# Patient Record
Sex: FEMALE | Race: WHITE | NOT HISPANIC OR LATINO | Employment: OTHER | URBAN - METROPOLITAN AREA
[De-identification: names, ages, dates, MRNs, and addresses within clinical notes are randomized per-mention and may not be internally consistent; named-entity substitution may affect disease eponyms.]

---

## 2017-07-07 ENCOUNTER — TELEPHONE (OUTPATIENT)
Dept: FAMILY MEDICINE | Facility: CLINIC | Age: 67
End: 2017-07-07

## 2017-07-07 NOTE — TELEPHONE ENCOUNTER
----- Message from Jodi Christianson sent at 7/7/2017  7:30 AM CDT -----  Contact: radha   Wants to be seen sooner, has many problems that need to be addressed   Past shingles may be causing neuropathy,   nausea, not able to eat,   Call back

## 2017-07-10 ENCOUNTER — LAB VISIT (OUTPATIENT)
Dept: LAB | Facility: HOSPITAL | Age: 67
End: 2017-07-10
Attending: FAMILY MEDICINE
Payer: MEDICARE

## 2017-07-10 ENCOUNTER — OFFICE VISIT (OUTPATIENT)
Dept: FAMILY MEDICINE | Facility: CLINIC | Age: 67
End: 2017-07-10
Payer: MEDICARE

## 2017-07-10 ENCOUNTER — DOCUMENTATION ONLY (OUTPATIENT)
Dept: FAMILY MEDICINE | Facility: CLINIC | Age: 67
End: 2017-07-10

## 2017-07-10 VITALS
OXYGEN SATURATION: 97 % | BODY MASS INDEX: 43.5 KG/M2 | TEMPERATURE: 98 F | HEIGHT: 60 IN | HEART RATE: 88 BPM | SYSTOLIC BLOOD PRESSURE: 162 MMHG | DIASTOLIC BLOOD PRESSURE: 97 MMHG | WEIGHT: 221.56 LBS

## 2017-07-10 DIAGNOSIS — J06.9 UPPER RESPIRATORY TRACT INFECTION, UNSPECIFIED TYPE: ICD-10-CM

## 2017-07-10 DIAGNOSIS — K21.9 GASTROESOPHAGEAL REFLUX DISEASE WITHOUT ESOPHAGITIS: ICD-10-CM

## 2017-07-10 DIAGNOSIS — Z29.9 PREVENTIVE MEASURE: Primary | ICD-10-CM

## 2017-07-10 DIAGNOSIS — Z29.9 PREVENTIVE MEASURE: ICD-10-CM

## 2017-07-10 DIAGNOSIS — L30.9 DERMATITIS: ICD-10-CM

## 2017-07-10 DIAGNOSIS — R94.31 ABNORMAL ECG: ICD-10-CM

## 2017-07-10 DIAGNOSIS — M32.9 SYSTEMIC LUPUS ERYTHEMATOSUS, UNSPECIFIED SLE TYPE, UNSPECIFIED ORGAN INVOLVEMENT STATUS: ICD-10-CM

## 2017-07-10 DIAGNOSIS — E66.9 OBESITY (BMI 35.0-39.9 WITHOUT COMORBIDITY): ICD-10-CM

## 2017-07-10 DIAGNOSIS — E78.5 HYPERLIPIDEMIA, UNSPECIFIED HYPERLIPIDEMIA TYPE: ICD-10-CM

## 2017-07-10 DIAGNOSIS — R11.0 NAUSEA: ICD-10-CM

## 2017-07-10 DIAGNOSIS — E03.9 HYPOTHYROIDISM, UNSPECIFIED TYPE: ICD-10-CM

## 2017-07-10 DIAGNOSIS — R13.10 DYSPHAGIA, UNSPECIFIED TYPE: ICD-10-CM

## 2017-07-10 DIAGNOSIS — R73.03 PREDIABETES: ICD-10-CM

## 2017-07-10 DIAGNOSIS — R94.31 ABNORMAL EKG: ICD-10-CM

## 2017-07-10 LAB
25(OH)D3+25(OH)D2 SERPL-MCNC: 16 NG/ML
ALBUMIN SERPL BCP-MCNC: 3.9 G/DL
ALP SERPL-CCNC: 81 U/L
ALT SERPL W/O P-5'-P-CCNC: 13 U/L
ANION GAP SERPL CALC-SCNC: 9 MMOL/L
AST SERPL-CCNC: 11 U/L
BILIRUB SERPL-MCNC: 0.5 MG/DL
BUN SERPL-MCNC: 13 MG/DL
CALCIUM SERPL-MCNC: 10.2 MG/DL
CHLORIDE SERPL-SCNC: 102 MMOL/L
CO2 SERPL-SCNC: 28 MMOL/L
CREAT SERPL-MCNC: 0.8 MG/DL
EST. GFR  (AFRICAN AMERICAN): >60 ML/MIN/1.73 M^2
EST. GFR  (NON AFRICAN AMERICAN): >60 ML/MIN/1.73 M^2
GLUCOSE SERPL-MCNC: 113 MG/DL
HCV AB SERPL QL IA: NEGATIVE
HIV 1+2 AB+HIV1 P24 AG SERPL QL IA: NEGATIVE
POTASSIUM SERPL-SCNC: 4.4 MMOL/L
PROT SERPL-MCNC: 8.2 G/DL
SODIUM SERPL-SCNC: 139 MMOL/L
T4 FREE SERPL-MCNC: 1.24 NG/DL
TSH SERPL DL<=0.005 MIU/L-ACNC: 11.51 UIU/ML

## 2017-07-10 PROCEDURE — 86592 SYPHILIS TEST NON-TREP QUAL: CPT

## 2017-07-10 PROCEDURE — 99999 PR PBB SHADOW E&M-EST. PATIENT-LVL V: CPT | Mod: PBBFAC,,, | Performed by: FAMILY MEDICINE

## 2017-07-10 PROCEDURE — 99204 OFFICE O/P NEW MOD 45 MIN: CPT | Mod: 25,S$GLB,, | Performed by: FAMILY MEDICINE

## 2017-07-10 PROCEDURE — 82306 VITAMIN D 25 HYDROXY: CPT

## 2017-07-10 PROCEDURE — 93000 ELECTROCARDIOGRAM COMPLETE: CPT | Mod: S$GLB,,, | Performed by: INTERNAL MEDICINE

## 2017-07-10 PROCEDURE — 84439 ASSAY OF FREE THYROXINE: CPT

## 2017-07-10 PROCEDURE — 96372 THER/PROPH/DIAG INJ SC/IM: CPT | Mod: S$GLB,,, | Performed by: FAMILY MEDICINE

## 2017-07-10 PROCEDURE — 80053 COMPREHEN METABOLIC PANEL: CPT

## 2017-07-10 PROCEDURE — 84443 ASSAY THYROID STIM HORMONE: CPT

## 2017-07-10 PROCEDURE — 99499 UNLISTED E&M SERVICE: CPT | Mod: S$GLB,,, | Performed by: FAMILY MEDICINE

## 2017-07-10 PROCEDURE — 36415 COLL VENOUS BLD VENIPUNCTURE: CPT | Mod: PO

## 2017-07-10 PROCEDURE — 86703 HIV-1/HIV-2 1 RESULT ANTBDY: CPT

## 2017-07-10 PROCEDURE — 86803 HEPATITIS C AB TEST: CPT

## 2017-07-10 RX ORDER — ONDANSETRON 4 MG/1
4 TABLET, FILM COATED ORAL EVERY 8 HOURS PRN
COMMUNITY
End: 2017-07-10 | Stop reason: SDUPTHER

## 2017-07-10 RX ORDER — ALBUTEROL SULFATE 90 UG/1
2 AEROSOL, METERED RESPIRATORY (INHALATION) EVERY 6 HOURS PRN
COMMUNITY

## 2017-07-10 RX ORDER — BENZONATATE 100 MG/1
100 CAPSULE ORAL 3 TIMES DAILY PRN
COMMUNITY
End: 2017-09-07

## 2017-07-10 RX ORDER — LEVOTHYROXINE SODIUM 112 UG/1
112 TABLET ORAL DAILY
COMMUNITY
End: 2017-10-03

## 2017-07-10 RX ORDER — OMEPRAZOLE 20 MG/1
20 CAPSULE, DELAYED RELEASE ORAL DAILY
Qty: 90 CAPSULE | Refills: 3 | Status: SHIPPED | OUTPATIENT
Start: 2017-07-10 | End: 2018-12-14 | Stop reason: SDUPTHER

## 2017-07-10 RX ORDER — GABAPENTIN 300 MG/1
300 CAPSULE ORAL 3 TIMES DAILY
COMMUNITY
End: 2017-09-07 | Stop reason: ALTCHOICE

## 2017-07-10 RX ORDER — BENZONATATE 200 MG/1
200 CAPSULE ORAL 3 TIMES DAILY PRN
COMMUNITY
End: 2017-08-03

## 2017-07-10 RX ORDER — ONDANSETRON 4 MG/1
4 TABLET, FILM COATED ORAL EVERY 8 HOURS PRN
Qty: 30 TABLET | Refills: 1 | Status: SHIPPED | OUTPATIENT
Start: 2017-07-10 | End: 2018-01-30 | Stop reason: SDUPTHER

## 2017-07-10 RX ORDER — TRIAMCINOLONE ACETONIDE 1 MG/G
CREAM TOPICAL 2 TIMES DAILY
COMMUNITY

## 2017-07-10 RX ORDER — IBUPROFEN 600 MG/1
600 TABLET ORAL EVERY 6 HOURS PRN
COMMUNITY
End: 2020-03-12

## 2017-07-10 RX ORDER — HYDROXYZINE HYDROCHLORIDE 25 MG/1
25 TABLET, FILM COATED ORAL 3 TIMES DAILY PRN
COMMUNITY
End: 2018-10-09 | Stop reason: ALTCHOICE

## 2017-07-10 RX ORDER — DEXAMETHASONE SODIUM PHOSPHATE 4 MG/ML
4 INJECTION, SOLUTION INTRA-ARTICULAR; INTRALESIONAL; INTRAMUSCULAR; INTRAVENOUS; SOFT TISSUE
Status: COMPLETED | OUTPATIENT
Start: 2017-07-10 | End: 2017-07-10

## 2017-07-10 RX ADMIN — DEXAMETHASONE SODIUM PHOSPHATE 4 MG: 4 INJECTION, SOLUTION INTRA-ARTICULAR; INTRALESIONAL; INTRAMUSCULAR; INTRAVENOUS; SOFT TISSUE at 08:07

## 2017-07-10 NOTE — NURSING
2 Patient identifiers used (name & ). Administered 60 mg Dexamethasone IM. Patient tolerated well. No bleeding at insertion site noted. Pain scale 0/10. Allergies reviewed. Aseptic technique maintained.

## 2017-07-10 NOTE — PROGRESS NOTES
[facility]  Progress Note    Subjective:       Patient ID: Aida Gupta is a 67 y.o. female.    Chief Complaint: Establish Care    HPI67 y.o.female with current medical history of hypothyroidism, GERD, obesity, hyperlipidemia, dysphagia, and prediabetes is here today to establish care.  Patient is currently complaining of congestion and wheezing.  Patient has significant for the past 1 week.  Patient has been taken over-the-counter medications which have not been effective in treating her symptoms.  Patient also has complaints of trouble swallowing.  Patient feels as if food gets stuck in her throat.  Patient also has burning sensation that starts at her stomach and comes up to her throat.  Patient has been taking Zantac which has provided minimal symptom relief.  Patient has not been on PPI in the past.  Patient also complaining of rash near [place].  Patient had shingles and ever since patient has had residual pain.  No further complaints at today's visit.  Review of Systems   Constitutional: Negative for chills, fatigue and fever.   HENT: Positive for congestion. Negative for ear pain, postnasal drip, sinus pressure, sneezing and sore throat.    Eyes: Negative for pain.   Respiratory: Positive for wheezing. Negative for cough and shortness of breath.    Cardiovascular: Negative for chest pain, palpitations and leg swelling.   Gastrointestinal: Negative for abdominal distention, abdominal pain, constipation, diarrhea, nausea and vomiting.   Genitourinary: Negative for difficulty urinating.   Musculoskeletal: Negative for back pain and myalgias.   Skin: Positive for rash.   Neurological: Negative for dizziness, seizures, syncope, weakness and numbness.   Psychiatric/Behavioral: Negative for sleep disturbance. The patient is not nervous/anxious.        Objective:      Vitals:    07/10/17 0707   BP: (!) 162/97   BP Location: Right arm   Patient Position: Sitting   BP Method: Automatic   Pulse: 88    Temp: 97.8 °F (36.6 °C)   TempSrc: Oral   SpO2: 97%   Weight: 100.5 kg (221 lb 9 oz)   Height: 5' (1.524 m)     Body mass index is 43.27 kg/m².  Physical Exam   Constitutional: She is oriented to person, place, and time. She appears well-developed and well-nourished.   HENT:   Head: Normocephalic and atraumatic.   Eyes: Conjunctivae and EOM are normal. Pupils are equal, round, and reactive to light.   Neck: Normal range of motion. Neck supple. No JVD present.   Cardiovascular: Normal rate, regular rhythm, normal heart sounds and intact distal pulses.  Exam reveals no gallop and no friction rub.    No murmur heard.  Pulmonary/Chest: Effort normal. No respiratory distress. She has no wheezes.   Abdominal: Soft. Bowel sounds are normal. There is no tenderness. There is no rebound and no guarding.   Musculoskeletal: Normal range of motion.   Neurological: She is alert and oriented to person, place, and time. No cranial nerve deficit.   Skin: Skin is warm and dry. Rash noted.   erythematous rash [place] near anus    Psychiatric: She has a normal mood and affect. Her behavior is normal. Judgment and thought content normal.   Nursing note and vitals reviewed.      Assessment:       1. Preventive measure    2. Hypothyroidism, unspecified type    3. Gastroesophageal reflux disease without esophagitis    4. Obesity (BMI 35.0-39.9 without comorbidity)    5. Hyperlipidemia, unspecified hyperlipidemia type    6. Prediabetes    7. Dermatitis    8. Dysphagia, unspecified type    9. Upper respiratory tract infection, unspecified type    10. Nausea    11. Abnormal EKG    12. Systemic lupus erythematosus, unspecified SLE type, unspecified organ involvement status        Plan:       Preventive measure  -     Hepatitis C antibody; Future; Expected date: 07/10/2017  -     HIV-1 and HIV-2 antibodies; Future; Expected date: 07/10/2017  -     RPR; Future; Expected date: 07/10/2017    Hypothyroidism, unspecified type  -     TSH; Future;  Expected date: 07/10/2017  -     T4, free; Future; Expected date: 07/10/2017    Gastroesophageal reflux disease without esophagitis  -     Comprehensive metabolic panel; Future; Expected date: 07/10/2017  -     omeprazole (PRILOSEC) 20 MG capsule; Take 1 capsule (20 mg total) by mouth once daily.  Dispense: 90 capsule; Refill: 3    Obesity (BMI 35.0-39.9 without comorbidity)  -     Cancel: Hemoglobin A1c; Future; Expected date: 07/10/2017  -     Cancel: Lipid panel; Future; Expected date: 07/10/2017  -     Vitamin D; Future; Expected date: 07/10/2017    Hyperlipidemia, unspecified hyperlipidemia type        - Follow up in two weeks with blood pressure log         - reassess in risk calculator for possible medication    Prediabetes        - Patient educated on diet and exercise     Dermatitis  -     Ambulatory referral to Dermatology    Dysphagia, unspecified type  -     Ambulatory referral to Gastroenterology    Upper respiratory tract infection, unspecified type  -     dexamethasone injection 4 mg; Inject 1 mL (4 mg total) into the muscle one time.    Nausea  -     ondansetron (ZOFRAN) 4 MG tablet; Take 1 tablet (4 mg total) by mouth every 8 (eight) hours as needed for Nausea.  Dispense: 30 tablet; Refill: 1    Abnormal EKG  -     Ambulatory referral to Cardiology    Systemic lupus erythematosus, unspecified SLE type, unspecified organ involvement status  -     Ambulatory referral to Rheumatology    Elevated Blood pressure        - Follow up in two weeks with blood pressure log    Patient readiness: acceptance and barriers:none    During the course of the visit the patient was educated and counseled about the following:     Obesity:   General weight loss/lifestyle modification strategies discussed (elicit support from others; identify saboteurs; non-food rewards, etc).    Goals: Obesity: Reduce calorie intake and BMI    Did patient meet goals/outcomes: Yes    The following self management tools provided: excercise  log    Patient Instructions (the written plan) was given to the patient/family.     Time spent with patient: 30 minutes        Return in about 3 weeks (around 7/31/2017).  Beni Gutierrez MD  [facility]  7/10/2017 7:40 AM

## 2017-07-10 NOTE — PATIENT INSTRUCTIONS
Weight Management: Getting Started  Healthy bodies come in all shapes and sizes. Not all bodies are made to be thin. For some people, a healthy weight is higher than the average weight listed on weight charts. Your healthcare provider can help you decide on a healthy weight for you.    Reasons to lose weight  Losing weight can help with some health problems, such as high blood pressure, heart disease, diabetes, sleep apnea, and arthritis. You may also feel more energy.  Set your long-term goal  Your goal doesn't even have to be a specific weight. You may decide on a fitness goal (such as being able to walk 10 miles a week), or a health goal (such as lowering your blood pressure). Choose a goal that is measurable and reasonable, so you know when you've reached it. A goal of reaching a BMI of less than 25 is not always reasonable (or possible).   Make an action plan  Habits dont change overnight. Setting your goals too high can leave you feeling discouraged if you cant reach them. Be realistic. Choose one or two small changes you can make now. Set an action plan for how you are going to make these changes. When you can stick to this plan, keep making a few more small changes. Taking small steps will help you stay on the path to success.  Track your progress  Write down your goals. Then, keep a daily record of your progress. Write down what you eat and how active you are. This record lets you look back on how much youve done. It may also help when youre feeling frustrated. Reward yourself for success. Even if you dont reach every goal, give yourself credit for what you do get done.  Get support  Encouragement from others can help make losing weight easier. Ask your family members and friends for support. They may even want to join you. Also look to your healthcare provider, registered dietitian, and  for help. Your local hospital can give you more information about nutrition, exercise, and  weight loss.  Date Last Reviewed: 1/31/2016 © 2000-2016 [employer]. [address] [place] [address], [place] [address]. All rights reserved. This information is not intended as a substitute for professional medical care. Always follow your healthcare professional's instructions.        Walking for Fitness  Fitness walking has something for everyone, even people who are already fit. Walking is one of the safest ways to condition your body aerobically. It can boost energy, help you lose weight, and reduce stress.    Physical benefits  · Walking strengthens your heart and lungs, and tones your muscles.  · When walking, your feet land with less impact than in other sports. This reduces chances of muscle, bone, and joint injury.  · Regular walking improves your cholesterol levels and lowers your risk of heart disease. And it helps you control your blood sugar if you have diabetes.  · Walking is a weight-bearing activity, which helps maintain bone density. This can help prevent osteoporosis.  Personal rewards  · Taking walks can help you relax and manage stress. And fitness walking may make you feel better about yourself.  · Walking can help you sleep better at night and make you less likely to be depressed.  · Regular walking may help maintain your memory as you get older.  · Walking is a great way to spend extra time with friends and family members. Be sure to invite your dog along!  Q&A about fitness walking  Q: Will walking keep me fit?  A: Yes. Regular walking at the right pace gives you all the benefits of other aerobic activities, such as jogging and swimming.  Q: Will walking help me lose weight and keep it off?  A: Yes. Per mile, walking can burn as many calories as jogging. Your health care provider can help work walking into your weight-loss plan.  Q: Is walking safe for my health?  A: Yes. Walking is safe if you have high blood pressure, diabetes, heart disease, or other conditions. Talk to your health  care provider before you start.  Date Last Reviewed: 5/9/2015  © 3820-1236 [employer]. [address] [place] [address], [place] [address]. All rights reserved. This information is not intended as a substitute for professional medical care. Always follow your healthcare professional's instructions.        Controlling High Blood Pressure  High blood pressure (hypertension) is often called the silent killer. This is because many people who have it dont know it. High blood pressure is defined as 140/90 mm Hg or higher. Know your blood pressure and remember to check it regularly. Doing so can save your life. Here are some things you can do to help control your blood pressure.    Choose heart-healthy foods  · Select low-salt, low-fat foods. Limit sodium intake to 2,400 mg per day or the amount suggested by your healthcare provider.  · Limit canned, dried, cured, packaged, and fast foods. These can contain a lot of salt.  · Eat 8 to 10 servings of fruits and vegetables every day.  · Choose lean meats, fish, or chicken.  · Eat whole-grain pasta, brown rice, and beans.  · Eat 2 to 3 servings of low-fat or fat-free dairy products.  · Ask your doctor about the DASH eating plan. This plan helps reduce blood pressure.  · When you go to a restaurant, ask that your meal be prepared with no added salt.  Maintain a healthy weight  · Ask your healthcare provider how many calories to eat a day. Then stick to that number.  · Ask your healthcare provider what weight range is healthiest for you. If you are overweight, a weight loss of only 3% to 5% of your body weight can help lower blood pressure. Generally, a good weight loss goal is to lose 10% of your body weight in a year.  · Limit snacks and sweets.  · Get regular exercise.  Get up and get active  · Choose activities you enjoy. Find ones you can do with friends or family. This includes bicycling, dancing, walking, and jogging.  · Park farther away from building  entrances.  · Use stairs instead of the elevator.  · When you can, walk or bike instead of driving.  · [place] leaves, garden, or do household repairs.  · Be active at a moderate to vigorous level of physical activity for at least 40 minutes for a minimum of 3 to 4 days a week.   Manage stress  · Make time to relax and enjoy life. Find time to laugh.  · Communicate your concerns with your loved ones and your healthcare provider.  · Visit with family and friends, and keep up with hobbies.  Limit alcohol and quit smoking  · Men should have no more than 2 drinks per day.  · Women should have no more than 1 drink per day.  · Talk with your healthcare provider about quitting smoking. Smoking significantly increases your risk for heart disease and stroke. Ask your healthcare provider about community smoking cessation programs and other options.  Medicines  If lifestyle changes arent enough, your healthcare provider may prescribe high blood pressure medicine. Take all medicines as prescribed. If you have any questions about your medicines, ask your healthcare provider before stopping or changing them.   Date Last Reviewed: 4/27/2016 © 2000-2016 [employer]. [address] [place] [address], [place] [address]. All rights reserved. This information is not intended as a substitute for professional medical care. Always follow your healthcare professional's instructions.

## 2017-07-10 NOTE — PROGRESS NOTES
Pre-Visit Chart Review  For Appointment Scheduled on 7-    Health Maintenance Due   Topic Date Due    Hepatitis C Screening  1950    Lipid Panel  1950    TETANUS VACCINE  06/10/1970    Mammogram  06/10/1992    DEXA SCAN  06/10/1992    Colonoscopy  06/10/2002    Zoster Vaccine  06/10/2012    Pneumococcal (65+) (1 of 2 - PCV13) 06/10/2017

## 2017-07-11 LAB — RPR SER QL: NORMAL

## 2017-07-14 DIAGNOSIS — R94.31 ABNORMAL ECG: Primary | ICD-10-CM

## 2017-07-20 ENCOUNTER — TELEPHONE (OUTPATIENT)
Dept: FAMILY MEDICINE | Facility: CLINIC | Age: 67
End: 2017-07-20

## 2017-07-25 ENCOUNTER — DOCUMENTATION ONLY (OUTPATIENT)
Dept: FAMILY MEDICINE | Facility: CLINIC | Age: 67
End: 2017-07-25

## 2017-07-25 NOTE — PROGRESS NOTES
Pre-Visit Chart Review  For Appointment Scheduled on 8/3/17.    Health Maintenance Due   Topic Date Due    Lipid Panel  1950    TETANUS VACCINE  06/10/1970    Mammogram  06/10/1992    DEXA SCAN  06/10/1992    Colonoscopy  06/10/2002    Zoster Vaccine  06/10/2012    Pneumococcal (65+) (1 of 2 - PCV13) 06/10/2017

## 2017-07-28 ENCOUNTER — OFFICE VISIT (OUTPATIENT)
Dept: CARDIOLOGY | Facility: CLINIC | Age: 67
End: 2017-07-28
Payer: MEDICARE

## 2017-07-28 VITALS
BODY MASS INDEX: 43.93 KG/M2 | OXYGEN SATURATION: 96 % | HEART RATE: 82 BPM | WEIGHT: 223.75 LBS | SYSTOLIC BLOOD PRESSURE: 159 MMHG | DIASTOLIC BLOOD PRESSURE: 14 MMHG | HEIGHT: 60 IN

## 2017-07-28 DIAGNOSIS — R06.00 DYSPNEA, UNSPECIFIED TYPE: ICD-10-CM

## 2017-07-28 DIAGNOSIS — E66.9 OBESITY (BMI 35.0-39.9 WITHOUT COMORBIDITY): ICD-10-CM

## 2017-07-28 DIAGNOSIS — R94.31 ABNORMAL ECG: Primary | ICD-10-CM

## 2017-07-28 DIAGNOSIS — R94.31 ABNORMAL ECG: ICD-10-CM

## 2017-07-28 PROCEDURE — 99499 UNLISTED E&M SERVICE: CPT | Mod: S$GLB,,, | Performed by: INTERNAL MEDICINE

## 2017-07-28 PROCEDURE — 99204 OFFICE O/P NEW MOD 45 MIN: CPT | Mod: S$GLB,,, | Performed by: INTERNAL MEDICINE

## 2017-07-28 PROCEDURE — 99999 PR PBB SHADOW E&M-EST. PATIENT-LVL V: CPT | Mod: PBBFAC,,, | Performed by: INTERNAL MEDICINE

## 2017-07-28 NOTE — PROGRESS NOTES
[facility]    CC: Abnormal EKG  Chief Complaint   Patient presents with    Consult     Dr. Gutierrez referral; Abnormal EKG       Patient ID: Aida Gupta is a 67 y.o. female .  HPI  She was referred with an abnormal EKG.  EKG shows incomplete right bundle-branch block.  SHe has some chest pain which is related to food.  It is mostly when she tries to swallow her food.  She also is suffering from and from shingles.  In addition she has some lab work and pending rheumatology consultation for possible lupus.  She does complain of occasional shortness of breath..    No past medical history on file.  No past surgical history on file.  Social History     Social History    Marital status:      Spouse name: N/A    Number of children: N/A    Years of education: N/A     Occupational History    Not on file.     Social History Main Topics    Smoking status: Never Smoker    Smokeless tobacco: Never Used    Alcohol use Not on file    Drug use: Unknown    Sexual activity: Not on file     Other Topics Concern    Not on file     Social History Narrative    No narrative on file     No family history on file.    Review of patient's allergies indicates:  No Known Allergies    Medication List with Changes/Refills   Current Medications    ALBUTEROL (VENTOLIN HFA) 90 MCG/ACTUATION INHALER    Inhale 2 puffs into the lungs every 6 (six) hours as needed for Wheezing. Rescue    BENZONATATE (TESSALON) 100 MG CAPSULE    Take 100 mg by mouth 3 (three) times daily as needed for Cough.    BENZONATATE (TESSALON) 200 MG CAPSULE    Take 200 mg by mouth 3 (three) times daily as needed for Cough.    FLUTICASONE PROPIONATE, MICRO (FLUTICASONE PROP, MICRO, BULK, MISC)    by Misc.(Non-Drug; Combo Route) route.    GABAPENTIN (NEURONTIN) 300 MG CAPSULE    Take 300 mg by mouth 3 (three) times daily.    HYDROXYZINE HCL (ATARAX) 25 MG TABLET    Take 25 mg by mouth 3 (three) times daily as needed for Itching.    IBUPROFEN  (ADVIL,MOTRIN) 600 MG TABLET    Take 600 mg by mouth every 6 (six) hours as needed for Pain.    LEVOTHYROXINE (SYNTHROID) 112 MCG TABLET    Take 112 mcg by mouth once daily.    LIDOCAINE 4 % GEL    Apply topically.    OMEPRAZOLE (PRILOSEC) 20 MG CAPSULE    Take 1 capsule (20 mg total) by mouth once daily.    ONDANSETRON (ZOFRAN) 4 MG TABLET    Take 1 tablet (4 mg total) by mouth every 8 (eight) hours as needed for Nausea.    TRIAMCINOLONE ACETONIDE 0.1% (KENALOG) 0.1 % CREAM    Apply topically 2 (two) times daily.       Review of Systems  Constitution: Denies chills, fever, and sweats.  HENT: Denies headaches or blurry vision.  Cardiovascular: Denies chest pain or irregular heart beat.  Respiratory:  shortness of breath.  Gastrointestinal: Denies abdominal pain, nausea, or vomiting.  Musculoskeletal: Denies muscle cramps.  Neurological: Denies dizziness or focal weakness.  Psychiatric/Behavioral: Normal mental status.  Hematologic/Lymphatic: Denies bleeding problem or easy bruising/bleeding.  Skin: Denies rash or suspicious lesions    Physical Examination  BP (!) 159/14 (BP Location: Right arm, Patient Position: Sitting)   Pulse 82   Ht 5' (1.524 m)   Wt 101.5 kg (223 lb 12.3 oz)   SpO2 96%   BMI 43.70 kg/m²     Constitutional: No acute distress, conversant  HEENT: Sclera anicteric, Pupils equal, round and reactive to light, extraocular motions intact, Oropharynx clear  Neck: No JVD, no carotid bruits  Cardiovascular: regular rate and rhythm, no murmur, rubs or gallops, normal S1/S2  Pulmonary: Clear to auscultation bilaterally  Abdominal: Abdomen soft, nontender, nondistended, positive bowel sounds  Extremities: No lower extremity edema,   Pulses:  Carotid pulses are 2+ on the right side, and 2+ on the left side.  Radial pulses are 2+ on the right side, and 2+ on the left side.   Femoral pulses are 2+ on the right side, and 2+ on the left side.      Skin: No ecchymosis, erythema, or ulcers  Psych: Alert and  oriented x 3, appropriate affect  Neuro: CNII-XII intact, no focal deficits    Labs:  Most Recent Data  CBC: No results found for: WBC, HGB, HCT, PLT, MCV, RDW  BMP:   Lab Results   Component Value Date     07/10/2017    K 4.4 07/10/2017     07/10/2017    CO2 28 07/10/2017    BUN 13 07/10/2017    CREATININE 0.8 07/10/2017     (H) 07/10/2017    CALCIUM 10.2 07/10/2017     LFTS;   Lab Results   Component Value Date    PROT 8.2 07/10/2017    ALBUMIN 3.9 07/10/2017    BILITOT 0.5 07/10/2017    AST 11 07/10/2017    ALKPHOS 81 07/10/2017    ALT 13 07/10/2017     COAGS: No results found for: INR, PROTIME, PTT  FLP: No results found for: CHOL, HDL, LDLCALC, TRIG, CHOLHDL  CARDIAC: No results found for: TROPONINI, CKMB, BNP    Imaging:    EKG: Normal sinus rhythm and complete right bundle-branch block.      I have personally reviewed these images and echo data    Assessment/Plan:  Aida was seen today for consult.    Diagnoses and all orders for this visit:    Abnormal ECG  -     EKG 12-lead    Dyspnea, unspecified type    Obesity (BMI 35.0-39.9 without comorbidity)       Incomplete right bundle branch block is A benign finding in absence of other symptoms.  We would do an baseline echocardiography for evaluation of cardiovascular hemodynamics.  We'll see her back with the results of the above.  Recommendations regarding healthy lifestyle, weight loss and exercise given to patient.      Total duration of face to face visit time 45 minutes.  Total time spent counseling greater than fifty percent of total visit time.  Counseling included discussion regarding imaging findings, diagnosis, possibilities, treatment options, risks and benefits.  The patient had many questions regarding the options and long-term effect which were all answered to my best ability.      Abdiel Avalos MD,MRCP,RPVI,FACC,FSCAI.  Interventional Cardiology   Phone [phone]

## 2017-07-28 NOTE — LETTER
July 28, 2017      Beni Gutierrez MD  [address]  [place] [address]           [place] [facility] Cardiology  [address]  [place] [address]  Phone: [phone]          Patient: Aida Gupta   MR Number: [id number]   YOB: 1950   Date of Visit: 7/28/2017       Dear Dr. Beni Gutierrez:    Thank you for referring Aida Gupta to me for evaluation. Attached you will find relevant portions of my assessment and plan of care.    If you have questions, please do not hesitate to call me. I look forward to following Aida Gupta along with you.    Sincerely,    Abdiel Avalos MD    Enclosure  CC:  No Recipients    If you would like to receive this communication electronically, please contact [email or web address] or [phone] to request more information on [employer] access.    For providers and/or their staff who would like to refer a patient to [facility], please contact us through our one-stop-shop provider referral line, [facility], at [phone].    If you feel you have received this communication in error or would no longer like to receive these types of communications, please e-mail [email or web address]

## 2017-08-03 ENCOUNTER — HOSPITAL ENCOUNTER (OUTPATIENT)
Dept: RADIOLOGY | Facility: CLINIC | Age: 67
Discharge: HOME | End: 2017-08-03
Attending: FAMILY MEDICINE
Payer: MEDICARE

## 2017-08-03 ENCOUNTER — OFFICE VISIT (OUTPATIENT)
Dept: FAMILY MEDICINE | Facility: CLINIC | Age: 67
End: 2017-08-03
Payer: MEDICARE

## 2017-08-03 VITALS
DIASTOLIC BLOOD PRESSURE: 98 MMHG | SYSTOLIC BLOOD PRESSURE: 156 MMHG | HEIGHT: 60 IN | BODY MASS INDEX: 43.76 KG/M2 | WEIGHT: 222.88 LBS

## 2017-08-03 DIAGNOSIS — E03.9 HYPOTHYROIDISM, UNSPECIFIED TYPE: ICD-10-CM

## 2017-08-03 DIAGNOSIS — B37.9 YEAST INFECTION: ICD-10-CM

## 2017-08-03 DIAGNOSIS — R05.9 COUGH: ICD-10-CM

## 2017-08-03 DIAGNOSIS — J32.9 SINUSITIS, UNSPECIFIED CHRONICITY, UNSPECIFIED LOCATION: Primary | ICD-10-CM

## 2017-08-03 DIAGNOSIS — E55.9 VITAMIN D DEFICIENCY: ICD-10-CM

## 2017-08-03 DIAGNOSIS — R73.9 HYPERGLYCEMIA: ICD-10-CM

## 2017-08-03 PROCEDURE — 96372 THER/PROPH/DIAG INJ SC/IM: CPT | Mod: S$GLB,,, | Performed by: FAMILY MEDICINE

## 2017-08-03 PROCEDURE — 99499 UNLISTED E&M SERVICE: CPT | Mod: S$GLB,,, | Performed by: FAMILY MEDICINE

## 2017-08-03 PROCEDURE — 99214 OFFICE O/P EST MOD 30 MIN: CPT | Mod: 25,S$GLB,, | Performed by: FAMILY MEDICINE

## 2017-08-03 PROCEDURE — 99999 PR PBB SHADOW E&M-EST. PATIENT-LVL III: CPT | Mod: PBBFAC,,, | Performed by: FAMILY MEDICINE

## 2017-08-03 PROCEDURE — 71020 XR CHEST PA AND LATERAL: CPT | Mod: 26,,, | Performed by: RADIOLOGY

## 2017-08-03 PROCEDURE — 3008F BODY MASS INDEX DOCD: CPT | Mod: S$GLB,,, | Performed by: FAMILY MEDICINE

## 2017-08-03 PROCEDURE — 71020 XR CHEST PA AND LATERAL: CPT | Mod: TC,PO

## 2017-08-03 RX ORDER — AMOXICILLIN AND CLAVULANATE POTASSIUM 875; 125 MG/1; MG/1
1 TABLET, FILM COATED ORAL 2 TIMES DAILY
Qty: 20 TABLET | Refills: 0 | Status: SHIPPED | OUTPATIENT
Start: 2017-08-03 | End: 2017-09-07

## 2017-08-03 RX ORDER — CODEINE PHOSPHATE AND GUAIFENESIN 10; 100 MG/5ML; MG/5ML
5 SOLUTION ORAL 3 TIMES DAILY PRN
Qty: 118 ML | Refills: 0 | Status: SHIPPED | OUTPATIENT
Start: 2017-08-03 | End: 2017-08-13

## 2017-08-03 RX ORDER — ERGOCALCIFEROL 1.25 MG/1
50000 CAPSULE ORAL
Qty: 12 CAPSULE | Refills: 0 | Status: SHIPPED | OUTPATIENT
Start: 2017-08-03 | End: 2018-01-30

## 2017-08-03 RX ORDER — NYSTATIN 100000 U/G
CREAM TOPICAL 2 TIMES DAILY
Qty: 30 G | Refills: 1 | Status: SHIPPED | OUTPATIENT
Start: 2017-08-03 | End: 2019-06-20

## 2017-08-03 RX ORDER — DEXAMETHASONE SODIUM PHOSPHATE 4 MG/ML
4 INJECTION, SOLUTION INTRA-ARTICULAR; INTRALESIONAL; INTRAMUSCULAR; INTRAVENOUS; SOFT TISSUE
Status: COMPLETED | OUTPATIENT
Start: 2017-08-03 | End: 2017-08-03

## 2017-08-03 RX ADMIN — DEXAMETHASONE SODIUM PHOSPHATE 4 MG: 4 INJECTION, SOLUTION INTRA-ARTICULAR; INTRALESIONAL; INTRAMUSCULAR; INTRAVENOUS; SOFT TISSUE at 11:08

## 2017-08-03 NOTE — PROGRESS NOTES
Injection given; RUO; patient tolerates well; Bandaid applied; Patient asked to wait 15minutes in lobby to monitor any reaction. Pain level: 0/10

## 2017-08-04 NOTE — PROGRESS NOTES
[facility]  Progress Note    Subjective:       Patient ID: Aida Gupta is a 67 y.o. female.    Chief Complaint: sinus congestion     HPI67 y.o.female is here today for abnormal lab follow-up.  Patient was found to have elevated TSH and low vitamin D.  Patient was not compliant with Synthroid.  Patient has started to her Synthroid once again.  Patient has not been on vitamin D supplementation in the past.  Patient is also complaining of sinus congestion, sinus drainage, and sinus pressure.  Patient has had symptoms ongoing for the past 1 week.  Symptoms have been progressing and severity.  Patient has been using over-the-counter medication which have not been effective in controlling her symptoms.  Review of Systems   Constitutional: Negative for chills, fatigue and fever.   HENT: Negative for congestion, ear pain, postnasal drip, sinus pressure, sneezing and sore throat.    Eyes: Negative for pain.   Respiratory: Negative for cough, shortness of breath and wheezing.    Cardiovascular: Negative for chest pain, palpitations and leg swelling.   Gastrointestinal: Negative for abdominal distention, abdominal pain, constipation, diarrhea, nausea and vomiting.   Genitourinary: Negative for difficulty urinating.   Musculoskeletal: Negative for back pain and myalgias.   Skin: Negative for rash.   Neurological: Negative for dizziness, seizures, syncope, weakness and numbness.   Psychiatric/Behavioral: Negative for sleep disturbance. The patient is not nervous/anxious.        Objective:      Vitals:    08/03/17 1055 08/03/17 1101   BP: (!) 160/94  Comment: AUTOMATED (!) 156/98  Comment: MANUAL   BP Location: Left arm Right arm   Patient Position: Sitting Sitting   Weight:  101.1 kg (222 lb 14.2 oz)   Height:  5' (1.524 m)     Body mass index is 43.53 kg/m².  Physical Exam   Constitutional: She is oriented to person, place, and time. She appears well-developed and well-nourished.   HENT:   Head: Normocephalic  and atraumatic.   Eyes: Conjunctivae and EOM are normal. Pupils are equal, round, and reactive to light.   Neck: Normal range of motion. Neck supple. No JVD present.   Cardiovascular: Normal rate, regular rhythm, normal heart sounds and intact distal pulses.  Exam reveals no gallop and no friction rub.    No murmur heard.  Pulmonary/Chest: Effort normal. No respiratory distress. She has no wheezes.   Abdominal: Soft. Bowel sounds are normal. There is no tenderness. There is no rebound and no guarding.   Musculoskeletal: Normal range of motion.   Neurological: She is alert and oriented to person, place, and time. No cranial nerve deficit.   Skin: Skin is warm and dry.   Psychiatric: She has a normal mood and affect. Her behavior is normal. Judgment and thought content normal.   Nursing note and vitals reviewed.      Assessment:       1. Sinusitis, unspecified chronicity, unspecified location    2. Hyperglycemia    3. Vitamin D deficiency    4. Hypothyroidism, unspecified type    5. Cough    6. Yeast infection        Plan:       Sinusitis, unspecified chronicity, unspecified location  -     amoxicillin-clavulanate 875-125mg (AUGMENTIN) 875-125 mg per tablet; Take 1 tablet by mouth 2 (two) times daily.  Dispense: 20 tablet; Refill: 0  -     dexamethasone injection 4 mg; Inject 1 mL (4 mg total) into the muscle one time.    Hyperglycemia  -     Hemoglobin A1c; Future; Expected date: 08/03/2017    Vitamin D deficiency  -     ergocalciferol (ERGOCALCIFEROL) 50,000 unit Cap; Take 1 capsule (50,000 Units total) by mouth every 7 days.  Dispense: 12 capsule; Refill: 0    Hypothyroidism, unspecified type  -     T4, free; Future; Expected date: 08/03/2017  -     TSH; Future; Expected date: 08/03/2017    Cough  -     guaifenesin-codeine 100-10 mg/5 ml (TUSSI-ORGANIDIN NR)  mg/5 mL syrup; Take 5 mLs by mouth 3 (three) times daily as needed for Cough.  Dispense: 118 mL; Refill: 0  -     X-Ray Chest PA And Lateral; Future;  Expected date: 08/03/2017    Yeast infection  -     nystatin (MYCOSTATIN) cream; Apply topically 2 (two) times daily.  Dispense: 30 g; Refill: 1      Return in about 4 weeks (around 8/31/2017).  Beni Gutierrez MD  [facility]  8/4/2017 1:22 PM

## 2017-08-07 ENCOUNTER — OFFICE VISIT (OUTPATIENT)
Dept: RHEUMATOLOGY | Facility: CLINIC | Age: 67
End: 2017-08-07
Payer: MEDICARE

## 2017-08-07 VITALS
DIASTOLIC BLOOD PRESSURE: 84 MMHG | HEIGHT: 61 IN | SYSTOLIC BLOOD PRESSURE: 138 MMHG | WEIGHT: 224 LBS | BODY MASS INDEX: 42.29 KG/M2

## 2017-08-07 DIAGNOSIS — M15.9 OSTEOARTHRITIS, GENERALIZED: ICD-10-CM

## 2017-08-07 DIAGNOSIS — R76.8 POSITIVE ANA (ANTINUCLEAR ANTIBODY): Primary | ICD-10-CM

## 2017-08-07 PROCEDURE — 3008F BODY MASS INDEX DOCD: CPT | Mod: ,,, | Performed by: INTERNAL MEDICINE

## 2017-08-07 PROCEDURE — 99204 OFFICE O/P NEW MOD 45 MIN: CPT | Mod: ,,, | Performed by: INTERNAL MEDICINE

## 2017-08-07 NOTE — PROGRESS NOTES
[facility] RHEUMATOLOGY        NEW PATIENT      Subjective:       Patient ID:   NAME: Aida Gupta : 1950     67 y.o. female    Referring Doc: No ref. provider found  Other Physicians:    Chief Complaint:  new pt. (abnormal blood results)      History of Present Illness:     New patient referred for positive JAVY,pos ds DNA .Tests were done in March due to fatigue for the last year.  Interrupted sleep due to generalized pain in legs.Loud snoring,has never had sleep study  No rashes,no photosensitivity  Dry eyes for yrs.No chronic  dryness of mouth,  No mucosal ulcerations.Hair thinning for 1 yr.  No Raynaud's.            ROS:   GEN: no fevers+ night sweats no  significant weight changes  +  fatigue  HEENT: +,from neck to occiput HA's, changes in vision , no mouth ulcers,  no scalp tenderness, jaw claudication  CV: no CP, SOB, PND,+ MCKEON ,no  orthopnea,no palpitations  PULM:no SOB, occ cough from allergiesNo hemoptysis, sputum or pleuritic pain  GI: no abdominal pain,+ nausea after eating vomiting,+ constipation, diarrhea, melanotic stools, BRBPR, or hematemesis, no dysphagia  : no hematuria, dysuria  NEURO:no paresthesias,+ headaches, visual disturbances, muscle weakness  SKIN:  no rashes , erythema, bruising, or swelling, no Raynauds, no photosensitivity  MUSCULOSKELETAL:no joint swelling, no prolonged AM stiffness, occ back pain   PSYCH:    Insomnia,   depression,  anxiety  RENAL: no hx proteinuria,hematuria  HEMATOLOGY; no hematologic disorders,thromboembolic events or miscarriages  Medications:    Current Outpatient Prescriptions:     albuterol (VENTOLIN HFA) 90 mcg/actuation inhaler, Inhale 2 puffs into the lungs every 6 (six) hours as needed for Wheezing. Rescue, Disp: , Rfl:     amoxicillin-clavulanate 875-125mg (AUGMENTIN) 875-125 mg per tablet, Take 1 tablet by mouth 2 (two) times daily., Disp: 20 tablet, Rfl: 0    benzonatate (TESSALON) 100 MG capsule, Take 100 mg by mouth 3 (three)  "times daily as needed for Cough., Disp: , Rfl:     ergocalciferol (ERGOCALCIFEROL) 50,000 unit Cap, Take 1 capsule (50,000 Units total) by mouth every 7 days., Disp: 12 capsule, Rfl: 0    FLUTICASONE PROPIONATE, MICRO (FLUTICASONE PROP, MICRO, BULK, MISC), by Misc.(Non-Drug; Combo Route) route., Disp: , Rfl:     gabapentin (NEURONTIN) 300 MG capsule, Take 300 mg by mouth 3 (three) times daily., Disp: , Rfl:     guaifenesin-codeine 100-10 mg/5 ml (TUSSI-ORGANIDIN NR)  mg/5 mL syrup, Take 5 mLs by mouth 3 (three) times daily as needed for Cough., Disp: 118 mL, Rfl: 0    hydrOXYzine HCl (ATARAX) 25 MG tablet, Take 25 mg by mouth 3 (three) times daily as needed for Itching., Disp: , Rfl:     ibuprofen (ADVIL,MOTRIN) 600 MG tablet, Take 600 mg by mouth every 6 (six) hours as needed for Pain., Disp: , Rfl:     levothyroxine (SYNTHROID) 112 MCG tablet, Take 112 mcg by mouth once daily., Disp: , Rfl:     nystatin (MYCOSTATIN) cream, Apply topically 2 (two) times daily., Disp: 30 g, Rfl: 1    omeprazole (PRILOSEC) 20 MG capsule, Take 1 capsule (20 mg total) by mouth once daily., Disp: 90 capsule, Rfl: 3    ondansetron (ZOFRAN) 4 MG tablet, Take 1 tablet (4 mg total) by mouth every 8 (eight) hours as needed for Nausea., Disp: 30 tablet, Rfl: 1    triamcinolone acetonide 0.1% (KENALOG) 0.1 % cream, Apply topically 2 (two) times daily., Disp: , Rfl:         FAMILY HISTORY: negative for Connective Tissue Disease except possibly mother with Rheumatoid Arthritis      PAST MEDICAL HISTORY:Hypothyroidism,GERD,Shingles one yr ago    PAST SURGICAL HISTORY:partial colectomy due to diverticulitis;cholecystectomy    SOCIAL HISTORY:Lives with     ALLERGIES:NKDA          Objective:     Vitals:  Blood pressure 138/84, height 5' 1" (1.549 m), weight 101.6 kg (224 lb).    Physical Examination:   GEN: no apparent distress, comfortable; AAOx3  SKIN: no rashes, no lesions, no sclerodactyly or induration, no Raynaud's, no " periungual erythema  HEAD: normal  EYES: no pallor, no icterus, PERRLA  ENT:  no thrush,no mucosal dryness or ulcerations  NECK: no masses, thyroid normal, trachea midline, no LAD/LN's, supple  CV:   S1 and S2 regular, no murmurs, gallop or rubs  CHEST: Normal respiratory effort;  normal breath sounds; no rubs, no wheezes, no crackles.   ABDOM: nontender and nondistended; soft; ; no rebound/guarding,no masses  MUSC/Skeletal: ROM normal; no crepitus; joints without synovitis, no deformities   EXTREM: no clubbing, cyanosis, edema, normal pulses.  NEURO: grossly intact; motorWNL; AAOx3; no tremors  PSYCH: normal mood, affect and behavior  LYMPH: normal cervical, supraclavicular            Labs:   @RESUFAST(WBC,HGB,HCT,MCV,PLT)  )@RESUFAST(NA,K,CL,CO2,GLU,BUN,Creatinine,Calcium,PROT,Albumin,Bilitot,Alkphos,AST,ALT,JAVY,Sed Rate,CRP,RF,CCP)      Radiology/Diagnostic Studies:    I have reviewed all available labs and XRay reports    Assessment/Plan:   67 y.o. female with positive JAVY,ds DNA.No other sxs or signs of CTD at this time  Labs  FU in 3 wks      PLAN:          Discussion:     I have explained all of the above in detail and the patient understands all of the current recommendation(s). I have answered all of their questions to the best of my ability and to their complete satisfaction.      I have reviewed the risks and benefits of the medication in detail with patient, who understands and wishes to proceed. Printed information regarding the disease and/or medication was also provided.        RTC 3 wks        Electronically signed by Cheri Serrato MD

## 2017-08-07 NOTE — PATIENT INSTRUCTIONS
Lupus (Systemic Lupus Erythematosus, SLE)  Lupus is a chronic (long-term) disease. It causes inflammation (swelling) in the body. It mainly affects the joints and muscles. Other parts of the body, such as the skin, kidneys, and heart may also be affected. Lupus is an autoimmune disease. This means that immune cells in the body begin attacking normal body cells. The cause of this is not known.  Common symptoms include:  · A butterfly-shaped rash across the bridge of the nose and cheeks or a disk-shaped rash on the face, neck, or chest  · Sun sensitivity (a short time in the sun may lead to severe sunburn or rash)  · Stiff, painful, or swollen joints (arthritis)  · Fatigue or depression  · Weight gain or weight loss  · Fever  Your healthcare provider may prescribe medicines such as oral steroids or medicines to suppress the immune system. Some people benefit from anti-malarial medicines as well. People with lupus are more likely to have heart disease. So, it is vital to manage other risk factors for heart disease. These include high blood pressure, smoking, and unhealthy cholesterol.   There is no cure for lupus. With good care, though, most people with the condition lead normal, active lives.   Home care  · If you were prescribed a medicine, take it as directed.  · Unless another pain medicine was prescribed, take an over-the-counter pain medicine such as acetaminophen or ibuprofen for pain. Do not take ibuprofen or other NSAID (non-steroidal anti-inflammatory) medicine if you were prescribed prednisone.  · Avoid sun exposure. Cover up with clothing. Wear sunglasses. Use sun screen (at least SPF 15).  · Get enough rest and reduce stress to help your immune system.  · Get some physical activity every day. This will help you feel your best.  · If you have high blood pressure, consider buying an automatic blood pressure machine (available at most pharmacies). Use this to monitor your blood pressure and report to your  doctor.  · Limit alcohol intake. Eat a healthy, balanced diet low in fat and cholesterol.  · If you smoke, quit. Smoking increases the risk of lupus-related complications.  Follow-up care  Follow up with your healthcare provider or as advised by our staff.  For more information contact the Lupus Foundation at [phone]  www.lupus.org  When to seek medical advice  Call your healthcare provider for any of the following:  · Increasing weakness, fainting  · Chest pain or shortness of breath or pain with breathing  · Severe headache with fever  · Seizures  · Leg swelling, redness or tenderness (sign of blood clot)  · Unusual bruising or bleeding anywhere on your body  · Blood in your stool (black or red color)  · Abdominal pain, repeated vomiting  Date Last Reviewed: 5/14/2015  © 8003-4485 [employer]. [address] [place] [address], [place] [address]. All rights reserved. This information is not intended as a substitute for professional medical care. Always follow your healthcare professional's instructions.

## 2017-08-11 ENCOUNTER — INITIAL CONSULT (OUTPATIENT)
Dept: GASTROENTEROLOGY | Facility: CLINIC | Age: 67
End: 2017-08-11
Payer: MEDICARE

## 2017-08-11 VITALS
WEIGHT: 225.31 LBS | BODY MASS INDEX: 44.23 KG/M2 | HEIGHT: 60 IN | HEART RATE: 77 BPM | RESPIRATION RATE: 20 BRPM | DIASTOLIC BLOOD PRESSURE: 92 MMHG | SYSTOLIC BLOOD PRESSURE: 151 MMHG

## 2017-08-11 DIAGNOSIS — R13.10 DYSPHAGIA, UNSPECIFIED TYPE: Primary | ICD-10-CM

## 2017-08-11 DIAGNOSIS — R12 HEARTBURN: ICD-10-CM

## 2017-08-11 DIAGNOSIS — R53.83 FATIGUE, UNSPECIFIED TYPE: ICD-10-CM

## 2017-08-11 DIAGNOSIS — Z87.19 HISTORY OF DIVERTICULITIS: ICD-10-CM

## 2017-08-11 PROCEDURE — 99205 OFFICE O/P NEW HI 60 MIN: CPT | Mod: S$GLB,,, | Performed by: INTERNAL MEDICINE

## 2017-08-11 PROCEDURE — 99999 PR PBB SHADOW E&M-EST. PATIENT-LVL III: CPT | Mod: PBBFAC,,, | Performed by: INTERNAL MEDICINE

## 2017-08-11 PROCEDURE — 99499 UNLISTED E&M SERVICE: CPT | Mod: S$GLB,,, | Performed by: INTERNAL MEDICINE

## 2017-08-11 PROCEDURE — 3008F BODY MASS INDEX DOCD: CPT | Mod: S$GLB,,, | Performed by: INTERNAL MEDICINE

## 2017-08-11 RX ORDER — MIRTAZAPINE 15 MG/1
15 TABLET, FILM COATED ORAL NIGHTLY
Qty: 30 TABLET | Refills: 2 | Status: SHIPPED | OUTPATIENT
Start: 2017-08-11 | End: 2020-03-12

## 2017-08-11 NOTE — PROGRESS NOTES
[facility]     CC: Dysphagia    HPI 67 y.o. female with history of hypothyroidism and fatigue, presents with chronic, intermittent, painless dysphagia to solids (primariliy rice and meats), not associated with weight loss or odynophagia. She describes substernal sensation of food sticking, however has not had nausea, vomiting or regurgitation. Of note she has had severe fatigue and generalized weakness for ~1 year since having a severe shingles outbreak, and is currently undergoing work-up for ? Rheumatologic disease (+ JAVY, + anti-DS DNA). She has not had prior endoscopy or radiographic imaging. She does not frequent heartburn which has improved with daily PPI (this has also somewhat improved her dysphagia). She also has intermittent back pain.     Her  is present and provides additional history, including onset of her symptoms. He also states she had 18 inches of her colon removed several years ago due to diverticulitis with abscess. She has no prior EGD or colonoscopy.     Patient is tearful at times due to concern over fatigue. She notes she has trouble both falling asleep and staying asleep.     Past Medical History:   Diagnosis Date    Shingles     1 year ago      Surgical History:  -Cholecystectomy  -Partial colectomy    Social History   Substance Use Topics    Smoking status: Never Smoker    Smokeless tobacco: Never Used    Alcohol use No   -Denies illicit drug use  -Lives with     Family History:  -No family history of esophageal, gastric or colon cancer    Review of Systems  General ROS: negative for - chills, fever or weight loss  Psychological ROS: negative for - hallucination or suicidal ideation; + depression  Ophthalmic ROS: negative for - blurry vision, photophobia or eye pain  ENT ROS: negative for - epistaxis, sore throat or rhinorrhea  Respiratory ROS: no cough, shortness of breath, or wheezing  Cardiovascular ROS: no chest pain or dyspnea on  exertion  Gastrointestinal ROS: + dysphagia to solids, no blood in stool, no changes in bowel habits  Genito-Urinary ROS: no dysuria, trouble voiding, or hematuria  Musculoskeletal ROS: positivee for - arthralgia, myalgia, weakness  Neurological ROS: no syncope or seizures; no ataxia  Dermatological ROS: negative for pruritis, rash and jaundice    Physical Examination  BP (!) 151/92   Pulse 77   Resp 20   Ht 5' (1.524 m)   Wt 102.2 kg (225 lb 5 oz)   BMI 44.00 kg/m²   General appearance: alert, cooperative, tearful at times  HENT: Normocephalic, atraumatic, neck symmetrical, no nasal discharge   Eyes: conjunctivae/corneas clear, PERRL, EOM's intact, sclera anicteric  Lungs: clear to auscultation bilaterally, no dullness to percussion bilaterally, symmetric expansion, breathing unlabored  Heart: regular rate and rhythm without rub; no displacement of the PMI   Abdomen: obese, soft, nontender, nondistended  Extremities: extremities symmetric; no clubbing, cyanosis, or edema  Integument: Skin color, texture, turgor normal; no rashes; hair distrubution normal, no jaundice  Neurologic: Alert and oriented X 3, no focal sensory or motor neurologic deficits  Psychiatric: no pressured speech; sad affect; no evidence of impaired cognition    Labs:  July 2017- LFTs- normal, HCV Ab- NR, HIV- NR    Imaging:  CXR was independently visualized and reviewed by me and showed clear lungs, no abnormalities.    Assessment:   67 y.o. female with 1 year of severe fatigue and generalized weakness after shingles undergoing rheumatologic work-up, presents with chronic dysphagia to solids and heartburn.     Plan:  1.Dysphagia:  -EGD (+/- biopsies pending endoscopic appearance)  -Barium esophagram    2.Heartburn  -Continue PPI    3.Screening  -Needs colonoscopy, however hesitant as has gluteal area of prior shingles that have healed. She has appointment with dermatology in next several weeks, and she would like to discuss with them prior  to scheduling procedure. Will re-discuss at next appointment.    4.Insomnia  -Trial of Emil Warren MD  [facility]  [address], [address]  [facility] [place] [address]  Office: [phone]  Fax: [phone]

## 2017-08-11 NOTE — LETTER
August 11, 2017      Beni Gutierrez MD  [address]  [place] [address]           [place] [facility]  [address]  [place] [address]  Phone: [phone]          Patient: Aida Gupta   MR Number: [id number]   YOB: 1950   Date of Visit: 8/11/2017       Dear Dr. Beni Gutierrez:    Thank you for referring Aida Gupta to me for evaluation. Attached you will find relevant portions of my assessment and plan of care.    If you have questions, please do not hesitate to call me. I look forward to following Aida Gupta along with you.    Sincerely,    Peyton Warren MD    Enclosure  CC:  No Recipients    If you would like to receive this communication electronically, please contact [email or web address] or [phone] to request more information on [employer] Link access.    For providers and/or their staff who would like to refer a patient to [facility], please contact us through our one-stop-shop provider referral line, [facility], at [phone].    If you feel you have received this communication in error or would no longer like to receive these types of communications, please e-mail [email or web address]

## 2017-08-14 ENCOUNTER — CLINICAL SUPPORT (OUTPATIENT)
Dept: CARDIOLOGY | Facility: CLINIC | Age: 67
End: 2017-08-14
Payer: MEDICARE

## 2017-08-14 DIAGNOSIS — R94.31 ABNORMAL ECG: ICD-10-CM

## 2017-08-14 PROCEDURE — 93306 TTE W/DOPPLER COMPLETE: CPT | Mod: S$GLB,,, | Performed by: INTERNAL MEDICINE

## 2017-08-15 LAB
ALBUMIN SERPL-MCNC: 3.9 G/DL (ref 3.6–5.1)
ALBUMIN/GLOB SERPL: 1.4 (CALC) (ref 1–2.5)
ALP SERPL-CCNC: 74 U/L (ref 33–130)
ALT SERPL-CCNC: 16 U/L (ref 6–29)
ANA SER QL IF: NEGATIVE
AST SERPL-CCNC: 14 U/L (ref 10–35)
B2 GLYCOPROT1 IGA SER-ACNC: <9 SAU
B2 GLYCOPROT1 IGG SER-ACNC: <9 SGU
B2 GLYCOPROT1 IGM SER-ACNC: <9 SMU
BASOPHILS # BLD AUTO: 41 CELLS/UL (ref 0–200)
BASOPHILS NFR BLD AUTO: 0.4 %
BILIRUB SERPL-MCNC: 0.4 MG/DL (ref 0.2–1.2)
BUN SERPL-MCNC: 14 MG/DL (ref 7–25)
BUN/CREAT SERPL: NORMAL (CALC) (ref 6–22)
C3 SERPL-MCNC: 164 MG/DL (ref 90–180)
C4 SERPL-MCNC: 32 MG/DL (ref 16–47)
CALCIUM SERPL-MCNC: 9.1 MG/DL (ref 8.6–10.4)
CARDIOLIPIN IGA SER IA-ACNC: <11 APL
CARDIOLIPIN IGG SER IA-ACNC: <14 GPL
CARDIOLIPIN IGM SER IA-ACNC: <12 MPL
CHLORIDE SERPL-SCNC: 104 MMOL/L (ref 98–110)
CO2 SERPL-SCNC: 26 MMOL/L (ref 20–31)
CREAT SERPL-MCNC: 0.56 MG/DL (ref 0.5–0.99)
CRP SERPL-MCNC: 1.75 MG/DL
EOSINOPHIL # BLD AUTO: 268 CELLS/UL (ref 15–500)
EOSINOPHIL NFR BLD AUTO: 2.6 %
ERYTHROCYTE [DISTWIDTH] IN BLOOD BY AUTOMATED COUNT: 13.5 % (ref 11–15)
GFR SERPL CREATININE-BSD FRML MDRD: 98 ML/MIN/1.73M2
GLOBULIN SER CALC-MCNC: 2.8 G/DL (CALC) (ref 1.9–3.7)
GLUCOSE SERPL-MCNC: 99 MG/DL (ref 65–99)
HBV SURFACE AG SERPL QL IA: NORMAL
HCT VFR BLD AUTO: 41 % (ref 35–45)
HCV AB S/CO SERPL IA: 0.03
HCV AB SERPL QL IA: NORMAL
HGB BLD-MCNC: 13.1 G/DL (ref 11.7–15.5)
INTERPRETATION: NORMAL
LYMPHOCYTES # BLD AUTO: 1761 CELLS/UL (ref 850–3900)
LYMPHOCYTES NFR BLD AUTO: 17.1 %
MCH RBC QN AUTO: 28.5 PG (ref 27–33)
MCHC RBC AUTO-ENTMCNC: 32 G/DL (ref 32–36)
MCV RBC AUTO: 89.1 FL (ref 80–100)
MONOCYTES # BLD AUTO: 567 CELLS/UL (ref 200–950)
MONOCYTES NFR BLD AUTO: 5.5 %
NEUTROPHILS # BLD AUTO: 7663 CELLS/UL (ref 1500–7800)
NEUTROPHILS NFR BLD AUTO: 74.4 %
PLATELET # BLD AUTO: 315 THOUSAND/UL (ref 140–400)
PMV BLD REES-ECKER: 8.9 FL (ref 7.5–12.5)
POTASSIUM SERPL-SCNC: 4.3 MMOL/L (ref 3.5–5.3)
PROT SERPL-MCNC: 6.7 G/DL (ref 6.1–8.1)
PS IGA SER-ACNC: <20 U/ML
PS IGG SER-ACNC: <10 U/ML
PS IGM SER-ACNC: <25 U/ML
RBC # BLD AUTO: 4.6 MILLION/UL (ref 3.8–5.1)
SODIUM SERPL-SCNC: 140 MMOL/L (ref 135–146)
T4 FREE SERPL-MCNC: 1.3 NG/DL (ref 0.8–1.8)
THYROGLOB AB SERPL-ACNC: <1 IU/ML
THYROPEROXIDASE AB SERPL-ACNC: 17 IU/ML
TSH SERPL-ACNC: 5.07 MIU/L (ref 0.4–4.5)
URATE SERPL-MCNC: 5.2 MG/DL (ref 2.5–7)
WBC # BLD AUTO: 10.3 THOUSAND/UL (ref 3.8–10.8)

## 2017-08-16 LAB
DIASTOLIC DYSFUNCTION: YES
ESTIMATED PA SYSTOLIC PRESSURE: 29.59
MITRAL VALVE REGURGITATION: ABNORMAL
RETIRED EF AND QEF - SEE NOTES: 55 (ref 55–65)
TRICUSPID VALVE REGURGITATION: ABNORMAL

## 2017-08-21 ENCOUNTER — HOSPITAL ENCOUNTER (OUTPATIENT)
Dept: RADIOLOGY | Facility: HOSPITAL | Age: 67
Discharge: HOME | End: 2017-08-21
Attending: INTERNAL MEDICINE
Payer: MEDICARE

## 2017-08-21 DIAGNOSIS — R13.10 DYSPHAGIA, UNSPECIFIED TYPE: ICD-10-CM

## 2017-08-21 PROCEDURE — 74220 X-RAY XM ESOPHAGUS 1CNTRST: CPT | Mod: 26,,, | Performed by: RADIOLOGY

## 2017-08-21 PROCEDURE — 74220 X-RAY XM ESOPHAGUS 1CNTRST: CPT | Mod: TC

## 2017-08-29 ENCOUNTER — DOCUMENTATION ONLY (OUTPATIENT)
Dept: FAMILY MEDICINE | Facility: CLINIC | Age: 67
End: 2017-08-29

## 2017-08-29 NOTE — PROGRESS NOTES
Pre-Visit Chart Review  For Appointment Scheduled on 9/1/17.    Health Maintenance Due   Topic Date Due    Lipid Panel  1950    TETANUS VACCINE  06/10/1970    Mammogram  06/10/1992    DEXA SCAN  06/10/1992    Colonoscopy  06/10/2002    Zoster Vaccine  06/10/2012    Pneumococcal (65+) (1 of 2 - PCV13) 06/10/2017    Influenza Vaccine  08/01/2017

## 2017-08-30 ENCOUNTER — TELEPHONE (OUTPATIENT)
Dept: FAMILY MEDICINE | Facility: CLINIC | Age: 67
End: 2017-08-30

## 2017-08-30 DIAGNOSIS — Z78.0 ASYMPTOMATIC MENOPAUSAL STATE: ICD-10-CM

## 2017-08-30 DIAGNOSIS — Z12.11 SCREENING FOR COLON CANCER: Primary | ICD-10-CM

## 2017-08-30 NOTE — TELEPHONE ENCOUNTER
Left message for patient to call back regarding appointment time. Will need patient to come in for an earlier time.

## 2017-09-06 ENCOUNTER — HOSPITAL ENCOUNTER (OUTPATIENT)
Facility: HOSPITAL | Age: 67
Discharge: HOME | End: 2017-09-06
Attending: INTERNAL MEDICINE | Admitting: INTERNAL MEDICINE
Payer: MEDICARE

## 2017-09-06 ENCOUNTER — ANESTHESIA EVENT (OUTPATIENT)
Dept: ENDOSCOPY | Facility: HOSPITAL | Age: 67
End: 2017-09-06
Payer: MEDICARE

## 2017-09-06 ENCOUNTER — ANESTHESIA (OUTPATIENT)
Dept: ENDOSCOPY | Facility: HOSPITAL | Age: 67
End: 2017-09-06
Payer: MEDICARE

## 2017-09-06 ENCOUNTER — SURGERY (OUTPATIENT)
Age: 67
End: 2017-09-06

## 2017-09-06 VITALS — RESPIRATION RATE: 38 BRPM

## 2017-09-06 DIAGNOSIS — R13.10 DYSPHAGIA: ICD-10-CM

## 2017-09-06 DIAGNOSIS — K22.2 LOWER ESOPHAGEAL RING: Primary | ICD-10-CM

## 2017-09-06 PROBLEM — K31.7 GASTRIC POLYP: Status: ACTIVE | Noted: 2017-09-06

## 2017-09-06 PROBLEM — K29.70 GASTRITIS DETERMINED BY ENDOSCOPY: Status: ACTIVE | Noted: 2017-09-06

## 2017-09-06 PROCEDURE — 25000003 PHARM REV CODE 250: Performed by: INTERNAL MEDICINE

## 2017-09-06 PROCEDURE — 43248 EGD GUIDE WIRE INSERTION: CPT | Performed by: INTERNAL MEDICINE

## 2017-09-06 PROCEDURE — 43248 EGD GUIDE WIRE INSERTION: CPT | Mod: ,,, | Performed by: INTERNAL MEDICINE

## 2017-09-06 PROCEDURE — D9220A PRA ANESTHESIA: Mod: CRNA,,, | Performed by: NURSE ANESTHETIST, CERTIFIED REGISTERED

## 2017-09-06 PROCEDURE — 88305 TISSUE EXAM BY PATHOLOGIST: CPT | Mod: 26,,,

## 2017-09-06 PROCEDURE — 63600175 PHARM REV CODE 636 W HCPCS: Performed by: NURSE ANESTHETIST, CERTIFIED REGISTERED

## 2017-09-06 PROCEDURE — 43239 EGD BIOPSY SINGLE/MULTIPLE: CPT | Performed by: INTERNAL MEDICINE

## 2017-09-06 PROCEDURE — 27201012 HC FORCEPS, HOT/COLD, DISP: Performed by: INTERNAL MEDICINE

## 2017-09-06 PROCEDURE — 37000009 HC ANESTHESIA EA ADD 15 MINS: Performed by: INTERNAL MEDICINE

## 2017-09-06 PROCEDURE — D9220A PRA ANESTHESIA: Mod: ANES,,, | Performed by: ANESTHESIOLOGY

## 2017-09-06 PROCEDURE — 37000008 HC ANESTHESIA 1ST 15 MINUTES: Performed by: INTERNAL MEDICINE

## 2017-09-06 PROCEDURE — 88305 TISSUE EXAM BY PATHOLOGIST: CPT

## 2017-09-06 PROCEDURE — 43239 EGD BIOPSY SINGLE/MULTIPLE: CPT | Mod: 51,,, | Performed by: INTERNAL MEDICINE

## 2017-09-06 PROCEDURE — 88342 IMHCHEM/IMCYTCHM 1ST ANTB: CPT | Mod: 26,,,

## 2017-09-06 RX ORDER — PROPOFOL 10 MG/ML
VIAL (ML) INTRAVENOUS
Status: DISCONTINUED | OUTPATIENT
Start: 2017-09-06 | End: 2017-09-06

## 2017-09-06 RX ORDER — LIDOCAINE HYDROCHLORIDE 20 MG/ML
INJECTION, SOLUTION EPIDURAL; INFILTRATION; INTRACAUDAL; PERINEURAL
Status: DISCONTINUED
Start: 2017-09-06 | End: 2017-09-06 | Stop reason: HOSPADM

## 2017-09-06 RX ORDER — SODIUM CHLORIDE 9 MG/ML
INJECTION, SOLUTION INTRAVENOUS CONTINUOUS
Status: DISCONTINUED | OUTPATIENT
Start: 2017-09-06 | End: 2017-09-06

## 2017-09-06 RX ORDER — LIDOCAINE HCL/PF 100 MG/5ML
SYRINGE (ML) INTRAVENOUS
Status: DISCONTINUED | OUTPATIENT
Start: 2017-09-06 | End: 2017-09-06

## 2017-09-06 RX ORDER — PROPOFOL 10 MG/ML
INJECTION, EMULSION INTRAVENOUS
Status: DISCONTINUED
Start: 2017-09-06 | End: 2017-09-06 | Stop reason: HOSPADM

## 2017-09-06 RX ADMIN — LIDOCAINE HYDROCHLORIDE 75 MG: 20 INJECTION, SOLUTION INTRAVENOUS at 08:09

## 2017-09-06 RX ADMIN — PROPOFOL 50 MG: 10 INJECTION, EMULSION INTRAVENOUS at 08:09

## 2017-09-06 RX ADMIN — PROPOFOL 100 MG: 10 INJECTION, EMULSION INTRAVENOUS at 08:09

## 2017-09-06 RX ADMIN — SODIUM CHLORIDE: 0.9 INJECTION, SOLUTION INTRAVENOUS at 08:09

## 2017-09-06 NOTE — ANESTHESIA POSTPROCEDURE EVALUATION
Anesthesia Post Evaluation    Patient: Aida Gupta    Procedure(s) Performed: Procedure(s) (LRB):  ESOPHAGOGASTRODUODENOSCOPY (EGD) (N/A)    Final Anesthesia Type: general  Patient location during evaluation: PACU  Patient participation: Yes- Able to Participate  Level of consciousness: awake and alert and oriented  Post-procedure vital signs: reviewed and stable  Pain management: adequate  Airway patency: patent  PONV status at discharge: No PONV  Anesthetic complications: no      Cardiovascular status: blood pressure returned to baseline  Respiratory status: unassisted, spontaneous ventilation and room air  Hydration status: euvolemic  Follow-up not needed.        Visit Vitals  BP (!) 146/85   Pulse 62   Temp 36.7 °C (98 °F) (Temporal)   Resp 18   Ht 5' (1.524 m)   Wt 95.3 kg (210 lb)   SpO2 96%   BMI 41.01 kg/m²       Pain/Sayra Score: Pain Assessment Performed: Yes (9/6/2017  9:00 AM)  Presence of Pain: denies (9/6/2017  9:45 AM)  Sayra Score: 10 (9/6/2017  9:45 AM)

## 2017-09-06 NOTE — TRANSFER OF CARE
Anesthesia Transfer of Care Note    Patient: Aida Gupta    Procedure(s) Performed: Procedure(s) (LRB):  ESOPHAGOGASTRODUODENOSCOPY (EGD) (N/A)    Patient location: PACU    Anesthesia Type: general    Transport from OR: Transported from OR on room air with adequate spontaneous ventilation    Post pain: adequate analgesia    Post assessment: no apparent anesthetic complications and tolerated procedure well    Post vital signs: stable    Level of consciousness: awake, alert and oriented    Nausea/Vomiting: no nausea/vomiting    Complications: none    Transfer of care protocol was followed      Last vitals:   Visit Vitals  BP (!) 177/104   Pulse 68   Temp 36.6 °C (97.9 °F) (Oral)   Resp 19   Ht 5' (1.524 m)   Wt 95.3 kg (210 lb)   SpO2 98%   BMI 41.01 kg/m²

## 2017-09-06 NOTE — H&P
The patient has been examined and the H&P has been reviewed:  I concur with the findings and no changes have occurred since H&P was written.    Patient cleared for Anesthesia: MAC    Anesthesia/Surgery risks, benefits and alternative options discussed and understood by patient/family.    There are no hospital problems to display for this patient.

## 2017-09-06 NOTE — DISCHARGE INSTRUCTIONS
Gastritis (Adult)    Gastritis is inflammation and irritation of the stomach lining. It can be present for a short time (acute) or be long lasting (chronic). Gastritis is often caused by infection with bacteria called H pylori. More than a third of people in the [place] have this bacteria in their bodies. In many cases, H pylori causes no problems or symptoms. In some people, though, the infection irritates the stomach lining and causes gastritis. Other causes of stomach irritation include drinking alcohol or taking pain-relieving medicines called NSAIDs (such as aspirin or ibuprofen).   Symptoms of gastritis can include:  · Abdominal pain or bloating  · Loss of appetite  · Nausea or vomiting  · Vomiting blood or having black stools  · Feeling more tired than usual  An inflamed and irritated stomach lining is more likely to develop a sore called an ulcer. To help prevent this, gastritis should be treated.  Home care  If needed, medicines may be prescribed. If you have H pylori infection, treating it will likely relieve your symptoms. Other changes can help reduce stomach irritation and help it heal.  · If you have been prescribed medicines for H pylori infection, take them as directed. Take all of the medicine until it is finished or your healthcare provider tells you to stop, even if you feel better.  · Your healthcare provider may recommend avoiding NSAIDs. If you take daily aspirin for your heart or other medical reasons, do not stop without talking to your healthcare provider first.  · Avoid drinking alcohol.  · Stop smoking. Smoking can irritate the stomach and delay healing. As much as possible, stay away from second hand smoke.  Follow-up care  Follow up with your healthcare provider, or as advised by our staff. Testing may be needed to check for inflammation or an ulcer.  When to seek medical advice  Call your healthcare provider for any of the following:  · Stomach pain that gets worse or moves to the lower  right abdomen (appendix area)  · Chest pain that appears or gets worse, or spreads to the back, neck, shoulder, or arm  · Frequent vomiting (cant keep down liquids)  · Blood in the stool or vomit (red or black in color)  · Feeling weak or dizzy  · Fever of 100.4ºF (38ºC) or higher, or as directed by your healthcare provider  Date Last Reviewed: 6/22/2015 © 2000-2016 [employer]. [address] [place] [address], [place] [address]. All rights reserved. This information is not intended as a substitute for professional medical care. Always follow your healthcare professional's instructions.        Anesthesia: Monitored Anesthesia Care (MAC)  Youre due to have surgery. During surgery, youll be given medication called anesthesia. This will keep you comfortable and pain-free. Your surgeon will use monitored anesthesia care (MAC). This sheet tells you more about this type of anesthesia.    What is monitored anesthesia care?  MAC keeps you very drowsy during surgery. You may be awake, but you will likely not remember much. And you wont feel pain. With MAC, medications are given through an IV line into a vein in your arm or hand. A local anesthetic will usually be injected into the skin and muscle around the surgical site to numb it. The anesthesia provider monitors you during the procedure. He or she checks your heart rate and rhythm, blood pressure, and blood oxygen level.  Anesthesia tools and medications that may be near you during your procedure  You will likely have:  · A pulse oximeter on the end of your finger. This measures your blood oxygen level.  · Electrocardiography leads (electrodes) on your chest. These record your heart rate and rhythm.  · Medications given through an IV. These relax you and prevent pain. You may be awake or sleep lightly. If you have local anesthetic, it is injected directly into your skin.  · A facemask to give you oxygen, if needed.  Risks and Possible Complications  MAC has some  risks. These include:  · Breathing problems  · Nausea and vomiting  · Allergic reaction to the anesthetic    Anesthesia safety  · Follow all instructions you are given for how long not to eat or drink before your procedure.  · Be sure your doctor knows what medications you take, especially any anti-inflammatory medication or blood thinners. This includes aspirin and any other over-the-counter medications, herbs, and supplements.  · Have an adult family member or friend drive you home after the procedure.  · For the first 24 hours after your surgery:  ¨ Do not drive or use heavy equipment.  ¨ Do not make important decisions or sign documents.  ¨ Avoid alcohol.  ¨ Have someone stay with you, if possible. They can watch for problems and help keep you safe.  Date Last Reviewed: 10/16/2014  © 1594-1546 [employer] [address] [place] [address], [place] [address]. All rights reserved. This information is not intended as a substitute for professional medical care. Always follow your healthcare professional's instructions.

## 2017-09-07 ENCOUNTER — OFFICE VISIT (OUTPATIENT)
Dept: DERMATOLOGY | Facility: CLINIC | Age: 67
End: 2017-09-07
Payer: MEDICARE

## 2017-09-07 VITALS
OXYGEN SATURATION: 96 % | WEIGHT: 210 LBS | RESPIRATION RATE: 18 BRPM | HEART RATE: 62 BPM | TEMPERATURE: 98 F | SYSTOLIC BLOOD PRESSURE: 146 MMHG | DIASTOLIC BLOOD PRESSURE: 85 MMHG | HEIGHT: 60 IN | BODY MASS INDEX: 41.23 KG/M2

## 2017-09-07 VITALS — HEIGHT: 60 IN | BODY MASS INDEX: 41.23 KG/M2 | WEIGHT: 210 LBS

## 2017-09-07 DIAGNOSIS — B02.29 POST HERPETIC NEURALGIA: Primary | ICD-10-CM

## 2017-09-07 PROCEDURE — 99201 PR OFFICE/OUTPT VISIT,NEW,LEVL I: CPT | Mod: S$GLB,,, | Performed by: DERMATOLOGY

## 2017-09-07 PROCEDURE — 99499 UNLISTED E&M SERVICE: CPT | Mod: S$GLB,,, | Performed by: DERMATOLOGY

## 2017-09-07 PROCEDURE — 3008F BODY MASS INDEX DOCD: CPT | Mod: S$GLB,,, | Performed by: DERMATOLOGY

## 2017-09-07 PROCEDURE — 99999 PR PBB SHADOW E&M-EST. PATIENT-LVL II: CPT | Mod: PBBFAC,,, | Performed by: DERMATOLOGY

## 2017-09-07 RX ORDER — AMITRIPTYLINE HYDROCHLORIDE 25 MG/1
TABLET, FILM COATED ORAL
Qty: 30 TABLET | Refills: 0 | Status: SHIPPED | OUTPATIENT
Start: 2017-09-07 | End: 2018-08-16 | Stop reason: SDUPTHER

## 2017-09-07 NOTE — LETTER
September 7, 2017      Beni Gutierrez MD  [address]  [place] [address]           [place] [facility]  [address]  [place] [address]  Phone: [phone]          Patient: Aida Gupta   MR Number: [id number]   YOB: 1950   Date of Visit: 9/7/2017       Dear Dr. Beni Gutierrez:    Thank you for referring Aida Gupta to me for evaluation. Attached you will find relevant portions of my assessment and plan of care.    If you have questions, please do not hesitate to call me. I look forward to following Aida Gupta along with you.    Sincerely,    Adelina Wu MD    Enclosure  CC:  No Recipients    If you would like to receive this communication electronically, please contact [email or web address] or [phone] to request more information on [facility] Link access.    For providers and/or their staff who would like to refer a patient to [facility], please contact us through our one-stop-shop provider referral line, [facility], at [phone].    If you feel you have received this communication in error or would no longer like to receive these types of communications, please e-mail [email or web address]

## 2017-09-07 NOTE — PROGRESS NOTES
"  Subjective:       Patient ID:  Aida Gupta is a 67 y.o. female who presents for   Chief Complaint   Patient presents with    Spot     R buttock      Initial visit  H/o shingles on R buttock 1 year ago, lasted months, had to wear catheter   "went away, never fully, now buttock stays raw", neuropathy to area and leg  Has neurontin rx, rarely taking "makes me groggy", even one QHS tablet lingers next am  Very painful    Current Outpatient Prescriptions:     albuterol (VENTOLIN HFA) 90 mcg/actuation inhaler, Inhale 2 puffs into the lungs every 6 (six) hours as needed for Wheezing. Rescue, Disp: , Rfl:     ergocalciferol (ERGOCALCIFEROL) 50,000 unit Cap, Take 1 capsule (50,000 Units total) by mouth every 7 days., Disp: 12 capsule, Rfl: 0    FLUTICASONE PROPIONATE, MICRO (FLUTICASONE PROP, MICRO, BULK, MISC), by Misc.(Non-Drug; Combo Route) route., Disp: , Rfl:     gabapentin (NEURONTIN) 300 MG capsule, Take 300 mg by mouth 3 (three) times daily., Disp: , Rfl:     hydrOXYzine HCl (ATARAX) 25 MG tablet, Take 25 mg by mouth 3 (three) times daily as needed for Itching., Disp: , Rfl:     ibuprofen (ADVIL,MOTRIN) 600 MG tablet, Take 600 mg by mouth every 6 (six) hours as needed for Pain., Disp: , Rfl:     levothyroxine (SYNTHROID) 112 MCG tablet, Take 112 mcg by mouth once daily., Disp: , Rfl:     mirtazapine (REMERON) 15 MG tablet, Take 1 tablet (15 mg total) by mouth every evening., Disp: 30 tablet, Rfl: 2    nystatin (MYCOSTATIN) cream, Apply topically 2 (two) times daily., Disp: 30 g, Rfl: 1    omeprazole (PRILOSEC) 20 MG capsule, Take 1 capsule (20 mg total) by mouth once daily., Disp: 90 capsule, Rfl: 3    ondansetron (ZOFRAN) 4 MG tablet, Take 1 tablet (4 mg total) by mouth every 8 (eight) hours as needed for Nausea., Disp: 30 tablet, Rfl: 1    triamcinolone acetonide 0.1% (KENALOG) 0.1 % cream, Apply topically 2 (two) times daily., Disp: , Rfl:   No current facility-administered medications for " this visit.         Spot  - Initial  Affected locations: right buttock  Duration: 1 year  Signs / symptoms: pain  Severity: moderate  Timing: constant  Aggravated by: pressure and friction  Treatments tried: triamcinolone cream, nystatin cream.  Improvement on treatment: mild        Review of Systems   Constitutional: Negative for fever, chills and night sweats.   Skin: Negative for daily sunscreen use, activity-related sunscreen use and wears hat.   Hematologic/Lymphatic: Does not bruise/bleed easily.        Objective:    Physical Exam   Constitutional: She appears well-developed and well-nourished. No distress.   Neurological: She is alert and oriented to person, place, and time. She is not disoriented.   Psychiatric: She has a normal mood and affect.   Skin:   Areas Examined (abnormalities noted in diagram):   Genitals / Buttocks / Groin Inspection Performed  RLE Inspected  LLE Inspection Performed              Diagram Legend     Erythematous scaling macule/papule c/w actinic keratosis       Vascular papule c/w angioma      Pigmented verrucoid papule/plaque c/w seborrheic keratosis      Yellow umbilicated papule c/w sebaceous hyperplasia      Irregularly shaped tan macule c/w lentigo     1-2 mm smooth white papules consistent with Milia      Movable subcutaneous cyst with punctum c/w epidermal inclusion cyst      Subcutaneous movable cyst c/w pilar cyst      Firm pink to brown papule c/w dermatofibroma      Pedunculated fleshy papule(s) c/w skin tag(s)      Evenly pigmented macule c/w junctional nevus     Mildly variegated pigmented, slightly irregular-bordered macule c/w mildly atypical nevus      Flesh colored to evenly pigmented papule c/w intradermal nevus       Pink pearly papule/plaque c/w basal cell carcinoma      Erythematous hyperkeratotic cursted plaque c/w SCC      Surgical scar with no sign of skin cancer recurrence      Open and closed comedones      Inflammatory papules and pustules      Verrucoid  papule consistent consistent with wart     Erythematous eczematous patches and plaques     Dystrophic onycholytic nail with subungual debris c/w onychomycosis     Umbilicated papule    Erythematous-base heme-crusted tan verrucoid plaque consistent with inflamed seborrheic keratosis     Erythematous Silvery Scaling Plaque c/w Psoriasis     See annotation      Assessment / Plan:        Post herpetic neuralgia, R buttock, perirectal  Normal skin  Gabapentin too sedating, not taking  Trial alternate    Amitriptyline 12.5mg QHS, x 2 weeks, may increase to one tab QHS   STOP gabapentin (too sedating)  Do not take with hydroxyzine or remeron (ws using PRN as sleeping aid)               Return in about 2 months (around 11/7/2017).

## 2017-09-13 ENCOUNTER — TELEPHONE (OUTPATIENT)
Dept: GASTROENTEROLOGY | Facility: CLINIC | Age: 67
End: 2017-09-13

## 2017-09-13 NOTE — TELEPHONE ENCOUNTER
----- Message from Peyton Warren MD sent at 9/12/2017  9:00 AM CDT -----  Please let patient know her biopsies are benign. She can follow up in clinic to discuss dysphagia (? Improved with dilation).

## 2017-09-22 ENCOUNTER — OFFICE VISIT (OUTPATIENT)
Dept: GASTROENTEROLOGY | Facility: CLINIC | Age: 67
End: 2017-09-22
Payer: MEDICARE

## 2017-09-22 VITALS
HEART RATE: 61 BPM | SYSTOLIC BLOOD PRESSURE: 164 MMHG | BODY MASS INDEX: 43.81 KG/M2 | RESPIRATION RATE: 20 BRPM | HEIGHT: 60 IN | DIASTOLIC BLOOD PRESSURE: 96 MMHG | WEIGHT: 223.13 LBS

## 2017-09-22 DIAGNOSIS — R13.10 DYSPHAGIA, UNSPECIFIED TYPE: Primary | ICD-10-CM

## 2017-09-22 DIAGNOSIS — K22.2 LOWER ESOPHAGEAL RING: ICD-10-CM

## 2017-09-22 PROCEDURE — 99213 OFFICE O/P EST LOW 20 MIN: CPT | Mod: S$GLB,,, | Performed by: INTERNAL MEDICINE

## 2017-09-22 PROCEDURE — 99999 PR PBB SHADOW E&M-EST. PATIENT-LVL III: CPT | Mod: PBBFAC,,, | Performed by: INTERNAL MEDICINE

## 2017-09-22 PROCEDURE — 3008F BODY MASS INDEX DOCD: CPT | Mod: S$GLB,,, | Performed by: INTERNAL MEDICINE

## 2017-09-22 PROCEDURE — 99499 UNLISTED E&M SERVICE: CPT | Mod: S$GLB,,, | Performed by: INTERNAL MEDICINE

## 2017-09-26 NOTE — PROGRESS NOTES
[facility] Note    CC: Dysphagia    HPI 67 y.o. female with history of hypothyroidism and fatigue, presents with chronic, intermittent, painless dysphagia to solids (primariliy rice and meats), not associated with weight loss or odynophagia. Since last visit she underwent EGD which showed mild gastritis (negative for H.pylori) and lower esophageal ring that was dilated. Since dilation her swallowing has improved. She continues to have severe fatigue and has appointment with rheumatologist in the next several months.     Past Medical History:   Diagnosis Date    Asthma     Shingles     1 year ago     Thyroid disease          Review of Systems  General ROS: negative for - chills, fever or weight loss  Cardiovascular ROS: no chest pain or dyspnea on exertion  Gastrointestinal ROS: improved dysphagia, no diarrhea, no vomiting    Physical Examination  BP (!) 164/96   Pulse 61   Resp 20   Ht 5' (1.524 m)   Wt 101.2 kg (223 lb 1.7 oz)   BMI 43.57 kg/m²   General appearance: alert, cooperative, no distress  HENT: Normocephalic, atraumatic, neck symmetrical, no nasal discharge, sclera anicteric   Lungs: clear to auscultation bilaterally, symmetric chest wall expansion bilaterally  Heart: regular rate and rhythm without rub; no displacement of the PMI   Abdomen: obese, soft, nontender, nondistended, BS normoactive, no organomegaly appreciated  Extremities: extremities symmetric; no clubbing, cyanosis, or edema        Labs:  .-No recent    Imaging:  Barium esophagram was independently visualized and reviewed by me and showed mild esophageal dysmotility, hiatal hernia    Assessment:   67 y.o. female presents with dysphagia that improved after dilation, as well as chronic fatigue and post-herpetic neuralgia, undergoing Rheumatologic work-up.     Plan:  -Resume Elavil  -Chew food thoroughly  -Keep follow up with rheumatology   -Discuss colonoscopy again at next visit (patient again refused today)    Peyton  Debbie Warren MD  [facility]  [address], [address]  [place] [address]  Office: [phone]  Fax: [phone]

## 2017-09-27 ENCOUNTER — LAB VISIT (OUTPATIENT)
Dept: LAB | Facility: HOSPITAL | Age: 67
End: 2017-09-27
Attending: FAMILY MEDICINE
Payer: MEDICARE

## 2017-09-27 DIAGNOSIS — E03.9 HYPOTHYROIDISM, UNSPECIFIED TYPE: ICD-10-CM

## 2017-09-27 DIAGNOSIS — R73.9 HYPERGLYCEMIA: ICD-10-CM

## 2017-09-27 LAB
ESTIMATED AVG GLUCOSE: 114 MG/DL
HBA1C MFR BLD HPLC: 5.6 %
T4 FREE SERPL-MCNC: 1.46 NG/DL
TSH SERPL DL<=0.005 MIU/L-ACNC: 4.95 UIU/ML

## 2017-09-27 PROCEDURE — 83036 HEMOGLOBIN GLYCOSYLATED A1C: CPT

## 2017-09-27 PROCEDURE — 84443 ASSAY THYROID STIM HORMONE: CPT

## 2017-09-27 PROCEDURE — 36415 COLL VENOUS BLD VENIPUNCTURE: CPT | Mod: PO

## 2017-09-27 PROCEDURE — 84439 ASSAY OF FREE THYROXINE: CPT

## 2017-09-28 ENCOUNTER — TELEPHONE (OUTPATIENT)
Dept: FAMILY MEDICINE | Facility: CLINIC | Age: 67
End: 2017-09-28

## 2017-09-28 DIAGNOSIS — Z12.39 SCREENING FOR BREAST CANCER: Primary | ICD-10-CM

## 2017-09-28 NOTE — TELEPHONE ENCOUNTER
Call placed to patient regarding lab results dated 9-27-17. No answer received. Left message to call office.

## 2017-09-28 NOTE — TELEPHONE ENCOUNTER
----- Message from Beni Gutierrez MD sent at 9/28/2017  8:57 AM CDT -----  Please call informed patient of have elevated TSH.  Please make appointment to discuss appropriate thyroid medication.

## 2017-10-02 ENCOUNTER — DOCUMENTATION ONLY (OUTPATIENT)
Dept: FAMILY MEDICINE | Facility: CLINIC | Age: 67
End: 2017-10-02

## 2017-10-02 ENCOUNTER — OFFICE VISIT (OUTPATIENT)
Dept: RHEUMATOLOGY | Facility: CLINIC | Age: 67
End: 2017-10-02
Payer: MEDICARE

## 2017-10-02 VITALS
SYSTOLIC BLOOD PRESSURE: 178 MMHG | BODY MASS INDEX: 44.05 KG/M2 | HEIGHT: 60 IN | DIASTOLIC BLOOD PRESSURE: 100 MMHG | WEIGHT: 224.38 LBS

## 2017-10-02 DIAGNOSIS — M15.9 OSTEOARTHRITIS, GENERALIZED: Primary | ICD-10-CM

## 2017-10-02 PROCEDURE — 99213 OFFICE O/P EST LOW 20 MIN: CPT | Mod: ,,, | Performed by: INTERNAL MEDICINE

## 2017-10-02 PROCEDURE — 3008F BODY MASS INDEX DOCD: CPT | Mod: ,,, | Performed by: INTERNAL MEDICINE

## 2017-10-02 NOTE — PROGRESS NOTES
[facility] RHEUMATOLOGY            PROGRESS NOTE      Subjective:       Patient ID:   NAME: Aida Gupta : 1950     67 y.o. female    Referring Doc: No ref. provider found  Other Physicians:    Chief Complaint:  Positive JAVY and Pain (8/10 lower back)      History of Present Illness:     Patient returns today for a regularly scheduled follow-up visit for  Initial visit for pos JAVY  No headaches,+sinus congestion  No rashes               ROS:   GEN:  No  fever, night sweats . weight is stable   +fatigue  SKIN: no rashes, no bruising, no ulcerations, no Raynaud's  HEENT: no HA's, No visual changes, no mucosal ulcers, no sicca symptoms,  CV:   no CP, SOB, PND, MCKEON, no orthopnea, no palpitations  PULM: normal with no SOB,+ cough from post nasal drip, hemoptysis, sputum or pleuritic pain  GI:  no abdominal pain, nausea, vomiting, constipation, diarrhea, melanotic stools, BRBPR, hematemesis, no dysphagia  :   no dysuria  NEURO: no paresthesias, headaches, visual disturbances, muscle weakness  MUSCULOSKELETAL:no joint swelling, prolonged AM stiffness, + back pain, no muscle pain  Allergies:  Review of patient's allergies indicates:  No Known Allergies    Medications:    Current Outpatient Prescriptions:     albuterol (VENTOLIN HFA) 90 mcg/actuation inhaler, Inhale 2 puffs into the lungs every 6 (six) hours as needed for Wheezing. Rescue, Disp: , Rfl:     amitriptyline (ELAVIL) 25 MG tablet, Take 1/2 tablet at bedtime x 2 weeks, may increase to one tablet at bedtime if tolerated, Disp: 30 tablet, Rfl: 0    ergocalciferol (ERGOCALCIFEROL) 50,000 unit Cap, Take 1 capsule (50,000 Units total) by mouth every 7 days., Disp: 12 capsule, Rfl: 0    FLUTICASONE PROPIONATE, MICRO (FLUTICASONE PROP, MICRO, BULK, MISC), by Misc.(Non-Drug; Combo Route) route., Disp: , Rfl:     hydrOXYzine HCl (ATARAX) 25 MG tablet, Take 25 mg by mouth 3 (three) times daily as needed for Itching., Disp: , Rfl:     ibuprofen  (ADVIL,MOTRIN) 600 MG tablet, Take 600 mg by mouth every 6 (six) hours as needed for Pain., Disp: , Rfl:     levothyroxine (SYNTHROID) 112 MCG tablet, Take 112 mcg by mouth once daily., Disp: , Rfl:     mirtazapine (REMERON) 15 MG tablet, Take 1 tablet (15 mg total) by mouth every evening., Disp: 30 tablet, Rfl: 2    nystatin (MYCOSTATIN) cream, Apply topically 2 (two) times daily., Disp: 30 g, Rfl: 1    omeprazole (PRILOSEC) 20 MG capsule, Take 1 capsule (20 mg total) by mouth once daily., Disp: 90 capsule, Rfl: 3    ondansetron (ZOFRAN) 4 MG tablet, Take 1 tablet (4 mg total) by mouth every 8 (eight) hours as needed for Nausea., Disp: 30 tablet, Rfl: 1    triamcinolone acetonide 0.1% (KENALOG) 0.1 % cream, Apply topically 2 (two) times daily., Disp: , Rfl:     PMHx/PSHx Updates:        Objective:     Vitals:  Blood pressure (!) 178/100, height 5' (1.524 m), weight 101.8 kg (224 lb 6.4 oz).    Physical Examination:   GEN: no apparent distress, comfortable; AAOx3  SKIN: no rashes,no ulceration, no Raynaud's, no petechiae, no SQ nodules,  HEAD: normal  EYES: no pallor, no icterus,   NECK: no masses, thyroid normal, trachea midline, no LAD/LN's, supple  CV: RRR with no murmur; l S1 and S2 reg. ,no gallop no rubs,   CHEST: Normal respiratory effort; CTAB; normal breath sounds; no wheeze or crackles  MUSC/Skeletal: ROM normal; no crepitus; joints without synovitis,  no deformities  No joint swelling or tenderness of PIP, MCP, wrist, elbow, shoulder, or knee joints  EXTREM: no clubbing, cyanosis, no edema,normal  pulses   NEURO: grossly intact; motor WNL; AAOx3; ,   PSYCH: normal mood, affect and behavior  LYMPH: normal cervical, supraclavicular          Labs:   Lab Results   Component Value Date    WBC 10.3 08/11/2017    HGB 13.1 08/11/2017    HCT 41.0 08/11/2017    MCV 89.1 08/11/2017     08/11/2017     CMP  @LASTLAB(NA,K,CL,CO2,GLU,BUN,Creatinine,Calcium,PROT,Albumin,Bilitot,Alkphos,AST,ALT,CRP,ESR,RF,CCP,JAVY,SSA,CPK,uric acid) )@  I have reviewed all available lab results and radiology reports.    Radiology/Diagnostic Studies:        Assessment/Plan:   (1) 67 y.o. female with diagnosis of Positive JAVY,pos ds DNA antibody  Repeated JAVY reported as negative but positive anti thyroid peroxidase antibody,  Hx pos JAVY most likely due to autoimmune thyroid diseased not SLE  Repeat ds DNA antibody,( should be negative)    RTC prn or if ds DNA remains positive    Pt will contact PCP for HTN        Discussion:     I have explained all of the above in detail and the patient understands all of the current recommendation(s). I have answered all questions to the best of my ability and to their complete satisfaction.       The patient is to continue with the current management plan         RTC in  prn       Electronically signed by Cheri Serrato MD

## 2017-10-02 NOTE — TELEPHONE ENCOUNTER
Talked with patient; she has 10/3/17  appointment and can discuss lab results then. She verifies time and thanks for call

## 2017-10-02 NOTE — PROGRESS NOTES
Pre-Visit Chart Review  For Appointment Scheduled on 10/3/17.    Health Maintenance Due   Topic Date Due    Lipid Panel  1950    TETANUS VACCINE  06/10/1970    Mammogram  06/10/1992    DEXA SCAN  06/10/1992    Zoster Vaccine  06/10/2012    Pneumococcal (65+) (1 of 2 - PCV13) 06/10/2017    Influenza Vaccine  08/01/2017

## 2017-10-03 ENCOUNTER — OFFICE VISIT (OUTPATIENT)
Dept: FAMILY MEDICINE | Facility: CLINIC | Age: 67
End: 2017-10-03
Payer: MEDICARE

## 2017-10-03 VITALS
DIASTOLIC BLOOD PRESSURE: 88 MMHG | HEART RATE: 76 BPM | BODY MASS INDEX: 43.81 KG/M2 | RESPIRATION RATE: 20 BRPM | HEIGHT: 60 IN | TEMPERATURE: 98 F | WEIGHT: 223.13 LBS | SYSTOLIC BLOOD PRESSURE: 152 MMHG

## 2017-10-03 DIAGNOSIS — E03.9 HYPOTHYROIDISM, UNSPECIFIED TYPE: Primary | ICD-10-CM

## 2017-10-03 DIAGNOSIS — M54.9 CHRONIC BACK PAIN, UNSPECIFIED BACK LOCATION, UNSPECIFIED BACK PAIN LATERALITY: ICD-10-CM

## 2017-10-03 DIAGNOSIS — E78.5 HYPERLIPIDEMIA, UNSPECIFIED HYPERLIPIDEMIA TYPE: ICD-10-CM

## 2017-10-03 DIAGNOSIS — B02.29 POST HERPETIC NEURALGIA: ICD-10-CM

## 2017-10-03 DIAGNOSIS — Z11.4 ENCOUNTER FOR SCREENING FOR HIV: ICD-10-CM

## 2017-10-03 DIAGNOSIS — G89.29 CHRONIC BACK PAIN, UNSPECIFIED BACK LOCATION, UNSPECIFIED BACK PAIN LATERALITY: ICD-10-CM

## 2017-10-03 PROCEDURE — 99214 OFFICE O/P EST MOD 30 MIN: CPT | Mod: S$GLB,,, | Performed by: FAMILY MEDICINE

## 2017-10-03 PROCEDURE — 99499 UNLISTED E&M SERVICE: CPT | Mod: S$GLB,,, | Performed by: FAMILY MEDICINE

## 2017-10-03 PROCEDURE — 99999 PR PBB SHADOW E&M-EST. PATIENT-LVL IV: CPT | Mod: PBBFAC,,, | Performed by: FAMILY MEDICINE

## 2017-10-03 RX ORDER — LEVOTHYROXINE SODIUM 125 UG/1
125 TABLET ORAL DAILY
Qty: 90 TABLET | Refills: 3 | Status: SHIPPED | OUTPATIENT
Start: 2017-10-03 | End: 2018-12-14 | Stop reason: SDUPTHER

## 2017-10-03 NOTE — PROGRESS NOTES
[facility]  Progress Note    Subjective:       Patient ID: Aida Gupta is a 67 y.o. female.    Chief Complaint: hypothyroidism follow up     HPI67 y.o.female is here today to follow-up for abnormal labs.  Patient had elevated TSH.  Patient has been taking 112 µg of Synthroid daily.  Patient also continues to complain of postherpetic neuralgia.  Patient had outbreak of shingles near her buttox.  Since that time patient has sharp excruciating pain in the area.  Patient is unable to sit flat on buttox without pain.  Will refer to pain management for further evaluation.  Review of Systems   Constitutional: Negative for activity change and unexpected weight change.   HENT: Positive for hearing loss and rhinorrhea. Negative for trouble swallowing.    Eyes: Positive for discharge. Negative for visual disturbance.   Cardiovascular: Negative for palpitations.   Gastrointestinal: Negative for blood in stool, constipation, diarrhea and vomiting.   Endocrine: Negative for polydipsia and polyuria.   Genitourinary: Positive for difficulty urinating. Negative for hematuria and menstrual problem.   Musculoskeletal: Positive for arthralgias. Negative for joint swelling and neck pain.   Neurological: Positive for weakness and headaches.   Psychiatric/Behavioral: Positive for confusion and dysphoric mood.       Objective:      Vitals:    10/03/17 1157 10/03/17 1201   BP: (!) 164/87 (!) 152/88   BP Location: Right arm Left arm   Patient Position: Sitting Sitting   BP Method: Large (Automatic) Large (Manual)   Pulse: 76    Resp: 20    Temp: 97.7 °F (36.5 °C)    TempSrc: Oral    Weight: 101.2 kg (223 lb 1.7 oz)    Height: 5' (1.524 m)      Body mass index is 43.57 kg/m².  Physical Exam   Constitutional: She is oriented to person, place, and time. She appears well-developed and well-nourished.   Eyes: Conjunctivae are normal.   Cardiovascular: Normal rate, regular rhythm, normal heart sounds and intact distal pulses.   Exam reveals no gallop and no friction rub.    No murmur heard.  Pulmonary/Chest: Effort normal and breath sounds normal. No respiratory distress. She has no wheezes. She has no rales. She exhibits no tenderness.   Abdominal: Soft.   Musculoskeletal: Normal range of motion.   Neurological: She is alert and oriented to person, place, and time.   Skin: She is not diaphoretic.   Psychiatric: She has a normal mood and affect. Her behavior is normal. Judgment and thought content normal.   Vitals reviewed.      Assessment:       1. Hypothyroidism, unspecified type    2. Hyperlipidemia, unspecified hyperlipidemia type    3. Encounter for screening for HIV    4. Chronic back pain, unspecified back location, unspecified back pain laterality    5. Post herpetic neuralgia        Plan:       Hypothyroidism, unspecified type  -     TSH; Future; Expected date: 10/03/2017  -     T4, free; Future; Expected date: 10/03/2017  -     levothyroxine (SYNTHROID) 125 MCG tablet; Take 1 tablet (125 mcg total) by mouth once daily.  Dispense: 90 tablet; Refill: 3    Hyperlipidemia, unspecified hyperlipidemia type  -     Lipid panel; Future; Expected date: 10/03/2017    Encounter for screening for HIV  -     Mammo Digital Screening Bilat with CAD; Future; Expected date: 10/03/2017    Chronic back pain, unspecified back location, unspecified back pain laterality  -     Ambulatory referral to Pain Clinic    Post herpetic neuralgia  -     Ambulatory referral to Pain Clinic      Return in about 4 weeks (around 10/31/2017).  Beni Gutierrez MD  [facility]  10/3/2017 1:25 PM

## 2017-10-04 ENCOUNTER — TELEPHONE (OUTPATIENT)
Dept: FAMILY MEDICINE | Facility: CLINIC | Age: 67
End: 2017-10-04

## 2017-10-04 LAB — DSDNA AB SER-ACNC: 27 IU/ML

## 2017-10-04 NOTE — TELEPHONE ENCOUNTER
----- Message from Darion Cleaning sent at 10/4/2017 12:04 PM CDT -----  Contact: pt  Pt is calling about her medications that was called into pharmacy for her(she only got her Thyroid medication)   Call Back#[phone]  Thanks

## 2017-10-05 NOTE — TELEPHONE ENCOUNTER
Patient thought medication was to be sent to pharmacy for neuropathy. [facility] suggests Cymbalta. Please advise

## 2017-10-05 NOTE — TELEPHONE ENCOUNTER
----- Message from Medhat Erazo sent at 10/4/2017  3:34 PM CDT -----  Contact: self   Placed call to pod, patient miss call from your office please call back at [phone] (dlxp)

## 2017-10-06 DIAGNOSIS — G62.9 NEUROPATHY: Primary | ICD-10-CM

## 2017-10-06 RX ORDER — DULOXETIN HYDROCHLORIDE 20 MG/1
20 CAPSULE, DELAYED RELEASE ORAL DAILY
Qty: 30 CAPSULE | Refills: 1 | Status: SHIPPED | OUTPATIENT
Start: 2017-10-06 | End: 2017-11-13

## 2017-10-06 NOTE — TELEPHONE ENCOUNTER
I have sent Cymbalta over to her pharmacy.  Please ensure she has follow-up within 6 weeks. (Routing comment) PER

## 2017-10-18 DIAGNOSIS — Z12.39 SCREENING FOR BREAST CANCER: Primary | ICD-10-CM

## 2017-10-19 ENCOUNTER — LAB VISIT (OUTPATIENT)
Dept: LAB | Facility: HOSPITAL | Age: 67
End: 2017-10-19
Attending: FAMILY MEDICINE
Payer: MEDICARE

## 2017-10-19 ENCOUNTER — OFFICE VISIT (OUTPATIENT)
Dept: PAIN MEDICINE | Facility: CLINIC | Age: 67
End: 2017-10-19
Payer: MEDICARE

## 2017-10-19 VITALS
HEIGHT: 60 IN | SYSTOLIC BLOOD PRESSURE: 151 MMHG | WEIGHT: 223 LBS | DIASTOLIC BLOOD PRESSURE: 106 MMHG | HEART RATE: 82 BPM | BODY MASS INDEX: 43.78 KG/M2

## 2017-10-19 DIAGNOSIS — M54.16 LUMBAR RADICULOPATHY: Primary | ICD-10-CM

## 2017-10-19 DIAGNOSIS — E78.5 HYPERLIPIDEMIA, UNSPECIFIED HYPERLIPIDEMIA TYPE: ICD-10-CM

## 2017-10-19 DIAGNOSIS — M54.16 LUMBAR RADICULITIS: ICD-10-CM

## 2017-10-19 DIAGNOSIS — B02.29 POST HERPETIC NEURALGIA: Primary | ICD-10-CM

## 2017-10-19 DIAGNOSIS — E03.9 HYPOTHYROIDISM, UNSPECIFIED TYPE: ICD-10-CM

## 2017-10-19 LAB
CHOLEST SERPL-MCNC: 215 MG/DL
CHOLEST/HDLC SERPL: 5.5 {RATIO}
HDLC SERPL-MCNC: 39 MG/DL
HDLC SERPL: 18.1 %
LDLC SERPL CALC-MCNC: 126.8 MG/DL
NONHDLC SERPL-MCNC: 176 MG/DL
T4 FREE SERPL-MCNC: 1.3 NG/DL
TRIGL SERPL-MCNC: 246 MG/DL
TSH SERPL DL<=0.005 MIU/L-ACNC: 2.79 UIU/ML

## 2017-10-19 PROCEDURE — 84439 ASSAY OF FREE THYROXINE: CPT

## 2017-10-19 PROCEDURE — 84443 ASSAY THYROID STIM HORMONE: CPT

## 2017-10-19 PROCEDURE — 99204 OFFICE O/P NEW MOD 45 MIN: CPT | Mod: S$GLB,,, | Performed by: ANESTHESIOLOGY

## 2017-10-19 PROCEDURE — 99499 UNLISTED E&M SERVICE: CPT | Mod: S$GLB,,, | Performed by: ANESTHESIOLOGY

## 2017-10-19 PROCEDURE — 36415 COLL VENOUS BLD VENIPUNCTURE: CPT | Mod: PO

## 2017-10-19 PROCEDURE — 80061 LIPID PANEL: CPT

## 2017-10-19 PROCEDURE — 99999 PR PBB SHADOW E&M-EST. PATIENT-LVL III: CPT | Mod: PBBFAC,,, | Performed by: ANESTHESIOLOGY

## 2017-10-19 RX ORDER — GABAPENTIN 300 MG/1
300 CAPSULE ORAL 3 TIMES DAILY
Qty: 90 CAPSULE | Refills: 1 | Status: SHIPPED | OUTPATIENT
Start: 2017-10-19 | End: 2018-01-30 | Stop reason: SDUPTHER

## 2017-10-19 NOTE — PROGRESS NOTES
This note was completed with dictation software and grammatical errors may exist.    Referring Physician: Beni Gutierrez MD    PCP: Beni Gutierrez MD      CC: Buttock and right leg pain    HPI:   Aida Gupta is a 67 y.o. female referred to us for buttock and right leg pain.  Pain has been present for over a year.  She had about of shingles that occurred over her tailbone and buttock area.  Pain radiated down her right leg.  Pain is a constant aching, burning, sharp pain in her buttock.  Pain worsens with sitting, touching, walking.  Pain improves with rest.  She is currently on Elavil, Cymbalta, gabapentin with mild benefits so far.  She denies weakness.  No bowel bladder changes.  She rates her pain 8/10 today.    ROS:  CONSTITUTIONAL: No fevers, chills, night sweats, wt. loss, appetite changes  SKIN: no rashes or itching  ENT: No headaches, head trauma, vision changes, or eye pain  LYMPH NODES: None noticed   CV: No chest pain, palpitations.   RESP: No shortness of breath, dyspnea on exertion, cough, wheezing, or hemoptysis  GI: No nausea, emesis, diarrhea, constipation, melena, hematochezia, pain.    : No dysuria, hematuria, urgency, or frequency   HEME: No easy bruising, bleeding problems  PSYCHIATRIC: No depression, anxiety, psychosis, hallucinations.  NEURO: No seizures, memory loss, dizziness or difficulty sleeping  MSK: + History of present illness      Past Medical History:   Diagnosis Date    Asthma     Shingles     1 year ago     Thyroid disease      Past Surgical History:   Procedure Laterality Date    Bilateral tubal ligation      cholectomy      COLON SURGERY      Diverticulitis     Family History   Problem Relation Age of Onset    Eczema Neg Hx     Lupus Neg Hx     Psoriasis Neg Hx     Melanoma Neg Hx      Social History     Social History    Marital status:      Spouse name: N/A    Number of children: N/A    Years of education: N/A     Social History Main Topics     Smoking status: Never Smoker    Smokeless tobacco: Never Used    Alcohol use No    Drug use: No    Sexual activity: Not Asked     Other Topics Concern    None     Social History Narrative    None         Medications/Allergies: See med card    Vitals:    10/19/17 0914   BP: (!) 151/106   Pulse: 82   Weight: 101.2 kg (223 lb)   Height: 5' (1.524 m)   PainSc:   8   PainLoc: Back         Physical exam:    GENERAL: A and O x3, the patient appears well groomed and is in no acute distress.  Skin: No rashes or obvious lesions  HEENT: normocephalic, atraumatic  CARDIOVASCULAR:  Palpable peripheral pulses  LUNGS: easy work of breathing  ABDOMEN: soft, nontender   UPPER EXTREMITIES: Normal alignment, normal range of motion, no atrophy, no skin changes,  hair growth and nail growth normal and equal bilaterally. No swelling, no tenderness.    LOWER EXTREMITIES:  Normal alignment, normal range of motion, no atrophy, no skin changes,  hair growth and nail growth normal and equal bilaterally. No swelling, no tenderness.    LUMBAR SPINE  Lumbar spine: ROM is full with flexion extension and oblique extension with mild increased pain.    Moises's test causes no increased pain on either side.    Supine straight leg raise is positive on right at 45 degrees.    Internal and external rotation of the hip causes no increased pain on either side.  Myofascial exam: No tenderness to palpation across lumbar paraspinous muscles.      MENTAL STATUS: normal orientation, speech, language, and fund of knowledge for social situation.  Emotional state appropriate.    CRANIAL NERVES:  II:  PERRL bilaterally,   III,IV,VI: EOMI.    V:  Facial sensation equal bilaterally  VII:  Facial motor function normal.  VIII:  Hearing equal to finger rub bilaterally  IX/X: Gag normal, palate symmetric  XI:  Shoulder shrug equal, head turn equal  XII:  Tongue midline without fasciculations      MOTOR: Tone and bulk: normal bilateral upper and lower Strength:  normal   Delt Bi Tri WE WF     R 5 5 5 5 5 5   L 5 5 5 5 5 5     IP ADD ABD Quad TA Gas HAM  R 5 5 5 5 5 5 5  L 5 5 5 5 5 5 5    SENSATION: Light touch and pinprick intact bilaterally  REFLEXES: normal, symmetric, nonbrisk.  Toes down, no clonus. No hoffmans.  GAIT: normal rise, base, steps, and arm swing.        Imaging:  None    Assessment:  Patient referred for buttock and right leg pain  1. Post herpetic neuralgia    2. Lumbar radiculitis          Plan:  - I have stressed the importance of physical activity and exercise to improve overall health  - Patient with postherpetic neuralgia of her sacral nerve.  Discuss disease progression of postherpetic neuralgia.  Continue anti-neuropathic medications.  Discuss performing a caudal SEUN to help with her radicular pain.  Patient wishes to proceed with procedure.  - Follow up after procedure      Thank you for referring this interesting patient, and I look forward to continuing to collaborate in her care.

## 2017-10-20 ENCOUNTER — DOCUMENTATION ONLY (OUTPATIENT)
Dept: FAMILY MEDICINE | Facility: CLINIC | Age: 67
End: 2017-10-20

## 2017-10-20 NOTE — PROGRESS NOTES
Pre-Visit Chart Review  For Appointment Scheduled on 10/23/17.    Health Maintenance Due   Topic Date Due    TETANUS VACCINE  06/10/1970    Mammogram  06/10/1992    DEXA SCAN  06/10/1992    Zoster Vaccine  06/10/2012    Pneumococcal (65+) (1 of 2 - PCV13) 06/10/2017    Influenza Vaccine  08/01/2017

## 2017-10-24 ENCOUNTER — TELEPHONE (OUTPATIENT)
Dept: FAMILY MEDICINE | Facility: CLINIC | Age: 67
End: 2017-10-24

## 2017-10-24 NOTE — TELEPHONE ENCOUNTER
Spoke with patient ;  Rescheduled appointment on Monday 10/30/17; patient states she is in too much discomfort to come in Wednesday and then have an outpatient procedure done on 10/26/17.

## 2017-10-24 NOTE — TELEPHONE ENCOUNTER
----- Message from Amalia Dalton sent at 10/24/2017  1:30 PM CDT -----  Contact: Patient  Patient is trying to schedule appointment with Dr. Gutierrez instead of coming tomorrow with Dr. Trevizo; states that she is coming to [place] the following day for an outpatient procedure and does not want to travel two days due to pain.  Call Back#[phone] or   Thanks

## 2017-10-26 ENCOUNTER — SURGERY (OUTPATIENT)
Age: 67
End: 2017-10-26

## 2017-10-26 ENCOUNTER — HOSPITAL ENCOUNTER (OUTPATIENT)
Facility: AMBULARY SURGERY CENTER | Age: 67
Discharge: HOME | End: 2017-10-26
Attending: ANESTHESIOLOGY | Admitting: ANESTHESIOLOGY
Payer: MEDICARE

## 2017-10-26 DIAGNOSIS — M51.36 DDD (DEGENERATIVE DISC DISEASE), LUMBAR: Primary | ICD-10-CM

## 2017-10-26 PROBLEM — M51.369 DDD (DEGENERATIVE DISC DISEASE), LUMBAR: Status: ACTIVE | Noted: 2017-10-26

## 2017-10-26 PROCEDURE — 62323 NJX INTERLAMINAR LMBR/SAC: CPT | Mod: ,,, | Performed by: ANESTHESIOLOGY

## 2017-10-26 PROCEDURE — 62323 NJX INTERLAMINAR LMBR/SAC: CPT | Performed by: ANESTHESIOLOGY

## 2017-10-26 PROCEDURE — 99152 MOD SED SAME PHYS/QHP 5/>YRS: CPT | Mod: ,,, | Performed by: ANESTHESIOLOGY

## 2017-10-26 RX ORDER — MIDAZOLAM HYDROCHLORIDE 2 MG/2ML
INJECTION, SOLUTION INTRAMUSCULAR; INTRAVENOUS
Status: DISCONTINUED | OUTPATIENT
Start: 2017-10-26 | End: 2017-10-26 | Stop reason: HOSPADM

## 2017-10-26 RX ORDER — FENTANYL CITRATE 50 UG/ML
INJECTION, SOLUTION INTRAMUSCULAR; INTRAVENOUS
Status: DISCONTINUED | OUTPATIENT
Start: 2017-10-26 | End: 2017-10-26 | Stop reason: HOSPADM

## 2017-10-26 RX ORDER — DEXAMETHASONE SODIUM PHOSPHATE 10 MG/ML
INJECTION INTRAMUSCULAR; INTRAVENOUS
Status: DISCONTINUED | OUTPATIENT
Start: 2017-10-26 | End: 2017-10-26 | Stop reason: HOSPADM

## 2017-10-26 RX ORDER — SODIUM CHLORIDE, SODIUM LACTATE, POTASSIUM CHLORIDE, CALCIUM CHLORIDE 600; 310; 30; 20 MG/100ML; MG/100ML; MG/100ML; MG/100ML
INJECTION, SOLUTION INTRAVENOUS ONCE AS NEEDED
Status: COMPLETED | OUTPATIENT
Start: 2017-10-26 | End: 2017-10-26

## 2017-10-26 RX ORDER — LIDOCAINE HYDROCHLORIDE 10 MG/ML
INJECTION, SOLUTION EPIDURAL; INFILTRATION; INTRACAUDAL; PERINEURAL
Status: DISCONTINUED
Start: 2017-10-26 | End: 2017-10-26 | Stop reason: HOSPADM

## 2017-10-26 RX ORDER — FENTANYL CITRATE 50 UG/ML
INJECTION, SOLUTION INTRAMUSCULAR; INTRAVENOUS
Status: DISCONTINUED
Start: 2017-10-26 | End: 2017-10-26 | Stop reason: HOSPADM

## 2017-10-26 RX ORDER — MIDAZOLAM HYDROCHLORIDE 1 MG/ML
INJECTION INTRAMUSCULAR; INTRAVENOUS
Status: DISCONTINUED
Start: 2017-10-26 | End: 2017-10-26 | Stop reason: HOSPADM

## 2017-10-26 RX ORDER — LIDOCAINE HYDROCHLORIDE 10 MG/ML
INJECTION, SOLUTION EPIDURAL; INFILTRATION; INTRACAUDAL; PERINEURAL
Status: DISCONTINUED | OUTPATIENT
Start: 2017-10-26 | End: 2017-10-26 | Stop reason: HOSPADM

## 2017-10-26 RX ORDER — DEXAMETHASONE SODIUM PHOSPHATE 10 MG/ML
INJECTION INTRAMUSCULAR; INTRAVENOUS
Status: DISCONTINUED
Start: 2017-10-26 | End: 2017-10-26 | Stop reason: HOSPADM

## 2017-10-26 RX ORDER — SODIUM CHLORIDE 9 MG/ML
INJECTION, SOLUTION INTRAMUSCULAR; INTRAVENOUS; SUBCUTANEOUS
Status: DISCONTINUED | OUTPATIENT
Start: 2017-10-26 | End: 2017-10-26 | Stop reason: HOSPADM

## 2017-10-26 RX ORDER — ALPRAZOLAM 0.25 MG/1
1 TABLET ORAL ONCE AS NEEDED
Status: DISCONTINUED | OUTPATIENT
Start: 2017-10-26 | End: 2017-10-26 | Stop reason: HOSPADM

## 2017-10-26 RX ADMIN — MIDAZOLAM HYDROCHLORIDE 2 MG: 2 INJECTION, SOLUTION INTRAMUSCULAR; INTRAVENOUS at 02:10

## 2017-10-26 RX ADMIN — LIDOCAINE HYDROCHLORIDE 5 ML: 10 INJECTION, SOLUTION EPIDURAL; INFILTRATION; INTRACAUDAL; PERINEURAL at 02:10

## 2017-10-26 RX ADMIN — SODIUM CHLORIDE 4 ML: 9 INJECTION, SOLUTION INTRAMUSCULAR; INTRAVENOUS; SUBCUTANEOUS at 02:10

## 2017-10-26 RX ADMIN — DEXAMETHASONE SODIUM PHOSPHATE 10 MG: 10 INJECTION INTRAMUSCULAR; INTRAVENOUS at 02:10

## 2017-10-26 RX ADMIN — SODIUM CHLORIDE, SODIUM LACTATE, POTASSIUM CHLORIDE, CALCIUM CHLORIDE: 600; 310; 30; 20 INJECTION, SOLUTION INTRAVENOUS at 01:10

## 2017-10-26 RX ADMIN — FENTANYL CITRATE 50 MCG: 50 INJECTION, SOLUTION INTRAMUSCULAR; INTRAVENOUS at 02:10

## 2017-10-26 NOTE — OP NOTE
PROCEDURE DATE: 10/26/2017    PROCEDURE:  Caudal epidural steroid injection under fluoroscopy.    Diagnosis: Lumbar disc displacement without myelopathy  Post Op diagnosis: Same    PHYSICIAN: Lex Walton M.D.    MEDICATIONS INJECTED:  10 mg of dexamethasone and 4 ml of sterile, preservative-free NaCl.    LOCAL ANESTHETIC GIVEN:  Lidocaine 1%, 2 ml total    SEDATION MEDICATIONS: RN IV sedation    ESTIMATED BLOOD LOSS:  None    COMPLICATIONS:  NOne    TECHNIQUE:   After the patient was placed in prone position, the patient was prepped and draped in the usual sterile fashion using ChloraPrep and sterile towels.  Appropriate anatomic landmarks were determined by identifying the sacral hiatus in the lateral fluoroscopic view.  Local anesthetic was given via a 25g 1.5 inch needle by raising a wheal and infiltrating down to the periosteum.  A 3.5 inch 20 gauge touhy needle was introduced thru the sacral hiatus.  1ml of contrast was injected to confirm placement in the appropriate area and that there was no vascular uptake.  The medication was then injected slowly.  The patient tolerated the procedure well.    The patient was monitored after the procedure.  Patient was given post procedure and discharge instructions to follow at home.  The patient was discharged in a stable condition

## 2017-10-26 NOTE — DISCHARGE SUMMARY
[facility]  Discharge Note  Short Stay    Admit Date: 10/26/2017    Discharge Date and Time: 10/26/2017    Attending Physician: Lex Walton MD     Discharge Provider: Lex Walton    Diagnoses:  Active Hospital Problems    Diagnosis  POA    *DDD (degenerative disc disease), lumbar [M51.36]  Yes      Resolved Hospital Problems    Diagnosis Date Resolved POA   No resolved problems to display.       Hospital Course: Caudal SEUN  Discharged Condition: Good    Final Diagnoses:   Active Hospital Problems    Diagnosis  POA    *DDD (degenerative disc disease), lumbar [M51.36]  Yes      Resolved Hospital Problems    Diagnosis Date Resolved POA   No resolved problems to display.       Disposition: Home or Self Care    Follow up/Patient Instructions:    Medications:  Reconciled Home Medications:   Current Discharge Medication List      CONTINUE these medications which have NOT CHANGED    Details   levothyroxine (SYNTHROID) 125 MCG tablet Take 1 tablet (125 mcg total) by mouth once daily.  Qty: 90 tablet, Refills: 3    Associated Diagnoses: Hypothyroidism, unspecified type      albuterol (VENTOLIN HFA) 90 mcg/actuation inhaler Inhale 2 puffs into the lungs every 6 (six) hours as needed for Wheezing. Rescue      amitriptyline (ELAVIL) 25 MG tablet Take 1/2 tablet at bedtime x 2 weeks, may increase to one tablet at bedtime if tolerated  Qty: 30 tablet, Refills: 0    Associated Diagnoses: Post herpetic neuralgia      duloxetine (CYMBALTA) 20 MG capsule Take 1 capsule (20 mg total) by mouth once daily.  Qty: 30 capsule, Refills: 1    Associated Diagnoses: Neuropathy      ergocalciferol (ERGOCALCIFEROL) 50,000 unit Cap Take 1 capsule (50,000 Units total) by mouth every 7 days.  Qty: 12 capsule, Refills: 0    Associated Diagnoses: Vitamin D deficiency      FLUTICASONE PROPIONATE, MICRO (FLUTICASONE PROP, MICRO, BULK, MISC) by Misc.(Non-Drug; Combo Route) route.      gabapentin (NEURONTIN) 300 MG capsule Take 1 capsule (300  mg total) by mouth 3 (three) times daily.  Qty: 90 capsule, Refills: 1      hydrOXYzine HCl (ATARAX) 25 MG tablet Take 25 mg by mouth 3 (three) times daily as needed for Itching.      ibuprofen (ADVIL,MOTRIN) 600 MG tablet Take 600 mg by mouth every 6 (six) hours as needed for Pain.      mirtazapine (REMERON) 15 MG tablet Take 1 tablet (15 mg total) by mouth every evening.  Qty: 30 tablet, Refills: 2      nystatin (MYCOSTATIN) cream Apply topically 2 (two) times daily.  Qty: 30 g, Refills: 1    Associated Diagnoses: Yeast infection      omeprazole (PRILOSEC) 20 MG capsule Take 1 capsule (20 mg total) by mouth once daily.  Qty: 90 capsule, Refills: 3    Associated Diagnoses: Gastroesophageal reflux disease without esophagitis      ondansetron (ZOFRAN) 4 MG tablet Take 1 tablet (4 mg total) by mouth every 8 (eight) hours as needed for Nausea.  Qty: 30 tablet, Refills: 1    Associated Diagnoses: Nausea      triamcinolone acetonide 0.1% (KENALOG) 0.1 % cream Apply topically 2 (two) times daily.             Discharge Procedure Orders  Diet general     Activity as tolerated     Call MD for:  temperature >100.4     Call MD for:  persistent nausea and vomiting or diarrhea     Call MD for:  severe uncontrolled pain     Call MD for:  redness, tenderness, or signs of infection (pain, swelling, redness, odor or green/yellow discharge around incision site)     Call MD for:  difficulty breathing or increased cough     Call MD for:  severe persistent headache          Follow up with MD in 2-3 weeks    Discharge Procedure Orders (must include Diet, Follow-up, Activity):    Discharge Procedure Orders (must include Diet, Follow-up, Activity)  Diet general     Activity as tolerated     Call MD for:  temperature >100.4     Call MD for:  persistent nausea and vomiting or diarrhea     Call MD for:  severe uncontrolled pain     Call MD for:  redness, tenderness, or signs of infection (pain, swelling, redness, odor or green/yellow  discharge around incision site)     Call MD for:  difficulty breathing or increased cough     Call MD for:  severe persistent headache

## 2017-10-26 NOTE — DISCHARGE INSTRUCTIONS
Anesthesia information    Anesthesia Safety      You have been given medicine  to sedate you during your procedure today. This may have included both a pain medicine and sleeping medicine. Most of the effects have worn off; however, you may continue to have some drowsiness for the next  24 hours. Anesthesia and pain medicines can cause nausea, sleepiness, dizziness and  constipation.    HOME CARE:  1) For the next EIGHT HOURS, you should be watched by a responsible adult to look for any worsening of your condition.  2) DO NOT DRINK any ALCOHOL for the next 24 HOURS.  3) DO NOT DRIVE or operate dangerous machinery during the next 24 HOURS.  FOLLOW UP with your doctor or this facility if you are not alert and back to your usual level of activity within 24 hrs.  GET PROMPT MEDICAL ATTENTION if any of the following occur:  -- Increased drowsiness  -- Increased weakness or dizziness  -- Repeated vomiting  -- If you cannot be awakened    Pain injection instructions:     Steroids take about a week to relieve pain.  Initially you may get pain relief from the local anesthetic but this may wear off before the steroid works.    No driving for 24 hrs   Activity as tolerated- gradually increase activities.  Dont lift over 10 lbs for 24 hrs   No heat at injection sites x 2 days  Use ice for mild swelling and for comfort.  May shower today. No baths for two days.      Resume Aspirin, Plavix, or Coumadin the day after the procedure unless otherwise instructed.   If diabetic,monitor your glucose carefully as steroids can increase glucose level    Seek immediate medical help for:   Severe increase in your usual pain or appearance of new pain.  Prolonged or increasing weakness or numbness in the legs or arms.    - Numbing medicine was injected that affects nerves that carry information from       muscles to brain and vice versa.  This numbness can last 4-6 hrs so be very careful walking.    Fever above 101 ,Drainage,redness,active  bleeding, or increased swelling at the injection site.  Headache, shortness of breath, chest pain, or breathing problems.

## 2017-10-27 ENCOUNTER — DOCUMENTATION ONLY (OUTPATIENT)
Dept: FAMILY MEDICINE | Facility: CLINIC | Age: 67
End: 2017-10-27

## 2017-10-27 VITALS
TEMPERATURE: 98 F | DIASTOLIC BLOOD PRESSURE: 86 MMHG | HEIGHT: 60 IN | BODY MASS INDEX: 43.78 KG/M2 | HEART RATE: 72 BPM | RESPIRATION RATE: 18 BRPM | OXYGEN SATURATION: 95 % | WEIGHT: 223 LBS | SYSTOLIC BLOOD PRESSURE: 164 MMHG

## 2017-10-27 NOTE — PROGRESS NOTES
Pre-Visit Chart Review  For Appointment Scheduled on 10/30/2017    Health Maintenance Due   Topic Date Due    TETANUS VACCINE  06/10/1970    Mammogram  06/10/1992    DEXA SCAN  06/10/1992    Zoster Vaccine  06/10/2012    Pneumococcal (65+) (1 of 2 - PCV13) 06/10/2017    Influenza Vaccine  08/01/2017

## 2017-10-30 ENCOUNTER — OFFICE VISIT (OUTPATIENT)
Dept: FAMILY MEDICINE | Facility: CLINIC | Age: 67
End: 2017-10-30
Payer: MEDICARE

## 2017-10-30 VITALS
BODY MASS INDEX: 43.81 KG/M2 | HEIGHT: 60 IN | WEIGHT: 223.13 LBS | DIASTOLIC BLOOD PRESSURE: 98 MMHG | TEMPERATURE: 97 F | SYSTOLIC BLOOD PRESSURE: 144 MMHG | HEART RATE: 86 BPM

## 2017-10-30 DIAGNOSIS — I10 HYPERTENSION, UNSPECIFIED TYPE: ICD-10-CM

## 2017-10-30 DIAGNOSIS — E78.5 HYPERLIPIDEMIA, UNSPECIFIED HYPERLIPIDEMIA TYPE: Primary | ICD-10-CM

## 2017-10-30 DIAGNOSIS — Z23 IMMUNIZATION DUE: ICD-10-CM

## 2017-10-30 PROCEDURE — 99999 PR PBB SHADOW E&M-EST. PATIENT-LVL IV: CPT | Mod: PBBFAC,,, | Performed by: PHYSICIAN ASSISTANT

## 2017-10-30 PROCEDURE — 90662 IIV NO PRSV INCREASED AG IM: CPT | Mod: S$GLB,,, | Performed by: PHYSICIAN ASSISTANT

## 2017-10-30 PROCEDURE — 99499 UNLISTED E&M SERVICE: CPT | Mod: S$GLB,,, | Performed by: PHYSICIAN ASSISTANT

## 2017-10-30 PROCEDURE — 99213 OFFICE O/P EST LOW 20 MIN: CPT | Mod: 25,S$GLB,, | Performed by: PHYSICIAN ASSISTANT

## 2017-10-30 PROCEDURE — 90662 IIV NO PRSV INCREASED AG IM: CPT | Mod: S$GLB,,, | Performed by: FAMILY MEDICINE

## 2017-10-30 PROCEDURE — 90670 PCV13 VACCINE IM: CPT | Mod: S$GLB,,, | Performed by: FAMILY MEDICINE

## 2017-10-30 PROCEDURE — 90471 IMMUNIZATION ADMIN: CPT | Mod: S$GLB,,, | Performed by: PHYSICIAN ASSISTANT

## 2017-10-30 PROCEDURE — G0009 ADMIN PNEUMOCOCCAL VACCINE: HCPCS | Mod: S$GLB,,, | Performed by: FAMILY MEDICINE

## 2017-10-30 RX ORDER — LISINOPRIL 10 MG/1
10 TABLET ORAL EVERY MORNING
Qty: 90 TABLET | Refills: 3 | Status: SHIPPED | OUTPATIENT
Start: 2017-10-30 | End: 2017-11-13

## 2017-10-30 RX ORDER — ATORVASTATIN CALCIUM 10 MG/1
10 TABLET, FILM COATED ORAL NIGHTLY
Qty: 90 TABLET | Refills: 3 | Status: SHIPPED | OUTPATIENT
Start: 2017-10-30 | End: 2019-05-06

## 2017-10-30 NOTE — PROGRESS NOTES
Subjective:       Patient ID: Aida Gupta is a 67 y.o. female.    Chief Complaint: Results (labs 10/19)    Patient presents for follow up of recent lab indicating hyperlipidemia meeting criteria for statin therapy.  She states that she took a cholesterol medication years ago but stopped due to throat congestion.  She cannot recall the name of the medication.  She also states that she took blood pressure medication in the past but she stopped when her blood pressure became controlled.  She cannot recall the name of the medication.        Review of Systems   Constitutional: Negative for activity change and unexpected weight change.   HENT: Positive for hearing loss. Negative for rhinorrhea and trouble swallowing.    Eyes: Positive for discharge. Negative for visual disturbance.   Respiratory: Negative for chest tightness and wheezing.    Cardiovascular: Negative for chest pain and palpitations.   Gastrointestinal: Negative for blood in stool, constipation, diarrhea and vomiting.   Endocrine: Negative for polydipsia and polyuria.   Genitourinary: Negative for difficulty urinating, dysuria, hematuria and menstrual problem.   Musculoskeletal: Positive for arthralgias and neck pain. Negative for joint swelling.   Neurological: Positive for weakness and headaches.       Objective:      Physical Exam   Constitutional: She appears well-developed and well-nourished. She is cooperative. No distress.   Obese body habitus      Eyes: Conjunctivae and EOM are normal. Pupils are equal, round, and reactive to light. No scleral icterus.   Cardiovascular: Normal rate, regular rhythm and normal heart sounds.    Pulmonary/Chest: Effort normal and breath sounds normal.   Abdominal: Soft. Bowel sounds are normal.   Musculoskeletal:        Right lower leg: She exhibits no edema.        Left lower leg: She exhibits no edema.   Neurological: She is alert.   Skin: Skin is warm and dry.   Psychiatric: She has a normal mood and affect.  Her speech is normal. Judgment normal. Cognition and memory are normal.   Vitals reviewed.      Assessment:       1. Hyperlipidemia, unspecified hyperlipidemia type    2. Hypertension, unspecified type    3. BMI 40.0-44.9, adult        Plan:     Aida was seen today for results.    Diagnoses and all orders for this visit:    Hyperlipidemia, unspecified hyperlipidemia type  atorvastatin (LIPITOR) 10 MG tablet; Take 1 tablet (10 mg total) by mouth nightly. Cholesterol  Hypertension, unspecified type     lisinopril 10 MG tablet; Take 1 tablet (10 mg total) by mouth every morning. Blood pressure      BMI 40.0-44.9, adult  Patient readiness: acceptance and barriers:environmental    During the course of the visit the patient was educated and counseled about the following:     Hypertension:   Medication: begin lisinopril.  Obesity:   Diet interventions: qualitative changes (increase low-fat,  high-fiber foods).    Goals: Hypertension: Reduce Blood Pressure and Obesity: Reduce calorie intake and BMI    Did patient meet goals/outcomes: No    The following self management tools provided: declined    Patient Instructions (the written plan) was given to the patient/family.     Time spent with patient: 30 minutes      prevnar 13 and influenza today     Other orders  -       -

## 2017-11-10 ENCOUNTER — DOCUMENTATION ONLY (OUTPATIENT)
Dept: FAMILY MEDICINE | Facility: CLINIC | Age: 67
End: 2017-11-10

## 2017-11-10 NOTE — PROGRESS NOTES
Pre-Visit Chart Review  For Appointment Scheduled on 11/13/2017    Health Maintenance Due   Topic Date Due    TETANUS VACCINE  06/10/1970    Mammogram  06/10/1992    DEXA SCAN  06/10/1992    Zoster Vaccine  06/10/2012

## 2017-11-13 ENCOUNTER — OFFICE VISIT (OUTPATIENT)
Dept: DERMATOLOGY | Facility: CLINIC | Age: 67
End: 2017-11-13
Payer: MEDICARE

## 2017-11-13 ENCOUNTER — OFFICE VISIT (OUTPATIENT)
Dept: FAMILY MEDICINE | Facility: CLINIC | Age: 67
End: 2017-11-13
Payer: MEDICARE

## 2017-11-13 VITALS
DIASTOLIC BLOOD PRESSURE: 96 MMHG | BODY MASS INDEX: 44.19 KG/M2 | HEIGHT: 60 IN | BODY MASS INDEX: 43.78 KG/M2 | TEMPERATURE: 98 F | SYSTOLIC BLOOD PRESSURE: 144 MMHG | WEIGHT: 223 LBS | WEIGHT: 225.06 LBS | HEIGHT: 60 IN | HEART RATE: 71 BPM

## 2017-11-13 DIAGNOSIS — I10 HYPERTENSION, UNSPECIFIED TYPE: Primary | ICD-10-CM

## 2017-11-13 DIAGNOSIS — B02.29 POST HERPETIC NEURALGIA: Primary | ICD-10-CM

## 2017-11-13 DIAGNOSIS — B02.29 PHN (POSTHERPETIC NEURALGIA): ICD-10-CM

## 2017-11-13 DIAGNOSIS — F32.1 MODERATE SINGLE CURRENT EPISODE OF MAJOR DEPRESSIVE DISORDER: ICD-10-CM

## 2017-11-13 PROCEDURE — 99499 UNLISTED E&M SERVICE: CPT | Mod: S$GLB,,, | Performed by: PHYSICIAN ASSISTANT

## 2017-11-13 PROCEDURE — 99213 OFFICE O/P EST LOW 20 MIN: CPT | Mod: S$GLB,,, | Performed by: PHYSICIAN ASSISTANT

## 2017-11-13 PROCEDURE — 99499 UNLISTED E&M SERVICE: CPT | Mod: S$GLB,,, | Performed by: DERMATOLOGY

## 2017-11-13 PROCEDURE — 99999 PR PBB SHADOW E&M-EST. PATIENT-LVL IV: CPT | Mod: PBBFAC,,, | Performed by: PHYSICIAN ASSISTANT

## 2017-11-13 PROCEDURE — 99999 PR PBB SHADOW E&M-EST. PATIENT-LVL II: CPT | Mod: PBBFAC,,, | Performed by: DERMATOLOGY

## 2017-11-13 PROCEDURE — 99213 OFFICE O/P EST LOW 20 MIN: CPT | Mod: S$GLB,,, | Performed by: DERMATOLOGY

## 2017-11-13 RX ORDER — DULOXETIN HYDROCHLORIDE 60 MG/1
60 CAPSULE, DELAYED RELEASE ORAL DAILY
Qty: 30 CAPSULE | Refills: 11 | Status: SHIPPED | OUTPATIENT
Start: 2017-11-13 | End: 2019-06-20

## 2017-11-13 RX ORDER — LISINOPRIL AND HYDROCHLOROTHIAZIDE 12.5; 2 MG/1; MG/1
1 TABLET ORAL DAILY
Qty: 30 TABLET | Refills: 11 | Status: SHIPPED | OUTPATIENT
Start: 2017-11-13 | End: 2019-05-06

## 2017-11-13 RX ORDER — DOXEPIN HYDROCHLORIDE 50 MG/G
CREAM TOPICAL
Qty: 45 G | Refills: 2 | Status: SHIPPED | OUTPATIENT
Start: 2017-11-13 | End: 2018-12-13 | Stop reason: SDUPTHER

## 2017-11-13 NOTE — PROGRESS NOTES
Subjective:       Patient ID: Aida Gupta is a 67 y.o. female.    Chief Complaint: Follow-up (lov 10/30)    Patient with uncontrolled hypertension, hyperlipidemia, uncontrolled pain due to PHN, and depression presents for 2 week follow up of HTN.  She started lisinopril 10 mg.  She is checking BP at home and readings are 140-150/.  She continues to have pain secondary to PHN.  She is in such pain that she cannot preform ADLs.  She is having increased depression with crying spells and lack of motivation.        Review of Systems   Constitutional: Negative for activity change and unexpected weight change.   HENT: Negative for hearing loss, rhinorrhea and trouble swallowing.    Eyes: Negative for discharge and visual disturbance.   Respiratory: Negative for chest tightness and wheezing.    Cardiovascular: Negative for chest pain and palpitations.   Gastrointestinal: Negative for blood in stool, constipation, diarrhea and vomiting.   Endocrine: Negative for polydipsia and polyuria.   Genitourinary: Negative for difficulty urinating, dysuria, hematuria and menstrual problem.   Musculoskeletal: Positive for arthralgias. Negative for joint swelling and neck pain.   Neurological: Positive for weakness, numbness and headaches.   Psychiatric/Behavioral: Positive for confusion and dysphoric mood. Negative for self-injury, sleep disturbance and suicidal ideas. The patient is not nervous/anxious.        Objective:      Physical Exam   Constitutional: She is cooperative. No distress.   Obese body habitus     Cardiovascular: Normal rate, regular rhythm and normal heart sounds.    Pulmonary/Chest: Effort normal and breath sounds normal.   Neurological: She is alert.   Psychiatric: Her speech is normal and behavior is normal. Judgment and thought content normal. Her mood appears not anxious. She is not actively hallucinating. Cognition and memory are normal. She exhibits a depressed mood (tearful ). She is attentive.    Vitals reviewed.      Assessment:       1. Hypertension, unspecified type    2. BMI 40.0-44.9, adult    3. PHN (postherpetic neuralgia)    4. Moderate single current episode of major depressive disorder        Plan:       Aida was seen today for follow-up.    Diagnoses and all orders for this visit:    Hypertension, unspecified type     lisinopril-hydrochlorothiazide (PRINZIDE,ZESTORETIC) 20-12.5 mg per tablet; Take 1 tablet by mouth once daily.  BMI 40.0-44.9, adult    PHN (postherpetic neuralgia)  - needs follow up Dr Walton     Ambulatory referral to [facility] Clinic    Moderate single current episode of major depressive disorder  -    -  Increase    DULoxetine (CYMBALTA) 60 MG capsule; Take 1 capsule (60 mg total) by mouth once daily. Depression and nerve pain    Return for 4 weeks me or pcp htn follow up .

## 2017-11-13 NOTE — LETTER
November 13, 2017      Beni Gutierrez MD  [address]  [place] [address]           [facility]  [address]  [place] [address]  Phone: [phone]          Patient: Aida Gupta   MR Number: [id number]   YOB: 1950   Date of Visit: 11/13/2017       Dear Dr. Beni Gutierrez:    Thank you for referring Aida Gupta to me for evaluation. Attached you will find relevant portions of my assessment and plan of care.    If you have questions, please do not hesitate to call me. I look forward to following Aida Gupta along with you.    Sincerely,    Adelina Wu MD    Enclosure  CC:  No Recipients    If you would like to receive this communication electronically, please contact [email or web address] or [phone] to request more information on [facility] [employer] access.    For providers and/or their staff who would like to refer a patient to [facility], please contact us through our one-stop-shop provider referral line, [facility], at [phone].    If you feel you have received this communication in error or would no longer like to receive these types of communications, please e-mail [email or web address]

## 2017-11-13 NOTE — PROGRESS NOTES
"  Subjective:       Patient ID:  Aida Gupta is a 67 y.o. female who presents for   Chief Complaint   Patient presents with    Spot     Follow up      Last seen 9/2017 with PHN  Discontinued gabapentin (too sedating), rx amitriptyline  Pain management Dr Walton discontinued amitryptiline and restarted gabapentin; takes 300mg as needed, not scheduled "I dont want to take it"    H/o shingles on R buttock 2016, lasted months, had to wear catheter   "went away, never fully, now buttock stays raw", neuropathy to area and leg  Has neurontin rx, rarely taking "makes me groggy", even one QHS tablet lingers next am  Very painful      Current Outpatient Prescriptions:     albuterol (VENTOLIN HFA) 90 mcg/actuation inhaler, Inhale 2 puffs into the lungs every 6 (six) hours as needed for Wheezing. Rescue, Disp: , Rfl:     amitriptyline (ELAVIL) 25 MG tablet, Take 1/2 tablet at bedtime x 2 weeks, may increase to one tablet at bedtime if tolerated, Disp: 30 tablet, Rfl: 0    atorvastatin (LIPITOR) 10 MG tablet, Take 1 tablet (10 mg total) by mouth nightly. Cholesterol, Disp: 90 tablet, Rfl: 3    duloxetine (CYMBALTA) 20 MG capsule, Take 1 capsule (20 mg total) by mouth once daily., Disp: 30 capsule, Rfl: 1    ergocalciferol (ERGOCALCIFEROL) 50,000 unit Cap, Take 1 capsule (50,000 Units total) by mouth every 7 days., Disp: 12 capsule, Rfl: 0    FLUTICASONE PROPIONATE, MICRO (FLUTICASONE PROP, MICRO, BULK, MISC), by Misc.(Non-Drug; Combo Route) route., Disp: , Rfl:     gabapentin (NEURONTIN) 300 MG capsule, Take 1 capsule (300 mg total) by mouth 3 (three) times daily., Disp: 90 capsule, Rfl: 1    hydrOXYzine HCl (ATARAX) 25 MG tablet, Take 25 mg by mouth 3 (three) times daily as needed for Itching., Disp: , Rfl:     ibuprofen (ADVIL,MOTRIN) 600 MG tablet, Take 600 mg by mouth every 6 (six) hours as needed for Pain., Disp: , Rfl:     levothyroxine (SYNTHROID) 125 MCG tablet, Take 1 tablet (125 mcg total) by mouth once " daily., Disp: 90 tablet, Rfl: 3    lisinopril 10 MG tablet, Take 1 tablet (10 mg total) by mouth every morning. Blood pressure, Disp: 90 tablet, Rfl: 3    mirtazapine (REMERON) 15 MG tablet, Take 1 tablet (15 mg total) by mouth every evening., Disp: 30 tablet, Rfl: 2    nystatin (MYCOSTATIN) cream, Apply topically 2 (two) times daily., Disp: 30 g, Rfl: 1    omeprazole (PRILOSEC) 20 MG capsule, Take 1 capsule (20 mg total) by mouth once daily., Disp: 90 capsule, Rfl: 3    ondansetron (ZOFRAN) 4 MG tablet, Take 1 tablet (4 mg total) by mouth every 8 (eight) hours as needed for Nausea., Disp: 30 tablet, Rfl: 1    triamcinolone acetonide 0.1% (KENALOG) 0.1 % cream, Apply topically 2 (two) times daily., Disp: , Rfl:         Spot  - Follow-up  Diagnosis: Post herpetic neuralgia.  Symptom course: unchanged  Currently using: gabapentin, kenalog cream.  Affected locations: right buttock  Signs / symptoms: pain  Severity: moderate        Review of Systems   Constitutional: Negative for fever, chills, weight loss, weight gain, fatigue, night sweats and malaise.   Skin: Negative for daily sunscreen use and activity-related sunscreen use.   Hematologic/Lymphatic: Does not bruise/bleed easily.        Objective:    Physical Exam   Constitutional: She appears well-developed and well-nourished. No distress.   Neurological: She is alert and oriented to person, place, and time. She is not disoriented.   Psychiatric: She has a normal mood and affect.   Skin:   Areas Examined (abnormalities noted in diagram):   Genitals / Buttocks / Groin Inspection Performed              Diagram Legend     Erythematous scaling macule/papule c/w actinic keratosis       Vascular papule c/w angioma      Pigmented verrucoid papule/plaque c/w seborrheic keratosis      Yellow umbilicated papule c/w sebaceous hyperplasia      Irregularly shaped tan macule c/w lentigo     1-2 mm smooth white papules consistent with Milia      Movable subcutaneous cyst  with punctum c/w epidermal inclusion cyst      Subcutaneous movable cyst c/w pilar cyst      Firm pink to brown papule c/w dermatofibroma      Pedunculated fleshy papule(s) c/w skin tag(s)      Evenly pigmented macule c/w junctional nevus     Mildly variegated pigmented, slightly irregular-bordered macule c/w mildly atypical nevus      Flesh colored to evenly pigmented papule c/w intradermal nevus       Pink pearly papule/plaque c/w basal cell carcinoma      Erythematous hyperkeratotic cursted plaque c/w SCC      Surgical scar with no sign of skin cancer recurrence      Open and closed comedones      Inflammatory papules and pustules      Verrucoid papule consistent consistent with wart     Erythematous eczematous patches and plaques     Dystrophic onycholytic nail with subungual debris c/w onychomycosis     Umbilicated papule    Erythematous-base heme-crusted tan verrucoid plaque consistent with inflamed seborrheic keratosis     Erythematous Silvery Scaling Plaque c/w Psoriasis     See annotation      Assessment / Plan:        Post herpetic neuralgia  -     doxepin (ZONALON) 5 % cream; AAA R buttock BID PRN pain  Dispense: 45 g; Refill: 2    skin normal; no suspicion of HSV with recurrent flares  Severe PHN  Under care pain mangement Dr Walton, defer management to him  Advised to take gabapentin scheduled at least BID  Not taking amitriptyline  Trial topical doxepin         Return if symptoms worsen or fail to improve.

## 2017-12-18 ENCOUNTER — DOCUMENTATION ONLY (OUTPATIENT)
Dept: FAMILY MEDICINE | Facility: CLINIC | Age: 67
End: 2017-12-18

## 2017-12-18 NOTE — PROGRESS NOTES
Pre-Visit Chart Review  For Appointment Scheduled on 12/19/17.    Health Maintenance Due   Topic Date Due    TETANUS VACCINE  06/10/1970    Mammogram  06/10/1992    DEXA SCAN  06/10/1992    Zoster Vaccine  06/10/2012

## 2017-12-20 ENCOUNTER — OFFICE VISIT (OUTPATIENT)
Dept: PAIN MEDICINE | Facility: CLINIC | Age: 67
End: 2017-12-20
Payer: MEDICARE

## 2017-12-20 VITALS
WEIGHT: 225 LBS | BODY MASS INDEX: 44.17 KG/M2 | DIASTOLIC BLOOD PRESSURE: 100 MMHG | HEART RATE: 77 BPM | HEIGHT: 60 IN | SYSTOLIC BLOOD PRESSURE: 137 MMHG

## 2017-12-20 DIAGNOSIS — M54.16 LUMBAR RADICULITIS: ICD-10-CM

## 2017-12-20 DIAGNOSIS — B02.29 POST HERPETIC NEURALGIA: Primary | ICD-10-CM

## 2017-12-20 PROCEDURE — 99499 UNLISTED E&M SERVICE: CPT | Mod: S$GLB,,, | Performed by: ANESTHESIOLOGY

## 2017-12-20 PROCEDURE — 99214 OFFICE O/P EST MOD 30 MIN: CPT | Mod: S$GLB,,, | Performed by: ANESTHESIOLOGY

## 2017-12-20 PROCEDURE — 99999 PR PBB SHADOW E&M-EST. PATIENT-LVL IV: CPT | Mod: PBBFAC,,, | Performed by: ANESTHESIOLOGY

## 2017-12-20 NOTE — PROGRESS NOTES
This note was completed with dictation software and grammatical errors may exist.    Referring Physician: Esther Trevizo PA    PCP: Beni Gutierrez MD      CC: Buttock and right leg pain    Interval history: Patient returns to our clinic.  She has continued buttock and right leg pain due to post herpetic neuralgia.  She is s/p caudal SEUN on 10/26/17 and states having moderate benefit for over 1.5 months. She was able to ambulate more with less pain.  Pain has gradually returned.  She does not desire oral medications. She is taking gabapentin 300mg BID with mild benefits.    Prior HPI:   Aida Gupta is a 67 y.o. female referred to us for buttock and right leg pain.  Pain has been present for over a year.  She had about of shingles that occurred over her tailbone and buttock area.  Pain radiated down her right leg.  Pain is a constant aching, burning, sharp pain in her buttock.  Pain worsens with sitting, touching, walking.  Pain improves with rest.  She is currently on Elavil, Cymbalta, gabapentin with mild benefits so far.  She denies weakness.  No bowel bladder changes.  She rates her pain 8/10 today.    ROS:  CONSTITUTIONAL: No fevers, chills, night sweats, wt. loss, appetite changes  SKIN: no rashes or itching  ENT: No headaches, head trauma, vision changes, or eye pain  LYMPH NODES: None noticed   CV: No chest pain, palpitations.   RESP: No shortness of breath, dyspnea on exertion, cough, wheezing, or hemoptysis  GI: No nausea, emesis, diarrhea, constipation, melena, hematochezia, pain.    : No dysuria, hematuria, urgency, or frequency   HEME: No easy bruising, bleeding problems  PSYCHIATRIC: No depression, anxiety, psychosis, hallucinations.  NEURO: No seizures, memory loss, dizziness or difficulty sleeping  MSK: + History of present illness      Past Medical History:   Diagnosis Date    Asthma     chronic bronchitis    Shingles     1 year ago     Thyroid disease      Past Surgical History:    Procedure Laterality Date    Bilateral tubal ligation      cholectomy      COLON SURGERY      Diverticulitis     Family History   Problem Relation Age of Onset    Eczema Neg Hx     Lupus Neg Hx     Psoriasis Neg Hx     Melanoma Neg Hx      Social History     Social History    Marital status:      Spouse name: N/A    Number of children: N/A    Years of education: N/A     Social History Main Topics    Smoking status: Never Smoker    Smokeless tobacco: Never Used    Alcohol use No    Drug use: No    Sexual activity: Not Asked     Other Topics Concern    None     Social History Narrative    None         Medications/Allergies: See med card    Vitals:    12/20/17 1118   BP: (!) 137/100   Pulse: 77   Weight: 102.1 kg (225 lb)   Height: 5' (1.524 m)   PainSc:   4   PainLoc: Back         Physical exam:    GENERAL: A and O x3, the patient appears well groomed and is in no acute distress.  Skin: No rashes or obvious lesions  HEENT: normocephalic, atraumatic  CARDIOVASCULAR:  Palpable peripheral pulses  LUNGS: easy work of breathing  ABDOMEN: soft, nontender   UPPER EXTREMITIES: Normal alignment, normal range of motion, no atrophy, no skin changes,  hair growth and nail growth normal and equal bilaterally. No swelling, no tenderness.    LOWER EXTREMITIES:  Normal alignment, normal range of motion, no atrophy, no skin changes,  hair growth and nail growth normal and equal bilaterally. No swelling, no tenderness.    LUMBAR SPINE  Lumbar spine: ROM is full with flexion extension and oblique extension with mild increased pain.    Moises's test causes no increased pain on either side.    Supine straight leg raise is positive on right at 45 degrees.    Internal and external rotation of the hip causes no increased pain on either side.  Myofascial exam: No tenderness to palpation across lumbar paraspinous muscles.      MENTAL STATUS: normal orientation, speech, language, and fund of knowledge for social  situation.  Emotional state appropriate.    CRANIAL NERVES:  II:  PERRL bilaterally,   III,IV,VI: EOMI.    V:  Facial sensation equal bilaterally  VII:  Facial motor function normal.  VIII:  Hearing equal to finger rub bilaterally  IX/X: Gag normal, palate symmetric  XI:  Shoulder shrug equal, head turn equal  XII:  Tongue midline without fasciculations      MOTOR: Tone and bulk: normal bilateral upper and lower Strength: normal   Delt Bi Tri WE WF     R 5 5 5 5 5 5   L 5 5 5 5 5 5     IP ADD ABD Quad TA Gas HAM  R 5 5 5 5 5 5 5  L 5 5 5 5 5 5 5    SENSATION: Light touch and pinprick intact bilaterally  REFLEXES: normal, symmetric, nonbrisk.  Toes down, no clonus. No hoffmans.  GAIT: normal rise, base, steps, and arm swing.        Imaging:  None    Assessment:  Patient referred for buttock and right leg pain  1. Post herpetic neuralgia    2. Lumbar radiculitis          Plan:  - I have stressed the importance of physical activity and exercise to improve overall health  - Patient with postherpetic neuralgia of her sacral nerve.  Discuss disease progression of postherpetic neuralgia.  Continue anti-neuropathic medications.  Discuss performing repeat caudal SEUN to help with her radicular pain.    - Follow up after procedure

## 2017-12-26 ENCOUNTER — DOCUMENTATION ONLY (OUTPATIENT)
Dept: FAMILY MEDICINE | Facility: CLINIC | Age: 67
End: 2017-12-26

## 2017-12-26 NOTE — PROGRESS NOTES
Pre-Visit Chart Review  For Appointment Scheduled on 12/26/17.    Health Maintenance Due   Topic Date Due    TETANUS VACCINE  06/10/1970    Mammogram  06/10/1992    DEXA SCAN  06/10/1992    Zoster Vaccine  06/10/2012

## 2017-12-28 DIAGNOSIS — M54.16 LUMBAR RADICULOPATHY: Primary | ICD-10-CM

## 2018-01-03 ENCOUNTER — TELEPHONE (OUTPATIENT)
Dept: PAIN MEDICINE | Facility: CLINIC | Age: 68
End: 2018-01-03

## 2018-01-03 NOTE — TELEPHONE ENCOUNTER
----- Message from Angela Bee sent at 1/3/2018  9:57 AM CST -----  Contact: patient  Patient calling to reschedule her procedure tomorrow on 1/4/18, due to a chest cold. She will call back to reschedule. Please advise.  Call back   Thanks!

## 2018-01-12 ENCOUNTER — SURGERY (OUTPATIENT)
Age: 68
End: 2018-01-12

## 2018-01-12 ENCOUNTER — HOSPITAL ENCOUNTER (OUTPATIENT)
Facility: AMBULARY SURGERY CENTER | Age: 68
Discharge: HOME | End: 2018-01-12
Attending: ANESTHESIOLOGY | Admitting: ANESTHESIOLOGY
Payer: MEDICARE

## 2018-01-12 DIAGNOSIS — M54.16 LUMBAR RADICULITIS: ICD-10-CM

## 2018-01-12 DIAGNOSIS — M51.36 DDD (DEGENERATIVE DISC DISEASE), LUMBAR: Primary | ICD-10-CM

## 2018-01-12 PROCEDURE — 62323 NJX INTERLAMINAR LMBR/SAC: CPT | Mod: ,,, | Performed by: ANESTHESIOLOGY

## 2018-01-12 PROCEDURE — 62323 NJX INTERLAMINAR LMBR/SAC: CPT | Performed by: ANESTHESIOLOGY

## 2018-01-12 PROCEDURE — 99152 MOD SED SAME PHYS/QHP 5/>YRS: CPT | Mod: ,,, | Performed by: ANESTHESIOLOGY

## 2018-01-12 RX ORDER — SODIUM CHLORIDE 9 MG/ML
INJECTION, SOLUTION INTRAMUSCULAR; INTRAVENOUS; SUBCUTANEOUS
Status: DISCONTINUED | OUTPATIENT
Start: 2018-01-12 | End: 2018-01-12 | Stop reason: HOSPADM

## 2018-01-12 RX ORDER — LIDOCAINE HYDROCHLORIDE 10 MG/ML
INJECTION, SOLUTION EPIDURAL; INFILTRATION; INTRACAUDAL; PERINEURAL
Status: DISCONTINUED | OUTPATIENT
Start: 2018-01-12 | End: 2018-01-12 | Stop reason: HOSPADM

## 2018-01-12 RX ORDER — DEXAMETHASONE SODIUM PHOSPHATE 10 MG/ML
INJECTION INTRAMUSCULAR; INTRAVENOUS
Status: DISCONTINUED | OUTPATIENT
Start: 2018-01-12 | End: 2018-01-12 | Stop reason: HOSPADM

## 2018-01-12 RX ORDER — DEXAMETHASONE SODIUM PHOSPHATE 10 MG/ML
INJECTION INTRAMUSCULAR; INTRAVENOUS
Status: DISCONTINUED
Start: 2018-01-12 | End: 2018-01-12 | Stop reason: HOSPADM

## 2018-01-12 RX ORDER — SODIUM CHLORIDE, SODIUM LACTATE, POTASSIUM CHLORIDE, CALCIUM CHLORIDE 600; 310; 30; 20 MG/100ML; MG/100ML; MG/100ML; MG/100ML
INJECTION, SOLUTION INTRAVENOUS ONCE AS NEEDED
Status: DISCONTINUED | OUTPATIENT
Start: 2018-01-12 | End: 2018-01-12 | Stop reason: HOSPADM

## 2018-01-12 RX ORDER — FENTANYL CITRATE 50 UG/ML
INJECTION, SOLUTION INTRAMUSCULAR; INTRAVENOUS
Status: DISCONTINUED | OUTPATIENT
Start: 2018-01-12 | End: 2018-01-12 | Stop reason: HOSPADM

## 2018-01-12 RX ORDER — MIDAZOLAM HYDROCHLORIDE 1 MG/ML
INJECTION INTRAMUSCULAR; INTRAVENOUS
Status: DISCONTINUED
Start: 2018-01-12 | End: 2018-01-12 | Stop reason: HOSPADM

## 2018-01-12 RX ORDER — LIDOCAINE HYDROCHLORIDE 10 MG/ML
INJECTION, SOLUTION EPIDURAL; INFILTRATION; INTRACAUDAL; PERINEURAL
Status: DISCONTINUED
Start: 2018-01-12 | End: 2018-01-12 | Stop reason: HOSPADM

## 2018-01-12 RX ORDER — MIDAZOLAM HYDROCHLORIDE 2 MG/2ML
INJECTION, SOLUTION INTRAMUSCULAR; INTRAVENOUS
Status: DISCONTINUED | OUTPATIENT
Start: 2018-01-12 | End: 2018-01-12 | Stop reason: HOSPADM

## 2018-01-12 RX ORDER — FENTANYL CITRATE 50 UG/ML
INJECTION, SOLUTION INTRAMUSCULAR; INTRAVENOUS
Status: DISCONTINUED
Start: 2018-01-12 | End: 2018-01-12 | Stop reason: HOSPADM

## 2018-01-12 RX ADMIN — DEXAMETHASONE SODIUM PHOSPHATE 10 MG: 10 INJECTION INTRAMUSCULAR; INTRAVENOUS at 12:01

## 2018-01-12 RX ADMIN — MIDAZOLAM HYDROCHLORIDE 2 MG: 2 INJECTION, SOLUTION INTRAMUSCULAR; INTRAVENOUS at 12:01

## 2018-01-12 RX ADMIN — FENTANYL CITRATE 25 MCG: 50 INJECTION, SOLUTION INTRAMUSCULAR; INTRAVENOUS at 12:01

## 2018-01-12 RX ADMIN — LIDOCAINE HYDROCHLORIDE 5 ML: 10 INJECTION, SOLUTION EPIDURAL; INFILTRATION; INTRACAUDAL; PERINEURAL at 12:01

## 2018-01-12 RX ADMIN — FENTANYL CITRATE 50 MCG: 50 INJECTION, SOLUTION INTRAMUSCULAR; INTRAVENOUS at 12:01

## 2018-01-12 RX ADMIN — SODIUM CHLORIDE 4 ML: 9 INJECTION, SOLUTION INTRAMUSCULAR; INTRAVENOUS; SUBCUTANEOUS at 12:01

## 2018-01-12 NOTE — DISCHARGE SUMMARY
[facility]  Discharge Note  Short Stay    Admit Date: 1/12/2018    Discharge Date and Time: 1/12/2018    Attending Physician: Lex Walton MD     Discharge Provider: Lex Walton    Diagnoses:  Active Hospital Problems    Diagnosis  POA    *Lumbar radiculitis [M54.16]  Yes      Resolved Hospital Problems    Diagnosis Date Resolved POA   No resolved problems to display.       Hospital Course: Caudal SEUN  Discharged Condition: Good    Final Diagnoses:   Active Hospital Problems    Diagnosis  POA    *Lumbar radiculitis [M54.16]  Yes      Resolved Hospital Problems    Diagnosis Date Resolved POA   No resolved problems to display.       Disposition: Home or Self Care    Follow up/Patient Instructions:    Medications:  Reconciled Home Medications:   Current Discharge Medication List      CONTINUE these medications which have NOT CHANGED    Details   ibuprofen (ADVIL,MOTRIN) 600 MG tablet Take 600 mg by mouth every 6 (six) hours as needed for Pain.      albuterol (VENTOLIN HFA) 90 mcg/actuation inhaler Inhale 2 puffs into the lungs every 6 (six) hours as needed for Wheezing. Rescue      amitriptyline (ELAVIL) 25 MG tablet Take 1/2 tablet at bedtime x 2 weeks, may increase to one tablet at bedtime if tolerated  Qty: 30 tablet, Refills: 0    Associated Diagnoses: Post herpetic neuralgia      atorvastatin (LIPITOR) 10 MG tablet Take 1 tablet (10 mg total) by mouth nightly. Cholesterol  Qty: 90 tablet, Refills: 3      doxepin (ZONALON) 5 % cream AAA R buttock BID PRN pain  Qty: 45 g, Refills: 2    Associated Diagnoses: Post herpetic neuralgia      DULoxetine (CYMBALTA) 60 MG capsule Take 1 capsule (60 mg total) by mouth once daily. Depression and nerve pain  Qty: 30 capsule, Refills: 11      ergocalciferol (ERGOCALCIFEROL) 50,000 unit Cap Take 1 capsule (50,000 Units total) by mouth every 7 days.  Qty: 12 capsule, Refills: 0    Associated Diagnoses: Vitamin D deficiency      FLUTICASONE PROPIONATE, MICRO (FLUTICASONE  PROP, MICRO, BULK, MISC) by Misc.(Non-Drug; Combo Route) route.      gabapentin (NEURONTIN) 300 MG capsule Take 1 capsule (300 mg total) by mouth 3 (three) times daily.  Qty: 90 capsule, Refills: 1      hydrOXYzine HCl (ATARAX) 25 MG tablet Take 25 mg by mouth 3 (three) times daily as needed for Itching.      levothyroxine (SYNTHROID) 125 MCG tablet Take 1 tablet (125 mcg total) by mouth once daily.  Qty: 90 tablet, Refills: 3    Associated Diagnoses: Hypothyroidism, unspecified type      lisinopril-hydrochlorothiazide (PRINZIDE,ZESTORETIC) 20-12.5 mg per tablet Take 1 tablet by mouth once daily.  Qty: 30 tablet, Refills: 11      mirtazapine (REMERON) 15 MG tablet Take 1 tablet (15 mg total) by mouth every evening.  Qty: 30 tablet, Refills: 2      nystatin (MYCOSTATIN) cream Apply topically 2 (two) times daily.  Qty: 30 g, Refills: 1    Associated Diagnoses: Yeast infection      omeprazole (PRILOSEC) 20 MG capsule Take 1 capsule (20 mg total) by mouth once daily.  Qty: 90 capsule, Refills: 3    Associated Diagnoses: Gastroesophageal reflux disease without esophagitis      ondansetron (ZOFRAN) 4 MG tablet Take 1 tablet (4 mg total) by mouth every 8 (eight) hours as needed for Nausea.  Qty: 30 tablet, Refills: 1    Associated Diagnoses: Nausea      triamcinolone acetonide 0.1% (KENALOG) 0.1 % cream Apply topically 2 (two) times daily.             Discharge Procedure Orders  Call MD for:  temperature >100.4     Call MD for:  persistent nausea and vomiting or diarrhea     Call MD for:  severe uncontrolled pain     Call MD for:  redness, tenderness, or signs of infection (pain, swelling, redness, odor or green/yellow discharge around incision site)     Call MD for:  difficulty breathing or increased cough     Call MD for:  severe persistent headache          Follow up with MD in 2-3 weeks    Discharge Procedure Orders (must include Diet, Follow-up, Activity):    Discharge Procedure Orders (must include Diet, Follow-up,  Activity)  Call MD for:  temperature >100.4     Call MD for:  persistent nausea and vomiting or diarrhea     Call MD for:  severe uncontrolled pain     Call MD for:  redness, tenderness, or signs of infection (pain, swelling, redness, odor or green/yellow discharge around incision site)     Call MD for:  difficulty breathing or increased cough     Call MD for:  severe persistent headache

## 2018-01-12 NOTE — PLAN OF CARE
Pt states ready to go home , stable, chiqui po fluids, ambulatory, denies pain, Leg raises performed in bed without diff and instructed on fall risk. PT and spouse verbalized understanding

## 2018-01-12 NOTE — OP NOTE
PROCEDURE DATE: 1/12/2018    PROCEDURE:  Caudal epidural steroid injection under fluoroscopy.    Diagnosis: Lumbar disc displacement without myelopathy  Post Op diagnosis: Same    PHYSICIAN: Lex Walton M.D.    MEDICATIONS INJECTED:  10 mg of dexamethasone and 4 ml of sterile, preservative-free NaCl.    LOCAL ANESTHETIC GIVEN:  Lidocaine 1%, 2 ml total    SEDATION MEDICATIONS: RN IV sedation    ESTIMATED BLOOD LOSS:  None    COMPLICATIONS:  None    TECHNIQUE:   After the patient was placed in prone position, the patient was prepped and draped in the usual sterile fashion using ChloraPrep and sterile towels.  Appropriate anatomic landmarks were determined by identifying the sacral hiatus in the lateral fluoroscopic view.  Local anesthetic was given via a 25g 1.5 inch needle by raising a wheal and infiltrating down to the periosteum.  A 3.5 inch 20 gauge touhy needle was introduced thru the sacral hiatus.  2ml of contrast was injected to confirm placement in the appropriate area and that there was no vascular uptake.  The medication was then injected slowly.  The patient tolerated the procedure well.    The patient was monitored after the procedure.  Patient was given post procedure and discharge instructions to follow at home.  The patient was discharged in a stable condition

## 2018-01-15 VITALS
RESPIRATION RATE: 18 BRPM | TEMPERATURE: 98 F | WEIGHT: 225 LBS | DIASTOLIC BLOOD PRESSURE: 70 MMHG | HEIGHT: 60 IN | SYSTOLIC BLOOD PRESSURE: 117 MMHG | OXYGEN SATURATION: 96 % | BODY MASS INDEX: 44.17 KG/M2 | HEART RATE: 73 BPM

## 2018-01-16 ENCOUNTER — OFFICE VISIT (OUTPATIENT)
Dept: RHEUMATOLOGY | Facility: CLINIC | Age: 68
End: 2018-01-16
Payer: MEDICARE

## 2018-01-16 VITALS
BODY MASS INDEX: 43.78 KG/M2 | WEIGHT: 223 LBS | SYSTOLIC BLOOD PRESSURE: 140 MMHG | HEIGHT: 60 IN | DIASTOLIC BLOOD PRESSURE: 98 MMHG

## 2018-01-16 DIAGNOSIS — R76.8 POSITIVE ANA (ANTINUCLEAR ANTIBODY): Primary | ICD-10-CM

## 2018-01-16 PROCEDURE — 99213 OFFICE O/P EST LOW 20 MIN: CPT | Mod: ,,, | Performed by: INTERNAL MEDICINE

## 2018-01-16 NOTE — PROGRESS NOTES
[facility] RHEUMATOLOGY            PROGRESS NOTE      Subjective:       Patient ID:   NAME: Aida Gupta : 1950     67 y.o. female    Referring Doc: No ref. provider found  Other Physicians:    Chief Complaint:  No chief complaint on file.      History of Present Illness:     Patient returns today for a regularly scheduled follow-up visit for positive JAVY,pos ds DNA antibody      The patient is recovering From a respiratory infection symptoms. No fevers just a nonproductive cough and rhinorrhea.  No rashes. No chest pains or shortness of breath. No mucosal ulcerations. No Raynaud's. No joint swelling.            ROS:   GEN:  No  fever, night sweats . weight is stable   some fatigue  SKIN: no rashes, no bruising, no ulcerations, no Raynaud's  HEENT: no HA's, No visual changes, no mucosal ulcers, no sicca symptoms,  CV:   no CP, SOB, PND, MCKEON, no orthopnea, no palpitations  PULM: normal with no SOB, + nonproductive cough, Nohemoptysis, sputum or pleuritic pain  GI:  no abdominal pain, nausea, vomiting, constipation, diarrhea, melanotic stools, BRBPR, hematemesis, no dysphagia  :   no dysuria  NEURO: no paresthesias, headaches, visual disturbances, muscle weakness  MUSCULOSKELETAL:no joint swelling, prolonged AM stiffness, no back pain, occ muscle pain  Allergies:  Review of patient's allergies indicates:  No Known Allergies    Medications:    Current Outpatient Prescriptions:     albuterol (VENTOLIN HFA) 90 mcg/actuation inhaler, Inhale 2 puffs into the lungs every 6 (six) hours as needed for Wheezing. Rescue, Disp: , Rfl:     amitriptyline (ELAVIL) 25 MG tablet, Take 1/2 tablet at bedtime x 2 weeks, may increase to one tablet at bedtime if tolerated, Disp: 30 tablet, Rfl: 0    atorvastatin (LIPITOR) 10 MG tablet, Take 1 tablet (10 mg total) by mouth nightly. Cholesterol, Disp: 90 tablet, Rfl: 3    doxepin (ZONALON) 5 % cream, AAA R buttock BID PRN pain, Disp: 45 g, Rfl: 2    DULoxetine  (CYMBALTA) 60 MG capsule, Take 1 capsule (60 mg total) by mouth once daily. Depression and nerve pain, Disp: 30 capsule, Rfl: 11    ergocalciferol (ERGOCALCIFEROL) 50,000 unit Cap, Take 1 capsule (50,000 Units total) by mouth every 7 days., Disp: 12 capsule, Rfl: 0    FLUTICASONE PROPIONATE, MICRO (FLUTICASONE PROP, MICRO, BULK, MISC), by Misc.(Non-Drug; Combo Route) route., Disp: , Rfl:     gabapentin (NEURONTIN) 300 MG capsule, Take 1 capsule (300 mg total) by mouth 3 (three) times daily., Disp: 90 capsule, Rfl: 1    hydrOXYzine HCl (ATARAX) 25 MG tablet, Take 25 mg by mouth 3 (three) times daily as needed for Itching., Disp: , Rfl:     ibuprofen (ADVIL,MOTRIN) 600 MG tablet, Take 600 mg by mouth every 6 (six) hours as needed for Pain., Disp: , Rfl:     levothyroxine (SYNTHROID) 125 MCG tablet, Take 1 tablet (125 mcg total) by mouth once daily., Disp: 90 tablet, Rfl: 3    lisinopril-hydrochlorothiazide (PRINZIDE,ZESTORETIC) 20-12.5 mg per tablet, Take 1 tablet by mouth once daily., Disp: 30 tablet, Rfl: 11    mirtazapine (REMERON) 15 MG tablet, Take 1 tablet (15 mg total) by mouth every evening., Disp: 30 tablet, Rfl: 2    nystatin (MYCOSTATIN) cream, Apply topically 2 (two) times daily., Disp: 30 g, Rfl: 1    omeprazole (PRILOSEC) 20 MG capsule, Take 1 capsule (20 mg total) by mouth once daily., Disp: 90 capsule, Rfl: 3    ondansetron (ZOFRAN) 4 MG tablet, Take 1 tablet (4 mg total) by mouth every 8 (eight) hours as needed for Nausea., Disp: 30 tablet, Rfl: 1    triamcinolone acetonide 0.1% (KENALOG) 0.1 % cream, Apply topically 2 (two) times daily., Disp: , Rfl:     PMHx/PSHx Updates:      Last Eye Exam      Objective:     Vitals:  Blood pressure (!) 140/98, height 5' (1.524 m), weight 101.2 kg (223 lb).    Physical Examination:   GEN: no apparent distress, comfortable; AAOx3  SKIN: no rashes,no ulceration, no Raynaud's, no petechiae, no SQ nodules,  HEAD: normal  EYES: no pallor, no icterus,  NECK:  no masses, thyroid normal, trachea midline, no LAD/LN's, supple  CV: RRR with no murmur; l S1 and S2 reg. ,no gallop no rubs,   CHEST: Normal respiratory effort; CTAB; normal breath sounds; no wheeze or crackles  MUSC/Skeletal: ROM normal; no crepitus; joints without synovitis,  no deformities  No joint swelling or tenderness of PIP, MCP, wrist, elbow, shoulder, or knee joints  EXTREM: no clubbing, cyanosis, no edema,normal  pulses   NEURO: grossly intact; motor WNL; AAOx3  PSYCH: normal mood, affect and behavior  LYMPH: normal cervical, supraclavicular          Labs:   Lab Results   Component Value Date    WBC 10.3 08/11/2017    HGB 13.1 08/11/2017    HCT 41.0 08/11/2017    MCV 89.1 08/11/2017     08/11/2017    CMP  @LASTLAB(NA,K,CL,CO2,GLU,BUN,Creatinine,Calcium,PROT,Albumin,Bilitot,Alkphos,AST,ALT,CRP,ESR,RF,CCP,JAVY,SSA,CPK,uric acid) )@  I have reviewed all available lab results and radiology reports.    Radiology/Diagnostic Studies:        Assessment/Plan:   (1) 67 y.o. female with diagnosis of positive JAVY,pos ds DNA.  She has a positive JAVY and positive antithyroid antibodies. The fact that the double-stranded DNA antibody is  persistently positive suggests that despite autoimmune thyroid disease,she  might still have systemic lupus. We will monitor her for any manifestation of Systemic Lupus .      Plan: CBC CMP urinalysis and complement  levels          Discussion:     I have explained all of the above in detail and the patient understands all of the current recommendation(s). I have answered all questions to the best of my ability and to their complete satisfaction.       The patient is to continue with the current management plan         RTC in   4 months or before if needed      Electronically signed by Cheri Serrato MD

## 2018-01-29 ENCOUNTER — DOCUMENTATION ONLY (OUTPATIENT)
Dept: FAMILY MEDICINE | Facility: CLINIC | Age: 68
End: 2018-01-29

## 2018-01-29 NOTE — PROGRESS NOTES
Pre-Visit Chart Review  For Appointment Scheduled on 1/30/18.    Health Maintenance Due   Topic Date Due    TETANUS VACCINE  06/10/1970    Mammogram  06/10/1992    DEXA SCAN  06/10/1992    Zoster Vaccine  06/10/2012

## 2018-01-30 ENCOUNTER — OFFICE VISIT (OUTPATIENT)
Dept: FAMILY MEDICINE | Facility: CLINIC | Age: 68
End: 2018-01-30
Payer: MEDICARE

## 2018-01-30 ENCOUNTER — TELEPHONE (OUTPATIENT)
Dept: FAMILY MEDICINE | Facility: CLINIC | Age: 68
End: 2018-01-30

## 2018-01-30 VITALS
WEIGHT: 224 LBS | HEART RATE: 70 BPM | TEMPERATURE: 98 F | RESPIRATION RATE: 20 BRPM | HEIGHT: 60 IN | SYSTOLIC BLOOD PRESSURE: 144 MMHG | BODY MASS INDEX: 43.98 KG/M2 | DIASTOLIC BLOOD PRESSURE: 76 MMHG

## 2018-01-30 DIAGNOSIS — R11.0 NAUSEA: ICD-10-CM

## 2018-01-30 DIAGNOSIS — E03.9 HYPOTHYROIDISM, UNSPECIFIED TYPE: ICD-10-CM

## 2018-01-30 DIAGNOSIS — E78.5 HYPERLIPIDEMIA, UNSPECIFIED HYPERLIPIDEMIA TYPE: Primary | ICD-10-CM

## 2018-01-30 DIAGNOSIS — E55.9 VITAMIN D DEFICIENCY: ICD-10-CM

## 2018-01-30 DIAGNOSIS — Z12.39 BREAST CANCER SCREENING: ICD-10-CM

## 2018-01-30 DIAGNOSIS — J32.9 SINUSITIS, UNSPECIFIED CHRONICITY, UNSPECIFIED LOCATION: ICD-10-CM

## 2018-01-30 DIAGNOSIS — E66.9 OBESITY (BMI 35.0-39.9 WITHOUT COMORBIDITY): ICD-10-CM

## 2018-01-30 DIAGNOSIS — M51.36 DDD (DEGENERATIVE DISC DISEASE), LUMBAR: ICD-10-CM

## 2018-01-30 DIAGNOSIS — I10 HYPERTENSION, UNSPECIFIED TYPE: ICD-10-CM

## 2018-01-30 DIAGNOSIS — R73.03 PREDIABETES: ICD-10-CM

## 2018-01-30 DIAGNOSIS — M54.16 LUMBAR RADICULITIS: ICD-10-CM

## 2018-01-30 DIAGNOSIS — R05.9 COUGH: ICD-10-CM

## 2018-01-30 DIAGNOSIS — K21.9 GASTROESOPHAGEAL REFLUX DISEASE WITHOUT ESOPHAGITIS: ICD-10-CM

## 2018-01-30 PROCEDURE — 99999 PR PBB SHADOW E&M-EST. PATIENT-LVL IV: CPT | Mod: PBBFAC,,, | Performed by: FAMILY MEDICINE

## 2018-01-30 PROCEDURE — 96372 THER/PROPH/DIAG INJ SC/IM: CPT | Mod: S$GLB,,, | Performed by: FAMILY MEDICINE

## 2018-01-30 PROCEDURE — 99214 OFFICE O/P EST MOD 30 MIN: CPT | Mod: 25,S$GLB,, | Performed by: FAMILY MEDICINE

## 2018-01-30 RX ORDER — GABAPENTIN 300 MG/1
300 CAPSULE ORAL 3 TIMES DAILY
Qty: 90 CAPSULE | Refills: 1 | Status: SHIPPED | OUTPATIENT
Start: 2018-01-30 | End: 2018-08-31

## 2018-01-30 RX ORDER — CODEINE PHOSPHATE AND GUAIFENESIN 10; 100 MG/5ML; MG/5ML
5 SOLUTION ORAL 3 TIMES DAILY PRN
Qty: 118 ML | Refills: 0 | Status: SHIPPED | OUTPATIENT
Start: 2018-01-30 | End: 2018-02-09

## 2018-01-30 RX ORDER — GUAIFENESIN 100 MG/5ML
200 SOLUTION ORAL 3 TIMES DAILY PRN
COMMUNITY
End: 2018-10-09 | Stop reason: ALTCHOICE

## 2018-01-30 RX ORDER — DEXAMETHASONE SODIUM PHOSPHATE 4 MG/ML
4 INJECTION, SOLUTION INTRA-ARTICULAR; INTRALESIONAL; INTRAMUSCULAR; INTRAVENOUS; SOFT TISSUE
Status: COMPLETED | OUTPATIENT
Start: 2018-01-30 | End: 2018-01-30

## 2018-01-30 RX ORDER — AMOXICILLIN AND CLAVULANATE POTASSIUM 875; 125 MG/1; MG/1
1 TABLET, FILM COATED ORAL 2 TIMES DAILY
Qty: 20 TABLET | Refills: 0 | Status: SHIPPED | OUTPATIENT
Start: 2018-01-30 | End: 2018-09-04 | Stop reason: SDUPTHER

## 2018-01-30 RX ORDER — ONDANSETRON 4 MG/1
4 TABLET, FILM COATED ORAL EVERY 8 HOURS PRN
Qty: 30 TABLET | Refills: 6 | Status: SHIPPED | OUTPATIENT
Start: 2018-01-30 | End: 2019-06-20 | Stop reason: SDUPTHER

## 2018-01-30 RX ADMIN — DEXAMETHASONE SODIUM PHOSPHATE 4 MG: 4 INJECTION, SOLUTION INTRA-ARTICULAR; INTRALESIONAL; INTRAMUSCULAR; INTRAVENOUS; SOFT TISSUE at 11:01

## 2018-01-30 NOTE — PROGRESS NOTES
[facility]  Progress Note    Subjective:       Patient ID: Aida Gupta is a 67 y.o. female.    Chief Complaint: Productive Cough     HPI67 y.o.female with productive cough (clear sputum) present for the past 3-4 weeks. Patient reports additional fevers, chills, MCKEON, rhinorrhea and congestion. She had tried taking Robitussin and Theraflu which have not helped. She states that her grandchildren have been sick lately. She denies any hemoptysis, weight loss, chest pain, palpitations, dyspnea at rest or dizziness. She recently had a caudal epidural steroid injection for post-herpetic neuralgia over the R-buttock. She states this did not help with her pain. However, her gabapentin does resolve the pain. She has a positive dsDNA Antibody and is scheduled to follow up with Rheumatology for this.     Review of Systems   Constitutional: Positive for chills and fever.   HENT: Positive for nosebleeds, postnasal drip and rhinorrhea. Negative for ear pain and sore throat.    Respiratory: Positive for cough, shortness of breath and wheezing.    Cardiovascular: Negative for chest pain.   Musculoskeletal: Positive for myalgias.   Skin: Negative for rash.   Allergic/Immunologic: Positive for environmental allergies.   Neurological: Positive for headaches.       Objective:      Vitals:    01/30/18 1021 01/30/18 1028   BP: (!) 143/75 (!) 144/76   BP Location: Right arm Left arm   Patient Position: Sitting Sitting   BP Method: Medium (Automatic) Medium (Manual)   Pulse: 70    Resp: 20    Temp: 97.9 °F (36.6 °C)    TempSrc: Oral    Weight: 101.6 kg (223 lb 15.8 oz)    Height: 5' (1.524 m)      Body mass index is 43.74 kg/m².  Physical Exam   Constitutional: She is oriented to person, place, and time. She appears well-developed and well-nourished. No distress.   HENT:   Head: Normocephalic and atraumatic.   Mouth/Throat: No oropharyngeal exudate.   Eyes: Conjunctivae are normal. Right eye exhibits no discharge. Left eye  exhibits no discharge. No scleral icterus.   Neck: Normal range of motion. No thyromegaly present.   Cardiovascular: Normal rate, regular rhythm, normal heart sounds and intact distal pulses.  Exam reveals no gallop and no friction rub.    No murmur heard.  Pulmonary/Chest: Effort normal and breath sounds normal. No respiratory distress. She has no wheezes. She has no rales. She exhibits no tenderness.   Abdominal: Soft. Bowel sounds are normal. She exhibits no distension. There is no tenderness.   Musculoskeletal: Normal range of motion.   Lymphadenopathy:     She has no cervical adenopathy.   Neurological: She is alert and oriented to person, place, and time.   Skin: Skin is warm and dry.   Psychiatric: She has a normal mood and affect.       Assessment:       1. Hyperlipidemia, unspecified hyperlipidemia type    2. Hypothyroidism, unspecified type    3. Obesity (BMI 35.0-39.9 without comorbidity)    4. Prediabetes    5. Gastroesophageal reflux disease without esophagitis    6. Vitamin D deficiency    7. Sinusitis, unspecified chronicity, unspecified location    8. Cough    9. Nausea    10. Lumbar radiculitis    11. DDD (degenerative disc disease), lumbar    12. Breast cancer screening    13. Hypertension, unspecified type        Plan:       Hyperlipidemia, unspecified hyperlipidemia type  -     Lipid panel; Future; Expected date: 01/30/2018   Continue Atorvastatin 10 MG daily     Hypothyroidism, unspecified type  -     T4, free; Future; Expected date: 01/30/2018  -     TSH; Future; Expected date: 01/30/2018   Continue Levothyroxine 125 MCG daily     Obesity (BMI 35.0-39.9 without comorbidity)   Encouraged Low-Fat Diet and Regular Exercise     Prediabetes  -     Hemoglobin A1c; Future; Expected date: 01/30/2018  -     Comprehensive metabolic panel; Future; Expected date: 01/30/2018   Encouraged Low-Fat Diet and Regular Exercise     Gastroesophageal reflux disease without esophagitis  -     CBC auto differential;  Future; Expected date: 01/30/2018   Continue Omeprazole 20 MG daily     Vitamin D deficiency  -     Vitamin D; Future; Expected date: 01/30/2018    Sinusitis, unspecified chronicity, unspecified location  -     dexamethasone injection 4 mg; Inject 1 mL (4 mg total) into the muscle one time.  -     amoxicillin-clavulanate 875-125mg (AUGMENTIN) 875-125 mg per tablet; Take 1 tablet by mouth 2 (two) times daily.  Dispense: 20 tablet; Refill: 0    Cough  -     X-Ray Chest PA And Lateral; Future; Expected date: 01/30/2018  -     guaifenesin-codeine 100-10 mg/5 ml (TUSSI-ORGANIDIN NR)  mg/5 mL syrup; Take 5 mLs by mouth 3 (three) times daily as needed for Cough.  Dispense: 118 mL; Refill: 0    Nausea  -     ondansetron (ZOFRAN) 4 MG tablet; Take 1 tablet (4 mg total) by mouth every 8 (eight) hours as needed for Nausea.  Dispense: 30 tablet; Refill: 6    Lumbar radiculitis  -     gabapentin (NEURONTIN) 300 MG capsule; Take 1 capsule (300 mg total) by mouth 3 (three) times daily.  Dispense: 90 capsule; Refill: 1  -     DXA Bone Density Spine And Hip; Future; Expected date: 01/30/2018    DDD (degenerative disc disease), lumbar  -     gabapentin (NEURONTIN) 300 MG capsule; Take 1 capsule (300 mg total) by mouth 3 (three) times daily.  Dispense: 90 capsule; Refill: 1  -     DXA Bone Density Spine And Hip; Future; Expected date: 01/30/2018    Breast cancer screening  -     Mammo Digital Screening Bilat with CAD; Future; Expected date: 01/30/2018    HTN        - Follow up in two weeks with blood pressure log      Follow-up in about 2 weeks (around 2/13/2018).  Beni Gutierrez MD  [facility]  1/30/2018 10:43 AM

## 2018-01-30 NOTE — PROGRESS NOTES
Patient, Aida Gupta (MRN #[id number]), presented with a recorded BMI of 43.74 kg/m^2 consistent with the definition of morbid obesity (ICD-10 E66.01). The patient's morbid obesity was monitored, evaluated, addressed and/or treated. This addendum to the medical record is made on 01/30/2018.

## 2018-02-02 ENCOUNTER — TELEPHONE (OUTPATIENT)
Dept: PAIN MEDICINE | Facility: CLINIC | Age: 68
End: 2018-02-02

## 2018-08-16 DIAGNOSIS — B02.29 POST HERPETIC NEURALGIA: ICD-10-CM

## 2018-08-16 RX ORDER — AMITRIPTYLINE HYDROCHLORIDE 25 MG/1
TABLET, FILM COATED ORAL
Qty: 30 TABLET | Refills: 2 | Status: SHIPPED | OUTPATIENT
Start: 2018-08-16 | End: 2019-03-11 | Stop reason: SDUPTHER

## 2018-08-16 NOTE — TELEPHONE ENCOUNTER
----- Message from Halley Shelly sent at 8/16/2018  1:35 PM CDT -----  Contact: Aida  Type:  RX Refill Request    Who Called:  patient  Refill or New Rx:  refill  RX Name and Strength:  amitriptyline (ELAVIL) 25 MG tablet  How is the patient currently taking it? (ex. 1XDay):  One tablet at bedtime  Is this a 30 day or 90 day RX:  30  Preferred Pharmacy with phone number:    [address] [place] - [address] [place] [address]  Phone: [phone] Fax: [phone]  Local or Mail Order:  Local  Ordering Provider:  Dr Adelina Wu  Best Call Back Number: [phone]  Additional Information:  Out of medication. Thanks!

## 2018-08-31 ENCOUNTER — LAB VISIT (OUTPATIENT)
Dept: LAB | Facility: HOSPITAL | Age: 68
End: 2018-08-31
Attending: PHYSICIAN ASSISTANT
Payer: MEDICARE

## 2018-08-31 ENCOUNTER — OFFICE VISIT (OUTPATIENT)
Dept: FAMILY MEDICINE | Facility: CLINIC | Age: 68
End: 2018-08-31
Payer: MEDICARE

## 2018-08-31 VITALS
HEIGHT: 60 IN | TEMPERATURE: 98 F | DIASTOLIC BLOOD PRESSURE: 84 MMHG | WEIGHT: 226.88 LBS | SYSTOLIC BLOOD PRESSURE: 132 MMHG | BODY MASS INDEX: 44.54 KG/M2 | HEART RATE: 86 BPM

## 2018-08-31 DIAGNOSIS — R53.81 MALAISE AND FATIGUE: ICD-10-CM

## 2018-08-31 DIAGNOSIS — R73.03 PREDIABETES: ICD-10-CM

## 2018-08-31 DIAGNOSIS — N39.0 URINARY TRACT INFECTION WITHOUT HEMATURIA, SITE UNSPECIFIED: Primary | ICD-10-CM

## 2018-08-31 DIAGNOSIS — E03.9 HYPOTHYROIDISM, UNSPECIFIED TYPE: ICD-10-CM

## 2018-08-31 DIAGNOSIS — R53.83 MALAISE AND FATIGUE: ICD-10-CM

## 2018-08-31 DIAGNOSIS — E78.5 HYPERLIPIDEMIA, UNSPECIFIED HYPERLIPIDEMIA TYPE: ICD-10-CM

## 2018-08-31 DIAGNOSIS — E66.01 CLASS 3 SEVERE OBESITY DUE TO EXCESS CALORIES WITH SERIOUS COMORBIDITY AND BODY MASS INDEX (BMI) OF 40.0 TO 44.9 IN ADULT: ICD-10-CM

## 2018-08-31 DIAGNOSIS — I10 ESSENTIAL HYPERTENSION: ICD-10-CM

## 2018-08-31 DIAGNOSIS — M54.16 LUMBAR RADICULITIS: ICD-10-CM

## 2018-08-31 DIAGNOSIS — B02.29 POST HERPETIC NEURALGIA: ICD-10-CM

## 2018-08-31 LAB
ALBUMIN SERPL BCP-MCNC: 3.9 G/DL
ALP SERPL-CCNC: 83 U/L
ALT SERPL W/O P-5'-P-CCNC: 16 U/L
ANION GAP SERPL CALC-SCNC: 9 MMOL/L
AST SERPL-CCNC: 14 U/L
BASOPHILS # BLD AUTO: 0.06 K/UL
BASOPHILS NFR BLD: 0.7 %
BILIRUB SERPL-MCNC: 0.7 MG/DL
BILIRUB SERPL-MCNC: NEGATIVE MG/DL
BLOOD URINE, POC: ABNORMAL
BUN SERPL-MCNC: 12 MG/DL
CALCIUM SERPL-MCNC: 10.2 MG/DL
CHLORIDE SERPL-SCNC: 100 MMOL/L
CHOLEST SERPL-MCNC: 220 MG/DL
CHOLEST/HDLC SERPL: 5.2 {RATIO}
CO2 SERPL-SCNC: 26 MMOL/L
COLOR, POC UA: YELLOW
CREAT SERPL-MCNC: 0.8 MG/DL
DIFFERENTIAL METHOD: NORMAL
EOSINOPHIL # BLD AUTO: 0.2 K/UL
EOSINOPHIL NFR BLD: 2.6 %
ERYTHROCYTE [DISTWIDTH] IN BLOOD BY AUTOMATED COUNT: 13.2 %
EST. GFR  (AFRICAN AMERICAN): >60 ML/MIN/1.73 M^2
EST. GFR  (NON AFRICAN AMERICAN): >60 ML/MIN/1.73 M^2
ESTIMATED AVG GLUCOSE: 117 MG/DL
GLUCOSE SERPL-MCNC: 119 MG/DL
GLUCOSE UR QL STRIP: NORMAL
HBA1C MFR BLD HPLC: 5.7 %
HCT VFR BLD AUTO: 44.2 %
HDLC SERPL-MCNC: 42 MG/DL
HDLC SERPL: 19.1 %
HGB BLD-MCNC: 14.6 G/DL
IMM GRANULOCYTES # BLD AUTO: 0.04 K/UL
IMM GRANULOCYTES NFR BLD AUTO: 0.5 %
KETONES UR QL STRIP: NEGATIVE
LDLC SERPL CALC-MCNC: 116.4 MG/DL
LEUKOCYTE ESTERASE URINE, POC: POSITIVE
LYMPHOCYTES # BLD AUTO: 1.6 K/UL
LYMPHOCYTES NFR BLD: 20 %
MCH RBC QN AUTO: 29.7 PG
MCHC RBC AUTO-ENTMCNC: 33 G/DL
MCV RBC AUTO: 90 FL
MONOCYTES # BLD AUTO: 0.6 K/UL
MONOCYTES NFR BLD: 7.1 %
NEUTROPHILS # BLD AUTO: 5.6 K/UL
NEUTROPHILS NFR BLD: 69.1 %
NITRITE, POC UA: NEGATIVE
NONHDLC SERPL-MCNC: 178 MG/DL
NRBC BLD-RTO: 0 /100 WBC
PH, POC UA: 5
PLATELET # BLD AUTO: 297 K/UL
PMV BLD AUTO: 9.2 FL
POTASSIUM SERPL-SCNC: 4.5 MMOL/L
PROT SERPL-MCNC: 7.6 G/DL
PROTEIN, POC: ABNORMAL
RBC # BLD AUTO: 4.91 M/UL
SODIUM SERPL-SCNC: 135 MMOL/L
SPECIFIC GRAVITY, POC UA: 1.02
T4 FREE SERPL-MCNC: 1.2 NG/DL
TRIGL SERPL-MCNC: 308 MG/DL
TSH SERPL DL<=0.005 MIU/L-ACNC: 11.53 UIU/ML
UROBILINOGEN, POC UA: NORMAL
WBC # BLD AUTO: 8.08 K/UL

## 2018-08-31 PROCEDURE — 3075F SYST BP GE 130 - 139MM HG: CPT | Mod: CPTII,S$GLB,, | Performed by: PHYSICIAN ASSISTANT

## 2018-08-31 PROCEDURE — 84443 ASSAY THYROID STIM HORMONE: CPT

## 2018-08-31 PROCEDURE — 36415 COLL VENOUS BLD VENIPUNCTURE: CPT | Mod: PO

## 2018-08-31 PROCEDURE — 85025 COMPLETE CBC W/AUTO DIFF WBC: CPT

## 2018-08-31 PROCEDURE — 99214 OFFICE O/P EST MOD 30 MIN: CPT | Mod: 25,S$GLB,, | Performed by: PHYSICIAN ASSISTANT

## 2018-08-31 PROCEDURE — 99999 PR PBB SHADOW E&M-EST. PATIENT-LVL V: CPT | Mod: PBBFAC,,, | Performed by: PHYSICIAN ASSISTANT

## 2018-08-31 PROCEDURE — 83036 HEMOGLOBIN GLYCOSYLATED A1C: CPT

## 2018-08-31 PROCEDURE — 99499 UNLISTED E&M SERVICE: CPT | Mod: S$GLB,,, | Performed by: PHYSICIAN ASSISTANT

## 2018-08-31 PROCEDURE — 84439 ASSAY OF FREE THYROXINE: CPT

## 2018-08-31 PROCEDURE — 80053 COMPREHEN METABOLIC PANEL: CPT

## 2018-08-31 PROCEDURE — 3079F DIAST BP 80-89 MM HG: CPT | Mod: CPTII,S$GLB,, | Performed by: PHYSICIAN ASSISTANT

## 2018-08-31 PROCEDURE — 80061 LIPID PANEL: CPT

## 2018-08-31 PROCEDURE — 81001 URINALYSIS AUTO W/SCOPE: CPT | Mod: S$GLB,,, | Performed by: PHYSICIAN ASSISTANT

## 2018-08-31 RX ORDER — DOXYCYCLINE 100 MG/1
100 CAPSULE ORAL EVERY 12 HOURS
Qty: 20 CAPSULE | Refills: 0 | Status: SHIPPED | OUTPATIENT
Start: 2018-08-31 | End: 2018-09-04

## 2018-08-31 RX ORDER — PREGABALIN 50 MG/1
50 CAPSULE ORAL 3 TIMES DAILY
Qty: 90 CAPSULE | Refills: 6 | Status: SHIPPED | OUTPATIENT
Start: 2018-08-31 | End: 2018-10-09 | Stop reason: ALTCHOICE

## 2018-08-31 NOTE — PROGRESS NOTES
Subjective:       Patient ID: Aida Gupta is a 68 y.o. female.    Chief Complaint: Fatigue and Urinary Tract Infection    Patient with hypertension, hyperlipidemia, hypothyroidism, post herpetic neuralgia and lumbar radiculitis presents for follow-up and evaluation of possible urinary tract infection.  Patient complains of strong odor to the urine.  She is having urinary frequency and hesitancy.  Symptoms began 1-2 weeks ago.  She states that she has had chills sweats nausea and vomiting.  Her appetite is normal.  She denies flank pain.  She denies dysuria and pelvic pain.  Patient states that her pain secondary to post herpetic neuralgia and lumbar radiculitis is not presently controlled.  She was seen by pain management in the past.  She is taking gabapentin twice daily and Elavil at nighttime without relief of her pain. Pain is located in the right lower extremity and right buttock.  Sitting on the right buttock increases the pain. She denies bowel incontinence, bladder incontinence or retention.  Regarding her chronic medical conditions she is due for routine lab.   Patients patient medical/surgical, social and family histories have been reviewed         Review of Systems   Constitutional: Positive for chills and diaphoresis. Negative for activity change, appetite change, fatigue, fever and unexpected weight change.   Eyes: Negative for visual disturbance.   Respiratory: Negative for cough and shortness of breath.    Cardiovascular: Negative for chest pain, palpitations and leg swelling.   Gastrointestinal: Positive for nausea and vomiting. Negative for abdominal distention, abdominal pain, constipation and diarrhea.   Endocrine: Negative for polydipsia and polyuria.   Genitourinary: Positive for frequency and urgency. Negative for difficulty urinating, dysuria, enuresis, flank pain, hematuria and pelvic pain.   Musculoskeletal: Positive for back pain and gait problem. Negative for arthralgias and joint  swelling.   Skin: Negative for rash.   Neurological: Positive for weakness and numbness. Negative for dizziness, light-headedness and headaches.   Psychiatric/Behavioral: Negative for dysphoric mood and sleep disturbance. The patient is not nervous/anxious.        Objective:      Physical Exam   Constitutional: She appears well-developed and well-nourished. She is cooperative. She appears ill. No distress.   Obese body habitus      HENT:   Head: Normocephalic and atraumatic.   Mouth/Throat: Oropharynx is clear and moist and mucous membranes are normal.   Cardiovascular: Normal rate and regular rhythm.   Pulmonary/Chest: Effort normal and breath sounds normal.   Abdominal: Soft. Normal appearance and bowel sounds are normal. She exhibits no distension. There is no tenderness. There is no CVA tenderness.   Limited exam due to body habitus and inability of patient to get up onto the exam table    Musculoskeletal:        Right hip: She exhibits normal range of motion, normal strength and no tenderness.        Left hip: She exhibits normal range of motion, normal strength and no tenderness.        Lumbar back: She exhibits decreased range of motion. She exhibits no tenderness.   Neurological: She is alert.       Assessment:       1. Urinary tract infection without hematuria, site unspecified    2. Malaise and fatigue    3. Post herpetic neuralgia    4. Lumbar radiculitis    5. Hyperlipidemia, unspecified hyperlipidemia type    6. Hypothyroidism, unspecified type    7. Class 3 severe obesity due to excess calories with serious comorbidity and body mass index (BMI) of 40.0 to 44.9 in adult    8. Essential hypertension    9. Prediabetes        Plan:       Aida was seen today for fatigue and urinary tract infection.    Diagnoses and all orders for this visit:    Urinary tract infection without hematuria, site unspecified  -     POCT urinalysis, dipstick or tablet reag  -     Urine culture; Future  -     doxycycline  (VIBRAMYCIN) 100 MG Cap; Take 1 capsule (100 mg total) by mouth every 12 (twelve) hours.    Malaise and fatigue  -     POCT urinalysis, dipstick or tablet reag  -     CBC auto differential; Future    Post herpetic neuralgia  -     pregabalin (LYRICA) 50 MG capsule; Take 1 capsule (50 mg total) by mouth 3 (three) times daily.  -     Ambulatory referral to Pain Clinic    Lumbar radiculitis  -     pregabalin (LYRICA) 50 MG capsule; Take 1 capsule (50 mg total) by mouth 3 (three) times daily.  -     Ambulatory referral to Pain Clinic    Hyperlipidemia, unspecified hyperlipidemia type  -     Comprehensive metabolic panel; Future  -     Lipid panel; Future    Hypothyroidism, unspecified type  -     Cancel: TSH; Future  -     TSH; Future    Class 3 severe obesity due to excess calories with serious comorbidity and body mass index (BMI) of 40.0 to 44.9 in adult    Essential hypertension    Prediabetes  -     Hemoglobin A1c; Future    Patient readiness: acceptance and barriers:readiness and environmental    During the course of the visit the patient was educated and counseled about the following:     Hypertension:   Medication: no change.  Obesity:   General weight loss/lifestyle modification strategies discussed (elicit support from others; identify saboteurs; non-food rewards, etc).    Goals: Hypertension: Reduce Blood Pressure and Obesity: Reduce calorie intake and BMI    Did patient meet goals/outcomes: No    The following self management tools provided: declined    Patient Instructions (the written plan) was given to the patient/family.     Time spent with patient: 30 minutes    Barriers to medications present (no )    Adverse reactions to current medications (yes- malaise gabapentin )    Over the counter medications reviewed (Yes)

## 2018-09-04 DIAGNOSIS — J32.9 SINUSITIS, UNSPECIFIED CHRONICITY, UNSPECIFIED LOCATION: ICD-10-CM

## 2018-09-04 RX ORDER — AMOXICILLIN AND CLAVULANATE POTASSIUM 875; 125 MG/1; MG/1
1 TABLET, FILM COATED ORAL 2 TIMES DAILY
Qty: 20 TABLET | Refills: 0 | Status: SHIPPED | OUTPATIENT
Start: 2018-09-04 | End: 2018-10-09 | Stop reason: ALTCHOICE

## 2018-09-05 DIAGNOSIS — E03.9 HYPOTHYROIDISM, UNSPECIFIED TYPE: Primary | ICD-10-CM

## 2018-09-17 ENCOUNTER — OFFICE VISIT (OUTPATIENT)
Dept: PAIN MEDICINE | Facility: CLINIC | Age: 68
End: 2018-09-17
Payer: MEDICARE

## 2018-09-17 VITALS
BODY MASS INDEX: 42.67 KG/M2 | HEART RATE: 76 BPM | HEIGHT: 61 IN | WEIGHT: 226 LBS | SYSTOLIC BLOOD PRESSURE: 155 MMHG | DIASTOLIC BLOOD PRESSURE: 98 MMHG

## 2018-09-17 DIAGNOSIS — M54.16 LUMBAR RADICULITIS: ICD-10-CM

## 2018-09-17 DIAGNOSIS — M51.36 DDD (DEGENERATIVE DISC DISEASE), LUMBAR: Primary | ICD-10-CM

## 2018-09-17 DIAGNOSIS — B02.29 POST HERPETIC NEURALGIA: ICD-10-CM

## 2018-09-17 PROCEDURE — 99999 PR PBB SHADOW E&M-EST. PATIENT-LVL II: CPT | Mod: PBBFAC,,, | Performed by: ANESTHESIOLOGY

## 2018-09-17 PROCEDURE — 99212 OFFICE O/P EST SF 10 MIN: CPT | Mod: PBBFAC,PN | Performed by: ANESTHESIOLOGY

## 2018-09-17 PROCEDURE — 3077F SYST BP >= 140 MM HG: CPT | Mod: CPTII,,, | Performed by: ANESTHESIOLOGY

## 2018-09-17 PROCEDURE — 1101F PT FALLS ASSESS-DOCD LE1/YR: CPT | Mod: CPTII,,, | Performed by: ANESTHESIOLOGY

## 2018-09-17 PROCEDURE — 99214 OFFICE O/P EST MOD 30 MIN: CPT | Mod: S$PBB,,, | Performed by: ANESTHESIOLOGY

## 2018-09-17 PROCEDURE — 3080F DIAST BP >= 90 MM HG: CPT | Mod: CPTII,,, | Performed by: ANESTHESIOLOGY

## 2018-09-17 RX ORDER — GABAPENTIN 300 MG/1
300 CAPSULE ORAL 3 TIMES DAILY
Qty: 90 CAPSULE | Refills: 1 | Status: SHIPPED | OUTPATIENT
Start: 2018-09-17 | End: 2019-03-11 | Stop reason: SDUPTHER

## 2018-09-17 RX ORDER — GABAPENTIN 300 MG/1
300 CAPSULE ORAL 3 TIMES DAILY
COMMUNITY
End: 2018-09-17

## 2018-09-17 NOTE — PROGRESS NOTES
This note was completed with dictation software and grammatical errors may exist.    Referring Physician: Esther Trevizo PA    PCP: Beni Gutierrez MD (Inactive)      CC: Buttock and right leg pain    Interval history: Patient returns to our clinic.  She has continued buttock and right leg pain due to post herpetic neuralgia.  She is s/p caudal SEUN on 10/26/17 and states having moderate benefit for over 1.5 months. Pain has gradually returned.  She is considering a repeat procedure but is wondering about a much more longer lasting procedrue.  She does not desire oral medications. She is taking gabapentin 300mg BID with mild benefits.    Prior HPI:   Aida Gupta is a 68 y.o. female referred to us for buttock and right leg pain.  Pain has been present for over a year.  She had about of shingles that occurred over her tailbone and buttock area.  Pain radiated down her right leg.  Pain is a constant aching, burning, sharp pain in her buttock.  Pain worsens with sitting, touching, walking.  Pain improves with rest.  She is currently on Elavil, Cymbalta, gabapentin with mild benefits so far.  She denies weakness.  No bowel bladder changes.  She rates her pain 8/10 today.    ROS:  CONSTITUTIONAL: No fevers, chills, night sweats, wt. loss, appetite changes  SKIN: no rashes or itching  ENT: No headaches, head trauma, vision changes, or eye pain  LYMPH NODES: None noticed   CV: No chest pain, palpitations.   RESP: No shortness of breath, dyspnea on exertion, cough, wheezing, or hemoptysis  GI: No nausea, emesis, diarrhea, constipation, melena, hematochezia, pain.    : No dysuria, hematuria, urgency, or frequency   HEME: No easy bruising, bleeding problems  PSYCHIATRIC: No depression, anxiety, psychosis, hallucinations.  NEURO: No seizures, memory loss, dizziness or difficulty sleeping  MSK: + History of present illness      Past Medical History:   Diagnosis Date    Asthma     chronic bronchitis    Shingles   "   1 year ago     Thyroid disease      Past Surgical History:   Procedure Laterality Date    Bilateral tubal ligation      cholectomy      COLON SURGERY      Diverticulitis    ESOPHAGOGASTRODUODENOSCOPY (EGD) N/A 9/6/2017    [facility]    INJECTION-STEROID-EPIDURAL-CAUDAL N/A 1/12/2018    [facility] at [facility]    INJECTION-STEROID-EPIDURAL-CAUDAL N/A 10/26/2017    [facility] at [facility] OR     Family History   Problem Relation Age of Onset    Eczema Neg Hx     Lupus Neg Hx     Psoriasis Neg Hx     Melanoma Neg Hx      Social History     Socioeconomic History    Marital status:      Spouse name: Not on file    Number of children: Not on file    Years of education: Not on file    Highest education level: Not on file   Social Needs    Financial resource strain: Not on file    Food insecurity - worry: Not on file    Food insecurity - inability: Not on file    Transportation needs - medical: Not on file    Transportation needs - non-medical: Not on file   Occupational History    Not on file   Tobacco Use    Smoking status: Never Smoker    Smokeless tobacco: Never Used   Substance and Sexual Activity    Alcohol use: No    Drug use: No    Sexual activity: Not on file   Other Topics Concern    Are you pregnant or think you may be? Not Asked    Breast-feeding Not Asked   Social History Narrative    Not on file         Medications/Allergies: See med card    Vitals:    09/17/18 1052   BP: (!) 155/98   Pulse: 76   Weight: 102.5 kg (226 lb)   Height: 5' 1" (1.549 m)   PainSc:   6   PainLoc: Back         Physical exam:    GENERAL: A and O x3, the patient appears well groomed and is in no acute distress.  Skin: No rashes or obvious lesions  HEENT: normocephalic, atraumatic  CARDIOVASCULAR:  Palpable peripheral pulses  LUNGS: easy work of breathing  ABDOMEN: soft, nontender   UPPER EXTREMITIES: Normal alignment, normal range of " motion, no atrophy, no skin changes,  hair growth and nail growth normal and equal bilaterally. No swelling, no tenderness.    LOWER EXTREMITIES:  Normal alignment, normal range of motion, no atrophy, no skin changes,  hair growth and nail growth normal and equal bilaterally. No swelling, no tenderness.    LUMBAR SPINE  Lumbar spine: ROM is full with flexion extension and oblique extension with mild increased pain.    Moises's test causes no increased pain on either side.    Supine straight leg raise is positive on right at 45 degrees.    Internal and external rotation of the hip causes no increased pain on either side.  Myofascial exam: No tenderness to palpation across lumbar paraspinous muscles.      MENTAL STATUS: normal orientation, speech, language, and fund of knowledge for social situation.  Emotional state appropriate.    CRANIAL NERVES:  II:  PERRL bilaterally,   III,IV,VI: EOMI.    V:  Facial sensation equal bilaterally  VII:  Facial motor function normal.  VIII:  Hearing equal to finger rub bilaterally  IX/X: Gag normal, palate symmetric  XI:  Shoulder shrug equal, head turn equal  XII:  Tongue midline without fasciculations      MOTOR: Tone and bulk: normal bilateral upper and lower Strength: normal   Delt Bi Tri WE WF     R 5 5 5 5 5 5   L 5 5 5 5 5 5     IP ADD ABD Quad TA Gas HAM  R 5 5 5 5 5 5 5  L 5 5 5 5 5 5 5    SENSATION: Light touch and pinprick intact bilaterally  REFLEXES: normal, symmetric, nonbrisk.  Toes down, no clonus. No hoffmans.  GAIT: normal rise, base, steps, and arm swing.        Imaging:  None    Assessment:  Patient referred for buttock and right leg pain  1. DDD (degenerative disc disease), lumbar    2. Lumbar radiculitis    3. Post herpetic neuralgia          Plan:  - I have stressed the importance of physical activity and exercise to improve overall health  - Patient with postherpetic neuralgia of her sacral nerve.  Discuss disease progression of postherpetic neuralgia.   Continue anti-neuropathic medications.  Discuss performing repeat caudal SEUN to help with her radicular pain.  She is consider this option  - Information also given for SCS as she maybe good candidate given her neuropathic pain  - Follow up as needed

## 2018-09-18 DIAGNOSIS — E55.9 VITAMIN D DEFICIENCY: Primary | ICD-10-CM

## 2018-09-18 RX ORDER — ERGOCALCIFEROL 1.25 MG/1
50000 CAPSULE ORAL
COMMUNITY
End: 2018-09-18 | Stop reason: SDUPTHER

## 2018-09-18 NOTE — PROGRESS NOTES
Refill Authorization Note     is requesting a refill authorization.    Brief assessment and rationale for refill: DEFER; No longer taking/ non-adherent/Needs Labs  Amount/Quantity of medication ordered: 90d  Date of last appointment: 8/31/2018     Refills Authorized: Yes  If authorized number of refills: 0        Medication-related problems identified:   Requires labs  Non-adherence - intentional  Medication Therapy Plan: DCed on 01/31/18 by Gena Moulton commented as No longer Taking; Med-minded indicates pt has not gotten the medication in past 12 months;  [facility] at [facility] verified last pu 09/7/17; Labs will be required if approved; unclear documentation; Defer to you   Name and strength of medication: ergocalciferol (VITAMIN D2) 50,000 unit Cap  How patient will take medication: t1c po qw  Medication reconciliation completed: Yes        Comments:     Lab Results   Component Value Date    [id number] 16 (L) 07/10/2017     Lab Results   Component Value Date    CALCIUM 10.2 08/31/2018

## 2018-09-19 RX ORDER — ERGOCALCIFEROL 1.25 MG/1
50000 CAPSULE ORAL
Qty: 12 CAPSULE | Refills: 0 | Status: SHIPPED | OUTPATIENT
Start: 2018-09-19 | End: 2019-03-28 | Stop reason: SDUPTHER

## 2018-10-08 ENCOUNTER — TELEPHONE (OUTPATIENT)
Dept: FAMILY MEDICINE | Facility: CLINIC | Age: 68
End: 2018-10-08

## 2018-10-08 ENCOUNTER — DOCUMENTATION ONLY (OUTPATIENT)
Dept: FAMILY MEDICINE | Facility: CLINIC | Age: 68
End: 2018-10-08

## 2018-10-08 NOTE — PROGRESS NOTES
Pre-Visit Chart Review  For Appointment Scheduled on 10/9/2018    Health Maintenance Due   Topic Date Due    TETANUS VACCINE  06/10/1970    Mammogram  06/10/1992    DEXA SCAN  06/10/1992    Colonoscopy  06/10/2002    Zoster Vaccine  06/10/2012    Influenza Vaccine  08/01/2018

## 2018-10-08 NOTE — TELEPHONE ENCOUNTER
----- Message from Gisselle Zheng sent at 10/8/2018  4:32 PM CDT -----  Contact: patient  Returning missed call. Please call back [phone]

## 2018-10-09 ENCOUNTER — OFFICE VISIT (OUTPATIENT)
Dept: FAMILY MEDICINE | Facility: CLINIC | Age: 68
End: 2018-10-09
Payer: MEDICARE

## 2018-10-09 ENCOUNTER — HOSPITAL ENCOUNTER (OUTPATIENT)
Dept: RADIOLOGY | Facility: CLINIC | Age: 68
Discharge: HOME | End: 2018-10-09
Attending: PHYSICIAN ASSISTANT
Payer: MEDICARE

## 2018-10-09 VITALS
DIASTOLIC BLOOD PRESSURE: 70 MMHG | HEART RATE: 87 BPM | SYSTOLIC BLOOD PRESSURE: 128 MMHG | HEIGHT: 61 IN | WEIGHT: 223.75 LBS | TEMPERATURE: 98 F | OXYGEN SATURATION: 96 % | BODY MASS INDEX: 42.24 KG/M2

## 2018-10-09 DIAGNOSIS — J32.9 SINUSITIS, UNSPECIFIED CHRONICITY, UNSPECIFIED LOCATION: ICD-10-CM

## 2018-10-09 DIAGNOSIS — J40 BRONCHITIS: ICD-10-CM

## 2018-10-09 DIAGNOSIS — R05.9 COUGH: Primary | ICD-10-CM

## 2018-10-09 DIAGNOSIS — R05.9 COUGH: ICD-10-CM

## 2018-10-09 PROCEDURE — 99999 PR PBB SHADOW E&M-EST. PATIENT-LVL V: CPT | Mod: PBBFAC,,, | Performed by: PHYSICIAN ASSISTANT

## 2018-10-09 PROCEDURE — 99215 OFFICE O/P EST HI 40 MIN: CPT | Mod: PBBFAC,25,PO | Performed by: PHYSICIAN ASSISTANT

## 2018-10-09 PROCEDURE — 3078F DIAST BP <80 MM HG: CPT | Mod: CPTII,,, | Performed by: PHYSICIAN ASSISTANT

## 2018-10-09 PROCEDURE — 3074F SYST BP LT 130 MM HG: CPT | Mod: CPTII,,, | Performed by: PHYSICIAN ASSISTANT

## 2018-10-09 PROCEDURE — 96372 THER/PROPH/DIAG INJ SC/IM: CPT | Mod: PBBFAC,PO

## 2018-10-09 PROCEDURE — 71046 X-RAY EXAM CHEST 2 VIEWS: CPT | Mod: TC,FY,PO

## 2018-10-09 PROCEDURE — 99213 OFFICE O/P EST LOW 20 MIN: CPT | Mod: S$PBB,,, | Performed by: PHYSICIAN ASSISTANT

## 2018-10-09 PROCEDURE — 1101F PT FALLS ASSESS-DOCD LE1/YR: CPT | Mod: CPTII,,, | Performed by: PHYSICIAN ASSISTANT

## 2018-10-09 PROCEDURE — 71046 X-RAY EXAM CHEST 2 VIEWS: CPT | Mod: 26,,, | Performed by: RADIOLOGY

## 2018-10-09 RX ORDER — PREDNISONE 10 MG/1
10 TABLET ORAL DAILY
Qty: 10 TABLET | Refills: 0 | Status: SHIPPED | OUTPATIENT
Start: 2018-10-09 | End: 2018-10-19

## 2018-10-09 RX ORDER — AMOXICILLIN AND CLAVULANATE POTASSIUM 875; 125 MG/1; MG/1
1 TABLET, FILM COATED ORAL 2 TIMES DAILY
Qty: 20 TABLET | Refills: 0 | Status: SHIPPED | OUTPATIENT
Start: 2018-10-09 | End: 2018-10-19

## 2018-10-09 RX ORDER — CODEINE PHOSPHATE AND GUAIFENESIN 10; 100 MG/5ML; MG/5ML
5 SOLUTION ORAL 3 TIMES DAILY PRN
Qty: 250 ML | Refills: 0 | Status: SHIPPED | OUTPATIENT
Start: 2018-10-09 | End: 2018-10-19

## 2018-10-09 RX ADMIN — METHYLPREDNISOLONE SODIUM SUCCINATE 125 MG: 125 INJECTION, POWDER, FOR SOLUTION INTRAMUSCULAR; INTRAVENOUS at 07:10

## 2018-10-09 NOTE — PROGRESS NOTES
Subjective:       Patient ID: Aida Gupta is a 68 y.o. female.     Chief Complaint: Cough (congestion)     Patient presents to clinic today for evaluation of cough and congestion. She states that her symptoms have been present for about a month without improvement on OTC medications. She states that her frequent coughing makes it difficult to sleep and causes her chest to hurt. She also reports having frequent bouts of sweating and chills. She has not taken her temperature at home. Additional sx include congestion and shortness of breath.        Review of Systems   Constitutional: Positive for chills, fatigue and fever.   HENT: Positive for congestion and sinus pressure. Negative for ear pain and sore throat.    Respiratory: Positive for cough, chest tightness and shortness of breath.    Cardiovascular: Positive for chest pain.   Gastrointestinal: Negative for abdominal pain, nausea and vomiting.   Musculoskeletal: Negative for myalgias.   Neurological: Positive for headaches. Negative for light-headedness.   All other systems reviewed and are negative.      Objective:   Physical Exam   Constitutional: She is oriented to person, place, and time. She appears well-developed and well-nourished.   HENT:   Head: Normocephalic and atraumatic.   Eyes: Conjunctivae are normal.   Cardiovascular: Normal rate and regular rhythm.   Pulmonary/Chest: She has no wheezes.   Decreased breath sounds due to expiratory and inspiratory effort.   Neurological: She is alert and oriented to person, place, and time.   Psychiatric: She has a normal mood and affect. Her behavior is normal. Judgment and thought content normal.       Assessment:       1. Cough    2. Bronchitis    3. Sinusitis, unspecified chronicity, unspecified location        Plan:   Aida was seen today for cough.     Diagnoses and all orders for this visit:     Cough  -     X-Ray Chest PA And Lateral; Future  -     guaifenesin-codeine 100-10 mg/5 ml  (TUSSI-ORGANIDIN NR)  mg/5 mL syrup; Take 5 mLs by mouth 3 (three) times daily as needed for Cough.     Bronchitis  -     methylPREDNISolone sod suc(PF) injection 125 mg; Inject 125 mg into the muscle one time.  -     predniSONE (DELTASONE) 10 MG tablet; Take 1 tablet (10 mg total) by mouth once daily. for 10 days  -     guaifenesin-codeine 100-10 mg/5 ml (TUSSI-ORGANIDIN NR)  mg/5 mL syrup; Take 5 mLs by mouth 3 (three) times daily as needed for Cough.     Sinusitis, unspecified chronicity, unspecified location  -     amoxicillin-clavulanate 875-125mg (AUGMENTIN) 875-125 mg per tablet; Take 1 tablet by mouth 2 (two) times daily. for 10 days

## 2018-10-09 NOTE — PROGRESS NOTES
2 patient identifiers used ( name &  ).  Administered Solumedrol 125 mg/ 2ml IM RUOQ.  Patient tolerated well, no bleeding at insertion site noted.  Pain scale 0/10.  Aseptic technique maintained. Advised patient to remain in clinic for 15 minutes to monitor for reaction.  No AR noted.

## 2018-10-18 ENCOUNTER — LAB VISIT (OUTPATIENT)
Dept: LAB | Facility: HOSPITAL | Age: 68
End: 2018-10-18
Attending: PHYSICIAN ASSISTANT
Payer: MEDICARE

## 2018-10-18 DIAGNOSIS — E03.9 HYPOTHYROIDISM, UNSPECIFIED TYPE: ICD-10-CM

## 2018-10-18 DIAGNOSIS — E55.9 VITAMIN D DEFICIENCY: ICD-10-CM

## 2018-10-18 LAB
25(OH)D3+25(OH)D2 SERPL-MCNC: 14 NG/ML
T4 FREE SERPL-MCNC: 1.39 NG/DL
TSH SERPL DL<=0.005 MIU/L-ACNC: 4.69 UIU/ML

## 2018-10-18 PROCEDURE — 82306 VITAMIN D 25 HYDROXY: CPT

## 2018-10-18 PROCEDURE — 84443 ASSAY THYROID STIM HORMONE: CPT

## 2018-10-18 PROCEDURE — 36415 COLL VENOUS BLD VENIPUNCTURE: CPT | Mod: PO

## 2018-10-18 PROCEDURE — 84439 ASSAY OF FREE THYROXINE: CPT

## 2018-10-19 RX ORDER — FLUCONAZOLE 150 MG/1
150 TABLET ORAL DAILY
Qty: 1 TABLET | Refills: 0 | Status: SHIPPED | OUTPATIENT
Start: 2018-10-19 | End: 2018-10-20

## 2018-11-30 ENCOUNTER — TELEPHONE (OUTPATIENT)
Dept: PAIN MEDICINE | Facility: CLINIC | Age: 68
End: 2018-11-30

## 2018-11-30 NOTE — TELEPHONE ENCOUNTER
----- Message from Aury Schmitz sent at 11/30/2018  9:28 AM CST -----  Please call pt at  [phone] requesting a Dr Excuse be faxed to  [phone] ... For Jury Duty / cannot sit long ...

## 2018-11-30 NOTE — TELEPHONE ENCOUNTER
----- Message from Chalino Orta sent at 11/30/2018  9:23 AM CST -----  Type: Needs Medical Advice    Who Called:  Patient    Best Call Back Number: [phone]  Additional Information: Patient would like a callback as soon as possible.

## 2018-12-03 ENCOUNTER — TELEPHONE (OUTPATIENT)
Dept: PAIN MEDICINE | Facility: CLINIC | Age: 68
End: 2018-12-03

## 2018-12-03 NOTE — TELEPHONE ENCOUNTER
----- Message from Toni Figueroa sent at 12/3/2018 11:09 AM CST -----  Patient  is on the line calling to confirm if Dr. Walton is going to write her letter for not serving jury duty. [phone] (home)  Fax:[phone]

## 2018-12-13 DIAGNOSIS — B02.29 POST HERPETIC NEURALGIA: ICD-10-CM

## 2018-12-13 RX ORDER — DOXEPIN HYDROCHLORIDE 50 MG/G
CREAM TOPICAL
Qty: 45 G | Refills: 2 | Status: SHIPPED | OUTPATIENT
Start: 2018-12-13 | End: 2019-10-23 | Stop reason: SDUPTHER

## 2018-12-14 DIAGNOSIS — E03.9 HYPOTHYROIDISM, UNSPECIFIED TYPE: ICD-10-CM

## 2018-12-14 DIAGNOSIS — K21.9 GASTROESOPHAGEAL REFLUX DISEASE WITHOUT ESOPHAGITIS: ICD-10-CM

## 2018-12-14 RX ORDER — LEVOTHYROXINE SODIUM 125 UG/1
125 TABLET ORAL DAILY
Qty: 90 TABLET | Refills: 3 | Status: CANCELLED | OUTPATIENT
Start: 2018-12-14 | End: 2019-12-14

## 2018-12-14 RX ORDER — LEVOTHYROXINE SODIUM 125 UG/1
125 TABLET ORAL DAILY
Qty: 90 TABLET | Refills: 0 | Status: SHIPPED | OUTPATIENT
Start: 2018-12-14 | End: 2019-03-11 | Stop reason: SDUPTHER

## 2018-12-14 RX ORDER — OMEPRAZOLE 20 MG/1
20 CAPSULE, DELAYED RELEASE ORAL DAILY
Qty: 90 CAPSULE | Refills: 0 | Status: SHIPPED | OUTPATIENT
Start: 2018-12-14 | End: 2019-06-20 | Stop reason: SDUPTHER

## 2018-12-14 NOTE — PROGRESS NOTES
Refill Authorization Note     is requesting a refill authorization.    Brief assessment and rationale for refill: APPROVE: prr  Amount/Quantity of medication ordered: 90d        Refills Authorized: Yes  If authorized number of refills: 0        Medication-related problems identified: Non-adherence - intentional  Medication Therapy Plan: TSH elevated but improved, T4 WNL; Gerd/Hypothyroidism LCO 1/18 continue current meds; FOV 3/19 to [email or web address] care; approve 3 more on each  Name and strength of medication: omeprazole (PRILOSEC) 20 MG capsule/levothyroxine (SYNTHROID) 125 MCG tablet  How patient will take medication: utd  Medication reconciliation completed: No        Comments:   Lab Results   Component Value Date    WBC 8.08 08/31/2018    HGB 14.6 08/31/2018    RBC 4.91 08/31/2018    HCT 44.2 08/31/2018    MCV 90 08/31/2018     08/31/2018     No results found for: MG  No results found for: [id number]  Lab Results   Component Value Date    TSH 4.692 (H) 10/18/2018    TSH 11.531 (H) 08/31/2018    TSH 2.794 10/19/2017    THYROIDAB 17 (H) 08/11/2017    FREET4 1.39 10/18/2018    FREET4 1.20 08/31/2018    FREET4 1.30 10/19/2017

## 2019-03-11 ENCOUNTER — PATIENT OUTREACH (OUTPATIENT)
Dept: ADMINISTRATIVE | Facility: HOSPITAL | Age: 69
End: 2019-03-11

## 2019-03-11 DIAGNOSIS — E03.9 HYPOTHYROIDISM, UNSPECIFIED TYPE: ICD-10-CM

## 2019-03-11 DIAGNOSIS — B02.29 POST HERPETIC NEURALGIA: ICD-10-CM

## 2019-03-11 RX ORDER — AMITRIPTYLINE HYDROCHLORIDE 25 MG/1
TABLET, FILM COATED ORAL
Qty: 30 TABLET | Refills: 2 | Status: SHIPPED | OUTPATIENT
Start: 2019-03-11 | End: 2019-06-20 | Stop reason: SDUPTHER

## 2019-03-11 RX ORDER — GABAPENTIN 300 MG/1
CAPSULE ORAL
Qty: 90 CAPSULE | Refills: 1 | Status: SHIPPED | OUTPATIENT
Start: 2019-03-11 | End: 2019-03-20

## 2019-03-11 RX ORDER — LEVOTHYROXINE SODIUM 125 UG/1
TABLET ORAL
Qty: 90 TABLET | Refills: 0 | Status: SHIPPED | OUTPATIENT
Start: 2019-03-11 | End: 2019-06-20 | Stop reason: SDUPTHER

## 2019-03-11 NOTE — PROGRESS NOTES
Health Maintenance Due   Topic Date Due    TETANUS VACCINE  06/10/1970    Mammogram  06/10/1992    DEXA SCAN  06/10/1992    Colonoscopy  06/10/2002    Zoster Vaccine  06/10/2012    Influenza Vaccine  08/01/2018    Pneumococcal Vaccine (65+ Low/Medium Risk) (2 of 2 - PPSV23) 10/30/2018

## 2019-03-20 ENCOUNTER — OFFICE VISIT (OUTPATIENT)
Dept: FAMILY MEDICINE | Facility: CLINIC | Age: 69
End: 2019-03-20
Payer: MEDICARE

## 2019-03-20 ENCOUNTER — LAB VISIT (OUTPATIENT)
Dept: LAB | Facility: HOSPITAL | Age: 69
End: 2019-03-20
Attending: FAMILY MEDICINE
Payer: MEDICARE

## 2019-03-20 VITALS
TEMPERATURE: 98 F | HEART RATE: 82 BPM | OXYGEN SATURATION: 97 % | DIASTOLIC BLOOD PRESSURE: 90 MMHG | HEIGHT: 61 IN | SYSTOLIC BLOOD PRESSURE: 150 MMHG | BODY MASS INDEX: 42.08 KG/M2 | WEIGHT: 222.88 LBS | RESPIRATION RATE: 16 BRPM

## 2019-03-20 DIAGNOSIS — I10 HYPERTENSION, UNSPECIFIED TYPE: ICD-10-CM

## 2019-03-20 DIAGNOSIS — B02.29 POST HERPETIC NEURALGIA: ICD-10-CM

## 2019-03-20 DIAGNOSIS — E55.9 VITAMIN D DEFICIENCY: ICD-10-CM

## 2019-03-20 DIAGNOSIS — R73.03 PREDIABETES: ICD-10-CM

## 2019-03-20 DIAGNOSIS — E66.01 MORBID OBESITY WITH BMI OF 40.0-44.9, ADULT: ICD-10-CM

## 2019-03-20 DIAGNOSIS — E06.3 HASHIMOTO'S DISEASE: ICD-10-CM

## 2019-03-20 DIAGNOSIS — E78.5 HYPERLIPIDEMIA, UNSPECIFIED HYPERLIPIDEMIA TYPE: ICD-10-CM

## 2019-03-20 DIAGNOSIS — M54.16 LUMBAR RADICULITIS: ICD-10-CM

## 2019-03-20 DIAGNOSIS — Z12.31 ENCOUNTER FOR SCREENING MAMMOGRAM FOR MALIGNANT NEOPLASM OF BREAST: ICD-10-CM

## 2019-03-20 DIAGNOSIS — R13.10 DYSPHAGIA, UNSPECIFIED TYPE: ICD-10-CM

## 2019-03-20 DIAGNOSIS — N95.9 MENOPAUSAL AND PERIMENOPAUSAL DISORDER: ICD-10-CM

## 2019-03-20 DIAGNOSIS — E78.5 HYPERLIPIDEMIA, UNSPECIFIED HYPERLIPIDEMIA TYPE: Primary | ICD-10-CM

## 2019-03-20 DIAGNOSIS — R76.8 DS DNA ANTIBODY POSITIVE: ICD-10-CM

## 2019-03-20 DIAGNOSIS — E03.9 HYPOTHYROIDISM, UNSPECIFIED TYPE: ICD-10-CM

## 2019-03-20 DIAGNOSIS — K21.9 GASTROESOPHAGEAL REFLUX DISEASE WITHOUT ESOPHAGITIS: ICD-10-CM

## 2019-03-20 DIAGNOSIS — N39.0 URINARY TRACT INFECTION WITHOUT HEMATURIA, SITE UNSPECIFIED: ICD-10-CM

## 2019-03-20 DIAGNOSIS — R76.8 POSITIVE ANA (ANTINUCLEAR ANTIBODY): ICD-10-CM

## 2019-03-20 LAB
25(OH)D3+25(OH)D2 SERPL-MCNC: 15 NG/ML
ALBUMIN SERPL BCP-MCNC: 3.9 G/DL
ALP SERPL-CCNC: 83 U/L
ALT SERPL W/O P-5'-P-CCNC: 18 U/L
ANION GAP SERPL CALC-SCNC: 8 MMOL/L
AST SERPL-CCNC: 15 U/L
BASOPHILS # BLD AUTO: 0.04 K/UL
BASOPHILS NFR BLD: 0.5 %
BILIRUB SERPL-MCNC: 0.5 MG/DL
BUN SERPL-MCNC: 13 MG/DL
CALCIUM SERPL-MCNC: 10.6 MG/DL
CHLORIDE SERPL-SCNC: 102 MMOL/L
CHOLEST SERPL-MCNC: 243 MG/DL
CHOLEST/HDLC SERPL: 5.9 {RATIO}
CO2 SERPL-SCNC: 28 MMOL/L
CREAT SERPL-MCNC: 0.7 MG/DL
DIFFERENTIAL METHOD: ABNORMAL
EOSINOPHIL # BLD AUTO: 0.2 K/UL
EOSINOPHIL NFR BLD: 2.1 %
ERYTHROCYTE [DISTWIDTH] IN BLOOD BY AUTOMATED COUNT: 12.7 %
EST. GFR  (AFRICAN AMERICAN): >60 ML/MIN/1.73 M^2
EST. GFR  (NON AFRICAN AMERICAN): >60 ML/MIN/1.73 M^2
ESTIMATED AVG GLUCOSE: 123 MG/DL
GLUCOSE SERPL-MCNC: 112 MG/DL
HBA1C MFR BLD HPLC: 5.9 %
HCT VFR BLD AUTO: 42.4 %
HDLC SERPL-MCNC: 41 MG/DL
HDLC SERPL: 16.9 %
HGB BLD-MCNC: 13.9 G/DL
IMM GRANULOCYTES # BLD AUTO: 0.04 K/UL
IMM GRANULOCYTES NFR BLD AUTO: 0.5 %
LDLC SERPL CALC-MCNC: 125.6 MG/DL
LYMPHOCYTES # BLD AUTO: 1.8 K/UL
LYMPHOCYTES NFR BLD: 20.8 %
MCH RBC QN AUTO: 29.5 PG
MCHC RBC AUTO-ENTMCNC: 32.8 G/DL
MCV RBC AUTO: 90 FL
MONOCYTES # BLD AUTO: 0.6 K/UL
MONOCYTES NFR BLD: 6.6 %
NEUTROPHILS # BLD AUTO: 6.1 K/UL
NEUTROPHILS NFR BLD: 69.5 %
NONHDLC SERPL-MCNC: 202 MG/DL
NRBC BLD-RTO: 0 /100 WBC
PLATELET # BLD AUTO: 315 K/UL
PMV BLD AUTO: 9.1 FL
POTASSIUM SERPL-SCNC: 4.4 MMOL/L
PROT SERPL-MCNC: 7.7 G/DL
RBC # BLD AUTO: 4.71 M/UL
SODIUM SERPL-SCNC: 138 MMOL/L
T4 FREE SERPL-MCNC: 1.25 NG/DL
TRIGL SERPL-MCNC: 382 MG/DL
TSH SERPL DL<=0.005 MIU/L-ACNC: 1.94 UIU/ML
WBC # BLD AUTO: 8.7 K/UL

## 2019-03-20 PROCEDURE — 96372 PR INJECTION,THERAP/PROPH/DIAG2ST, IM OR SUBCUT: ICD-10-PCS | Mod: S$GLB,,, | Performed by: FAMILY MEDICINE

## 2019-03-20 PROCEDURE — 84443 ASSAY THYROID STIM HORMONE: CPT

## 2019-03-20 PROCEDURE — 1101F PR PT FALLS ASSESS DOC 0-1 FALLS W/OUT INJ PAST YR: ICD-10-PCS | Mod: CPTII,S$GLB,, | Performed by: FAMILY MEDICINE

## 2019-03-20 PROCEDURE — 87088 URINE BACTERIA CULTURE: CPT

## 2019-03-20 PROCEDURE — 99214 PR OFFICE/OUTPT VISIT, EST, LEVL IV, 30-39 MIN: ICD-10-PCS | Mod: 25,S$GLB,, | Performed by: FAMILY MEDICINE

## 2019-03-20 PROCEDURE — 99999 PR PBB SHADOW E&M-EST. PATIENT-LVL V: ICD-10-PCS | Mod: PBBFAC,,, | Performed by: FAMILY MEDICINE

## 2019-03-20 PROCEDURE — 36415 COLL VENOUS BLD VENIPUNCTURE: CPT | Mod: PO

## 2019-03-20 PROCEDURE — 99214 OFFICE O/P EST MOD 30 MIN: CPT | Mod: 25,S$GLB,, | Performed by: FAMILY MEDICINE

## 2019-03-20 PROCEDURE — 85025 COMPLETE CBC W/AUTO DIFF WBC: CPT

## 2019-03-20 PROCEDURE — 3080F DIAST BP >= 90 MM HG: CPT | Mod: CPTII,S$GLB,, | Performed by: FAMILY MEDICINE

## 2019-03-20 PROCEDURE — 80053 COMPREHEN METABOLIC PANEL: CPT

## 2019-03-20 PROCEDURE — 87086 URINE CULTURE/COLONY COUNT: CPT

## 2019-03-20 PROCEDURE — 87186 SC STD MICRODIL/AGAR DIL: CPT

## 2019-03-20 PROCEDURE — 3077F SYST BP >= 140 MM HG: CPT | Mod: CPTII,S$GLB,, | Performed by: FAMILY MEDICINE

## 2019-03-20 PROCEDURE — 3077F PR MOST RECENT SYSTOLIC BLOOD PRESSURE >= 140 MM HG: ICD-10-PCS | Mod: CPTII,S$GLB,, | Performed by: FAMILY MEDICINE

## 2019-03-20 PROCEDURE — 99999 PR PBB SHADOW E&M-EST. PATIENT-LVL V: CPT | Mod: PBBFAC,,, | Performed by: FAMILY MEDICINE

## 2019-03-20 PROCEDURE — 84439 ASSAY OF FREE THYROXINE: CPT

## 2019-03-20 PROCEDURE — 80061 LIPID PANEL: CPT

## 2019-03-20 PROCEDURE — 96372 THER/PROPH/DIAG INJ SC/IM: CPT | Mod: S$GLB,,, | Performed by: FAMILY MEDICINE

## 2019-03-20 PROCEDURE — 82306 VITAMIN D 25 HYDROXY: CPT

## 2019-03-20 PROCEDURE — 99499 RISK ADDL DX/OHS AUDIT: ICD-10-PCS | Mod: S$GLB,,, | Performed by: FAMILY MEDICINE

## 2019-03-20 PROCEDURE — 3080F PR MOST RECENT DIASTOLIC BLOOD PRESSURE >= 90 MM HG: ICD-10-PCS | Mod: CPTII,S$GLB,, | Performed by: FAMILY MEDICINE

## 2019-03-20 PROCEDURE — 1101F PT FALLS ASSESS-DOCD LE1/YR: CPT | Mod: CPTII,S$GLB,, | Performed by: FAMILY MEDICINE

## 2019-03-20 PROCEDURE — 99499 UNLISTED E&M SERVICE: CPT | Mod: S$GLB,,, | Performed by: FAMILY MEDICINE

## 2019-03-20 PROCEDURE — 87077 CULTURE AEROBIC IDENTIFY: CPT

## 2019-03-20 PROCEDURE — 83036 HEMOGLOBIN GLYCOSYLATED A1C: CPT

## 2019-03-20 RX ORDER — KETOROLAC TROMETHAMINE 30 MG/ML
60 INJECTION, SOLUTION INTRAMUSCULAR; INTRAVENOUS
Status: COMPLETED | OUTPATIENT
Start: 2019-03-20 | End: 2019-03-20

## 2019-03-20 RX ORDER — NITROFURANTOIN 25; 75 MG/1; MG/1
100 CAPSULE ORAL 2 TIMES DAILY
Qty: 14 CAPSULE | Refills: 0 | Status: SHIPPED | OUTPATIENT
Start: 2019-03-20 | End: 2019-05-06

## 2019-03-20 RX ORDER — GABAPENTIN 300 MG/1
600 CAPSULE ORAL 3 TIMES DAILY
Qty: 180 CAPSULE | Refills: 1 | Status: SHIPPED | OUTPATIENT
Start: 2019-03-20 | End: 2019-06-20 | Stop reason: SDUPTHER

## 2019-03-20 RX ORDER — MELOXICAM 7.5 MG/1
7.5 TABLET ORAL DAILY
Qty: 90 TABLET | Refills: 3 | Status: SHIPPED | OUTPATIENT
Start: 2019-03-20 | End: 2019-06-20 | Stop reason: SDUPTHER

## 2019-03-20 RX ADMIN — KETOROLAC TROMETHAMINE 60 MG: 30 INJECTION, SOLUTION INTRAMUSCULAR; INTRAVENOUS at 04:03

## 2019-03-20 NOTE — PROGRESS NOTES
Subjective:       Patient ID: Aida Gupta is a 68 y.o. female.    Chief Complaint: Establish Care    Patient Active Problem List   Diagnosis    Hypothyroid    Gastroesophageal reflux disease without esophagitis    Morbid obesity with BMI of 40.0-44.9, adult    Hyperlipidemia    Prediabetes    Dermatitis    Dyspnea    Dysphagia    Lower esophageal ring    Gastritis determined by endoscopy    Gastric polyp    DDD (degenerative disc disease), lumbar    Lumbar radiculitis    Vitamin D deficiency    HTN (hypertension)    Post herpetic neuralgia    Ds DNA antibody positive    Positive JAVY (antinuclear antibody)    Hashimoto's disease   New patient to me.  Standing and leaning over exam table when I walk in. States she can't sit down due to post herpetic neuralgia which started 2 years ago. Pain Dr. Walton has tried nerve blocks caudal 10/17 and According to notes from pain med she has had SEUN for lumbar radiculopathy 1/18    Had uti 1 week ago and treated with abx.  Sx still present    Rheum Dr. Serrato- following for + JAVY with + ds DNA and antithyroid antibodies (TPO AB)- possible SLE but no current manifestations    GI Dr. Warren EGD 9/17-mild schatzki ring-dilated, gastric polyp, gerd    HPI  Review of Systems   Constitutional: Negative for chills, fatigue, fever and unexpected weight change.   Respiratory: Negative for chest tightness and shortness of breath.    Cardiovascular: Negative for chest pain, palpitations and leg swelling.   Gastrointestinal: Negative for abdominal pain, nausea and vomiting.   Genitourinary: Positive for dysuria, frequency and urgency.   Musculoskeletal: Positive for back pain and myalgias. Negative for arthralgias.   Skin: Negative for rash.   Neurological: Negative for dizziness, syncope, light-headedness and headaches.       Objective:      Physical Exam   Constitutional: She is oriented to person, place, and time. She appears well-developed and well-nourished.    Cardiovascular: Normal rate, regular rhythm and normal heart sounds.   Pulmonary/Chest: Effort normal and breath sounds normal.   Musculoskeletal: She exhibits no edema.   Neurological: She is alert and oriented to person, place, and time.   Skin: Skin is warm and dry.   Psychiatric: She has a normal mood and affect.   Nursing note and vitals reviewed.      Assessment:       1. Hyperlipidemia, unspecified hyperlipidemia type    2. Hypertension, unspecified type    3. Hypothyroidism, unspecified type    4. Prediabetes    5. Vitamin D deficiency    6. Gastroesophageal reflux disease without esophagitis    7. Dysphagia, unspecified type    8. Morbid obesity with BMI of 40.0-44.9, adult    9. Lumbar radiculitis    10. Post herpetic neuralgia    11. Ds DNA antibody positive    12. Positive JAVY (antinuclear antibody)    13. Hashimoto's disease    14. Urinary tract infection without hematuria, site unspecified    15. Encounter for screening mammogram for malignant neoplasm of breast    16. Menopausal and perimenopausal disorder        Plan:     1. Hyperlipidemia, unspecified hyperlipidemia type  Stable condition.  Continue current medications.  Will adjust based on lab findings or if condition changes.    - Comprehensive metabolic panel; Future  - Lipid panel; Future    2. Hypertension, unspecified type  Uncontrolled. I counseled the patient on HTN education, management and recommendations.  I recommended weight loss toward a BMI < 25, avoidance of salt and the DASH diet, regular cardio exercise a minimum of 150 minutes per week and medications if indicated.  Printed materials were given. The goal is < 140/90 unless otherwise specified.      3. Hypothyroidism, unspecified type  Stable condition.  Continue current medications.  Will adjust based on lab findings or if condition changes.    - CBC auto differential; Future  - TSH; Future  - T4, free; Future    4. Prediabetes   Your blood sugar is borderline high.  This means  "you are at risk for developing type 2 diabetes mellitus.  To lessen your risk you should exercise regularly, avoid excess carbohydrates and work toward a body mass index of less than 25.      - Hemoglobin A1c; Future    5. Vitamin D deficiency  Screen and treat as indicated:    - Vitamin D; Future    6. Gastroesophageal reflux disease without esophagitis  Counseled patient on prevention of reflux with changes in diet and behavior.  I recommended avoidance of greasy and spicy foods, caffeine and eating within 3 hours of bedtime.  I counseled the patient to avoid eating large meals and instead eating more frequent small meals.  I also recommended weight loss and elevation of the head of the bed by 6 inches.  If symptoms persist after these changes medication may be needed to control GERD.        7. Dysphagia, unspecified type  F/u GI    8. Morbid obesity with BMI of 40.0-44.9, adult  Counseled patient on his ideal body weight, health consequences of being obese and current recommendations including weekly exercise and a heart healthy diet.  Current BMI is:Estimated body mass index is 42.11 kg/m² as calculated from the following:    Height as of this encounter: 5' 1" (1.549 m).    Weight as of this encounter: 101.1 kg (222 lb 14.2 oz)..  Patient is aware that ideal BMI < 25 or Weight in (lb) to have BMI = 25: 132.        9. Lumbar radiculitis  Treat  - meloxicam (MOBIC) 7.5 MG tablet; Take 1 tablet (7.5 mg total) by mouth once daily.  Dispense: 90 tablet; Refill: 3  - gabapentin (NEURONTIN) 300 MG capsule; Take 2 capsules (600 mg total) by mouth 3 (three) times daily.  Dispense: 180 capsule; Refill: 1  - ketorolac injection 60 mg    10. Post herpetic neuralgia  Cont current care    11. Ds DNA antibody positive  Cont rheum care    12. Positive JAVY (antinuclear antibody)  Cont rheum care    13. Hashimoto's disease  Cont monitoring    14. Urinary tract infection without hematuria, site unspecified  Patient was counseled " to increase fluid intake.  Avoid carbonated beverages.  Empty your bladder frequently, before and after intercourse, and wipe front to back when cleaning after using the bathroom.  Cranberry juice intake may help.  Notify MD if you have fever > 100.4, severe abdominal pain, blood in urine or if you see no improvement in 3 days.    - POCT urinalysis, dipstick or tablet reag  - Urine culture  - nitrofurantoin, macrocrystal-monohydrate, (MACROBID) 100 MG capsule; Take 1 capsule (100 mg total) by mouth 2 (two) times daily.  Dispense: 14 capsule; Refill: 0    15. Encounter for screening mammogram for malignant neoplasm of breast  Screen and treat as indicated:    - Mammo Digital Screening Bilateral With CAD; Future    16. Menopausal and perimenopausal disorder  Screen and treat as indicated:    - DXA Bone Density Spine And Hip; Future      Patient Instructions (the written plan) was given to the patient/family: Yes  Time spent with patient: 20 minutes    Patient with be reevaluated in 3 and 6 months or sooner prn    Greater than 50% of this visit was spent counseling as described in above documentation:Yes

## 2019-03-20 NOTE — PATIENT INSTRUCTIONS
Increase gabapentin 300 mg 2 tabs three times per day.  Add meloxicam 7.5mg a day. Avoid anti-inflammatory meds like ibuprofen.  OK to take tylenol ES 500mg 2 every 6 hours as needed.            Established High Blood Pressure    High blood pressure (hypertension) is a chronic disease. Often, healthcare providers dont know what causes it. But it can be caused by certain health conditions and medicines.  If you have high blood pressure, you may not have any symptoms. If you do have symptoms, they may include headache, dizziness, changes in your vision, chest pain, and shortness of breath. But even without symptoms, high blood pressure thats not treated raises your risk for heart attack and stroke. High blood pressure is a serious health risk and shouldnt be ignored.  A blood pressure reading is made up of two numbers: a higher number over a lower number. The top number is the systolic pressure. The bottom number is the diastolic pressure. A normal blood pressure is a systolic pressure of  less than 120 over a diastolic pressure of less than 80. You will see your blood pressure readings written together. For example, a person with a systolic pressure of 188 and a diastolic pressure of 78 will have 118/78 written in the medical record.  High blood pressure is when either the top number is 140 or higher, or the bottom number is 90 or higher. This must be the result when taking your blood pressure a number of times. The blood pressures between normal and high are called prehypertension.  Home care  If you have high blood pressure, you should do what is listed below to lower your blood pressure. If you are taking medicines for high blood pressure, these methods may reduce or end your need for medicines in the future.  · Begin a weight-loss program if you are overweight.  · Cut back on how much salt you get in your diet. Heres how to do this:  ¨ Dont eat foods that have a lot of salt. These include olives, pickles,  smoked meats, and salted potato chips.  ¨ Dont add salt to your food at the table.  ¨ Use only small amounts of salt when cooking.  · Start an exercise program. Talk with your healthcare provider about the type of exercise program that would be best for you. It doesn't have to be hard. Even brisk walking for 20 minutes 3 times a week is a good form of exercise.  · Dont take medicines that stimulate the heart. This includes many over-the-counter cold and sinus decongestant pills and sprays, as well as diet pills. Check the warnings about hypertension on the label. Before buying any over-the-counter medicines or supplements, always ask the pharmacist about the product's potential interaction with your high blood pressure and your high blood pressure medicines.  · Stimulants such as amphetamine or cocaine could be deadly for someone with high blood pressure. Never take these.  · Limit how much caffeine you get in your diet. Switch to caffeine-free products.  · Stop smoking. If you are a long-time smoker, this can be hard. Talk to your healthcare provider about medicines and nicotine replacement options to help you. Also, enroll in a stop-smoking program to make it more likely that you will quit for good.  · Learn how to handle stress. This is an important part of any program to lower blood pressure. Learn about relaxation methods like meditation, yoga, or biofeedback.  · If your provider prescribed medicines, take them exactly as directed. Missing doses may cause your blood pressure get out of control.  · If you miss a dose or doses, check with your healthcare provider or pharmacist about what to do.  · Consider buying an automatic blood pressure machine. Ask your provider for a recommendation. You can get one of these at most pharmacies.     The American Heart Association recommends the following guidelines for home blood pressure monitoring:  · Don't smoke or drink coffee for 30 minutes before taking your blood  pressure.  · Go to the bathroom before the test.  · Relax for 5 minutes before taking the measurement.  · Sit with your back supported (don't sit on a couch or soft chair); keep your feet on the floor uncrossed. Place your arm on a solid flat surface (like a table) with the upper part of the arm at heart level. Place the middle of the cuff directly above the eye of the elbow. Check the monitor's instruction manual for an illustration.  · Take multiple readings. When you measure, take 2 to 3 readings one minute apart and record all of the results.  · Take your blood pressure at the same time every day, or as your healthcare provider recommends.  · Record the date, time, and blood pressure reading.  · Take the record with you to your next medical appointment. If your blood pressure monitor has a built-in memory, simply take the monitor with you to your next appointment.  · Call your provider if you have several high readings. Don't be frightened by a single high blood pressure reading, but if you get several high readings, check in with your healthcare provider.  · Note: When blood pressure reaches a systolic (top number) of 180 or higher OR diastolic (bottom number) of 110 or higher, seek emergency medical treatment.  Follow-up care  You will need to see your healthcare provider regularly. This is to check your blood pressure and to make changes to your medicines. Make a follow-up appointment as directed. Bring the record of your home blood pressure readings to the appointment.  When to seek medical advice  Call your healthcare provider right away if any of these occur:  · Blood pressure reaches a systolic (upper number) of 180 or higher OR a diastolic (bottom number) of 110 or higher  · Chest pain or shortness of breath  · Severe headache  · Throbbing or rushing sound in the ears  · Nosebleed  · Sudden severe pain in your belly (abdomen)  · Extreme drowsiness, confusion, or fainting  · Dizziness or spinning  sensation (vertigo)  · Weakness of an arm or leg or one side of the face  · You have problems speaking or seeing   Date Last Reviewed: 12/1/2016  © 1865-9808 [employer]. [address] [place] [address], [place] [address]. All rights reserved. This information is not intended as a substitute for professional medical care. Always follow your healthcare professional's instructions.

## 2019-03-20 NOTE — PROGRESS NOTES
Patient identified by name and  with verbal feedback. Toradol 60 mg administered IM to right upper outer quadrant gluteus using aseptic technique. Patient tolerated well, no adverse reactions noted/reported.

## 2019-03-23 LAB — BACTERIA UR CULT: NORMAL

## 2019-03-28 ENCOUNTER — TELEPHONE (OUTPATIENT)
Dept: FAMILY MEDICINE | Facility: CLINIC | Age: 69
End: 2019-03-28

## 2019-03-28 RX ORDER — ERGOCALCIFEROL 1.25 MG/1
50000 CAPSULE ORAL
Qty: 12 CAPSULE | Refills: 3 | Status: SHIPPED | OUTPATIENT
Start: 2019-03-28 | End: 2019-06-20 | Stop reason: SDUPTHER

## 2019-03-28 NOTE — TELEPHONE ENCOUNTER
----- Message from Ramona Schmitz LPN sent at 3/27/2019 10:41 AM CDT -----  Contact: self   Please result labs  ----- Message -----  From: Jason Mo  Sent: 3/27/2019  10:25 AM  To: Natalia BLACKMAN Staff    Patient may have missed a call from your office regarding her labs. Please call back at [phone] (avvr)

## 2019-03-28 NOTE — TELEPHONE ENCOUNTER
Notify patient: Your labs are all essentially in the normal range including: kidney function, liver function, blood cell counts and thyroid level.  Your blood sugar control is excellent and at the goal HbA1c, 3 month blood sugar average, we desire.  Keep up the good work!    Your cholesterol is high.  Here are the results of the last cholesterol checks:   Lab Results   Component Value Date    CHOL 243 (H) 03/20/2019    CHOL 220 (H) 08/31/2018    CHOL 215 (H) 10/19/2017     Lab Results   Component Value Date    HDL 41 03/20/2019    HDL 42 08/31/2018    HDL 39 (L) 10/19/2017     Lab Results   Component Value Date    LDLCALC 125.6 03/20/2019    LDLCALC 116.4 08/31/2018    LDLCALC 126.8 10/19/2017     Lab Results   Component Value Date    TRIG 382 (H) 03/20/2019    TRIG 308 (H) 08/31/2018    TRIG 246 (H) 10/19/2017     Lab Results   Component Value Date    CHOLHDL 16.9 (L) 03/20/2019    CHOLHDL 19.1 (L) 08/31/2018    CHOLHDL 18.1 (L) 10/19/2017       Your 10 year risk of having a heart attack or a stroke is:The 10-year ASCVD risk score ([place] WEBSTER Jr., et al., 2013) is: 10.2%    Values used to calculate the score:      Age: 68 years      Sex: Female      Is Non- : No      Diabetic: No      Tobacco smoker: No      Systolic Blood Pressure: 150 mmHg      Is BP treated: No      HDL Cholesterol: 41 mg/dL      Total Cholesterol: 243 mg/dL    I recommend a heart healthy diet rich in fiber, fresh vegetables and fruit and low in saturated fats (fried foods, red meat, etc.).  I also recommend regular exercise including a minimum of 150 minutes of cardio exercise per week and 2-30 minute workouts of strength training like light weights, yoga, pilates, etc.  You should work toward a body mass index of < 25.        Your vitamin D is very low.  Continue your weekly supplement

## 2019-03-29 NOTE — TELEPHONE ENCOUNTER
Called Pt regarding below messages. Patient informed of results and recommendations as noted by Dr. Fisher. Patient verbalized understanding.  Itching from antibiotics and has been taking benadryl and it isnt helping. Please advise

## 2019-04-01 NOTE — TELEPHONE ENCOUNTER
Itching can be a sign of allergic reaction. Is she having hives or persistent sx then stop med and come in. sob, nausea, throat or tongue swelling are emergency and she needs to call 911.

## 2019-04-02 ENCOUNTER — TELEPHONE (OUTPATIENT)
Dept: FAMILY MEDICINE | Facility: CLINIC | Age: 69
End: 2019-04-02

## 2019-04-02 NOTE — TELEPHONE ENCOUNTER
Patient reports itching inside elbow and all over stomach area and chest and flank. Patient denies any bumps, swelling, redness or hives. Patient tried switching detergents. Patient has completed antibiotic macrobid. Patient denies SOB, nausea, or tongue or throat swelling. Please advise.

## 2019-04-02 NOTE — TELEPHONE ENCOUNTER
No voicemail box set up. Unable to leave message on cell phone.    Left message on patient's home number.

## 2019-04-02 NOTE — TELEPHONE ENCOUNTER
Was on lunch break when call came through. Returned call to patient and spoke to her. Documented in previous encounter.

## 2019-04-02 NOTE — TELEPHONE ENCOUNTER
Needs urgent eval-go to urgent care or ED or come in tomorrow if no sob, throat or tongue swelling or nausea. May use benadryl otc prn

## 2019-04-02 NOTE — TELEPHONE ENCOUNTER
----- Message from Angela Bee sent at 4/2/2019 12:36 PM CDT -----  Contact: Patient  Type:  Patient Returning Call    Who Called:  Patient   Who Left Message for Patient:  Tamar  Does the patient know what this is regarding?:  no  Best Call Back Number:    Additional Information:  I sent an IM to Tamar, no response. Please advise.

## 2019-04-02 NOTE — TELEPHONE ENCOUNTER
Called and spoke to patient regarding symptoms. Patient was informed to go to Urgent Care or ED to be evaluated or if symptoms get worse.  Patient was advise per Dr. Fisher, to take benadryl otc prn. Patient states that she has started taking benadryl and symptoms are a little better. Patient was also informed to come into the clinic on tomorrow to be seen by Dr. Fisher or one of our LIZZ's, NP's. Patient verbalize and understand instruction given.

## 2019-04-03 ENCOUNTER — TELEPHONE (OUTPATIENT)
Dept: FAMILY MEDICINE | Facility: CLINIC | Age: 69
End: 2019-04-03

## 2019-04-03 NOTE — TELEPHONE ENCOUNTER
----- Message from Jason Mo sent at 4/3/2019 11:49 AM CDT -----  Contact: self   Patient would like mammogram orders faxed to [facility] [phone]. Please call patient back at [phone] (home)

## 2019-04-03 NOTE — TELEPHONE ENCOUNTER
Spoke to patient to advise her [facility] is part of [facility] and her referral is good to use there, just call to schedule.

## 2019-04-23 ENCOUNTER — TELEPHONE (OUTPATIENT)
Dept: FAMILY MEDICINE | Facility: CLINIC | Age: 69
End: 2019-04-23

## 2019-04-23 DIAGNOSIS — N39.0 URINARY TRACT INFECTION WITHOUT HEMATURIA, SITE UNSPECIFIED: Primary | ICD-10-CM

## 2019-04-23 DIAGNOSIS — R53.81 DEBILITY: ICD-10-CM

## 2019-04-23 NOTE — TELEPHONE ENCOUNTER
Will order [facility] to go out and collect U/A with c/s.  Hold abx until U/A collected. Monitor for worsening symptoms and return to clinic or go to the ER if these occur: fever > 100.4, severe abdominal pain, intractable vomiting, bleeding from the rectum or black tarry stools, dehydration, lethargy or other worsening symptoms.

## 2019-04-23 NOTE — TELEPHONE ENCOUNTER
----- Message from Jelly Phan sent at 4/23/2019 12:06 PM CDT -----  Contact: Patient  Type: Needs Medical Advice    Who Called:  Patient  Symptoms (please be specific):  Bladder infection  How long has patient had these symptoms:  On going  Pharmacy name and phone #:    [facility] - [place] - [address]  [address] [place] [address]  Phone: [phone] Fax: [phone]      Best Call Back Number: [phone]

## 2019-04-23 NOTE — TELEPHONE ENCOUNTER
Dysuria, lower abdominal pressure and burning during urination x 4 days. Patient states she has been taking pyridium from last visit and drinking lots of cranberry juice. Patient also reports pain in lower back. States she cannot come in for visit because she lives in [place]. She states she does not want to go to urgent care close to her because she could not urinate when she went into their clinic and they wouldn't treat her urinary symptoms and they told her she had scabies and she doesn't think she had scabies.

## 2019-04-24 ENCOUNTER — TELEPHONE (OUTPATIENT)
Dept: FAMILY MEDICINE | Facility: CLINIC | Age: 69
End: 2019-04-24

## 2019-04-24 NOTE — TELEPHONE ENCOUNTER
----- Message from Sakshi Blair sent at 4/24/2019  1:38 PM CDT -----  Contact: pt  Pt would like to be called back regarding home health nurse    Pt can be reached at [phone]

## 2019-04-24 NOTE — TELEPHONE ENCOUNTER
Spoke to patient and advised her [facility] will be coming out at some point to collect urine sample. Called [facility] and faxed over the referral. Jennifer stated they will be looking out for it because they are still waiting on authorization from [facility].

## 2019-04-24 NOTE — TELEPHONE ENCOUNTER
Spoke to patient again because  has not yet received authorization to go out there from [facility]. Jennifer at  stated she would call the patient and coordinate with her.

## 2019-04-24 NOTE — TELEPHONE ENCOUNTER
Spoke to patient and let her know home health agency was waiting for authorization from [facility]. JANE Rodriguez stated she would call the patient and let her know an update.

## 2019-04-26 ENCOUNTER — LAB VISIT (OUTPATIENT)
Dept: LAB | Facility: HOSPITAL | Age: 69
End: 2019-04-26
Attending: FAMILY MEDICINE
Payer: MEDICARE

## 2019-04-26 DIAGNOSIS — R53.81 DEBILITY: ICD-10-CM

## 2019-04-26 DIAGNOSIS — N39.0 URINARY TRACT INFECTION WITHOUT HEMATURIA, SITE UNSPECIFIED: ICD-10-CM

## 2019-04-26 LAB
BACTERIA #/AREA URNS AUTO: ABNORMAL /HPF
BILIRUB UR QL STRIP: NEGATIVE
CAOX CRY UR QL COMP ASSIST: ABNORMAL
CLARITY UR REFRACT.AUTO: CLEAR
COLOR UR AUTO: YELLOW
GLUCOSE UR QL STRIP: NEGATIVE
HGB UR QL STRIP: NEGATIVE
KETONES UR QL STRIP: NEGATIVE
LEUKOCYTE ESTERASE UR QL STRIP: ABNORMAL
MICROSCOPIC COMMENT: ABNORMAL
NITRITE UR QL STRIP: NEGATIVE
NON-SQ EPI CELLS #/AREA URNS AUTO: 2 /HPF
PH UR STRIP: 5 [PH] (ref 5–8)
PROT UR QL STRIP: NEGATIVE
SP GR UR STRIP: 1.02 (ref 1–1.03)
SQUAMOUS #/AREA URNS AUTO: 2 /HPF
URN SPEC COLLECT METH UR: ABNORMAL
WBC #/AREA URNS AUTO: 5 /HPF (ref 0–5)

## 2019-04-26 PROCEDURE — 87086 URINE CULTURE/COLONY COUNT: CPT

## 2019-04-26 PROCEDURE — 81001 URINALYSIS AUTO W/SCOPE: CPT

## 2019-04-28 LAB
BACTERIA UR CULT: NORMAL
BACTERIA UR CULT: NORMAL

## 2019-04-29 ENCOUNTER — TELEPHONE (OUTPATIENT)
Dept: FAMILY MEDICINE | Facility: CLINIC | Age: 69
End: 2019-04-29

## 2019-04-29 NOTE — TELEPHONE ENCOUNTER
[facility] faxed and stated that [facility]  cannot assist this patient because of her location. She is located in [place]. Please let me know of another home health location that can assist her in that area and I will let Jennifer know.

## 2019-04-29 NOTE — TELEPHONE ENCOUNTER
----- Message from Alka Butler sent at 4/29/2019  4:54 PM CDT -----  Contact: Aida  Type:  Test Results    Who Called:  patient  Name of Test (Lab/Mammo/Etc):  UA  Date of Test:  4/26/19  Ordering Provider:  Natalia  Where the test was performed:  [facility]  Best Call Back Number:  [phone]  Additional Information:  Please advise--thank you

## 2019-04-29 NOTE — TELEPHONE ENCOUNTER
----- Message from Jesica Erwin sent at 4/29/2019  9:28 AM CDT -----  Contact: Jennifer with [facility]  Jennifer with [facility] calling regarding the referral to [facility]. They are unable to see the patient. Jennifer is asking if there is another agencies they can use. Please call Jennifer at [phone]. Thanks!

## 2019-04-30 ENCOUNTER — TELEPHONE (OUTPATIENT)
Dept: FAMILY MEDICINE | Facility: CLINIC | Age: 69
End: 2019-04-30

## 2019-04-30 NOTE — TELEPHONE ENCOUNTER
Please result UA and culture. Patient would like to know if she needs to be put on antibiotics. Thank you!

## 2019-04-30 NOTE — TELEPHONE ENCOUNTER
Patient notified by phone room  that Natalia has not yet responded with results and recommendations.

## 2019-04-30 NOTE — TELEPHONE ENCOUNTER
----- Message from Meka Clemente sent at 4/30/2019 12:16 PM CDT -----  Contact: Self   Type:  Test Results    Who Called: Self     Name of Test (Lab/Mammo/Etc): URINE     Date of Test:  4/26/19    Ordering Provider: Natalia     Where the test was performed: [facility] lab       Would the patient rather a call back or a response via [facility]? Call  Best Call Back Number: [phone]

## 2019-04-30 NOTE — TELEPHONE ENCOUNTER
----- Message from Angela Bee sent at 4/30/2019  9:57 AM CDT -----  Contact: Patient  Type:  Patient Returning Call    Who Called: Patient  Who Left Message for Patient:  Bekah  Does the patient know what this is regarding?:  Test results  Best Call Back Number:    Additional Information:  Call to pod. No answer. Please advise

## 2019-05-01 ENCOUNTER — TELEPHONE (OUTPATIENT)
Dept: FAMILY MEDICINE | Facility: CLINIC | Age: 69
End: 2019-05-01

## 2019-05-01 ENCOUNTER — PATIENT MESSAGE (OUTPATIENT)
Dept: FAMILY MEDICINE | Facility: CLINIC | Age: 69
End: 2019-05-01

## 2019-05-01 NOTE — TELEPHONE ENCOUNTER
----- Message from Medhat Erazo sent at 5/1/2019 11:10 AM CDT -----  Contact: self  Patient want to speak with a nurse regarding test results please call back at [phone] (home)

## 2019-05-02 ENCOUNTER — TELEPHONE (OUTPATIENT)
Dept: FAMILY MEDICINE | Facility: CLINIC | Age: 69
End: 2019-05-02

## 2019-05-02 NOTE — TELEPHONE ENCOUNTER
Urine culture did not grow any specific bacteria indicating the urine was contaminated but not infected. She does not have a UTI. Needs appt to eval symptoms. Can be urgent care as well as with [facility]

## 2019-05-02 NOTE — TELEPHONE ENCOUNTER
Notified patient and patient demonstrated understanding. Stated she is having less urinary pain now but still lower back pain and thinks it is due to shingles and nerve damage. Scheduled patient to see NP Charly 5/6/19 to discuss next steps.

## 2019-05-02 NOTE — TELEPHONE ENCOUNTER
----- Message from Rekha Gregory sent at 5/2/2019  8:19 AM CDT -----  Type: Needs test results    Who Called:  Patient  Best Call Back Number: [phone]  Additional Information: Patient stated she has been trying for a week to get nurse to explain test results to her/has not heard from anyone/please call patient back to advise. (Patient stated she feels like no one cares)

## 2019-05-05 NOTE — TELEPHONE ENCOUNTER
Urine culture did not grow any bacteria. Needs appt to address concerns. She does not have a UTI and abx are inappropriate

## 2019-05-05 NOTE — TELEPHONE ENCOUNTER
Ask Linda Rueda, our in house [facility] rep, to help us with a list of HH that accept [facility] in her area

## 2019-05-06 ENCOUNTER — OFFICE VISIT (OUTPATIENT)
Dept: FAMILY MEDICINE | Facility: CLINIC | Age: 69
End: 2019-05-06
Payer: MEDICARE

## 2019-05-06 VITALS
BODY MASS INDEX: 41.96 KG/M2 | HEIGHT: 61 IN | DIASTOLIC BLOOD PRESSURE: 88 MMHG | OXYGEN SATURATION: 95 % | HEART RATE: 69 BPM | TEMPERATURE: 98 F | WEIGHT: 222.25 LBS | SYSTOLIC BLOOD PRESSURE: 138 MMHG

## 2019-05-06 DIAGNOSIS — M54.16 LUMBAR RADICULITIS: ICD-10-CM

## 2019-05-06 DIAGNOSIS — G89.29 CHRONIC RIGHT-SIDED LOW BACK PAIN WITH RIGHT-SIDED SCIATICA: Primary | ICD-10-CM

## 2019-05-06 DIAGNOSIS — R35.0 FREQUENCY OF URINATION: ICD-10-CM

## 2019-05-06 DIAGNOSIS — M51.36 DDD (DEGENERATIVE DISC DISEASE), LUMBAR: ICD-10-CM

## 2019-05-06 DIAGNOSIS — M54.41 CHRONIC RIGHT-SIDED LOW BACK PAIN WITH RIGHT-SIDED SCIATICA: Primary | ICD-10-CM

## 2019-05-06 DIAGNOSIS — R33.9 INCOMPLETE BLADDER EMPTYING: ICD-10-CM

## 2019-05-06 PROCEDURE — 99214 PR OFFICE/OUTPT VISIT, EST, LEVL IV, 30-39 MIN: ICD-10-PCS | Mod: 25,S$GLB,, | Performed by: NURSE PRACTITIONER

## 2019-05-06 PROCEDURE — 99499 RISK ADDL DX/OHS AUDIT: ICD-10-PCS | Mod: S$GLB,,, | Performed by: NURSE PRACTITIONER

## 2019-05-06 PROCEDURE — 96372 THER/PROPH/DIAG INJ SC/IM: CPT | Mod: S$GLB,,, | Performed by: NURSE PRACTITIONER

## 2019-05-06 PROCEDURE — 96372 PR INJECTION,THERAP/PROPH/DIAG2ST, IM OR SUBCUT: ICD-10-PCS | Mod: S$GLB,,, | Performed by: NURSE PRACTITIONER

## 2019-05-06 PROCEDURE — 1101F PR PT FALLS ASSESS DOC 0-1 FALLS W/OUT INJ PAST YR: ICD-10-PCS | Mod: CPTII,S$GLB,, | Performed by: NURSE PRACTITIONER

## 2019-05-06 PROCEDURE — 1101F PT FALLS ASSESS-DOCD LE1/YR: CPT | Mod: CPTII,S$GLB,, | Performed by: NURSE PRACTITIONER

## 2019-05-06 PROCEDURE — 99999 PR PBB SHADOW E&M-EST. PATIENT-LVL V: CPT | Mod: PBBFAC,,, | Performed by: NURSE PRACTITIONER

## 2019-05-06 PROCEDURE — 99214 OFFICE O/P EST MOD 30 MIN: CPT | Mod: 25,S$GLB,, | Performed by: NURSE PRACTITIONER

## 2019-05-06 PROCEDURE — 3079F PR MOST RECENT DIASTOLIC BLOOD PRESSURE 80-89 MM HG: ICD-10-PCS | Mod: CPTII,S$GLB,, | Performed by: NURSE PRACTITIONER

## 2019-05-06 PROCEDURE — 3079F DIAST BP 80-89 MM HG: CPT | Mod: CPTII,S$GLB,, | Performed by: NURSE PRACTITIONER

## 2019-05-06 PROCEDURE — 3075F PR MOST RECENT SYSTOLIC BLOOD PRESS GE 130-139MM HG: ICD-10-PCS | Mod: CPTII,S$GLB,, | Performed by: NURSE PRACTITIONER

## 2019-05-06 PROCEDURE — 3075F SYST BP GE 130 - 139MM HG: CPT | Mod: CPTII,S$GLB,, | Performed by: NURSE PRACTITIONER

## 2019-05-06 PROCEDURE — 99999 PR PBB SHADOW E&M-EST. PATIENT-LVL V: ICD-10-PCS | Mod: PBBFAC,,, | Performed by: NURSE PRACTITIONER

## 2019-05-06 PROCEDURE — 99499 UNLISTED E&M SERVICE: CPT | Mod: S$GLB,,, | Performed by: NURSE PRACTITIONER

## 2019-05-06 RX ORDER — DEXAMETHASONE SODIUM PHOSPHATE 4 MG/ML
8 INJECTION, SOLUTION INTRA-ARTICULAR; INTRALESIONAL; INTRAMUSCULAR; INTRAVENOUS; SOFT TISSUE
Status: COMPLETED | OUTPATIENT
Start: 2019-05-06 | End: 2019-05-06

## 2019-05-06 RX ORDER — PERMETHRIN 50 MG/G
CREAM TOPICAL
Refills: 1 | COMMUNITY
Start: 2019-04-05 | End: 2021-01-27

## 2019-05-06 RX ORDER — HYDROXYZINE HYDROCHLORIDE 25 MG/1
TABLET, FILM COATED ORAL
Refills: 0 | COMMUNITY
Start: 2019-04-05 | End: 2019-06-20 | Stop reason: SDUPTHER

## 2019-05-06 RX ADMIN — DEXAMETHASONE SODIUM PHOSPHATE 8 MG: 4 INJECTION, SOLUTION INTRA-ARTICULAR; INTRALESIONAL; INTRAMUSCULAR; INTRAVENOUS; SOFT TISSUE at 10:05

## 2019-05-06 NOTE — PROGRESS NOTES
Patient verified by name and . Patient received 8mg dexamethasone in right ventrogluteal. Patient tolerated injection well. Patient advised to wait in clinic for 15 minutes in case of adverse reactions. Patient demonstrated understanding.

## 2019-05-06 NOTE — PROGRESS NOTES
Subjective:       Patient ID: Aida Gupta is a 68 y.o. female.    Chief Complaint: Back Pain (nerve)    Ms. Gupta presents today with complaints of right sided lower back pain. Recent U/A and urine culture showed no significant growth. Still having urinary pressure and sensation of incomplete bladder emptying. Has seen urology in the past, required catheterization for about a month after shingles. Back pain is worse on the right side, radiates down her right leg. Reports this is a chronic issue. Per chart review, has seen Dr. Walton in the past. Patient reports that nerve block did not provide any relief. Had good relief with a steroid injection in our clinic. Taking mobic nightly. Pain has become so severe that it limits her ADLs. Likes to cook, but prolonged periods of standing cause increased pain. Also very painful at night while lying down.      Patient Active Problem List   Diagnosis    Hypothyroid    Gastroesophageal reflux disease without esophagitis    Morbid obesity with BMI of 40.0-44.9, adult    Hyperlipidemia    Prediabetes    Dermatitis    Dyspnea    Dysphagia    Lower esophageal ring    Gastritis determined by endoscopy    Gastric polyp    DDD (degenerative disc disease), lumbar    Lumbar radiculitis    Vitamin D deficiency    HTN (hypertension)    Post herpetic neuralgia    Ds DNA antibody positive    Positive JAVY (antinuclear antibody)    Hashimoto's disease     Review of Systems   Constitutional: Positive for activity change. Negative for fever.   Genitourinary: Positive for frequency. Negative for difficulty urinating, dysuria, hematuria and urgency.   Musculoskeletal: Positive for arthralgias, back pain and myalgias.   Neurological: Negative for dizziness, weakness and light-headedness.       Objective:      Physical Exam   Constitutional: She is oriented to person, place, and time. She appears well-developed and well-nourished.   Cardiovascular: Normal rate, regular  "rhythm and normal heart sounds.   Pulmonary/Chest: Effort normal and breath sounds normal.   Abdominal: Soft. There is no tenderness.   Musculoskeletal: She exhibits no edema.   Neurological: She is alert and oriented to person, place, and time.   Skin: Skin is warm and dry.   Psychiatric: She has a normal mood and affect.   Nursing note and vitals reviewed.      Assessment:       1. Chronic right-sided low back pain with right-sided sciatica    2. Frequency of urination    3. Incomplete bladder emptying    4. DDD (degenerative disc disease), lumbar    5. Lumbar radiculitis    6. BMI 40.0-44.9, adult        Plan:       Aida was seen today for back pain.    Diagnoses and all orders for this visit:    Chronic right-sided low back pain with right-sided sciatica  -     Ambulatory referral to Physical Medicine Rehab  -     dexamethasone injection 8 mg  Declined referral to pain management  Physical medicine for further evaluation     Frequency of urination  -     URINALYSIS; Future  -     Urine culture; Future  Repeat urine. Unable to void in clinic, will return urine to lab    Incomplete bladder emptying  -     Ambulatory referral to Urology  -     URINALYSIS; Future  -     Urine culture; Future  See above    DDD (degenerative disc disease), lumbar  -     Ambulatory referral to Physical Medicine Rehab    Lumbar radiculitis  -     Ambulatory referral to Physical Medicine Rehab    BMI 40.0-44.9, adult  Counseled patient on his ideal body weight, health consequences of being obese and current recommendations including weekly exercise and a heart healthy diet.  Current BMI is:Estimated body mass index is 41.99 kg/m² as calculated from the following:    Height as of this encounter: 5' 1" (1.549 m).    Weight as of this encounter: 100.8 kg (222 lb 3.6 oz)..  Patient is aware that ideal BMI < 25 or Weight in (lb) to have BMI = 25: 132.        "

## 2019-06-17 DIAGNOSIS — E03.9 HYPOTHYROIDISM, UNSPECIFIED TYPE: ICD-10-CM

## 2019-06-20 ENCOUNTER — OFFICE VISIT (OUTPATIENT)
Dept: FAMILY MEDICINE | Facility: CLINIC | Age: 69
End: 2019-06-20
Payer: MEDICARE

## 2019-06-20 VITALS
BODY MASS INDEX: 42.71 KG/M2 | HEART RATE: 80 BPM | SYSTOLIC BLOOD PRESSURE: 128 MMHG | OXYGEN SATURATION: 97 % | WEIGHT: 226.19 LBS | DIASTOLIC BLOOD PRESSURE: 88 MMHG | HEIGHT: 61 IN

## 2019-06-20 DIAGNOSIS — K21.9 GASTROESOPHAGEAL REFLUX DISEASE WITHOUT ESOPHAGITIS: ICD-10-CM

## 2019-06-20 DIAGNOSIS — R11.0 NAUSEA: ICD-10-CM

## 2019-06-20 DIAGNOSIS — I10 HYPERTENSION, UNSPECIFIED TYPE: Primary | ICD-10-CM

## 2019-06-20 DIAGNOSIS — Z12.39 BREAST CANCER SCREENING: ICD-10-CM

## 2019-06-20 DIAGNOSIS — E78.5 HYPERLIPIDEMIA, UNSPECIFIED HYPERLIPIDEMIA TYPE: ICD-10-CM

## 2019-06-20 DIAGNOSIS — E03.9 HYPOTHYROIDISM, UNSPECIFIED TYPE: ICD-10-CM

## 2019-06-20 DIAGNOSIS — M54.16 LUMBAR RADICULITIS: ICD-10-CM

## 2019-06-20 DIAGNOSIS — B02.29 POST HERPETIC NEURALGIA: ICD-10-CM

## 2019-06-20 DIAGNOSIS — R76.8 POSITIVE ANA (ANTINUCLEAR ANTIBODY): ICD-10-CM

## 2019-06-20 DIAGNOSIS — E66.01 MORBID OBESITY WITH BMI OF 40.0-44.9, ADULT: ICD-10-CM

## 2019-06-20 DIAGNOSIS — E55.9 VITAMIN D DEFICIENCY: ICD-10-CM

## 2019-06-20 DIAGNOSIS — Z78.0 ASYMPTOMATIC POSTMENOPAUSAL STATE: ICD-10-CM

## 2019-06-20 DIAGNOSIS — L29.9 ITCHING: ICD-10-CM

## 2019-06-20 DIAGNOSIS — R73.03 PREDIABETES: ICD-10-CM

## 2019-06-20 PROCEDURE — 3074F PR MOST RECENT SYSTOLIC BLOOD PRESSURE < 130 MM HG: ICD-10-PCS | Mod: CPTII,S$GLB,, | Performed by: NURSE PRACTITIONER

## 2019-06-20 PROCEDURE — 99214 OFFICE O/P EST MOD 30 MIN: CPT | Mod: S$GLB,,, | Performed by: NURSE PRACTITIONER

## 2019-06-20 PROCEDURE — 99999 PR PBB SHADOW E&M-EST. PATIENT-LVL III: CPT | Mod: PBBFAC,,, | Performed by: NURSE PRACTITIONER

## 2019-06-20 PROCEDURE — 3079F PR MOST RECENT DIASTOLIC BLOOD PRESSURE 80-89 MM HG: ICD-10-PCS | Mod: CPTII,S$GLB,, | Performed by: NURSE PRACTITIONER

## 2019-06-20 PROCEDURE — 99214 PR OFFICE/OUTPT VISIT, EST, LEVL IV, 30-39 MIN: ICD-10-PCS | Mod: S$GLB,,, | Performed by: NURSE PRACTITIONER

## 2019-06-20 PROCEDURE — 1101F PR PT FALLS ASSESS DOC 0-1 FALLS W/OUT INJ PAST YR: ICD-10-PCS | Mod: CPTII,S$GLB,, | Performed by: NURSE PRACTITIONER

## 2019-06-20 PROCEDURE — 99999 PR PBB SHADOW E&M-EST. PATIENT-LVL III: ICD-10-PCS | Mod: PBBFAC,,, | Performed by: NURSE PRACTITIONER

## 2019-06-20 PROCEDURE — 1101F PT FALLS ASSESS-DOCD LE1/YR: CPT | Mod: CPTII,S$GLB,, | Performed by: NURSE PRACTITIONER

## 2019-06-20 PROCEDURE — 3074F SYST BP LT 130 MM HG: CPT | Mod: CPTII,S$GLB,, | Performed by: NURSE PRACTITIONER

## 2019-06-20 PROCEDURE — 3079F DIAST BP 80-89 MM HG: CPT | Mod: CPTII,S$GLB,, | Performed by: NURSE PRACTITIONER

## 2019-06-20 RX ORDER — DOXEPIN HYDROCHLORIDE 50 MG/G
CREAM TOPICAL
Qty: 45 G | Refills: 2 | Status: CANCELLED | OUTPATIENT
Start: 2019-06-20

## 2019-06-20 RX ORDER — LEVOTHYROXINE SODIUM 125 UG/1
125 TABLET ORAL DAILY
Qty: 90 TABLET | Refills: 1 | Status: SHIPPED | OUTPATIENT
Start: 2019-06-20 | End: 2019-10-23 | Stop reason: SDUPTHER

## 2019-06-20 RX ORDER — AMITRIPTYLINE HYDROCHLORIDE 25 MG/1
TABLET, FILM COATED ORAL
Qty: 30 TABLET | Refills: 2 | Status: SHIPPED | OUTPATIENT
Start: 2019-06-20 | End: 2019-10-23 | Stop reason: SDUPTHER

## 2019-06-20 RX ORDER — OMEPRAZOLE 20 MG/1
20 CAPSULE, DELAYED RELEASE ORAL DAILY
Qty: 90 CAPSULE | Refills: 1 | Status: SHIPPED | OUTPATIENT
Start: 2019-06-20 | End: 2020-07-22 | Stop reason: SDUPTHER

## 2019-06-20 RX ORDER — HYDROXYZINE HYDROCHLORIDE 25 MG/1
25 TABLET, FILM COATED ORAL 3 TIMES DAILY PRN
Qty: 30 TABLET | Refills: 0 | Status: SHIPPED | OUTPATIENT
Start: 2019-06-20 | End: 2021-05-10 | Stop reason: SDUPTHER

## 2019-06-20 RX ORDER — GABAPENTIN 300 MG/1
600 CAPSULE ORAL 3 TIMES DAILY
Qty: 180 CAPSULE | Refills: 1 | Status: SHIPPED | OUTPATIENT
Start: 2019-06-20 | End: 2019-10-23 | Stop reason: SDUPTHER

## 2019-06-20 RX ORDER — ERGOCALCIFEROL 1.25 MG/1
50000 CAPSULE ORAL
Qty: 12 CAPSULE | Refills: 3 | Status: SHIPPED | OUTPATIENT
Start: 2019-06-20 | End: 2019-10-23 | Stop reason: SDUPTHER

## 2019-06-20 RX ORDER — ONDANSETRON 4 MG/1
4 TABLET, FILM COATED ORAL EVERY 8 HOURS PRN
Qty: 30 TABLET | Refills: 0 | Status: SHIPPED | OUTPATIENT
Start: 2019-06-20 | End: 2019-10-23 | Stop reason: SDUPTHER

## 2019-06-20 RX ORDER — MELOXICAM 7.5 MG/1
7.5 TABLET ORAL DAILY
Qty: 90 TABLET | Refills: 3 | Status: SHIPPED | OUTPATIENT
Start: 2019-06-20 | End: 2019-10-23 | Stop reason: SDUPTHER

## 2019-06-20 NOTE — PROGRESS NOTES
"Subjective:       Patient ID: Aida Gupta is a 69 y.o. female.    Chief Complaint: Follow-up    Ms. Gupta presents today for a chronic conditions F/U and medication refills. She is feeling well. Has intermittent diarrhea since her colon surgery. Has an appointment with urology scheduled for recurrent UTIs. Not having symptoms today. Back pain has improved with mobic at night, denies any pain today. Still struggles with PHN after shingles. Due for mammogram and DEXA. Does not want vaccines today. Vitals stable today. Working to increase her exercise, will walk around the [place].     Back Pain   This is a chronic problem. The current episode started more than 1 month ago. The problem occurs constantly. The problem has been waxing and waning since onset. The pain is present in the sacro-iliac. The quality of the pain is described as aching. The pain radiates to the right thigh. The pain is at a severity of 9/10. The pain is severe. The pain is worse during the night. The symptoms are aggravated by lying down, standing and stress. Stiffness is present all day. Associated symptoms include abdominal pain, bladder incontinence ("dribble if I wait too long to pee"), bowel incontinence ("diarrhea"), headaches ("with bad pain", not present today), leg pain, pelvic pain (from colon sx, at sx site) and weakness (occasional). Pertinent negatives include no chest pain, dysuria, fever, numbness, paresis, paresthesias, perianal numbness, tingling or weight loss. Risk factors include obesity. She has tried heat for the symptoms. The treatment provided moderate relief.      Patient Active Problem List   Diagnosis    Hypothyroid    Gastroesophageal reflux disease without esophagitis    Morbid obesity with BMI of 40.0-44.9, adult    Hyperlipidemia    Prediabetes    Dermatitis    Dyspnea    Dysphagia    Lower esophageal ring    Gastritis determined by endoscopy    Gastric polyp    DDD (degenerative disc " "disease), lumbar    Lumbar radiculitis    Vitamin D deficiency    HTN (hypertension)    Post herpetic neuralgia    Ds DNA antibody positive    Positive JAVY (antinuclear antibody)    Hashimoto's disease     Review of Systems   Constitutional: Negative for activity change, appetite change, fever and weight loss.   Respiratory: Negative for chest tightness, shortness of breath and wheezing.    Cardiovascular: Negative for chest pain and leg swelling.   Gastrointestinal: Positive for abdominal pain, bowel incontinence ("diarrhea") and diarrhea (frequent after colectomy).   Genitourinary: Positive for bladder incontinence ("dribble if I wait too long to pee") and pelvic pain (from colon sx, at sx site). Negative for dysuria and hematuria.   Musculoskeletal: Positive for back pain.   Neurological: Positive for weakness (occasional) and headaches ("with bad pain", not present today). Negative for tingling, numbness and paresthesias.       Objective:      Physical Exam   Constitutional: She is oriented to person, place, and time. She appears well-developed and well-nourished.   Obese body habitus   Cardiovascular: Normal rate, regular rhythm and normal heart sounds.   Pulses:       Dorsalis pedis pulses are 2+ on the right side, and 2+ on the left side.   Pulmonary/Chest: Effort normal and breath sounds normal.   Abdominal: Soft. There is no tenderness.   Musculoskeletal: She exhibits no edema.   Neurological: She is alert and oriented to person, place, and time.   Skin: Skin is warm and dry.   Psychiatric: She has a normal mood and affect.   Nursing note and vitals reviewed.      Assessment:       1. Hypertension, unspecified type    2. Post herpetic neuralgia    3. Hypothyroidism, unspecified type    4. Gastroesophageal reflux disease without esophagitis    5. Nausea    6. Lumbar radiculitis    7. Hyperlipidemia, unspecified hyperlipidemia type    8. Prediabetes    9. Vitamin D deficiency    10. Breast cancer " screening    11. Asymptomatic postmenopausal state    12. Positive JAVY (antinuclear antibody)    13. Itching    14. Morbid obesity with BMI of 40.0-44.9, adult        Plan:       Aida was seen today for follow-up.    Diagnoses and all orders for this visit:    Hypertension, unspecified type  -     CBC auto differential; Future  -     Comprehensive metabolic panel; Future  Controlled on current medications    Post herpetic neuralgia  -     amitriptyline (ELAVIL) 25 MG tablet; TAKE ONE- HALF TABLET BY MOUTH AT BEDTIME FOR 2 WEEKS , MAY INCREASE TO 1 TABLET AT BEDTIME IF TOLERATED  Stable condition.  Continue current medications.  Will adjust based on lab findings or if condition changes.    Hypothyroidism, unspecified type  -     levothyroxine (SYNTHROID) 125 MCG tablet; Take 1 tablet (125 mcg total) by mouth once daily.  -     TSH; Future  -     T4, free; Future  Stable condition.  Continue current medications.  Will adjust based on lab findings or if condition changes.    Gastroesophageal reflux disease without esophagitis  -     omeprazole (PRILOSEC) 20 MG capsule; Take 1 capsule (20 mg total) by mouth once daily.  Stable condition.  Continue current medications.  Will adjust based on lab findings or if condition changes.    Nausea  -     ondansetron (ZOFRAN) 4 MG tablet; Take 1 tablet (4 mg total) by mouth every 8 (eight) hours as needed for Nausea.  Use PRN    Lumbar radiculitis  -     gabapentin (NEURONTIN) 300 MG capsule; Take 2 capsules (600 mg total) by mouth 3 (three) times daily.  -     meloxicam (MOBIC) 7.5 MG tablet; Take 1 tablet (7.5 mg total) by mouth once daily.  Controlled on current drug regimen    Hyperlipidemia, unspecified hyperlipidemia type  -     Lipid panel; Future  I recommend a heart healthy diet rich in fiber, fresh vegetables and fruit and low in saturated fats (fried foods, red meat, etc.).  I also recommend regular exercise including a minimum of 150 minutes of cardio exercise per  "week and 20-30 minute workouts of strength training like light weights, yoga, pilates, etc.  You should work toward a body mass index of < 25.      Prediabetes  -     Hemoglobin A1c; Future  Work on diet    Vitamin D deficiency  -     ergocalciferol (VITAMIN D2) 50,000 unit Cap; Take 1 capsule (50,000 Units total) by mouth every 7 days.  -     Vitamin D; Future  Stable condition.  Continue current medications.  Will adjust based on lab findings or if condition changes.    Breast cancer screening  -     Mammo Digital Screening Bilateral With CAD; Future    Asymptomatic postmenopausal state  DEXA as ordered     Positive JAVY (antinuclear antibody)  Continue under the care of rheumatology     Itching  -     hydrOXYzine HCl (ATARAX) 25 MG tablet; Take 1 tablet (25 mg total) by mouth 3 (three) times daily as needed for Itching.    Morbid obesity with BMI of 40.0-44.9, adult  Counseled patient on his ideal body weight, health consequences of being obese and current recommendations including weekly exercise and a heart healthy diet.  Current BMI is:Estimated body mass index is 42.74 kg/m² as calculated from the following:    Height as of this encounter: 5' 1" (1.549 m).    Weight as of this encounter: 102.6 kg (226 lb 3.1 oz)..  Patient is aware that ideal BMI < 25 or Weight in (lb) to have BMI = 25: 132.    Patient readiness: acceptance and barriers:none    During the course of the visit the patient was educated and counseled about the following:     Obesity:   General weight loss/lifestyle modification strategies discussed (elicit support from others; identify saboteurs; non-food rewards, etc).  Informal exercise measures discussed, e.g. taking stairs instead of elevator.    Goals: Obesity: Reduce calorie intake and BMI    Did patient meet goals/outcomes: No    The following self management tools provided: declined    Patient Instructions (the written plan) was given to the patient/family.     Time spent with patient: 30 " minutes    Barriers to medications present (no )    Adverse reactions to current medications (no)    Over the counter medications reviewed (Yes)    F/U as scheduled with Dr. Fisher or sooner PRN

## 2019-06-23 RX ORDER — LEVOTHYROXINE SODIUM 125 UG/1
TABLET ORAL
Qty: 90 TABLET | Refills: 3 | Status: SHIPPED | OUTPATIENT
Start: 2019-06-23 | End: 2019-12-09 | Stop reason: SDUPTHER

## 2019-10-23 DIAGNOSIS — M54.16 LUMBAR RADICULITIS: ICD-10-CM

## 2019-10-23 DIAGNOSIS — E55.9 VITAMIN D DEFICIENCY: ICD-10-CM

## 2019-10-23 DIAGNOSIS — E03.9 HYPOTHYROIDISM, UNSPECIFIED TYPE: ICD-10-CM

## 2019-10-23 DIAGNOSIS — R11.0 NAUSEA: ICD-10-CM

## 2019-10-23 DIAGNOSIS — B02.29 POST HERPETIC NEURALGIA: ICD-10-CM

## 2019-10-23 NOTE — TELEPHONE ENCOUNTER
LOV: 6/20/19  Next appt: TODAY 10/23/19-NOW SHOW  Labs: 6/27/19  Last refill: 6/20/19        ----- Message from Silvia Caballero sent at 10/23/2019 11:20 AM CDT -----  Contact: patient  Type:  RX Refill Request    Who Called:  patient  Refill or New Rx:  refill  RX Name and Strength:    1. amitriptyline (ELAVIL) 25 MG tablet  2. ondansetron (ZOFRAN) 4 MG tablet  3. meloxicam (MOBIC) 7.5 MG tablet  4. FLUTICASONE PROPIONATE, MICRO (FLUTICASONE PROP, MICRO, BULK, MISC  5. levothyroxine (SYNTHROID) 125 MCG tablet  6. ergocalciferol (VITAMIN D2) 50,000 unit Cap  7. gabapentin (NEURONTIN) 300 MG capsule  8. doxepin (ZONALON) 5 % cream  Is this a 30 day or 90 day RX:   Patient is requesting 90 day supply  Preferred Pharmacy with phone number:    [facility] [place] - [address]  [address] [place] [address]  Phone: [phone] Fax: [phone]      Local or Mail Order:  local  Ordering Provider:  Natalia  Best Call Back Number:  [phone]  Additional Information:  Patient states that she thought her appt with Dr. Fisher was scheduled for 10/24/2019. Patient r/s her appt for 10/28/2019

## 2019-10-24 RX ORDER — LEVOTHYROXINE SODIUM 125 UG/1
125 TABLET ORAL DAILY
Qty: 90 TABLET | Refills: 3 | Status: SHIPPED | OUTPATIENT
Start: 2019-10-24 | End: 2019-11-05

## 2019-10-24 RX ORDER — MELOXICAM 7.5 MG/1
7.5 TABLET ORAL DAILY
Qty: 90 TABLET | Refills: 3 | Status: SHIPPED | OUTPATIENT
Start: 2019-10-24 | End: 2020-03-12 | Stop reason: SDUPTHER

## 2019-10-24 RX ORDER — FLUTICASONE PROPIONAT,MICRONIZ 100 %
POWDER (GRAM) MISCELLANEOUS
Refills: 3 | OUTPATIENT
Start: 2019-10-24

## 2019-10-24 RX ORDER — DOXEPIN HYDROCHLORIDE 50 MG/G
CREAM TOPICAL
Qty: 45 G | Refills: 3 | Status: SHIPPED | OUTPATIENT
Start: 2019-10-24 | End: 2019-11-05

## 2019-10-24 RX ORDER — AMITRIPTYLINE HYDROCHLORIDE 25 MG/1
TABLET, FILM COATED ORAL
Qty: 30 TABLET | Refills: 3 | Status: SHIPPED | OUTPATIENT
Start: 2019-10-24 | End: 2020-03-12 | Stop reason: SDUPTHER

## 2019-10-24 RX ORDER — GABAPENTIN 300 MG/1
600 CAPSULE ORAL 3 TIMES DAILY
Qty: 180 CAPSULE | Refills: 3 | Status: SHIPPED | OUTPATIENT
Start: 2019-10-24 | End: 2019-12-09 | Stop reason: SDUPTHER

## 2019-10-24 RX ORDER — ONDANSETRON 4 MG/1
4 TABLET, FILM COATED ORAL EVERY 8 HOURS PRN
Qty: 30 TABLET | Refills: 3 | Status: SHIPPED | OUTPATIENT
Start: 2019-10-24 | End: 2020-03-12 | Stop reason: SDUPTHER

## 2019-10-24 RX ORDER — ERGOCALCIFEROL 1.25 MG/1
50000 CAPSULE ORAL
Qty: 12 CAPSULE | Refills: 3 | Status: SHIPPED | OUTPATIENT
Start: 2019-10-24 | End: 2019-12-09 | Stop reason: SDUPTHER

## 2019-10-28 ENCOUNTER — TELEPHONE (OUTPATIENT)
Dept: FAMILY MEDICINE | Facility: CLINIC | Age: 69
End: 2019-10-28

## 2019-10-28 NOTE — TELEPHONE ENCOUNTER
Returned call and spoke to patient regarding medication. Patient was informed that Dr. Fisher is out today and will returned tomorrow. Message forwarded to Dr. Fisher

## 2019-10-28 NOTE — TELEPHONE ENCOUNTER
----- Message from Nely Zayas sent at 10/28/2019 11:20 AM CDT -----    Type:  RX Refill Request    Who Called: pt  Refill   RX Name and Strength:  Doxepin  Cream // the  New  Med  Was too  expensive  Preferred Pharmacy with phone number:    [facility] - [place] - [address]  [address] [place] [address]  Phone: [phone] Fax: [phone]  Best Call Back Number:  [phone]  Additional Information:    Calling  For  refill

## 2019-10-28 NOTE — TELEPHONE ENCOUNTER
----- Message from Taryn Moy sent at 10/28/2019  2:00 PM CDT -----  Contact: pt [phone]  Patient called back she is returning  A call back to the office she sates the Doxepin her insurance will no longer cover it will cost her $600.00 patient states she is out of the medication and she is in pain. Please call back to see what they will cover for her they gave her a long list.

## 2019-10-28 NOTE — TELEPHONE ENCOUNTER
Returned call and spoke to patient regarding Rx refill. Patient was informed that Dr. Fisher fill her Rx for Doxepin on 10/24/19

## 2019-10-30 ENCOUNTER — PATIENT OUTREACH (OUTPATIENT)
Dept: ADMINISTRATIVE | Facility: OTHER | Age: 69
End: 2019-10-30

## 2019-11-05 ENCOUNTER — OFFICE VISIT (OUTPATIENT)
Dept: FAMILY MEDICINE | Facility: CLINIC | Age: 69
End: 2019-11-05
Payer: MEDICARE

## 2019-11-05 VITALS
SYSTOLIC BLOOD PRESSURE: 114 MMHG | WEIGHT: 225.31 LBS | OXYGEN SATURATION: 97 % | DIASTOLIC BLOOD PRESSURE: 78 MMHG | HEART RATE: 72 BPM | HEIGHT: 61 IN | BODY MASS INDEX: 42.54 KG/M2 | TEMPERATURE: 99 F

## 2019-11-05 DIAGNOSIS — E55.9 VITAMIN D DEFICIENCY: ICD-10-CM

## 2019-11-05 DIAGNOSIS — M51.36 DDD (DEGENERATIVE DISC DISEASE), LUMBAR: ICD-10-CM

## 2019-11-05 DIAGNOSIS — E78.5 HYPERLIPIDEMIA, UNSPECIFIED HYPERLIPIDEMIA TYPE: ICD-10-CM

## 2019-11-05 DIAGNOSIS — R73.03 PREDIABETES: ICD-10-CM

## 2019-11-05 DIAGNOSIS — E66.01 MORBID OBESITY WITH BMI OF 40.0-44.9, ADULT: ICD-10-CM

## 2019-11-05 DIAGNOSIS — R82.90 FOUL SMELLING URINE: Primary | ICD-10-CM

## 2019-11-05 DIAGNOSIS — E03.9 HYPOTHYROIDISM, UNSPECIFIED TYPE: ICD-10-CM

## 2019-11-05 DIAGNOSIS — I10 HYPERTENSION, UNSPECIFIED TYPE: ICD-10-CM

## 2019-11-05 DIAGNOSIS — B02.29 POST HERPETIC NEURALGIA: ICD-10-CM

## 2019-11-05 DIAGNOSIS — R33.9 INCOMPLETE BLADDER EMPTYING: ICD-10-CM

## 2019-11-05 DIAGNOSIS — Z23 FLU VACCINE NEED: ICD-10-CM

## 2019-11-05 LAB
BILIRUB SERPL-MCNC: ABNORMAL MG/DL
BLOOD URINE, POC: ABNORMAL
COLOR, POC UA: ABNORMAL
GLUCOSE UR QL STRIP: ABNORMAL
KETONES UR QL STRIP: ABNORMAL
LEUKOCYTE ESTERASE URINE, POC: ABNORMAL
NITRITE, POC UA: ABNORMAL
PH, POC UA: 5
PROTEIN, POC: ABNORMAL
SPECIFIC GRAVITY, POC UA: 1020
UROBILINOGEN, POC UA: ABNORMAL

## 2019-11-05 PROCEDURE — 3078F PR MOST RECENT DIASTOLIC BLOOD PRESSURE < 80 MM HG: ICD-10-PCS | Mod: CPTII,S$GLB,, | Performed by: NURSE PRACTITIONER

## 2019-11-05 PROCEDURE — 99499 RISK ADDL DX/OHS AUDIT: ICD-10-PCS | Mod: S$GLB,,, | Performed by: NURSE PRACTITIONER

## 2019-11-05 PROCEDURE — 90662 IIV NO PRSV INCREASED AG IM: CPT | Mod: S$GLB,,, | Performed by: NURSE PRACTITIONER

## 2019-11-05 PROCEDURE — 87086 URINE CULTURE/COLONY COUNT: CPT

## 2019-11-05 PROCEDURE — 3078F DIAST BP <80 MM HG: CPT | Mod: CPTII,S$GLB,, | Performed by: NURSE PRACTITIONER

## 2019-11-05 PROCEDURE — 1101F PT FALLS ASSESS-DOCD LE1/YR: CPT | Mod: CPTII,S$GLB,, | Performed by: NURSE PRACTITIONER

## 2019-11-05 PROCEDURE — G0008 ADMIN INFLUENZA VIRUS VAC: HCPCS | Mod: S$GLB,,, | Performed by: NURSE PRACTITIONER

## 2019-11-05 PROCEDURE — 90662 FLU VACCINE - HIGH DOSE (65+) PRESERVATIVE FREE IM: ICD-10-PCS | Mod: S$GLB,,, | Performed by: NURSE PRACTITIONER

## 2019-11-05 PROCEDURE — G0008 FLU VACCINE - HIGH DOSE (65+) PRESERVATIVE FREE IM: ICD-10-PCS | Mod: S$GLB,,, | Performed by: NURSE PRACTITIONER

## 2019-11-05 PROCEDURE — 3074F SYST BP LT 130 MM HG: CPT | Mod: CPTII,S$GLB,, | Performed by: NURSE PRACTITIONER

## 2019-11-05 PROCEDURE — 3074F PR MOST RECENT SYSTOLIC BLOOD PRESSURE < 130 MM HG: ICD-10-PCS | Mod: CPTII,S$GLB,, | Performed by: NURSE PRACTITIONER

## 2019-11-05 PROCEDURE — 99999 PR PBB SHADOW E&M-EST. PATIENT-LVL IV: CPT | Mod: PBBFAC,,, | Performed by: NURSE PRACTITIONER

## 2019-11-05 PROCEDURE — 1101F PR PT FALLS ASSESS DOC 0-1 FALLS W/OUT INJ PAST YR: ICD-10-PCS | Mod: CPTII,S$GLB,, | Performed by: NURSE PRACTITIONER

## 2019-11-05 PROCEDURE — 99499 UNLISTED E&M SERVICE: CPT | Mod: S$GLB,,, | Performed by: NURSE PRACTITIONER

## 2019-11-05 PROCEDURE — 81002 URINALYSIS NONAUTO W/O SCOPE: CPT | Mod: S$GLB,,, | Performed by: NURSE PRACTITIONER

## 2019-11-05 PROCEDURE — 99999 PR PBB SHADOW E&M-EST. PATIENT-LVL IV: ICD-10-PCS | Mod: PBBFAC,,, | Performed by: NURSE PRACTITIONER

## 2019-11-05 PROCEDURE — 81002 POCT URINE DIPSTICK WITHOUT MICROSCOPE: ICD-10-PCS | Mod: S$GLB,,, | Performed by: NURSE PRACTITIONER

## 2019-11-05 PROCEDURE — 99214 OFFICE O/P EST MOD 30 MIN: CPT | Mod: 25,S$GLB,, | Performed by: NURSE PRACTITIONER

## 2019-11-05 PROCEDURE — 99214 PR OFFICE/OUTPT VISIT, EST, LEVL IV, 30-39 MIN: ICD-10-PCS | Mod: 25,S$GLB,, | Performed by: NURSE PRACTITIONER

## 2019-11-05 RX ORDER — DOXEPIN HYDROCHLORIDE 50 MG/G
CREAM TOPICAL
Qty: 45 G | Refills: 3 | Status: SHIPPED | OUTPATIENT
Start: 2019-11-05 | End: 2019-12-10

## 2019-11-05 NOTE — PROGRESS NOTES
"Subjective:       Patient ID: Aida Gupta is a 69 y.o. female.    Chief Complaint: Urinary Tract Infection    Ms. Gupta presents today for chronic conditions follow-up.  Was seen by Urology yesterday in [place], but left before being seen because of her .  States the appointment "took too long".  She is agreeable see a urologist in [place].  Today she complains of malodorous urine.  Also reports occasional burning with urination but denies urgency, frequency, or hematuria.  Complains of chronic lower back pain secondary and post hepatic neuralgia affecting back and buttocks.  Seen by Pain Management multiple times with several procedures that did not help improve the pain. Does report some improvement with gabapentin.  Last labs show multiple abnormalities.  Reports she is not consistent with taking her medications, including vitamin-D.  Believe she takes Synthroid daily.  Vital stable today.  Due for labs and is agreeable to the flu vaccine.    Patient Active Problem List   Diagnosis    Hypothyroid    Gastroesophageal reflux disease without esophagitis    Morbid obesity with BMI of 40.0-44.9, adult    Hyperlipidemia    Prediabetes    Dermatitis    Dyspnea    Dysphagia    Lower esophageal ring    Gastritis determined by endoscopy    Gastric polyp    DDD (degenerative disc disease), lumbar    Lumbar radiculitis    Vitamin D deficiency    HTN (hypertension)    Post herpetic neuralgia    Ds DNA antibody positive    Positive JAVY (antinuclear antibody)    Hashimoto's disease       Current Outpatient Medications:     albuterol (VENTOLIN HFA) 90 mcg/actuation inhaler, Inhale 2 puffs into the lungs every 6 (six) hours as needed for Wheezing. Rescue, Disp: , Rfl:     amitriptyline (ELAVIL) 25 MG tablet, TAKE ONE- HALF TABLET BY MOUTH AT BEDTIME FOR 2 WEEKS , MAY INCREASE TO 1 TABLET AT BEDTIME IF TOLERATED, Disp: 30 tablet, Rfl: 3    doxepin (ZONALON) 5 % cream, APPLY   TOPICALLY TO " AFFECTED AREA ( RIGHT BUTTOCK) TWICE DAILY AS NEEDED FOR PAIN, Disp: 45 g, Rfl: 3    ergocalciferol (VITAMIN D2) 50,000 unit Cap, Take 1 capsule (50,000 Units total) by mouth every 7 days., Disp: 12 capsule, Rfl: 3    FLUTICASONE PROPIONATE, MICRO (FLUTICASONE PROP, MICRO, BULK, MISC), by Misc.(Non-Drug; Combo Route) route., Disp: , Rfl:     gabapentin (NEURONTIN) 300 MG capsule, Take 2 capsules (600 mg total) by mouth 3 (three) times daily., Disp: 180 capsule, Rfl: 3    hydrOXYzine HCl (ATARAX) 25 MG tablet, Take 1 tablet (25 mg total) by mouth 3 (three) times daily as needed for Itching., Disp: 30 tablet, Rfl: 0    ibuprofen (ADVIL,MOTRIN) 600 MG tablet, Take 600 mg by mouth every 6 (six) hours as needed for Pain., Disp: , Rfl:     levothyroxine (SYNTHROID) 125 MCG tablet, TAKE 1 TABLET BY MOUTH ONCE DAILY, Disp: 90 tablet, Rfl: 3    meloxicam (MOBIC) 7.5 MG tablet, Take 1 tablet (7.5 mg total) by mouth once daily., Disp: 90 tablet, Rfl: 3    omeprazole (PRILOSEC) 20 MG capsule, Take 1 capsule (20 mg total) by mouth once daily., Disp: 90 capsule, Rfl: 1    ondansetron (ZOFRAN) 4 MG tablet, Take 1 tablet (4 mg total) by mouth every 8 (eight) hours as needed for Nausea., Disp: 30 tablet, Rfl: 3    permethrin (ELIMITE) 5 % cream, , Disp: , Rfl: 1    triamcinolone acetonide 0.1% (KENALOG) 0.1 % cream, Apply topically 2 (two) times daily., Disp: , Rfl:     mirtazapine (REMERON) 15 MG tablet, Take 1 tablet (15 mg total) by mouth every evening., Disp: 30 tablet, Rfl: 2    Lab Results   Component Value Date    WBC 8.40 06/27/2019    HGB 14.3 06/27/2019    HCT 42.7 06/27/2019     06/27/2019    CHOL 241 (H) 06/27/2019    TRIG 266 (H) 06/27/2019    HDL 47 06/27/2019    ALT 21 06/27/2019    AST 21 06/27/2019     06/27/2019    K 4.1 06/27/2019     06/27/2019    CREATININE 0.8 06/27/2019    BUN 14 06/27/2019    CO2 27 06/27/2019    TSH 17.44 (H) 06/27/2019    HGBA1C 5.9 (H) 06/27/2019     Review  of Systems   Constitutional: Negative for appetite change and fever.   Genitourinary: Positive for dysuria. Negative for difficulty urinating, frequency, hematuria and urgency.        Malodorous urine    Musculoskeletal: Positive for arthralgias and myalgias.   Neurological: Negative for dizziness, weakness and light-headedness.       Objective:      Physical Exam   Constitutional: She is oriented to person, place, and time. She appears well-developed and well-nourished.   Obese body habitus   Cardiovascular: Normal rate, regular rhythm and normal heart sounds.   Pulses:       Dorsalis pedis pulses are 2+ on the right side, and 2+ on the left side.   Pulmonary/Chest: Effort normal and breath sounds normal.   Abdominal: Soft. There is no tenderness.   Musculoskeletal: She exhibits no edema.   Neurological: She is alert and oriented to person, place, and time.   Skin: Skin is warm and dry.   Psychiatric: She has a normal mood and affect.   Nursing note and vitals reviewed.      Assessment:       1. Foul smelling urine    2. Incomplete bladder emptying    3. Post herpetic neuralgia    4. DDD (degenerative disc disease), lumbar    5. Hypertension, unspecified type    6. Prediabetes    7. Hypothyroidism, unspecified type    8. Hyperlipidemia, unspecified hyperlipidemia type    9. Vitamin D deficiency    10. Flu vaccine need    11. Morbid obesity with BMI of 40.0-44.9, adult        Plan:       Aida was seen today for urinary tract infection.    Diagnoses and all orders for this visit:    Foul smelling urine  -     POCT urine dipstick without microscope  -     Urine culture  -     Urinalysis; Future  Positive leuks, trace blood, negative nitrites  Sent for microscopy and urine culture prior to treatment    Incomplete bladder emptying  -     Ambulatory referral to Urology  -     Urinalysis; Future  Patient prefers to see Urology in [place]      Post herpetic neuralgia  -     doxepin (ZONALON) 5 % cream; APPLY   TOPICALLY  TO AFFECTED AREA ( RIGHT BUTTOCK) TWICE DAILY AS NEEDED FOR PAIN  Fair control on current regimen    DDD (degenerative disc disease), lumbar    Hypertension, unspecified type  -     Comprehensive metabolic panel; Future  -     CBC auto differential; Future  Controlled     Prediabetes  -     Hemoglobin A1c; Future  Monitor, no medications at this time  Check labs    Hypothyroidism, unspecified type  -     TSH; Future  Uncontrolled on last labs, concern for medication compliance  Check labs    Hyperlipidemia, unspecified hyperlipidemia type  -     Lipid panel; Future  Monitor    Vitamin D deficiency  -     Vitamin D; Future  Noncompliant with medications, check labs    Flu vaccine need  -     Influenza - High Dose (65+) (PF) (IM)    Morbid obesity with BMI of 40.0-44.9, adult  Limited on exercise due to chronic pain secondary to lower back pain and post hepatic neuralgia  Work on diet

## 2019-11-07 LAB
BACTERIA UR CULT: NORMAL
BACTERIA UR CULT: NORMAL

## 2019-11-08 ENCOUNTER — TELEPHONE (OUTPATIENT)
Dept: FAMILY MEDICINE | Facility: CLINIC | Age: 69
End: 2019-11-08

## 2019-11-08 ENCOUNTER — CLINICAL SUPPORT (OUTPATIENT)
Dept: FAMILY MEDICINE | Facility: CLINIC | Age: 69
End: 2019-11-08
Payer: MEDICARE

## 2019-11-08 ENCOUNTER — TELEPHONE (OUTPATIENT)
Dept: UROLOGY | Facility: CLINIC | Age: 69
End: 2019-11-08

## 2019-11-08 DIAGNOSIS — R33.9 INCOMPLETE BLADDER EMPTYING: ICD-10-CM

## 2019-11-08 DIAGNOSIS — Z46.6 ENCOUNTER FOR URINARY CATHETER: Primary | ICD-10-CM

## 2019-11-08 DIAGNOSIS — R82.90 FOUL SMELLING URINE: Primary | ICD-10-CM

## 2019-11-08 DIAGNOSIS — R82.90 FOUL SMELLING URINE: ICD-10-CM

## 2019-11-08 LAB
BACTERIA #/AREA URNS AUTO: ABNORMAL /HPF
BILIRUB UR QL STRIP: NEGATIVE
BILIRUB UR QL STRIP: NEGATIVE
CLARITY UR REFRACT.AUTO: ABNORMAL
CLARITY UR REFRACT.AUTO: ABNORMAL
COLOR UR AUTO: YELLOW
COLOR UR AUTO: YELLOW
GLUCOSE UR QL STRIP: NEGATIVE
GLUCOSE UR QL STRIP: NEGATIVE
HGB UR QL STRIP: ABNORMAL
HGB UR QL STRIP: ABNORMAL
HYALINE CASTS UR QL AUTO: 1 /LPF
KETONES UR QL STRIP: NEGATIVE
KETONES UR QL STRIP: NEGATIVE
LEUKOCYTE ESTERASE UR QL STRIP: NEGATIVE
LEUKOCYTE ESTERASE UR QL STRIP: NEGATIVE
MICROSCOPIC COMMENT: ABNORMAL
NITRITE UR QL STRIP: NEGATIVE
NITRITE UR QL STRIP: NEGATIVE
PH UR STRIP: 5 [PH] (ref 5–8)
PH UR STRIP: 5 [PH] (ref 5–8)
PROT UR QL STRIP: NEGATIVE
PROT UR QL STRIP: NEGATIVE
RBC #/AREA URNS AUTO: 1 /HPF (ref 0–4)
SP GR UR STRIP: 1.02 (ref 1–1.03)
SP GR UR STRIP: 1.02 (ref 1–1.03)
URN SPEC COLLECT METH UR: ABNORMAL
URN SPEC COLLECT METH UR: ABNORMAL

## 2019-11-08 PROCEDURE — 87086 URINE CULTURE/COLONY COUNT: CPT

## 2019-11-08 PROCEDURE — 81001 URINALYSIS AUTO W/SCOPE: CPT

## 2019-11-08 NOTE — PROGRESS NOTES
Spoke with patient regarding her results, she stated she will have to get in touch with her  to see if she can come in today. Patient will call me back to let me know.

## 2019-11-08 NOTE — TELEPHONE ENCOUNTER
Spoke with patient regarding her results, she stated she will have to get in touch with her  to see if she can come in today. Patient will call me back to let me know.    Waiting on call back

## 2019-11-08 NOTE — TELEPHONE ENCOUNTER
----- Message from Ewa Hills sent at 11/8/2019  8:41 AM CST -----  Contact: [phone]  Patient is requesting a call back from the nurse stated she can come in today at 2pm for the nurse visit.    Please call the patient upon request at phone number [phone].

## 2019-11-08 NOTE — TELEPHONE ENCOUNTER
Spoke with patient she states message was suppose to be left for 's office. Will call back to leave another message.

## 2019-11-08 NOTE — PROGRESS NOTES
Patient ID with name and . Patient here for an in and out Cath in which a urinalysis and culture will be completed. Procedure was explained to patient privacy was maintained. Patient tolerated procedure well urine straw in color clear with fowl odder. Urine cup labled in front of patient

## 2019-11-08 NOTE — TELEPHONE ENCOUNTER
----- Message from Lakshmi Parks NP sent at 11/8/2019  6:52 AM CST -----  Multiple organisms on urine culture. Needs cath specimen.

## 2019-11-10 LAB — BACTERIA UR CULT: NO GROWTH

## 2019-11-11 ENCOUNTER — TELEPHONE (OUTPATIENT)
Dept: FAMILY MEDICINE | Facility: CLINIC | Age: 69
End: 2019-11-11

## 2019-11-11 NOTE — TELEPHONE ENCOUNTER
----- Message from Lakshmi Parks NP sent at 11/10/2019 11:41 AM CST -----  No growth on UA. F/U with urology as planned.

## 2019-11-12 ENCOUNTER — TELEPHONE (OUTPATIENT)
Dept: FAMILY MEDICINE | Facility: CLINIC | Age: 69
End: 2019-11-12

## 2019-11-14 ENCOUNTER — APPOINTMENT (OUTPATIENT)
Dept: LAB | Facility: HOSPITAL | Age: 69
End: 2019-11-14
Attending: UROLOGY
Payer: MEDICARE

## 2019-11-14 ENCOUNTER — OFFICE VISIT (OUTPATIENT)
Dept: UROLOGY | Facility: CLINIC | Age: 69
End: 2019-11-14
Payer: MEDICARE

## 2019-11-14 VITALS
HEART RATE: 77 BPM | SYSTOLIC BLOOD PRESSURE: 168 MMHG | DIASTOLIC BLOOD PRESSURE: 97 MMHG | BODY MASS INDEX: 41.64 KG/M2 | WEIGHT: 220.56 LBS | HEIGHT: 61 IN

## 2019-11-14 DIAGNOSIS — N81.4 CYSTOCELE WITH PROLAPSE: ICD-10-CM

## 2019-11-14 DIAGNOSIS — N39.0 RECURRENT UTI: ICD-10-CM

## 2019-11-14 DIAGNOSIS — R33.9 INCOMPLETE EMPTYING OF BLADDER: Primary | ICD-10-CM

## 2019-11-14 LAB
BILIRUB UR QL STRIP: NEGATIVE
CLARITY UR: CLEAR
COLOR UR: YELLOW
GLUCOSE UR QL STRIP: NEGATIVE
HGB UR QL STRIP: NEGATIVE
KETONES UR QL STRIP: NEGATIVE
LEUKOCYTE ESTERASE UR QL STRIP: NEGATIVE
NITRITE UR QL STRIP: NEGATIVE
PH UR STRIP: 6 [PH] (ref 5–8)
PROT UR QL STRIP: NEGATIVE
SP GR UR STRIP: >=1.03 (ref 1–1.03)
URN SPEC COLLECT METH UR: ABNORMAL
UROBILINOGEN UR STRIP-ACNC: NEGATIVE EU/DL

## 2019-11-14 PROCEDURE — 1101F PT FALLS ASSESS-DOCD LE1/YR: CPT | Mod: CPTII,S$GLB,, | Performed by: UROLOGY

## 2019-11-14 PROCEDURE — 87088 URINE BACTERIA CULTURE: CPT

## 2019-11-14 PROCEDURE — 99205 PR OFFICE/OUTPT VISIT, NEW, LEVL V, 60-74 MIN: ICD-10-PCS | Mod: 25,S$GLB,, | Performed by: UROLOGY

## 2019-11-14 PROCEDURE — 99999 PR PBB SHADOW E&M-EST. PATIENT-LVL III: ICD-10-PCS | Mod: PBBFAC,,, | Performed by: UROLOGY

## 2019-11-14 PROCEDURE — 3077F SYST BP >= 140 MM HG: CPT | Mod: CPTII,S$GLB,, | Performed by: UROLOGY

## 2019-11-14 PROCEDURE — 99205 OFFICE O/P NEW HI 60 MIN: CPT | Mod: 25,S$GLB,, | Performed by: UROLOGY

## 2019-11-14 PROCEDURE — 81003 URINALYSIS AUTO W/O SCOPE: CPT

## 2019-11-14 PROCEDURE — 87077 CULTURE AEROBIC IDENTIFY: CPT

## 2019-11-14 PROCEDURE — 87186 SC STD MICRODIL/AGAR DIL: CPT

## 2019-11-14 PROCEDURE — 51701 INSERT BLADDER CATHETER: CPT | Mod: S$GLB,,, | Performed by: UROLOGY

## 2019-11-14 PROCEDURE — 87086 URINE CULTURE/COLONY COUNT: CPT

## 2019-11-14 PROCEDURE — 3080F DIAST BP >= 90 MM HG: CPT | Mod: CPTII,S$GLB,, | Performed by: UROLOGY

## 2019-11-14 PROCEDURE — 3077F PR MOST RECENT SYSTOLIC BLOOD PRESSURE >= 140 MM HG: ICD-10-PCS | Mod: CPTII,S$GLB,, | Performed by: UROLOGY

## 2019-11-14 PROCEDURE — 99999 PR PBB SHADOW E&M-EST. PATIENT-LVL III: CPT | Mod: PBBFAC,,, | Performed by: UROLOGY

## 2019-11-14 PROCEDURE — 3080F PR MOST RECENT DIASTOLIC BLOOD PRESSURE >= 90 MM HG: ICD-10-PCS | Mod: CPTII,S$GLB,, | Performed by: UROLOGY

## 2019-11-14 PROCEDURE — 1101F PR PT FALLS ASSESS DOC 0-1 FALLS W/OUT INJ PAST YR: ICD-10-PCS | Mod: CPTII,S$GLB,, | Performed by: UROLOGY

## 2019-11-14 PROCEDURE — 51701 PR INSERTION OF NON-INDWELLING BLADDER CATHETERIZATION FOR RESIDUAL UR: ICD-10-PCS | Mod: S$GLB,,, | Performed by: UROLOGY

## 2019-11-14 RX ORDER — MIRABEGRON 25 MG/1
TABLET, FILM COATED, EXTENDED RELEASE ORAL
COMMUNITY
Start: 2019-11-04 | End: 2019-11-14 | Stop reason: ALTCHOICE

## 2019-11-14 NOTE — LETTER
November 14, 2019      Lakshmi Parks, NP  [address] [facility] [address] [facility]  [place] [address]           [place] [facility]  [address] [place] [address] [address]  [facility] [address]  Phone: [phone]  Fax: [phone]          Patient: Aida Gupta   MR Number: [id number]   YOB: 1950   Date of Visit: 11/14/2019       Dear Lakshmi Parks:    Thank you for referring Aida Gupta to me for evaluation. Attached you will find relevant portions of my assessment and plan of care.    If you have questions, please do not hesitate to call me. I look forward to following Aida Gupta along with you.    Sincerely,    Astrid Castillo MD    Enclosure  CC:  No Recipients    If you would like to receive this communication electronically, please contact [email or web address] or [phone] to request more information on [employer] [facility] access.    For providers and/or their staff who would like to refer a patient to [facility], please contact us through our one-stop-shop provider referral line, [facility], at [phone].    If you feel you have received this communication in error or would no longer like to receive these types of communications, please e-mail [email or web address]

## 2019-11-14 NOTE — PROGRESS NOTES
This patient always needs catheterized urine samples. Her urine samples in the past have shown vaginal contamination from normal occurring vaginal bacteria.       [facility] - [place]  Staff: MD Jonathan    Referring provider and please cc:   Lakshmi Parks, NP  [address] [facility] [place] [address]     PCP: Edel Fisher MD    [facility] Utilization: active    Chief Complaint:   Chief Complaint   Patient presents with    Advice Only     weak urinary stream, incomplete emptying         Subjective:        HPI: Aida Gupta is a 69 y.o. female     Was having rec uti's ass'd with dysuria, frequency, urgency however was also having daily fecal urge incontinence/leakage since colectomy for diverticulitis 5 years ago requring underwear changes and she thinks uti's were associated with this. However recently now has constipation and hasn't had a uti in a few months. Voids about 4x a day, minimal water intake    Apparently also had feelings of incomplete emptying, but now she feels like she is emptying. Having bm's 1-2x a day. Residual today was 160 but voided 2 hours prior.   Only leaks urine if she holds it too long    Also c/o right lower back pain, but has h/o shingles 3 years ago. Non tender on exam today.  Sees   No personal or family hx of stones. No GH.      and told she has prolapse. Not bothersome to her. No hysterectomy.     Urine history  Urinalysis void: could not provide. pvr by scan:0  ua cath: sent for ua and culture, pvr by in and out cath: 160 (voided 2 hours prior and drank bottle of water). No sx of uti.   Urine history:   19 Ng, cath:1+bld, 1 rbc/mod bact  19 Multiple org, void: tr bld/tr prot/2+wbc  19 Multiple org, void:1+leuk  3/20/19 E.coli, void:n/a  18 E.coli, void: 50 bld/+leuk      REVIEW OF SYSTEMS:  General ROS: no fevers, no chills  Psychological ROS: no depression  Endocrine ROS: no heat or cold  Respiratory ROS: no  SOB  Cardiovascular ROS: no CP  Gastrointestinal ROS: no abdominal pain, n constipation, no diarrhea, noBRBPR  Musculoskeletal ROS: no muscle pain  Neurological ROS: no headaches  Dermatological ROS: no rashes  HEENT: noglasses, no sinus   ROS: per HPI     PMHx:  Past Medical History:   Diagnosis Date    Asthma     chronic bronchitis    Shingles     1 year ago     Thyroid disease        PSHx:  Past Surgical History:   Procedure Laterality Date    Bilateral tubal ligation      cholectomy      CHOLECYSTECTOMY      COLON SURGERY      Diverticulitis    TUBAL LIGATION         Stents/Valves/Foreign Bodies/Cardiac Evaluation/Cardiologist: none  GI: , last colonoscopy:    Family History   Problem Relation Age of Onset    Breast cancer Sister     Eczema Neg Hx     Lupus Neg Hx     Psoriasis Neg Hx     Melanoma Neg Hx       malignancies: none.   kidney stones: none    Soc Hx:  Social History     Tobacco Use    Smoking status: Former Smoker     Packs/day: 3.00     Years: 15.00     Pack years: 45.00     Types: Cigarettes    Smokeless tobacco: Never Used   Substance Use Topics    Alcohol use: No    Drug use: No     Only smoked for 2 years.     Lives in: [place]  :  Children:   Patient's occupation: retired nurses aid    Allergies:  Patient has no known allergies.    Anticoagulation/Aspirin: none    Objective:     Vitals:    19 1044   BP: (!) 168/97   Pulse: 77       General:WDWN in NAD  Eyes: PERRLA, normal conjunctiva  Respiratory: no increased work on breathing. No wheezing.   Cardiovascular: No obvious extremity edema. Warm and well perfused.   GI: no palpation of masses. No tenderness. No hepatosplenomegaly to palpation.  Musculoskeletal: normal range of motion of bilateral upper extremities. Normal muscle strength and tone.  Skin: no obvious rashes or lesions. No tightening of skin noted.  Neurologic: CN grossly normal. Normal sensation.   Psychiatric: awake, alert and  oriented x 3. Mood and affect normal. Cooperative.    Pelvic exam 11/14/19     External Genitalia: normal hair distribution, no lesions  Urethral meatus: normal without prolapse, nocaruncle  Urethra: without tenderness or mass  Bladder: without fulness or tenderness  Vagina: normal appearing. No lesions. +stage 2 prolapse.   Anus and perineum: appear normal  In and out cath performed with 160cc residual  Levator ani tenderness: none    LABS REVIEW:  Recent Labs   Lab 08/31/18  1118 03/20/19  1704 06/27/19  0922   WBC 8.08 8.70 8.40   Hemoglobin 14.6 13.9 14.3   Hematocrit 44.2 42.4 42.7   Platelets 297 315 288   ]  Recent Labs   Lab 08/31/18  1118 03/20/19  1704 06/27/19  0922   Sodium 135 L 138 138   Potassium 4.5 4.4 4.1   Chloride 100 102 102   CO2 26 28 27   BUN, Bld 12 13 14   Creatinine 0.8 0.7 0.8   Glucose 119 H 112 H 126 H   Calcium 10.2 10.6 H 9.5   Alkaline Phosphatase 83 83 69   Total Protein 7.6 7.7 7.4   Albumin 3.9 3.9 4.1   Total Bilirubin 0.7 0.5 1.0   AST 14 15 21   ALT 16 18 21   ]    Lab Results   Component Value Date    HGBA1C 5.9 (H) 06/27/2019          Recent Pertinent urologic PATHOLOGY REVIEW:  none      Recent Pertinent Urologic RADIOGRAPHIC REVIEW: (remainder of images reviewed)      none        Assessment:       1. Incomplete emptying of bladder    2. Recurrent UTI    3. Cystocele with prolapse          Plan:     Incomplete emptying of bladder  -     POCT URINE DIPSTICK WITHOUT MICROSCOPE      Constipation will cause feelings of incomplete emptying. However, she is doing fine now.  Has 160cc residual but this is normal since she voided about 2 hours ago and drank 1/2 bottle of water prior to coming. Does have a cystocele and if these feelings persist recommend referral to urogynecology for evaluation and possible treatment of cystocele (could try pessary) to see if this helps.   Return for nurse visit for cath urien for residual immediatley post void. Scan read 0cc but in and out was  150    Denies incontinence and only voids 4x a day-discontinue myrbetriq    To prevent uti's which she thinks is associated with diarrhea, Needs to drink more water. Change underwear asap after diarrhea episode. She should try to talk to her GI to see if she has suggestions to treat diarrhea. She did not talk to her about this at her f/u   Drink more water 2 to 3 bottles of water a day  Urinate more- void every 2 hours will help leak less and less UTI.   If uti persists despite resolution of diarrhea, consider hormone cream and rechecking residual (if high, refer to urogynecology for prolapse treatment)  Can get a renal ultrasound at least to make sure no big stones    Sending cath urine for ua and cx. No sx of uti. Explained to pt no need for tx unless having sx.     Follow up as needed unless renal ultrasound abnormal    Route     This patient always needs catheterized urine samples. Her urine samples in the past have shown vaginal contamination from normal occurring vaginal bacteria.         Astrid Castillo MD

## 2019-11-14 NOTE — PATIENT INSTRUCTIONS
Constipation will cause feelings of incomplete emptying. However, she is doing fine now.  Has 160cc residual but this is normal since she voided about 2 hours ago and drank 1/2 bottle of water prior to coming. Does have a cystocele and if these feelings persist recommend referral to urogynecology for evaluation and possible treatment of cystocele (could try pessary) to see if this helps.   Return for nurse visit for cath urien for residual immediatley post void. Scan read 0cc but in and out was 150    Denies incontinence and only voids 4x a day-discontinue myrbetriq    To prevent uti's which she thinks is associated with diarrhea, Needs to drink more water. Change underwear asap after diarrhea episode. She should try to talk to her GI to see if she has suggestions to treat diarrhea. She did not talk to her about this at her f/u   Drink more water 2 to 3 bottles of water a day  Urinate more- void every 2 hours will help leak less and less UTI.   If uti persists despite resolution of diarrhea, consider hormone cream and rechecking residual (if high, refer to urogynecology for prolapse treatment)  Can get a renal ultrasound at least to make sure no big stones    Sending cath urine for ua and cx. No sx of uti. Explained to pt no need for tx unless having sx.

## 2019-11-15 ENCOUNTER — PATIENT MESSAGE (OUTPATIENT)
Dept: FAMILY MEDICINE | Facility: CLINIC | Age: 69
End: 2019-11-15

## 2019-11-15 NOTE — TELEPHONE ENCOUNTER
Spoke with patient regarding her My chart message, she stated [facility] will not pay for the Doxepin cream even with a PA it will be $600. (she spoke with the pharmacist and PH). Patient is in need of an alternative medication because she is hurting bad , due to the weather.

## 2019-11-17 LAB — BACTERIA UR CULT: ABNORMAL

## 2019-11-19 ENCOUNTER — TELEPHONE (OUTPATIENT)
Dept: FAMILY MEDICINE | Facility: CLINIC | Age: 69
End: 2019-11-19

## 2019-11-19 NOTE — TELEPHONE ENCOUNTER
----- Message from Lotus Hyde sent at 11/19/2019 12:07 PM CST -----  Contact: pt  Pt states that she called the office on 11/15 and told that the doxepin (ZONALON) 5 % cream was $ 600 and then someone ordered it again. She is wanting to speak to someone in the office cause she is in allot of pain....[phone]

## 2019-11-25 ENCOUNTER — PATIENT OUTREACH (OUTPATIENT)
Dept: ADMINISTRATIVE | Facility: HOSPITAL | Age: 69
End: 2019-11-25

## 2019-12-09 ENCOUNTER — CLINICAL SUPPORT (OUTPATIENT)
Dept: UROLOGY | Facility: CLINIC | Age: 69
End: 2019-12-09
Payer: MEDICARE

## 2019-12-09 ENCOUNTER — TELEPHONE (OUTPATIENT)
Dept: UROLOGY | Facility: CLINIC | Age: 69
End: 2019-12-09

## 2019-12-09 ENCOUNTER — HOSPITAL ENCOUNTER (OUTPATIENT)
Dept: RADIOLOGY | Facility: CLINIC | Age: 69
Discharge: HOME | End: 2019-12-09
Attending: UROLOGY
Payer: MEDICARE

## 2019-12-09 ENCOUNTER — TELEPHONE (OUTPATIENT)
Dept: PHARMACY | Facility: CLINIC | Age: 69
End: 2019-12-09

## 2019-12-09 ENCOUNTER — OFFICE VISIT (OUTPATIENT)
Dept: FAMILY MEDICINE | Facility: CLINIC | Age: 69
End: 2019-12-09
Payer: MEDICARE

## 2019-12-09 VITALS
DIASTOLIC BLOOD PRESSURE: 88 MMHG | HEIGHT: 61 IN | BODY MASS INDEX: 42.08 KG/M2 | SYSTOLIC BLOOD PRESSURE: 120 MMHG | OXYGEN SATURATION: 96 % | WEIGHT: 222.88 LBS | RESPIRATION RATE: 14 BRPM | TEMPERATURE: 98 F | HEART RATE: 82 BPM

## 2019-12-09 DIAGNOSIS — F41.8 DEPRESSION WITH ANXIETY: ICD-10-CM

## 2019-12-09 DIAGNOSIS — B02.29 POST HERPETIC NEURALGIA: ICD-10-CM

## 2019-12-09 DIAGNOSIS — E55.9 VITAMIN D DEFICIENCY: ICD-10-CM

## 2019-12-09 DIAGNOSIS — R82.998 CELLS AND CASTS IN URINE: Primary | ICD-10-CM

## 2019-12-09 DIAGNOSIS — R73.03 PREDIABETES: ICD-10-CM

## 2019-12-09 DIAGNOSIS — N39.0 RECURRENT UTI: ICD-10-CM

## 2019-12-09 DIAGNOSIS — Z23 NEED FOR PNEUMOCOCCAL VACCINATION: ICD-10-CM

## 2019-12-09 DIAGNOSIS — E78.5 HYPERLIPIDEMIA, UNSPECIFIED HYPERLIPIDEMIA TYPE: ICD-10-CM

## 2019-12-09 DIAGNOSIS — E03.9 HYPOTHYROIDISM, UNSPECIFIED TYPE: ICD-10-CM

## 2019-12-09 DIAGNOSIS — N81.4 CYSTOCELE WITH PROLAPSE: ICD-10-CM

## 2019-12-09 DIAGNOSIS — I10 HYPERTENSION, UNSPECIFIED TYPE: Primary | ICD-10-CM

## 2019-12-09 DIAGNOSIS — E66.01 MORBID OBESITY WITH BMI OF 40.0-44.9, ADULT: ICD-10-CM

## 2019-12-09 DIAGNOSIS — M54.16 LUMBAR RADICULITIS: ICD-10-CM

## 2019-12-09 PROCEDURE — 76770 US RETROPERITONEAL COMPLETE: ICD-10-PCS | Mod: 26,,, | Performed by: RADIOLOGY

## 2019-12-09 PROCEDURE — 99999 PR PBB SHADOW E&M-EST. PATIENT-LVL III: CPT | Mod: PBBFAC,,, | Performed by: FAMILY MEDICINE

## 2019-12-09 PROCEDURE — 1159F PR MEDICATION LIST DOCUMENTED IN MEDICAL RECORD: ICD-10-PCS | Mod: S$GLB,,, | Performed by: FAMILY MEDICINE

## 2019-12-09 PROCEDURE — 87077 CULTURE AEROBIC IDENTIFY: CPT

## 2019-12-09 PROCEDURE — 87086 URINE CULTURE/COLONY COUNT: CPT

## 2019-12-09 PROCEDURE — 3079F PR MOST RECENT DIASTOLIC BLOOD PRESSURE 80-89 MM HG: ICD-10-PCS | Mod: CPTII,S$GLB,, | Performed by: FAMILY MEDICINE

## 2019-12-09 PROCEDURE — G0009 PNEUMOCOCCAL POLYSACCHARIDE VACCINE 23-VALENT =>2YO SQ IM: ICD-10-PCS | Mod: S$GLB,,, | Performed by: FAMILY MEDICINE

## 2019-12-09 PROCEDURE — 1101F PT FALLS ASSESS-DOCD LE1/YR: CPT | Mod: CPTII,S$GLB,, | Performed by: FAMILY MEDICINE

## 2019-12-09 PROCEDURE — 99499 UNLISTED E&M SERVICE: CPT | Mod: S$GLB,,, | Performed by: UROLOGY

## 2019-12-09 PROCEDURE — 1159F MED LIST DOCD IN RCRD: CPT | Mod: S$GLB,,, | Performed by: FAMILY MEDICINE

## 2019-12-09 PROCEDURE — 90732 PPSV23 VACC 2 YRS+ SUBQ/IM: CPT | Mod: S$GLB,,, | Performed by: FAMILY MEDICINE

## 2019-12-09 PROCEDURE — 99999 PR PBB SHADOW E&M-EST. PATIENT-LVL III: ICD-10-PCS | Mod: PBBFAC,,, | Performed by: FAMILY MEDICINE

## 2019-12-09 PROCEDURE — 3079F DIAST BP 80-89 MM HG: CPT | Mod: CPTII,S$GLB,, | Performed by: FAMILY MEDICINE

## 2019-12-09 PROCEDURE — 87186 SC STD MICRODIL/AGAR DIL: CPT

## 2019-12-09 PROCEDURE — 76770 US EXAM ABDO BACK WALL COMP: CPT | Mod: 26,,, | Performed by: RADIOLOGY

## 2019-12-09 PROCEDURE — 3074F PR MOST RECENT SYSTOLIC BLOOD PRESSURE < 130 MM HG: ICD-10-PCS | Mod: CPTII,S$GLB,, | Performed by: FAMILY MEDICINE

## 2019-12-09 PROCEDURE — 99214 OFFICE O/P EST MOD 30 MIN: CPT | Mod: 25,S$GLB,, | Performed by: FAMILY MEDICINE

## 2019-12-09 PROCEDURE — 90732 PNEUMOCOCCAL POLYSACCHARIDE VACCINE 23-VALENT =>2YO SQ IM: ICD-10-PCS | Mod: S$GLB,,, | Performed by: FAMILY MEDICINE

## 2019-12-09 PROCEDURE — 3074F SYST BP LT 130 MM HG: CPT | Mod: CPTII,S$GLB,, | Performed by: FAMILY MEDICINE

## 2019-12-09 PROCEDURE — 76770 US EXAM ABDO BACK WALL COMP: CPT | Mod: TC,PO

## 2019-12-09 PROCEDURE — 1125F AMNT PAIN NOTED PAIN PRSNT: CPT | Mod: S$GLB,,, | Performed by: FAMILY MEDICINE

## 2019-12-09 PROCEDURE — 1101F PR PT FALLS ASSESS DOC 0-1 FALLS W/OUT INJ PAST YR: ICD-10-PCS | Mod: CPTII,S$GLB,, | Performed by: FAMILY MEDICINE

## 2019-12-09 PROCEDURE — 99214 PR OFFICE/OUTPT VISIT, EST, LEVL IV, 30-39 MIN: ICD-10-PCS | Mod: 25,S$GLB,, | Performed by: FAMILY MEDICINE

## 2019-12-09 PROCEDURE — 99499 NO LOS: ICD-10-PCS | Mod: S$GLB,,, | Performed by: UROLOGY

## 2019-12-09 PROCEDURE — G0009 ADMIN PNEUMOCOCCAL VACCINE: HCPCS | Mod: S$GLB,,, | Performed by: FAMILY MEDICINE

## 2019-12-09 PROCEDURE — 87088 URINE BACTERIA CULTURE: CPT

## 2019-12-09 PROCEDURE — 1125F PR PAIN SEVERITY QUANTIFIED, PAIN PRESENT: ICD-10-PCS | Mod: S$GLB,,, | Performed by: FAMILY MEDICINE

## 2019-12-09 RX ORDER — DOXEPIN HYDROCHLORIDE 50 MG/G
CREAM TOPICAL 3 TIMES DAILY
Qty: 45 G | Refills: 3 | Status: SHIPPED | OUTPATIENT
Start: 2019-12-09 | End: 2019-12-10

## 2019-12-09 RX ORDER — ERGOCALCIFEROL 1.25 MG/1
50000 CAPSULE ORAL
Qty: 12 CAPSULE | Refills: 3 | Status: SHIPPED | OUTPATIENT
Start: 2019-12-09 | End: 2020-03-29 | Stop reason: SDUPTHER

## 2019-12-09 RX ORDER — LEVOTHYROXINE SODIUM 137 UG/1
137 TABLET ORAL DAILY
Qty: 90 TABLET | Refills: 3 | Status: SHIPPED | OUTPATIENT
Start: 2019-12-09 | End: 2020-03-12 | Stop reason: SDUPTHER

## 2019-12-09 RX ORDER — GABAPENTIN 300 MG/1
900 CAPSULE ORAL 3 TIMES DAILY
Qty: 270 CAPSULE | Refills: 5 | Status: SHIPPED | OUTPATIENT
Start: 2019-12-09 | End: 2020-03-12

## 2019-12-09 RX ORDER — DULOXETIN HYDROCHLORIDE 30 MG/1
30 CAPSULE, DELAYED RELEASE ORAL DAILY
Qty: 30 CAPSULE | Refills: 2 | Status: SHIPPED | OUTPATIENT
Start: 2019-12-09 | End: 2020-03-12

## 2019-12-09 NOTE — TELEPHONE ENCOUNTER
The prior authorization for Aida Gupta's Doxepin 5% cream has been submitted on 12/9/19 @ 12.02pm.      It can take up to 72 hours for a decision to be rendered from the insurance.      An unsuccessful attempt to notify the patient was made today and unable to leave a message.      Please let me know if you have any questions.    Thank You!   Barbara Strickland CPhT, B.A  Patient Care Advocate   [facility]  [email or web address]  Phone: [phone] Ext [phone]  Fax: [phone]

## 2019-12-09 NOTE — TELEPHONE ENCOUNTER
Spoke with patient informed her of recommendations. Appointment scheduled for 3/10 at 9:30am with roddy ahuja. Patient verbally voiced understanding.

## 2019-12-09 NOTE — PROGRESS NOTES
Subjective:       Patient ID: Aida Gupta is a 69 y.o. female.    Chief Complaint: Follow-up (1mth f/u)    HPI  Review of Systems   Constitutional: Negative for chills, fatigue, fever and unexpected weight change.   HENT: Positive for congestion, postnasal drip, rhinorrhea, sneezing and sore throat. Negative for ear discharge, ear pain and sinus pressure.    Respiratory: Negative for cough, chest tightness, shortness of breath and wheezing.    Cardiovascular: Negative for chest pain, palpitations and leg swelling.   Gastrointestinal: Negative for abdominal pain.   Musculoskeletal: Positive for arthralgias and back pain.   Neurological: Negative for dizziness, syncope, weakness, light-headedness, numbness and headaches.   Psychiatric/Behavioral: Positive for dysphoric mood. The patient is nervous/anxious.        Patient Active Problem List   Diagnosis    Hypothyroid    Gastroesophageal reflux disease without esophagitis    Morbid obesity with BMI of 40.0-44.9, adult    Hyperlipidemia    Prediabetes    Dermatitis    Dyspnea    Dysphagia    Lower esophageal ring    Gastritis determined by endoscopy    Gastric polyp    DDD (degenerative disc disease), lumbar    Lumbar radiculitis    Vitamin D deficiency    HTN (hypertension)    Post herpetic neuralgia    Ds DNA antibody positive    Positive JAVY (antinuclear antibody)    Hashimoto's disease    Cystocele with prolapse    Incomplete emptying of bladder    Recurrent UTI     Patient is here for a chronic conditions follow up.    Reviewed labs 12/19- vit d def, tsh elevated, TG high    Urology Dr. Castillo recurrent UTIs  Rheum Dr. Serrato- following for + JAVY with + ds DNA and antithyroid antibodies (TPO AB)- possible SLE but no current manifestations     GI Dr. Warren EGD 9/17-mild schatzki ring-dilated, gastric polyp, gerd    Standing and leaning forward uneasily in her chair when I walk in. States she can't sit down due to post herpetic  neuralgia which started 2 years ago. Pain Dr. Walton has tried nerve blocks caudal 10/17 and According to notes from pain med she has had SEUN for lumbar radiculopathy 1/18    Here with her . C/o constant pain from neuraglia unrelieved with gabapentin 300mg 2 tid.  States she is depressed and tearful because she can't do anything.  Has been battling uri sx 5-7 days with sweats.    Objective:      Physical Exam   Constitutional: She appears well-developed and well-nourished.   HENT:   Right Ear: Tympanic membrane and ear canal normal.   Left Ear: Tympanic membrane and ear canal normal.   Nose: Mucosal edema and rhinorrhea present. Right sinus exhibits no maxillary sinus tenderness and no frontal sinus tenderness. Left sinus exhibits no maxillary sinus tenderness and no frontal sinus tenderness.   Mouth/Throat: Posterior oropharyngeal erythema present. No oropharyngeal exudate, posterior oropharyngeal edema or tonsillar abscesses.   Cardiovascular: Normal rate, regular rhythm and normal heart sounds.   Pulmonary/Chest: Effort normal and breath sounds normal.   Skin: Skin is warm and dry.   Psychiatric: She has a normal mood and affect.   Nursing note and vitals reviewed.      Assessment:       1. Hypertension, unspecified type    2. Hyperlipidemia, unspecified hyperlipidemia type    3. Hypothyroidism, unspecified type    4. Morbid obesity with BMI of 40.0-44.9, adult    5. Prediabetes    6. Vitamin D deficiency    7. Need for pneumococcal vaccination    8. Lumbar radiculitis    9. Depression with anxiety    10. Post herpetic neuralgia        Plan:         1. Hypertension, unspecified type  Controlled on current medications.  Continue current medications.      2. Hyperlipidemia, unspecified hyperlipidemia type  Chol at goal. Your triglycerides are borderline high. I recommend a heart healthy diet rich in fiber, fresh vegetables and fruit and low in saturated fats (fried foods, red meat, etc.).  I also recommend  "regular exercise including a minimum of 150 minutes of cardio exercise per week and 2-30 minute workouts of strength training like light weights, yoga, pilates, etc.  You should work toward a body mass index of < 25.        3. Hypothyroidism, unspecified type  Uncontrolled. Increase to  - levothyroxine (SYNTHROID) 137 MCG Tab tablet; Take 1 tablet (137 mcg total) by mouth once daily.  Dispense: 90 tablet; Refill: 3    4. Morbid obesity with BMI of 40.0-44.9, adult  Counseled patient on his ideal body weight, health consequences of being obese and current recommendations including weekly exercise and a heart healthy diet.  Current BMI is:Estimated body mass index is 42.11 kg/m² as calculated from the following:    Height as of this encounter: 5' 1" (1.549 m).    Weight as of this encounter: 101.1 kg (222 lb 14.2 oz)..  Patient is aware that ideal BMI < 25 or Weight in (lb) to have BMI = 25: 132.        5. Prediabetes  Controlled.  Your blood sugar is borderline high.  This means you are at risk for developing type 2 diabetes mellitus.  To lessen your risk you should exercise regularly, avoid excess carbohydrates and work toward a body mass index of less than 25.        6. Vitamin D deficiency  Supplement. Screen and treat as indicated:    - ergocalciferol (VITAMIN D2) 50,000 unit Cap; Take 1 capsule (50,000 Units total) by mouth every 7 days.  Dispense: 12 capsule; Refill: 3    7. Need for pneumococcal vaccination  Immunize today.  Counseled patient on risks, benefits and side effects.  Patient elected to proceed with vaccination.    - (In Office Administered) Pneumococcal Polysaccharide Vaccine (23 Valent) (SQ/IM)    8. Lumbar radiculitis  Uncontrolled. Increase to  - gabapentin (NEURONTIN) 300 MG capsule; Take 3 capsules (900 mg total) by mouth 3 (three) times daily.  Dispense: 270 capsule; Refill: 5    9. Depression with anxiety  Add   - DULoxetine (CYMBALTA) 30 MG capsule; Take 1 capsule (30 mg total) by mouth " once daily.  Dispense: 30 capsule; Refill: 2    10. Post herpetic neuralgia  Try to get doxepin cream which has helped approved by insurance through [facility]        Time spent with patient: 20 minutes    Patient with be reevaluated in 4 weeks or sooner prn    Greater than 50% of this visit was spent counseling as described in above documentation:Yes

## 2019-12-09 NOTE — TELEPHONE ENCOUNTER
Please make 3 month f/u with uro NP to check resisdual (post void preferably)   Since today was 175 and she hadn't voided in about an hour and had no urge to void

## 2019-12-10 ENCOUNTER — TELEPHONE (OUTPATIENT)
Dept: FAMILY MEDICINE | Facility: CLINIC | Age: 69
End: 2019-12-10

## 2019-12-10 DIAGNOSIS — B02.29 POST HERPETIC NEURALGIA: Primary | ICD-10-CM

## 2019-12-10 DIAGNOSIS — N13.30 HYDRONEPHROSIS, UNSPECIFIED HYDRONEPHROSIS TYPE: Primary | ICD-10-CM

## 2019-12-10 RX ORDER — DOXEPIN HYDROCHLORIDE 50 MG/G
CREAM TOPICAL
Qty: 60 G | Refills: 5 | Status: SHIPPED | OUTPATIENT
Start: 2019-12-10 | End: 2019-12-12

## 2019-12-10 NOTE — TELEPHONE ENCOUNTER
----- Message -----  From: Rekha Gregory  Sent: 12/9/2019   3:26 PM CST  To: Natalia BLACKMAN Staff    Type: Calling to concerning an RX    Name of Caller:  Evelyn  Pharmacy Name:  [facility]  Prescription Name:  doxepin (ZONALON) 5 % cream  What do they need to clarify?:  Discuss alternative  Best Call Back Number:  [phone]  Additional Information:

## 2019-12-10 NOTE — TELEPHONE ENCOUNTER
Spoke to Evelyn in Pharmacy services at [facility]. She states that Doxepin cream is not covered for post herpetic neuralgia.  States that lidocaine 5% patch and generic lyrica are coverable with a PA.   Please advise.

## 2019-12-10 NOTE — TELEPHONE ENCOUNTER
Spoke to the pharmacist at [facility]. He states they can make a 4.5% or 5.5% cream.   30 Gm would be approximately $50.

## 2019-12-10 NOTE — TELEPHONE ENCOUNTER
Call [facility] and ask if they make compounded version of doxepin 5 % cream and what the cost would be?

## 2019-12-11 RX ORDER — LIDOCAINE 50 MG/G
OINTMENT TOPICAL
Qty: 284 G | Status: SHIPPED | OUTPATIENT
Start: 2019-12-11 | End: 2019-12-12 | Stop reason: SDUPTHER

## 2019-12-11 NOTE — TELEPHONE ENCOUNTER
Patient states she is unable to afford this medication. Requests that something else be prescribed.

## 2019-12-12 DIAGNOSIS — B02.29 POST HERPETIC NEURALGIA: ICD-10-CM

## 2019-12-12 LAB — BACTERIA UR CULT: ABNORMAL

## 2019-12-12 RX ORDER — LIDOCAINE 50 MG/G
OINTMENT TOPICAL
Qty: 284 G | Status: CANCELLED | OUTPATIENT
Start: 2019-12-12

## 2019-12-12 RX ORDER — LIDOCAINE 50 MG/G
OINTMENT TOPICAL
Qty: 284 G | Status: SHIPPED | OUTPATIENT
Start: 2019-12-12 | End: 2019-12-18 | Stop reason: SDUPTHER

## 2019-12-12 NOTE — TELEPHONE ENCOUNTER
Returned call and spoke to pharmacist regarding patient medication. Per Dr. Fisher medication can be voided. Rx sent to wrong pharmacy.

## 2019-12-12 NOTE — TELEPHONE ENCOUNTER
----- Message from Delilah Alegre sent at 12/12/2019  8:39 AM CST -----  Contact: Shireen  Type:  Pharmacy Calling to Clarify an RX    Name of Caller:  Shireen  Pharmacy Name:  [facility]  Prescription Name:  lidocaine (XYLOCAINE) 5 % Oint ointment  What do they need to clarify?:  Is Rx for compound 5.5 percent, or if not need to change quanity  Best Call Back Number:    [id number] - [place], [place] - [address] [address]  Phone: [phone] Fax: [phone]  Thanks!

## 2019-12-13 ENCOUNTER — HOSPITAL ENCOUNTER (OUTPATIENT)
Dept: RADIOLOGY | Facility: HOSPITAL | Age: 69
Discharge: HOME | End: 2019-12-13
Attending: UROLOGY
Payer: MEDICARE

## 2019-12-13 DIAGNOSIS — N13.30 HYDRONEPHROSIS, UNSPECIFIED HYDRONEPHROSIS TYPE: ICD-10-CM

## 2019-12-13 DIAGNOSIS — R91.1 LUNG NODULE: ICD-10-CM

## 2019-12-13 PROCEDURE — 25500020 PHARM REV CODE 255: Performed by: UROLOGY

## 2019-12-13 PROCEDURE — 74178 CT ABD&PLV WO CNTR FLWD CNTR: CPT | Mod: 26,,, | Performed by: RADIOLOGY

## 2019-12-13 PROCEDURE — 74178 CT ABD&PLV WO CNTR FLWD CNTR: CPT | Mod: TC

## 2019-12-13 PROCEDURE — 74178 CT UROGRAM ABD PELVIS W WO: ICD-10-PCS | Mod: 26,,, | Performed by: RADIOLOGY

## 2019-12-13 RX ADMIN — IOHEXOL 125 ML: 350 INJECTION, SOLUTION INTRAVENOUS at 10:12

## 2019-12-17 ENCOUNTER — TELEPHONE (OUTPATIENT)
Dept: FAMILY MEDICINE | Facility: CLINIC | Age: 69
End: 2019-12-17

## 2019-12-17 DIAGNOSIS — B02.29 POST HERPETIC NEURALGIA: ICD-10-CM

## 2019-12-17 NOTE — TELEPHONE ENCOUNTER
Received fax from [facility] stating that lidocaine ointment requires a PA.   Called [facility] pharmacy services and spoke to júnior. She advised that a PA could be obtained but the qty should be 50 GM for 30 day supply.   Please send a new rx for qty 50 gm  to [facility] so PA process can be started. SIG need to state number of times patient can apply per day.

## 2019-12-18 RX ORDER — LIDOCAINE 50 MG/G
OINTMENT TOPICAL
Qty: 50 G | Status: SHIPPED | OUTPATIENT
Start: 2019-12-18 | End: 2021-06-10

## 2019-12-19 ENCOUNTER — TELEPHONE (OUTPATIENT)
Dept: FAMILY MEDICINE | Facility: CLINIC | Age: 69
End: 2019-12-19

## 2019-12-19 NOTE — TELEPHONE ENCOUNTER
Clarified sig per Dr Fisher-apply topically every 3 hours PRN.  Pharmacy was informed. Spoke to Ira

## 2019-12-19 NOTE — TELEPHONE ENCOUNTER
----- Message from Silvia Caballero sent at 12/19/2019  8:41 AM CST -----  Type:  Pharmacy Calling to Clarify an RX    Name of Caller:  Chraity  Pharmacy Name:    [facility] [place] - [address]  [address] [place] [place] [address]  Phone: [phone] Fax: [phone]  Prescription Name:  lidocaine (XYLOCAINE) 5 % Oint ointment  What do they need to clarify?:  Need frequency

## 2020-03-09 ENCOUNTER — PATIENT OUTREACH (OUTPATIENT)
Dept: ADMINISTRATIVE | Facility: OTHER | Age: 70
End: 2020-03-09

## 2020-03-09 NOTE — PROGRESS NOTES
"[facility] Note  Staff: HENNY SandersonC    PCP: Dr. Edel Fisher    Chief Complaint: Follow-up: Check urine residual by in and out cath    Subjective:        HPI: Aida Gupta is a 69 y.o. female presents today for routine recheck of her lower urinary tract symptoms.  The pt was last seen by Dr. Astrid Castillo on 2019 for weak urinary stream and incomplete bladder emptying.  Today pt states she remains with RIGHT lower back pains and would like medication for this at this time.  She currently denies gross hematuria and dysuria.    Pt states today she still has to sit on her toilet at home for a long time before the urine begins to flow out.  She will intermittently feel that she has to urinate and then "nothing comes out".     HX includes:  Was having rec uti's ass'd with dysuria, frequency, urgency however was also having daily fecal urge incontinence/leakage since colectomy for diverticulitis 5 years ago requring underwear changes and she thinks uti's were associated with this. However recently now has constipation and hasn't had a uti in a few months. Voids about 4x a day, minimal water intake     Apparently also had feelings of incomplete emptying, but now she feels like she is emptying. Having bm's 1-2x a day. Residual today was 160 but voided 2 hours prior.   Only leaks urine if she holds it too long     Also c/o right lower back pain, but has h/o shingles 3 years ago. Non tender on exam today.  Sees   No personal or family hx of stones. No GH.       and told she has prolapse. Not bothersome to her. No hysterectomy.      Urine history  Urinalysis void TODAY: *could not provide during office visit today; She last urinated at 7:30 am today.  Urine history:   19            Ng, cath:1+bld, 1 rbc/mod bact  19            Multiple org, void: tr bld/tr prot/2+wbc  19            Multiple org, void:1+leuk  3/20/19            E.coli, " void:n/a  8/31/18            E.coli, void: 50 bld/+leuk    CT Urogram of Abdomen/Pelvis with and without contrast:  IMPRESSION:  Bilateral parapelvic cyst formation.  No hydronephrosis or suspicious findings.  6 mm right middle lobe nodule.  Small hiatal hernia.  Moderate diverticulosis.  Atherosclerosis.  Cholecystectomy.  Yellow Pulmonary Nodule 2017 Alert:  Yellow Actionable Finding (Radiology: Volume 284: Number 1-July 2017)    [facility] Guidelines do not apply in patients younger than 35 years, immunocompromised patients or patients with cancer. Risk assignment based on ACCP with low risk being less than 5% and high risk combining intermediate and high risk categories.    UNEXPECTED FINDINGS:  6 mm pulmonary nodule (Seeing Dr. Fisher for further evaluation related to this)      REVIEW OF SYSTEMS:  General ROS: no fevers, no chills  Psychological ROS: no depression  Endocrine ROS: no heat or cold  Respiratory ROS: no SOB  Cardiovascular ROS: no CP  Gastrointestinal ROS: no abdominal pain, n constipation, no diarrhea, noBRBPR  Musculoskeletal ROS: no muscle pain  Neurological ROS: no headaches  Dermatological ROS: no rashes  HEENT: noglasses, no sinus   ROS: per HPI    PMHx:  Past Medical History:   Diagnosis Date    Asthma     chronic bronchitis    Hx of colectomy     Shingles     1 year ago     Thyroid disease      PSHx:  Past Surgical History:   Procedure Laterality Date    Bilateral tubal ligation      COLECTOMY      COLON SURGERY      Diverticulitis    TUBAL LIGATION       Allergies:  Contrast media    Medications: reviewed    Objective:     Vitals:    03/10/20 0928   BP: (!) 157/93   Pulse: 68   Resp: 18   Temp: 97.6 °F (36.4 °C)     Physical Exam   Vitals reviewed.  Constitutional: She is oriented to person, place, and time. She appears well-developed and well-nourished.   HENT:   Head: Normocephalic and atraumatic.   Eyes: Conjunctivae and EOM are normal. Pupils are equal, round, and reactive  to light.   Neck: Normal range of motion. Neck supple.   Cardiovascular: Normal rate, regular rhythm, normal heart sounds and intact distal pulses.    Pulmonary/Chest: Effort normal and breath sounds normal.   Abdominal: Soft. Bowel sounds are normal.   Musculoskeletal: Normal range of motion.   Neurological: She is alert and oriented to person, place, and time. She has normal reflexes.   Skin: Skin is warm and dry.     Psychiatric: She has a normal mood and affect. Her behavior is normal. Judgment and thought content normal.      EXAM performed by me in office today:  *Pt lying on her side during exam and catheter-she states she is having pain and unable to lay flat.  External Genitalia: normal hair distribution, no lesions  Urethral meatus: normal without prolapse or caruncle  Urethra: without tenderness or mass  Bladder: without fullness or tenderness  Anus and perineum: appear normal  Pt c/o severe right lower back pains during exam and visit today.  14 fr red rubber in and out cath performed by me with 180 mL of urine residual    LABS REVIEW:  UA today: (Catheter specimen today)  Color:  Yellow  Spec. Grav.  1.030  PH  5.5  Trace blood  Assessment:       1. Hydronephrosis, unspecified hydronephrosis type    2. Incomplete emptying of bladder    3. Recurrent UTI          Plan:   Incomplete bladder emptying, bladder pain:    --It was reiterated to pt during ov today, that her pain is most likely due to when she is not emptying her bladder fully.  But due to her complaints, will go ahead and send urine for culture and UA.    --Would not give her any pain medication but will put her on trial of Flomax 0.4 mg daily to see if this helps improve any of her LUTS and emptying issues.    F/u with Dr. Castillo in 3-4 months if symptoms show no changes or worsening, she may need further workup with UDS and/or Cysto per MD indications.    [facility]: Active    Kalyn Soares, JULIANA

## 2020-03-10 ENCOUNTER — OFFICE VISIT (OUTPATIENT)
Dept: UROLOGY | Facility: CLINIC | Age: 70
End: 2020-03-10
Payer: MEDICARE

## 2020-03-10 ENCOUNTER — APPOINTMENT (OUTPATIENT)
Dept: LAB | Facility: HOSPITAL | Age: 70
End: 2020-03-10
Attending: NURSE PRACTITIONER
Payer: MEDICARE

## 2020-03-10 VITALS
RESPIRATION RATE: 18 BRPM | BODY MASS INDEX: 41.75 KG/M2 | TEMPERATURE: 98 F | HEART RATE: 68 BPM | WEIGHT: 221.13 LBS | SYSTOLIC BLOOD PRESSURE: 157 MMHG | DIASTOLIC BLOOD PRESSURE: 93 MMHG | HEIGHT: 61 IN

## 2020-03-10 DIAGNOSIS — N39.0 RECURRENT UTI: ICD-10-CM

## 2020-03-10 DIAGNOSIS — R33.9 INCOMPLETE EMPTYING OF BLADDER: ICD-10-CM

## 2020-03-10 DIAGNOSIS — N13.30 HYDRONEPHROSIS, UNSPECIFIED HYDRONEPHROSIS TYPE: Primary | ICD-10-CM

## 2020-03-10 LAB
BILIRUB SERPL-MCNC: ABNORMAL MG/DL
BILIRUB UR QL STRIP: NEGATIVE
BLOOD URINE, POC: ABNORMAL
CLARITY UR: CLEAR
COLOR UR: YELLOW
COLOR, POC UA: YELLOW
GLUCOSE UR QL STRIP: ABNORMAL
GLUCOSE UR QL STRIP: NEGATIVE
HGB UR QL STRIP: ABNORMAL
KETONES UR QL STRIP: ABNORMAL
KETONES UR QL STRIP: NEGATIVE
LEUKOCYTE ESTERASE UR QL STRIP: NEGATIVE
LEUKOCYTE ESTERASE URINE, POC: ABNORMAL
NITRITE UR QL STRIP: NEGATIVE
NITRITE, POC UA: ABNORMAL
PH UR STRIP: 6 [PH] (ref 5–8)
PH, POC UA: 5.5
PROT UR QL STRIP: NEGATIVE
PROTEIN, POC: ABNORMAL
SP GR UR STRIP: >=1.03 (ref 1–1.03)
SPECIFIC GRAVITY, POC UA: 1.03
URN SPEC COLLECT METH UR: ABNORMAL
UROBILINOGEN UR STRIP-ACNC: NEGATIVE EU/DL
UROBILINOGEN, POC UA: 0.2

## 2020-03-10 PROCEDURE — 1159F PR MEDICATION LIST DOCUMENTED IN MEDICAL RECORD: ICD-10-PCS | Mod: S$GLB,,, | Performed by: NURSE PRACTITIONER

## 2020-03-10 PROCEDURE — 1101F PR PT FALLS ASSESS DOC 0-1 FALLS W/OUT INJ PAST YR: ICD-10-PCS | Mod: CPTII,S$GLB,, | Performed by: NURSE PRACTITIONER

## 2020-03-10 PROCEDURE — 99214 PR OFFICE/OUTPT VISIT, EST, LEVL IV, 30-39 MIN: ICD-10-PCS | Mod: 25,S$GLB,, | Performed by: NURSE PRACTITIONER

## 2020-03-10 PROCEDURE — 1125F AMNT PAIN NOTED PAIN PRSNT: CPT | Mod: S$GLB,,, | Performed by: NURSE PRACTITIONER

## 2020-03-10 PROCEDURE — 3077F SYST BP >= 140 MM HG: CPT | Mod: CPTII,S$GLB,, | Performed by: NURSE PRACTITIONER

## 2020-03-10 PROCEDURE — 3080F DIAST BP >= 90 MM HG: CPT | Mod: CPTII,S$GLB,, | Performed by: NURSE PRACTITIONER

## 2020-03-10 PROCEDURE — 87077 CULTURE AEROBIC IDENTIFY: CPT

## 2020-03-10 PROCEDURE — 99999 PR PBB SHADOW E&M-EST. PATIENT-LVL V: ICD-10-PCS | Mod: PBBFAC,,, | Performed by: NURSE PRACTITIONER

## 2020-03-10 PROCEDURE — 51701 INSERT,NON-INDWELLING BLADDER CATHETER: ICD-10-PCS | Mod: S$GLB,,, | Performed by: NURSE PRACTITIONER

## 2020-03-10 PROCEDURE — 81002 POCT URINE DIPSTICK WITHOUT MICROSCOPE: ICD-10-PCS | Mod: S$GLB,,, | Performed by: NURSE PRACTITIONER

## 2020-03-10 PROCEDURE — 87088 URINE BACTERIA CULTURE: CPT

## 2020-03-10 PROCEDURE — 3077F PR MOST RECENT SYSTOLIC BLOOD PRESSURE >= 140 MM HG: ICD-10-PCS | Mod: CPTII,S$GLB,, | Performed by: NURSE PRACTITIONER

## 2020-03-10 PROCEDURE — 1101F PT FALLS ASSESS-DOCD LE1/YR: CPT | Mod: CPTII,S$GLB,, | Performed by: NURSE PRACTITIONER

## 2020-03-10 PROCEDURE — 3080F PR MOST RECENT DIASTOLIC BLOOD PRESSURE >= 90 MM HG: ICD-10-PCS | Mod: CPTII,S$GLB,, | Performed by: NURSE PRACTITIONER

## 2020-03-10 PROCEDURE — 87186 SC STD MICRODIL/AGAR DIL: CPT

## 2020-03-10 PROCEDURE — 81003 URINALYSIS AUTO W/O SCOPE: CPT

## 2020-03-10 PROCEDURE — 1125F PR PAIN SEVERITY QUANTIFIED, PAIN PRESENT: ICD-10-PCS | Mod: S$GLB,,, | Performed by: NURSE PRACTITIONER

## 2020-03-10 PROCEDURE — 87086 URINE CULTURE/COLONY COUNT: CPT

## 2020-03-10 PROCEDURE — 99214 OFFICE O/P EST MOD 30 MIN: CPT | Mod: 25,S$GLB,, | Performed by: NURSE PRACTITIONER

## 2020-03-10 PROCEDURE — 99999 PR PBB SHADOW E&M-EST. PATIENT-LVL V: CPT | Mod: PBBFAC,,, | Performed by: NURSE PRACTITIONER

## 2020-03-10 PROCEDURE — 1159F MED LIST DOCD IN RCRD: CPT | Mod: S$GLB,,, | Performed by: NURSE PRACTITIONER

## 2020-03-10 PROCEDURE — 51701 INSERT BLADDER CATHETER: CPT | Mod: S$GLB,,, | Performed by: NURSE PRACTITIONER

## 2020-03-10 PROCEDURE — 81002 URINALYSIS NONAUTO W/O SCOPE: CPT | Mod: S$GLB,,, | Performed by: NURSE PRACTITIONER

## 2020-03-10 RX ORDER — TAMSULOSIN HYDROCHLORIDE 0.4 MG/1
0.4 CAPSULE ORAL DAILY
Qty: 30 CAPSULE | Refills: 5 | Status: SHIPPED | OUTPATIENT
Start: 2020-03-10 | End: 2021-01-27

## 2020-03-12 ENCOUNTER — LAB VISIT (OUTPATIENT)
Dept: LAB | Facility: HOSPITAL | Age: 70
End: 2020-03-12
Attending: FAMILY MEDICINE
Payer: MEDICARE

## 2020-03-12 ENCOUNTER — OFFICE VISIT (OUTPATIENT)
Dept: FAMILY MEDICINE | Facility: CLINIC | Age: 70
End: 2020-03-12
Payer: MEDICARE

## 2020-03-12 VITALS
DIASTOLIC BLOOD PRESSURE: 80 MMHG | SYSTOLIC BLOOD PRESSURE: 120 MMHG | WEIGHT: 221.13 LBS | RESPIRATION RATE: 14 BRPM | HEIGHT: 61 IN | HEART RATE: 75 BPM | OXYGEN SATURATION: 97 % | TEMPERATURE: 98 F | BODY MASS INDEX: 41.75 KG/M2

## 2020-03-12 DIAGNOSIS — E55.9 VITAMIN D DEFICIENCY: ICD-10-CM

## 2020-03-12 DIAGNOSIS — R73.03 PREDIABETES: ICD-10-CM

## 2020-03-12 DIAGNOSIS — E78.5 HYPERLIPIDEMIA, UNSPECIFIED HYPERLIPIDEMIA TYPE: ICD-10-CM

## 2020-03-12 DIAGNOSIS — I10 HYPERTENSION, UNSPECIFIED TYPE: Primary | ICD-10-CM

## 2020-03-12 DIAGNOSIS — E03.9 HYPOTHYROIDISM, UNSPECIFIED TYPE: ICD-10-CM

## 2020-03-12 DIAGNOSIS — B02.29 POST HERPETIC NEURALGIA: ICD-10-CM

## 2020-03-12 DIAGNOSIS — B35.4 TINEA CORPORIS: ICD-10-CM

## 2020-03-12 DIAGNOSIS — M54.16 LUMBAR RADICULITIS: ICD-10-CM

## 2020-03-12 DIAGNOSIS — R91.1 PULMONARY NODULE: ICD-10-CM

## 2020-03-12 DIAGNOSIS — E66.01 MORBID OBESITY WITH BMI OF 40.0-44.9, ADULT: ICD-10-CM

## 2020-03-12 DIAGNOSIS — K21.9 GASTROESOPHAGEAL REFLUX DISEASE WITHOUT ESOPHAGITIS: ICD-10-CM

## 2020-03-12 DIAGNOSIS — R11.0 NAUSEA: ICD-10-CM

## 2020-03-12 LAB
ALBUMIN SERPL BCP-MCNC: 3.9 G/DL (ref 3.5–5.2)
ALP SERPL-CCNC: 72 U/L (ref 55–135)
ALT SERPL W/O P-5'-P-CCNC: 15 U/L (ref 10–44)
ANION GAP SERPL CALC-SCNC: 10 MMOL/L (ref 8–16)
AST SERPL-CCNC: 16 U/L (ref 10–40)
BASOPHILS # BLD AUTO: 0.05 K/UL (ref 0–0.2)
BASOPHILS NFR BLD: 0.7 % (ref 0–1.9)
BILIRUB SERPL-MCNC: 0.5 MG/DL (ref 0.1–1)
BUN SERPL-MCNC: 17 MG/DL (ref 8–23)
CALCIUM SERPL-MCNC: 10.6 MG/DL (ref 8.7–10.5)
CHLORIDE SERPL-SCNC: 103 MMOL/L (ref 95–110)
CHOLEST SERPL-MCNC: 238 MG/DL (ref 120–199)
CHOLEST/HDLC SERPL: 4.8 {RATIO} (ref 2–5)
CO2 SERPL-SCNC: 27 MMOL/L (ref 23–29)
CREAT SERPL-MCNC: 0.7 MG/DL (ref 0.5–1.4)
DIFFERENTIAL METHOD: ABNORMAL
EOSINOPHIL # BLD AUTO: 0.2 K/UL (ref 0–0.5)
EOSINOPHIL NFR BLD: 2.5 % (ref 0–8)
ERYTHROCYTE [DISTWIDTH] IN BLOOD BY AUTOMATED COUNT: 13.1 % (ref 11.5–14.5)
EST. GFR  (AFRICAN AMERICAN): >60 ML/MIN/1.73 M^2
EST. GFR  (NON AFRICAN AMERICAN): >60 ML/MIN/1.73 M^2
GLUCOSE SERPL-MCNC: 104 MG/DL (ref 70–110)
HCT VFR BLD AUTO: 46.4 % (ref 37–48.5)
HDLC SERPL-MCNC: 50 MG/DL (ref 40–75)
HDLC SERPL: 21 % (ref 20–50)
HGB BLD-MCNC: 14.3 G/DL (ref 12–16)
IMM GRANULOCYTES # BLD AUTO: 0.04 K/UL (ref 0–0.04)
IMM GRANULOCYTES NFR BLD AUTO: 0.5 % (ref 0–0.5)
LDLC SERPL CALC-MCNC: 137.8 MG/DL (ref 63–159)
LYMPHOCYTES # BLD AUTO: 1.6 K/UL (ref 1–4.8)
LYMPHOCYTES NFR BLD: 20.9 % (ref 18–48)
MCH RBC QN AUTO: 29.4 PG (ref 27–31)
MCHC RBC AUTO-ENTMCNC: 30.8 G/DL (ref 32–36)
MCV RBC AUTO: 96 FL (ref 82–98)
MONOCYTES # BLD AUTO: 0.5 K/UL (ref 0.3–1)
MONOCYTES NFR BLD: 6.2 % (ref 4–15)
NEUTROPHILS # BLD AUTO: 5.2 K/UL (ref 1.8–7.7)
NEUTROPHILS NFR BLD: 69.2 % (ref 38–73)
NONHDLC SERPL-MCNC: 188 MG/DL
NRBC BLD-RTO: 0 /100 WBC
PLATELET # BLD AUTO: 292 K/UL (ref 150–350)
PMV BLD AUTO: 9.1 FL (ref 9.2–12.9)
POTASSIUM SERPL-SCNC: 4.5 MMOL/L (ref 3.5–5.1)
PROT SERPL-MCNC: 7.6 G/DL (ref 6–8.4)
RBC # BLD AUTO: 4.86 M/UL (ref 4–5.4)
SODIUM SERPL-SCNC: 140 MMOL/L (ref 136–145)
T4 FREE SERPL-MCNC: 1.15 NG/DL (ref 0.71–1.51)
TRIGL SERPL-MCNC: 251 MG/DL (ref 30–150)
TSH SERPL DL<=0.005 MIU/L-ACNC: 6.23 UIU/ML (ref 0.4–4)
WBC # BLD AUTO: 7.57 K/UL (ref 3.9–12.7)

## 2020-03-12 PROCEDURE — 1101F PT FALLS ASSESS-DOCD LE1/YR: CPT | Mod: CPTII,S$GLB,, | Performed by: FAMILY MEDICINE

## 2020-03-12 PROCEDURE — 99499 UNLISTED E&M SERVICE: CPT | Mod: S$GLB,,, | Performed by: FAMILY MEDICINE

## 2020-03-12 PROCEDURE — 85025 COMPLETE CBC W/AUTO DIFF WBC: CPT

## 2020-03-12 PROCEDURE — 1101F PR PT FALLS ASSESS DOC 0-1 FALLS W/OUT INJ PAST YR: ICD-10-PCS | Mod: CPTII,S$GLB,, | Performed by: FAMILY MEDICINE

## 2020-03-12 PROCEDURE — 1159F MED LIST DOCD IN RCRD: CPT | Mod: S$GLB,,, | Performed by: FAMILY MEDICINE

## 2020-03-12 PROCEDURE — 80061 LIPID PANEL: CPT

## 2020-03-12 PROCEDURE — 3074F PR MOST RECENT SYSTOLIC BLOOD PRESSURE < 130 MM HG: ICD-10-PCS | Mod: CPTII,S$GLB,, | Performed by: FAMILY MEDICINE

## 2020-03-12 PROCEDURE — 1159F PR MEDICATION LIST DOCUMENTED IN MEDICAL RECORD: ICD-10-PCS | Mod: S$GLB,,, | Performed by: FAMILY MEDICINE

## 2020-03-12 PROCEDURE — 82306 VITAMIN D 25 HYDROXY: CPT

## 2020-03-12 PROCEDURE — 83036 HEMOGLOBIN GLYCOSYLATED A1C: CPT

## 2020-03-12 PROCEDURE — 3079F PR MOST RECENT DIASTOLIC BLOOD PRESSURE 80-89 MM HG: ICD-10-PCS | Mod: CPTII,S$GLB,, | Performed by: FAMILY MEDICINE

## 2020-03-12 PROCEDURE — 1125F AMNT PAIN NOTED PAIN PRSNT: CPT | Mod: S$GLB,,, | Performed by: FAMILY MEDICINE

## 2020-03-12 PROCEDURE — 99999 PR PBB SHADOW E&M-EST. PATIENT-LVL III: CPT | Mod: PBBFAC,,, | Performed by: FAMILY MEDICINE

## 2020-03-12 PROCEDURE — 99999 PR PBB SHADOW E&M-EST. PATIENT-LVL III: ICD-10-PCS | Mod: PBBFAC,,, | Performed by: FAMILY MEDICINE

## 2020-03-12 PROCEDURE — 36415 COLL VENOUS BLD VENIPUNCTURE: CPT | Mod: PO

## 2020-03-12 PROCEDURE — 84443 ASSAY THYROID STIM HORMONE: CPT

## 2020-03-12 PROCEDURE — 99499 RISK ADDL DX/OHS AUDIT: ICD-10-PCS | Mod: S$GLB,,, | Performed by: FAMILY MEDICINE

## 2020-03-12 PROCEDURE — 80053 COMPREHEN METABOLIC PANEL: CPT

## 2020-03-12 PROCEDURE — 99214 OFFICE O/P EST MOD 30 MIN: CPT | Mod: S$GLB,,, | Performed by: FAMILY MEDICINE

## 2020-03-12 PROCEDURE — 3074F SYST BP LT 130 MM HG: CPT | Mod: CPTII,S$GLB,, | Performed by: FAMILY MEDICINE

## 2020-03-12 PROCEDURE — 99214 PR OFFICE/OUTPT VISIT, EST, LEVL IV, 30-39 MIN: ICD-10-PCS | Mod: S$GLB,,, | Performed by: FAMILY MEDICINE

## 2020-03-12 PROCEDURE — 3079F DIAST BP 80-89 MM HG: CPT | Mod: CPTII,S$GLB,, | Performed by: FAMILY MEDICINE

## 2020-03-12 PROCEDURE — 1125F PR PAIN SEVERITY QUANTIFIED, PAIN PRESENT: ICD-10-PCS | Mod: S$GLB,,, | Performed by: FAMILY MEDICINE

## 2020-03-12 PROCEDURE — 84439 ASSAY OF FREE THYROXINE: CPT

## 2020-03-12 RX ORDER — CLOTRIMAZOLE 1 %
CREAM (GRAM) TOPICAL 2 TIMES DAILY
Qty: 60 G | Refills: 1 | Status: SHIPPED | OUTPATIENT
Start: 2020-03-12 | End: 2021-06-10

## 2020-03-12 RX ORDER — DOXEPIN HYDROCHLORIDE 50 MG/G
CREAM TOPICAL
Qty: 45 G | Refills: 5 | Status: SHIPPED | OUTPATIENT
Start: 2020-03-12 | End: 2023-07-20 | Stop reason: SDUPTHER

## 2020-03-12 RX ORDER — MELOXICAM 7.5 MG/1
7.5 TABLET ORAL DAILY
Qty: 90 TABLET | Refills: 3 | Status: SHIPPED | OUTPATIENT
Start: 2020-03-12 | End: 2021-05-10

## 2020-03-12 RX ORDER — PREGABALIN 50 MG/1
50 CAPSULE ORAL 2 TIMES DAILY
Qty: 60 CAPSULE | Refills: 2 | Status: SHIPPED | OUTPATIENT
Start: 2020-03-12 | End: 2021-01-27

## 2020-03-12 RX ORDER — ONDANSETRON 4 MG/1
4 TABLET, FILM COATED ORAL EVERY 8 HOURS PRN
Qty: 30 TABLET | Refills: 3 | Status: SHIPPED | OUTPATIENT
Start: 2020-03-12 | End: 2021-05-10

## 2020-03-12 RX ORDER — AMITRIPTYLINE HYDROCHLORIDE 50 MG/1
50 TABLET, FILM COATED ORAL NIGHTLY
Qty: 90 TABLET | Refills: 3 | Status: SHIPPED | OUTPATIENT
Start: 2020-03-12 | End: 2021-05-10

## 2020-03-12 RX ORDER — LEVOTHYROXINE SODIUM 137 UG/1
137 TABLET ORAL DAILY
Qty: 90 TABLET | Refills: 3 | Status: SHIPPED | OUTPATIENT
Start: 2020-03-12 | End: 2020-03-29 | Stop reason: SDUPTHER

## 2020-03-12 NOTE — PROGRESS NOTES
Subjective:       Patient ID: Aida Gupta is a 69 y.o. female.    Chief Complaint: Follow-up (3mth f/u hypertension)    HPI  Review of Systems   Constitutional: Negative for fatigue and unexpected weight change.   Respiratory: Negative for chest tightness and shortness of breath.    Cardiovascular: Negative for chest pain, palpitations and leg swelling.   Gastrointestinal: Negative for abdominal pain.   Musculoskeletal: Negative for arthralgias.   Neurological: Negative for dizziness, syncope, light-headedness and headaches.       Patient Active Problem List   Diagnosis    Hypothyroid    Gastroesophageal reflux disease without esophagitis    Morbid obesity with BMI of 40.0-44.9, adult    Hyperlipidemia    Prediabetes    Dermatitis    Dyspnea    Dysphagia    Lower esophageal ring    Gastritis determined by endoscopy    Gastric polyp    DDD (degenerative disc disease), lumbar    Lumbar radiculitis    Vitamin D deficiency    HTN (hypertension)    Post herpetic neuralgia    Ds DNA antibody positive    Positive JAVY (antinuclear antibody)    Hashimoto's disease    Cystocele with prolapse    Incomplete emptying of bladder    Recurrent UTI    Pulmonary nodule     Patient is here for a chronic conditions follow up.    Urology Dr. Castillo recurrent UTIs  Rheum Dr. Serrato- following for + JAVY with + ds DNA and antithyroid antibodies (TPO AB)- possible SLE but no current manifestations     GI Dr. Warren EGD 9/17-mild schatzki ring-dilated, gastric polyp, gerd     Chronic pain- States she can't sit down due to post herpetic neuralgia which started 2 years ago. Pain Dr. Walton has tried nerve blocks caudal 10/17 and According to notes from pain med she has had SEUN for lumbar radiculopathy 1/18     Increased to gabapentin 300mg tid. Cymbatla 30 mg a day added but could not tolerate due to nausea.  gabapentin makes her drowsy. Elavil helps with mood somewhat    Has non itchy rash left  forearm  Objective:      Physical Exam   Constitutional: She is oriented to person, place, and time. She appears well-developed and well-nourished.   Cardiovascular: Normal rate, regular rhythm and normal heart sounds.   Pulmonary/Chest: Effort normal and breath sounds normal.   Musculoskeletal: She exhibits no edema.   Neurological: She is alert and oriented to person, place, and time.   Skin: Skin is warm and dry.        Psychiatric: She has a normal mood and affect.   Nursing note and vitals reviewed.      Assessment:       1. Hypertension, unspecified type    2. Hypothyroidism, unspecified type    3. Lumbar radiculitis    4. Post herpetic neuralgia    5. Hyperlipidemia, unspecified hyperlipidemia type    6. Vitamin D deficiency    7. Prediabetes    8. Morbid obesity with BMI of 40.0-44.9, adult    9. Gastroesophageal reflux disease without esophagitis    10. Nausea    11. Tinea corporis    12. Pulmonary nodule        Plan:         1. Hypothyroidism, unspecified type  Stable condition.  Continue current medications.  Will adjust based on lab findings or if condition changes.    - levothyroxine (SYNTHROID) 137 MCG Tab tablet; Take 1 tablet (137 mcg total) by mouth once daily.  Dispense: 90 tablet; Refill: 3  - CBC auto differential; Future  - TSH; Future  - T4, free; Future    2. Lumbar radiculitis  Cont current mgmt  - meloxicam (MOBIC) 7.5 MG tablet; Take 1 tablet (7.5 mg total) by mouth once daily.  Dispense: 90 tablet; Refill: 3    3. Post herpetic neuralgia  Increase  - amitriptyline (ELAVIL) 50 MG tablet; Take 1 tablet (50 mg total) by mouth every evening.  Dispense: 90 tablet; Refill: 3  Try to get approved (has helped in past)  - doxepin (ZONALON) 5 % cream; Apply thin film topically to affected area three times daily as needed  Dispense: 45 g; Refill: 5    4. Hypertension, unspecified type  Controlled on current medications.  Continue current medications.      5. Hyperlipidemia, unspecified hyperlipidemia  "type  Stable condition.  Continue current medications.  Will adjust based on lab findings or if condition changes.    - Comprehensive metabolic panel; Future  - Lipid panel; Future    6. Vitamin D deficiency  Screen and treat as indicated:    - Vitamin D; Future    7. Prediabetes   Your blood sugar is borderline high.  This means you are at risk for developing type 2 diabetes mellitus.  To lessen your risk you should exercise regularly, avoid excess carbohydrates and work toward a body mass index of less than 25.      - Hemoglobin A1c; Future    8. Morbid obesity with BMI of 40.0-44.9, adult  Counseled patient on his ideal body weight, health consequences of being obese and current recommendations including weekly exercise and a heart healthy diet.  Current BMI is:Estimated body mass index is 41.78 kg/m² as calculated from the following:    Height as of this encounter: 5' 1" (1.549 m).    Weight as of this encounter: 100.3 kg (221 lb 1.9 oz)..  Patient is aware that ideal BMI < 25 or Weight in (lb) to have BMI = 25: 132.        9. Gastroesophageal reflux disease without esophagitis  Counseled patient on prevention of reflux with changes in diet and behavior.  I recommended avoidance of greasy and spicy foods, caffeine and eating within 3 hours of bedtime.  I counseled the patient to avoid eating large meals and instead eating more frequent small meals.  I also recommended weight loss and elevation of the head of the bed by 6 inches.  If symptoms persist after these changes medication may be needed to control GERD.        10. Nausea  Use prn  - ondansetron (ZOFRAN) 4 MG tablet; Take 1 tablet (4 mg total) by mouth every 8 (eight) hours as needed for Nausea.  Dispense: 30 tablet; Refill: 3    11. Tinea corporis  Apply for minimum 4 weeks until resolved  - clotrimazole (LOTRIMIN) 1 % cream; Apply topically 2 (two) times daily.  Dispense: 60 g; Refill: 1    12. Pulmonary nodule  F/u CT chest as scheduled 6/20     Time " spent with patient: 20 minutes    Patient with be reevaluated in 6 months or sooner prn    Greater than 50% of this visit was spent counseling as described in above documentation:Yes

## 2020-03-13 LAB
25(OH)D3+25(OH)D2 SERPL-MCNC: 14 NG/ML (ref 30–96)
BACTERIA UR CULT: ABNORMAL
ESTIMATED AVG GLUCOSE: 126 MG/DL (ref 68–131)
HBA1C MFR BLD HPLC: 6 % (ref 4–5.6)

## 2020-03-13 RX ORDER — NITROFURANTOIN (MACROCRYSTALS) 100 MG/1
100 CAPSULE ORAL 2 TIMES DAILY
Qty: 14 CAPSULE | Refills: 0 | Status: SHIPPED | OUTPATIENT
Start: 2020-03-13 | End: 2020-03-20

## 2020-03-29 DIAGNOSIS — R73.03 PREDIABETES: ICD-10-CM

## 2020-03-29 DIAGNOSIS — E55.9 VITAMIN D DEFICIENCY: ICD-10-CM

## 2020-03-29 DIAGNOSIS — R33.9 INCOMPLETE EMPTYING OF BLADDER: ICD-10-CM

## 2020-03-29 DIAGNOSIS — E03.9 HYPOTHYROIDISM, UNSPECIFIED TYPE: ICD-10-CM

## 2020-03-29 DIAGNOSIS — E78.5 HYPERLIPIDEMIA, UNSPECIFIED HYPERLIPIDEMIA TYPE: Primary | ICD-10-CM

## 2020-03-29 RX ORDER — ERGOCALCIFEROL 1.25 MG/1
50000 CAPSULE ORAL
Qty: 12 CAPSULE | Refills: 3 | Status: SHIPPED | OUTPATIENT
Start: 2020-03-29 | End: 2021-05-10 | Stop reason: SDUPTHER

## 2020-03-29 RX ORDER — LEVOTHYROXINE SODIUM 150 UG/1
150 TABLET ORAL DAILY
Qty: 90 TABLET | Refills: 3 | Status: SHIPPED | OUTPATIENT
Start: 2020-03-29 | End: 2021-05-10 | Stop reason: SDUPTHER

## 2020-04-02 ENCOUNTER — TELEPHONE (OUTPATIENT)
Dept: FAMILY MEDICINE | Facility: CLINIC | Age: 70
End: 2020-04-02

## 2020-04-02 DIAGNOSIS — E03.9 HYPOTHYROIDISM, UNSPECIFIED TYPE: ICD-10-CM

## 2020-04-02 NOTE — TELEPHONE ENCOUNTER
----- Message from Thomas Eason sent at 4/2/2020  4:22 PM CDT -----  Contact: Patient  Type:  RX Refill Request    Who Called:  Patient  Refill or New Rx:  Refill  RX Name and Strength:  levothyroxine (SYNTHROID) 150 MCG tablet  How is the patient currently taking it? (ex. 1XDay):  As ordered  Is this a 30 day or 90 day RX:  90  Preferred Pharmacy with phone number:    [id number] [place] - [address] AT [facility] [address]  [address] [address]  Phone: [phone] Fax: [phone]  Local or Mail Order:  Local  Ordering Provider:  Dr. Fisher  Best Call Back Number:  [phone]  Additional Information:  Patient is unsure of prescription name, only that it is a thyroid medication. Patient states dosage may have changed. Please call to confirm. Thanks!

## 2020-05-05 ENCOUNTER — PATIENT MESSAGE (OUTPATIENT)
Dept: ADMINISTRATIVE | Facility: HOSPITAL | Age: 70
End: 2020-05-05

## 2020-06-28 DIAGNOSIS — J84.10 PULMONARY FIBROSIS: Primary | ICD-10-CM

## 2020-07-09 ENCOUNTER — PATIENT OUTREACH (OUTPATIENT)
Dept: ADMINISTRATIVE | Facility: OTHER | Age: 70
End: 2020-07-09

## 2020-07-09 NOTE — PROGRESS NOTES
Chart was reviewed for overdue [facility] Encounters (EDA)  topics  Updates were requested from care everywhere  Health Maintenance has been updated

## 2020-07-22 DIAGNOSIS — K21.9 GASTROESOPHAGEAL REFLUX DISEASE WITHOUT ESOPHAGITIS: ICD-10-CM

## 2020-07-23 RX ORDER — OMEPRAZOLE 20 MG/1
20 CAPSULE, DELAYED RELEASE ORAL DAILY
Qty: 90 CAPSULE | Refills: 1 | Status: SHIPPED | OUTPATIENT
Start: 2020-07-23 | End: 2024-03-21 | Stop reason: SDUPTHER

## 2020-09-14 ENCOUNTER — TELEPHONE (OUTPATIENT)
Dept: FAMILY MEDICINE | Facility: CLINIC | Age: 70
End: 2020-09-14

## 2020-09-14 NOTE — TELEPHONE ENCOUNTER
Called patient regarding appointment on tomorrow 9/15/2020 with Dr. Fisher, no answer could not leave voice message on cell phone and on home phone message box is full.

## 2020-12-15 ENCOUNTER — PATIENT OUTREACH (OUTPATIENT)
Dept: ADMINISTRATIVE | Facility: HOSPITAL | Age: 70
End: 2020-12-15

## 2021-01-20 DIAGNOSIS — M54.16 LUMBAR RADICULITIS: ICD-10-CM

## 2021-01-20 RX ORDER — GABAPENTIN 300 MG/1
CAPSULE ORAL
Qty: 270 CAPSULE | Refills: 0 | Status: SHIPPED | OUTPATIENT
Start: 2021-01-20 | End: 2021-01-27 | Stop reason: SDUPTHER

## 2021-01-27 ENCOUNTER — OFFICE VISIT (OUTPATIENT)
Dept: FAMILY MEDICINE | Facility: CLINIC | Age: 71
End: 2021-01-27
Payer: MEDICARE

## 2021-01-27 DIAGNOSIS — M54.16 LUMBAR RADICULITIS: ICD-10-CM

## 2021-01-27 DIAGNOSIS — I10 HYPERTENSION, UNSPECIFIED TYPE: Primary | ICD-10-CM

## 2021-01-27 DIAGNOSIS — E03.9 HYPOTHYROIDISM, UNSPECIFIED TYPE: ICD-10-CM

## 2021-01-27 DIAGNOSIS — R73.03 PREDIABETES: ICD-10-CM

## 2021-01-27 DIAGNOSIS — E78.5 HYPERLIPIDEMIA, UNSPECIFIED HYPERLIPIDEMIA TYPE: ICD-10-CM

## 2021-01-27 DIAGNOSIS — E66.01 MORBID OBESITY WITH BMI OF 40.0-44.9, ADULT: ICD-10-CM

## 2021-01-27 DIAGNOSIS — E55.9 VITAMIN D DEFICIENCY: ICD-10-CM

## 2021-01-27 PROCEDURE — 99442 PR PHYSICIAN TELEPHONE EVALUATION 11-20 MIN: ICD-10-PCS | Mod: 95,,, | Performed by: FAMILY MEDICINE

## 2021-01-27 PROCEDURE — 99442 PR PHYSICIAN TELEPHONE EVALUATION 11-20 MIN: CPT | Mod: 95,,, | Performed by: FAMILY MEDICINE

## 2021-01-27 RX ORDER — GABAPENTIN 300 MG/1
900 CAPSULE ORAL 3 TIMES DAILY
Qty: 270 CAPSULE | Refills: 3 | Status: SHIPPED | OUTPATIENT
Start: 2021-01-27 | End: 2021-05-10 | Stop reason: SDUPTHER

## 2021-05-10 ENCOUNTER — OFFICE VISIT (OUTPATIENT)
Dept: FAMILY MEDICINE | Facility: CLINIC | Age: 71
End: 2021-05-10
Payer: MEDICARE

## 2021-05-10 ENCOUNTER — TELEPHONE (OUTPATIENT)
Dept: FAMILY MEDICINE | Facility: CLINIC | Age: 71
End: 2021-05-10

## 2021-05-10 DIAGNOSIS — E03.9 HYPOTHYROIDISM, UNSPECIFIED TYPE: ICD-10-CM

## 2021-05-10 DIAGNOSIS — E78.5 HYPERLIPIDEMIA, UNSPECIFIED HYPERLIPIDEMIA TYPE: Primary | ICD-10-CM

## 2021-05-10 DIAGNOSIS — E55.9 VITAMIN D DEFICIENCY: ICD-10-CM

## 2021-05-10 DIAGNOSIS — M54.16 LUMBAR RADICULITIS: ICD-10-CM

## 2021-05-10 DIAGNOSIS — I25.10 ATHEROSCLEROSIS OF NATIVE CORONARY ARTERY OF NATIVE HEART WITHOUT ANGINA PECTORIS: ICD-10-CM

## 2021-05-10 DIAGNOSIS — L29.9 ITCHING: ICD-10-CM

## 2021-05-10 DIAGNOSIS — R73.03 PREDIABETES: ICD-10-CM

## 2021-05-10 DIAGNOSIS — F41.8 DEPRESSION WITH ANXIETY: ICD-10-CM

## 2021-05-10 DIAGNOSIS — R11.0 NAUSEA: ICD-10-CM

## 2021-05-10 PROCEDURE — 1159F MED LIST DOCD IN RCRD: CPT | Mod: 95,,, | Performed by: FAMILY MEDICINE

## 2021-05-10 PROCEDURE — 1159F PR MEDICATION LIST DOCUMENTED IN MEDICAL RECORD: ICD-10-PCS | Mod: 95,,, | Performed by: FAMILY MEDICINE

## 2021-05-10 PROCEDURE — 99214 PR OFFICE/OUTPT VISIT, EST, LEVL IV, 30-39 MIN: ICD-10-PCS | Mod: 95,,, | Performed by: FAMILY MEDICINE

## 2021-05-10 PROCEDURE — 99214 OFFICE O/P EST MOD 30 MIN: CPT | Mod: 95,,, | Performed by: FAMILY MEDICINE

## 2021-05-10 RX ORDER — LEVOTHYROXINE SODIUM 150 UG/1
150 TABLET ORAL DAILY
Qty: 90 TABLET | Refills: 3 | Status: SHIPPED | OUTPATIENT
Start: 2021-05-10 | End: 2022-07-07

## 2021-05-10 RX ORDER — GABAPENTIN 300 MG/1
900 CAPSULE ORAL 3 TIMES DAILY
Qty: 270 CAPSULE | Refills: 3 | Status: SHIPPED | OUTPATIENT
Start: 2021-05-10 | End: 2022-07-11

## 2021-05-10 RX ORDER — HYDROXYZINE HYDROCHLORIDE 25 MG/1
25 TABLET, FILM COATED ORAL 3 TIMES DAILY PRN
Qty: 90 TABLET | Status: SHIPPED | OUTPATIENT
Start: 2021-05-10 | End: 2021-06-10

## 2021-05-10 RX ORDER — CITALOPRAM 20 MG/1
20 TABLET, FILM COATED ORAL DAILY
Qty: 30 TABLET | Refills: 5 | Status: SHIPPED | OUTPATIENT
Start: 2021-05-10 | End: 2021-11-10 | Stop reason: SDUPTHER

## 2021-05-10 RX ORDER — MELOXICAM 7.5 MG/1
7.5 TABLET ORAL DAILY
Qty: 90 TABLET | Refills: 3 | Status: SHIPPED | OUTPATIENT
Start: 2021-05-10 | End: 2022-07-11

## 2021-05-10 RX ORDER — ONDANSETRON 4 MG/1
4 TABLET, FILM COATED ORAL EVERY 8 HOURS PRN
Qty: 30 TABLET | Refills: 0 | Status: SHIPPED | OUTPATIENT
Start: 2021-05-10 | End: 2022-07-11

## 2021-05-10 RX ORDER — ERGOCALCIFEROL 1.25 MG/1
50000 CAPSULE ORAL
Qty: 12 CAPSULE | Refills: 3 | Status: SHIPPED | OUTPATIENT
Start: 2021-05-10 | End: 2024-03-21 | Stop reason: SDUPTHER

## 2021-05-31 ENCOUNTER — PATIENT OUTREACH (OUTPATIENT)
Dept: ADMINISTRATIVE | Facility: HOSPITAL | Age: 71
End: 2021-05-31

## 2021-06-09 ENCOUNTER — TELEPHONE (OUTPATIENT)
Dept: FAMILY MEDICINE | Facility: CLINIC | Age: 71
End: 2021-06-09

## 2021-06-10 ENCOUNTER — OFFICE VISIT (OUTPATIENT)
Dept: FAMILY MEDICINE | Facility: CLINIC | Age: 71
End: 2021-06-10
Payer: MEDICARE

## 2021-06-10 DIAGNOSIS — F41.8 DEPRESSION WITH ANXIETY: ICD-10-CM

## 2021-06-10 DIAGNOSIS — F43.9 STRESS AT HOME: ICD-10-CM

## 2021-06-10 DIAGNOSIS — J44.1 COPD EXACERBATION: Primary | ICD-10-CM

## 2021-06-10 PROCEDURE — 1159F MED LIST DOCD IN RCRD: CPT | Mod: 95,,, | Performed by: FAMILY MEDICINE

## 2021-06-10 PROCEDURE — 99499 UNLISTED E&M SERVICE: CPT | Mod: 95,,, | Performed by: FAMILY MEDICINE

## 2021-06-10 PROCEDURE — 99214 PR OFFICE/OUTPT VISIT, EST, LEVL IV, 30-39 MIN: ICD-10-PCS | Mod: 95,,, | Performed by: FAMILY MEDICINE

## 2021-06-10 PROCEDURE — 99499 RISK ADDL DX/OHS AUDIT: ICD-10-PCS | Mod: 95,,, | Performed by: FAMILY MEDICINE

## 2021-06-10 PROCEDURE — 99214 OFFICE O/P EST MOD 30 MIN: CPT | Mod: 95,,, | Performed by: FAMILY MEDICINE

## 2021-06-10 PROCEDURE — 1159F PR MEDICATION LIST DOCUMENTED IN MEDICAL RECORD: ICD-10-PCS | Mod: 95,,, | Performed by: FAMILY MEDICINE

## 2021-06-10 RX ORDER — AZITHROMYCIN 250 MG/1
TABLET, FILM COATED ORAL
Qty: 6 TABLET | Refills: 0 | Status: SHIPPED | OUTPATIENT
Start: 2021-06-10 | End: 2021-06-15

## 2021-06-10 RX ORDER — METHYLPREDNISOLONE 4 MG/1
TABLET ORAL
Qty: 1 PACKAGE | Refills: 0 | Status: SHIPPED | OUTPATIENT
Start: 2021-06-10 | End: 2021-07-01

## 2021-06-11 ENCOUNTER — TELEPHONE (OUTPATIENT)
Dept: FAMILY MEDICINE | Facility: CLINIC | Age: 71
End: 2021-06-11

## 2021-10-22 ENCOUNTER — OFFICE VISIT (OUTPATIENT)
Dept: FAMILY MEDICINE | Facility: CLINIC | Age: 71
End: 2021-10-22
Payer: MEDICARE

## 2021-10-22 DIAGNOSIS — Z00.00 ENCOUNTER FOR PREVENTIVE HEALTH EXAMINATION: Primary | ICD-10-CM

## 2021-10-22 DIAGNOSIS — I10 HYPERTENSION, UNSPECIFIED TYPE: ICD-10-CM

## 2021-10-22 DIAGNOSIS — F33.0 MILD EPISODE OF RECURRENT MAJOR DEPRESSIVE DISORDER: ICD-10-CM

## 2021-10-22 DIAGNOSIS — E66.01 MORBID OBESITY WITH BMI OF 40.0-44.9, ADULT: ICD-10-CM

## 2021-10-22 DIAGNOSIS — J44.9 CHRONIC OBSTRUCTIVE PULMONARY DISEASE, UNSPECIFIED COPD TYPE: ICD-10-CM

## 2021-10-22 DIAGNOSIS — I70.0 ATHEROSCLEROSIS OF AORTA: ICD-10-CM

## 2021-10-22 DIAGNOSIS — E78.5 HYPERLIPIDEMIA, UNSPECIFIED HYPERLIPIDEMIA TYPE: ICD-10-CM

## 2021-10-22 DIAGNOSIS — Z12.11 COLON CANCER SCREENING: ICD-10-CM

## 2021-10-22 PROCEDURE — 3288F FALL RISK ASSESSMENT DOCD: CPT | Mod: CPTII,S$GLB,, | Performed by: NURSE PRACTITIONER

## 2021-10-22 PROCEDURE — 3044F HG A1C LEVEL LT 7.0%: CPT | Mod: CPTII,S$GLB,, | Performed by: NURSE PRACTITIONER

## 2021-10-22 PROCEDURE — 1170F PR FUNCTIONAL STATUS ASSESSED: ICD-10-PCS | Mod: CPTII,S$GLB,, | Performed by: NURSE PRACTITIONER

## 2021-10-22 PROCEDURE — 1160F RVW MEDS BY RX/DR IN RCRD: CPT | Mod: CPTII,S$GLB,, | Performed by: NURSE PRACTITIONER

## 2021-10-22 PROCEDURE — G0439 PR MEDICARE ANNUAL WELLNESS SUBSEQUENT VISIT: ICD-10-PCS | Mod: 95,,, | Performed by: NURSE PRACTITIONER

## 2021-10-22 PROCEDURE — 3044F PR MOST RECENT HEMOGLOBIN A1C LEVEL <7.0%: ICD-10-PCS | Mod: CPTII,S$GLB,, | Performed by: NURSE PRACTITIONER

## 2021-10-22 PROCEDURE — 1159F MED LIST DOCD IN RCRD: CPT | Mod: CPTII,S$GLB,, | Performed by: NURSE PRACTITIONER

## 2021-10-22 PROCEDURE — 1160F PR REVIEW ALL MEDS BY PRESCRIBER/CLIN PHARMACIST DOCUMENTED: ICD-10-PCS | Mod: CPTII,S$GLB,, | Performed by: NURSE PRACTITIONER

## 2021-10-22 PROCEDURE — 99499 UNLISTED E&M SERVICE: CPT | Mod: 95,,, | Performed by: NURSE PRACTITIONER

## 2021-10-22 PROCEDURE — 99499 RISK ADDL DX/OHS AUDIT: ICD-10-PCS | Mod: 95,,, | Performed by: NURSE PRACTITIONER

## 2021-10-22 PROCEDURE — 3288F PR FALLS RISK ASSESSMENT DOCUMENTED: ICD-10-PCS | Mod: CPTII,S$GLB,, | Performed by: NURSE PRACTITIONER

## 2021-10-22 PROCEDURE — 1101F PT FALLS ASSESS-DOCD LE1/YR: CPT | Mod: CPTII,S$GLB,, | Performed by: NURSE PRACTITIONER

## 2021-10-22 PROCEDURE — G0439 PPPS, SUBSEQ VISIT: HCPCS | Mod: 95,,, | Performed by: NURSE PRACTITIONER

## 2021-10-22 PROCEDURE — 1159F PR MEDICATION LIST DOCUMENTED IN MEDICAL RECORD: ICD-10-PCS | Mod: CPTII,S$GLB,, | Performed by: NURSE PRACTITIONER

## 2021-10-22 PROCEDURE — 1101F PR PT FALLS ASSESS DOC 0-1 FALLS W/OUT INJ PAST YR: ICD-10-PCS | Mod: CPTII,S$GLB,, | Performed by: NURSE PRACTITIONER

## 2021-10-22 PROCEDURE — 1170F FXNL STATUS ASSESSED: CPT | Mod: CPTII,S$GLB,, | Performed by: NURSE PRACTITIONER

## 2021-11-10 ENCOUNTER — OFFICE VISIT (OUTPATIENT)
Dept: FAMILY MEDICINE | Facility: CLINIC | Age: 71
End: 2021-11-10
Payer: MEDICARE

## 2021-11-10 DIAGNOSIS — F41.8 DEPRESSION WITH ANXIETY: ICD-10-CM

## 2021-11-10 DIAGNOSIS — E55.9 VITAMIN D DEFICIENCY: ICD-10-CM

## 2021-11-10 DIAGNOSIS — E66.01 MORBID OBESITY WITH BMI OF 40.0-44.9, ADULT: ICD-10-CM

## 2021-11-10 DIAGNOSIS — I10 HYPERTENSION, UNSPECIFIED TYPE: ICD-10-CM

## 2021-11-10 DIAGNOSIS — E03.9 HYPOTHYROIDISM, UNSPECIFIED TYPE: ICD-10-CM

## 2021-11-10 DIAGNOSIS — E78.5 HYPERLIPIDEMIA, UNSPECIFIED HYPERLIPIDEMIA TYPE: ICD-10-CM

## 2021-11-10 DIAGNOSIS — J44.1 COPD EXACERBATION: Primary | ICD-10-CM

## 2021-11-10 DIAGNOSIS — R73.03 PREDIABETES: ICD-10-CM

## 2021-11-10 PROCEDURE — 99214 OFFICE O/P EST MOD 30 MIN: CPT | Mod: 95,,, | Performed by: FAMILY MEDICINE

## 2021-11-10 PROCEDURE — 99214 PR OFFICE/OUTPT VISIT, EST, LEVL IV, 30-39 MIN: ICD-10-PCS | Mod: 95,,, | Performed by: FAMILY MEDICINE

## 2021-11-10 PROCEDURE — 1160F PR REVIEW ALL MEDS BY PRESCRIBER/CLIN PHARMACIST DOCUMENTED: ICD-10-PCS | Mod: CPTII,95,, | Performed by: FAMILY MEDICINE

## 2021-11-10 PROCEDURE — 1159F MED LIST DOCD IN RCRD: CPT | Mod: CPTII,95,, | Performed by: FAMILY MEDICINE

## 2021-11-10 PROCEDURE — 3044F HG A1C LEVEL LT 7.0%: CPT | Mod: CPTII,95,, | Performed by: FAMILY MEDICINE

## 2021-11-10 PROCEDURE — 1159F PR MEDICATION LIST DOCUMENTED IN MEDICAL RECORD: ICD-10-PCS | Mod: CPTII,95,, | Performed by: FAMILY MEDICINE

## 2021-11-10 PROCEDURE — 1160F RVW MEDS BY RX/DR IN RCRD: CPT | Mod: CPTII,95,, | Performed by: FAMILY MEDICINE

## 2021-11-10 PROCEDURE — 3044F PR MOST RECENT HEMOGLOBIN A1C LEVEL <7.0%: ICD-10-PCS | Mod: CPTII,95,, | Performed by: FAMILY MEDICINE

## 2021-11-10 RX ORDER — CITALOPRAM 20 MG/1
20 TABLET, FILM COATED ORAL DAILY
Qty: 90 TABLET | Refills: 3 | Status: SHIPPED | OUTPATIENT
Start: 2021-11-10 | End: 2023-05-12

## 2021-11-10 RX ORDER — PROMETHAZINE HYDROCHLORIDE AND DEXTROMETHORPHAN HYDROBROMIDE 6.25; 15 MG/5ML; MG/5ML
5 SYRUP ORAL EVERY 4 HOURS PRN
Qty: 240 ML | Refills: 0 | Status: SHIPPED | OUTPATIENT
Start: 2021-11-10 | End: 2021-11-20

## 2021-11-10 RX ORDER — AZITHROMYCIN 250 MG/1
TABLET, FILM COATED ORAL
Qty: 6 TABLET | Refills: 0 | Status: SHIPPED | OUTPATIENT
Start: 2021-11-10 | End: 2021-11-15

## 2022-01-31 ENCOUNTER — TELEPHONE (OUTPATIENT)
Dept: FAMILY MEDICINE | Facility: CLINIC | Age: 72
End: 2022-01-31
Payer: MEDICARE

## 2022-01-31 NOTE — TELEPHONE ENCOUNTER
Called and spoke to patient regarding symptoms. Appointment scheduled for tomorrow with Nicholas Hopkins PA-C

## 2022-01-31 NOTE — TELEPHONE ENCOUNTER
----- Message from Anum Barba sent at 1/31/2022 10:37 AM CST -----  Regarding: advice  Contact: pt  Type: Needs Medical Advice    Who Called:  pt  Symptoms (please be specific):  coughing  How long has patient had these symptoms:  n/a    Pharmacy name and phone #:    [id number] - [place] - [address]  [address] [place] [place] [address]  Phone: [phone] Fax: [phone]    Best Call Back Number: [phone]    Additional Information: was diagnosed with bronchitis day before christmas, still coughing. Please call to advise

## 2022-01-31 NOTE — TELEPHONE ENCOUNTER
----- Message from Kalyn Amador sent at 1/28/2022 10:05 AM CST -----  Contact: pt at  [phone]  Type: Needs Medical Advice  Who Called:  pt  Best Call Back Number: [phone]  Additional Information: pt is calling the office to see what she should do. She was diagnosed with bronchitis the day before pricilla and she is still having really severe coughing, she has a lot of phlegm. She wants to see also if there is anything that the doctor can prescribe something until she can see her. Please call back and advise

## 2022-02-10 ENCOUNTER — HOSPITAL ENCOUNTER (OUTPATIENT)
Dept: RADIOLOGY | Facility: CLINIC | Age: 72
Discharge: HOME | End: 2022-02-10
Attending: FAMILY MEDICINE
Payer: MEDICARE

## 2022-02-10 ENCOUNTER — OFFICE VISIT (OUTPATIENT)
Dept: FAMILY MEDICINE | Facility: CLINIC | Age: 72
End: 2022-02-10
Payer: MEDICARE

## 2022-02-10 VITALS
OXYGEN SATURATION: 97 % | SYSTOLIC BLOOD PRESSURE: 132 MMHG | BODY MASS INDEX: 39.02 KG/M2 | HEIGHT: 62 IN | TEMPERATURE: 98 F | HEART RATE: 96 BPM | DIASTOLIC BLOOD PRESSURE: 70 MMHG | RESPIRATION RATE: 18 BRPM | WEIGHT: 212.06 LBS

## 2022-02-10 DIAGNOSIS — E66.01 MORBID OBESITY WITH BMI OF 40.0-44.9, ADULT: ICD-10-CM

## 2022-02-10 DIAGNOSIS — E55.9 VITAMIN D DEFICIENCY: ICD-10-CM

## 2022-02-10 DIAGNOSIS — I70.0 ATHEROSCLEROSIS OF AORTA: ICD-10-CM

## 2022-02-10 DIAGNOSIS — Z23 FLU VACCINE NEED: ICD-10-CM

## 2022-02-10 DIAGNOSIS — R05.3 CHRONIC COUGH: ICD-10-CM

## 2022-02-10 DIAGNOSIS — J44.9 CHRONIC OBSTRUCTIVE PULMONARY DISEASE, UNSPECIFIED COPD TYPE: ICD-10-CM

## 2022-02-10 DIAGNOSIS — E78.5 HYPERLIPIDEMIA, UNSPECIFIED HYPERLIPIDEMIA TYPE: ICD-10-CM

## 2022-02-10 DIAGNOSIS — E03.9 HYPOTHYROIDISM, UNSPECIFIED TYPE: ICD-10-CM

## 2022-02-10 DIAGNOSIS — I10 HYPERTENSION, UNSPECIFIED TYPE: Primary | ICD-10-CM

## 2022-02-10 DIAGNOSIS — Z12.31 ENCOUNTER FOR SCREENING MAMMOGRAM FOR MALIGNANT NEOPLASM OF BREAST: ICD-10-CM

## 2022-02-10 DIAGNOSIS — R73.03 PREDIABETES: ICD-10-CM

## 2022-02-10 PROCEDURE — 3075F SYST BP GE 130 - 139MM HG: CPT | Mod: CPTII,S$GLB,, | Performed by: FAMILY MEDICINE

## 2022-02-10 PROCEDURE — 1159F MED LIST DOCD IN RCRD: CPT | Mod: CPTII,S$GLB,, | Performed by: FAMILY MEDICINE

## 2022-02-10 PROCEDURE — 3078F DIAST BP <80 MM HG: CPT | Mod: CPTII,S$GLB,, | Performed by: FAMILY MEDICINE

## 2022-02-10 PROCEDURE — 90694 FLU VACCINE - QUADRIVALENT - ADJUVANTED: ICD-10-PCS | Mod: S$GLB,,, | Performed by: FAMILY MEDICINE

## 2022-02-10 PROCEDURE — 3288F FALL RISK ASSESSMENT DOCD: CPT | Mod: CPTII,S$GLB,, | Performed by: FAMILY MEDICINE

## 2022-02-10 PROCEDURE — 71046 X-RAY EXAM CHEST 2 VIEWS: CPT | Mod: 26,,, | Performed by: RADIOLOGY

## 2022-02-10 PROCEDURE — 1159F PR MEDICATION LIST DOCUMENTED IN MEDICAL RECORD: ICD-10-PCS | Mod: CPTII,S$GLB,, | Performed by: FAMILY MEDICINE

## 2022-02-10 PROCEDURE — 99499 RISK ADDL DX/OHS AUDIT: ICD-10-PCS | Mod: S$GLB,,, | Performed by: FAMILY MEDICINE

## 2022-02-10 PROCEDURE — 71046 X-RAY EXAM CHEST 2 VIEWS: CPT | Mod: TC,FY,PO

## 2022-02-10 PROCEDURE — 3044F HG A1C LEVEL LT 7.0%: CPT | Mod: CPTII,S$GLB,, | Performed by: FAMILY MEDICINE

## 2022-02-10 PROCEDURE — 71046 XR CHEST PA AND LATERAL: ICD-10-PCS | Mod: 26,,, | Performed by: RADIOLOGY

## 2022-02-10 PROCEDURE — 3008F BODY MASS INDEX DOCD: CPT | Mod: CPTII,S$GLB,, | Performed by: FAMILY MEDICINE

## 2022-02-10 PROCEDURE — 3288F PR FALLS RISK ASSESSMENT DOCUMENTED: ICD-10-PCS | Mod: CPTII,S$GLB,, | Performed by: FAMILY MEDICINE

## 2022-02-10 PROCEDURE — 1101F PR PT FALLS ASSESS DOC 0-1 FALLS W/OUT INJ PAST YR: ICD-10-PCS | Mod: CPTII,S$GLB,, | Performed by: FAMILY MEDICINE

## 2022-02-10 PROCEDURE — G0008 ADMIN INFLUENZA VIRUS VAC: HCPCS | Mod: S$GLB,,, | Performed by: FAMILY MEDICINE

## 2022-02-10 PROCEDURE — 1101F PT FALLS ASSESS-DOCD LE1/YR: CPT | Mod: CPTII,S$GLB,, | Performed by: FAMILY MEDICINE

## 2022-02-10 PROCEDURE — 1160F RVW MEDS BY RX/DR IN RCRD: CPT | Mod: CPTII,S$GLB,, | Performed by: FAMILY MEDICINE

## 2022-02-10 PROCEDURE — 1125F AMNT PAIN NOTED PAIN PRSNT: CPT | Mod: CPTII,S$GLB,, | Performed by: FAMILY MEDICINE

## 2022-02-10 PROCEDURE — 90694 VACC AIIV4 NO PRSRV 0.5ML IM: CPT | Mod: S$GLB,,, | Performed by: FAMILY MEDICINE

## 2022-02-10 PROCEDURE — 99999 PR PBB SHADOW E&M-EST. PATIENT-LVL IV: ICD-10-PCS | Mod: PBBFAC,,, | Performed by: FAMILY MEDICINE

## 2022-02-10 PROCEDURE — 99214 PR OFFICE/OUTPT VISIT, EST, LEVL IV, 30-39 MIN: ICD-10-PCS | Mod: S$GLB,,, | Performed by: FAMILY MEDICINE

## 2022-02-10 PROCEDURE — 3078F PR MOST RECENT DIASTOLIC BLOOD PRESSURE < 80 MM HG: ICD-10-PCS | Mod: CPTII,S$GLB,, | Performed by: FAMILY MEDICINE

## 2022-02-10 PROCEDURE — 99999 PR PBB SHADOW E&M-EST. PATIENT-LVL IV: CPT | Mod: PBBFAC,,, | Performed by: FAMILY MEDICINE

## 2022-02-10 PROCEDURE — G0008 FLU VACCINE - QUADRIVALENT - ADJUVANTED: ICD-10-PCS | Mod: S$GLB,,, | Performed by: FAMILY MEDICINE

## 2022-02-10 PROCEDURE — 99499 UNLISTED E&M SERVICE: CPT | Mod: S$GLB,,, | Performed by: FAMILY MEDICINE

## 2022-02-10 PROCEDURE — 3075F PR MOST RECENT SYSTOLIC BLOOD PRESS GE 130-139MM HG: ICD-10-PCS | Mod: CPTII,S$GLB,, | Performed by: FAMILY MEDICINE

## 2022-02-10 PROCEDURE — 3008F PR BODY MASS INDEX (BMI) DOCUMENTED: ICD-10-PCS | Mod: CPTII,S$GLB,, | Performed by: FAMILY MEDICINE

## 2022-02-10 PROCEDURE — 1125F PR PAIN SEVERITY QUANTIFIED, PAIN PRESENT: ICD-10-PCS | Mod: CPTII,S$GLB,, | Performed by: FAMILY MEDICINE

## 2022-02-10 PROCEDURE — 99214 OFFICE O/P EST MOD 30 MIN: CPT | Mod: S$GLB,,, | Performed by: FAMILY MEDICINE

## 2022-02-10 PROCEDURE — 1160F PR REVIEW ALL MEDS BY PRESCRIBER/CLIN PHARMACIST DOCUMENTED: ICD-10-PCS | Mod: CPTII,S$GLB,, | Performed by: FAMILY MEDICINE

## 2022-02-10 PROCEDURE — 3044F PR MOST RECENT HEMOGLOBIN A1C LEVEL <7.0%: ICD-10-PCS | Mod: CPTII,S$GLB,, | Performed by: FAMILY MEDICINE

## 2022-02-10 RX ORDER — FLUTICASONE PROPIONATE 220 UG/1
1 AEROSOL, METERED RESPIRATORY (INHALATION) 2 TIMES DAILY
Qty: 12 G | Refills: 11 | Status: SHIPPED | OUTPATIENT
Start: 2022-02-10 | End: 2023-07-20

## 2022-02-10 NOTE — PROGRESS NOTES
Subjective:       Patient ID: Aida Gupta is a 71 y.o. female.    Chief Complaint: Follow-up (3mth f/u )    HPI  Review of Systems   Constitutional: Negative for fatigue and unexpected weight change.   Respiratory: Negative for chest tightness and shortness of breath.    Cardiovascular: Negative for chest pain, palpitations and leg swelling.   Gastrointestinal: Negative for abdominal pain.   Musculoskeletal: Negative for arthralgias.   Neurological: Negative for dizziness, syncope, light-headedness and headaches.       Patient Active Problem List   Diagnosis    Hypothyroid    Gastroesophageal reflux disease without esophagitis    Morbid obesity with BMI of 40.0-44.9, adult    Hyperlipidemia    Prediabetes    Dermatitis    Dyspnea    Dysphagia    Lower esophageal ring    Gastritis determined by endoscopy    Gastric polyp    DDD (degenerative disc disease), lumbar    Lumbar radiculitis    Vitamin D deficiency    HTN (hypertension)    Post herpetic neuralgia    Ds DNA antibody positive    Positive JAVY (antinuclear antibody)    Hashimoto's disease    Cystocele with prolapse    Incomplete emptying of bladder    Recurrent UTI    Pulmonary nodule    Atherosclerosis of aorta     Patient is here for a chronic conditions follow up.    Reviewed labs 5/2021-vit d   \Seen in ED 12/22/2021 for bronchitis. COVID neg.   C/o chronic cough with prod white/clear sputum. No sob or fever. Feels weak and tired     has passed away. has Limited transportation.       HPL -Your cholesterol is high.       Urology Dr. Castillo recurrent UTIs  Rheum Dr. Serrato- following for + JAVY with + ds DNA and antithyroid antibodies (TPO AB)- possible SLE but no current manifestations     GI Dr. Warren EGD 9/17-mild schatzki ring-dilated, gastric polyp, gerd     Chronic pain- States she can't sit down due to post herpetic neuralgia which started 2 years ago. Pain Dr. Walton has tried nerve blocks caudal 10/17 and  According to notes from pain med she has had SEUN for lumbar radiculopathy 1/18     Increased to gabapentin 300mg tid. Cymbatla 30 mg a day added but could not tolerate due to nausea.  gabapentin makes her drowsy. Elavil helps with mood somewhat.  celexa started last visit but states [facility] never [facility] it. Still having depression and grief. Talking to Caoda[employer] pastors about loss of  which helps. Has limited trasnportatio  Objective:      Physical Exam  Vitals and nursing note reviewed.   Constitutional:       Appearance: She is well-developed and well-nourished.   Cardiovascular:      Rate and Rhythm: Normal rate and regular rhythm.      Heart sounds: Normal heart sounds.   Pulmonary:      Effort: Pulmonary effort is normal.      Breath sounds: Decreased breath sounds present.   Musculoskeletal:         General: No edema.   Skin:     General: Skin is warm and dry.   Neurological:      Mental Status: She is alert and oriented to person, place, and time.   Psychiatric:         Mood and Affect: Mood and affect normal.         Assessment:       1. Hypertension, unspecified type    2. Hypothyroidism, unspecified type    3. Hyperlipidemia, unspecified hyperlipidemia type    4. Vitamin D deficiency    5. Morbid obesity with BMI of 40.0-44.9, adult    6. Prediabetes    7. Chronic obstructive pulmonary disease, unspecified COPD type    8. Atherosclerosis of aorta    9. Flu vaccine need    10. Encounter for screening mammogram for malignant neoplasm of breast    11. Chronic cough        Plan:         1. Hypertension, unspecified type  Controlled on current medications.  Continue current medications.      2. Hypothyroidism, unspecified type  Stable condition.  Continue current medications.  Will adjust based on lab findings or if condition changes.    - CBC Auto Differential; Future  - TSH; Future  - T4, Free; Future    3. Hyperlipidemia, unspecified hyperlipidemia type  Stable condition.  Continue current medications.   "Will adjust based on lab findings or if condition changes.    - Comprehensive Metabolic Panel; Future  - Lipid Panel; Future    4. Vitamin D deficiency  Screen and treat as indicated:    - Calcitriol (1,25 di-OH Vitamin D); Future  - Calcitriol (1,25 di-OH Vitamin D)    5. Morbid obesity with BMI of 40.0-44.9, adult  Counseled patient on his ideal body weight, health consequences of being obese and current recommendations including weekly exercise and a heart healthy diet.  Current BMI is:Estimated body mass index is 38.79 kg/m² as calculated from the following:    Height as of this encounter: 5' 2" (1.575 m).    Weight as of this encounter: 96.2 kg (212 lb 1.3 oz)..  Patient is aware that ideal BMI < 25 or Weight in (lb) to have BMI = 25: 136.4.        6. Prediabetes   Your blood sugar is borderline high.  This means you are at risk for developing type 2 diabetes mellitus.  To lessen your risk you should exercise regularly, avoid excess carbohydrates and work toward a body mass index of less than 25.      - Hemoglobin A1C; Future    7. Chronic obstructive pulmonary disease, unspecified COPD type  Refer for PFTs.  Add flovent daily.  Use albuterol prn.     8. Atherosclerosis of aorta  Cont to lower risk factors    9. Flu vaccine need  Immunize today.  Counseled patient on risks, benefits and side effects.  Patient elected to proceed with vaccination.    - Influenza (FLUAD) - Quadrivalent (Adjuvanted) *Preferred* (65+) (PF)    10. Encounter for screening mammogram for malignant neoplasm of breast  Screen and treat as indicated:    - Mammo Digital Screening Bilat; Future    11. Chronic cough  Screen and treat as indicated:    - Complete PFT w/ bronchodilator; Future  - fluticasone propionate (FLOVENT HFA) 220 mcg/actuation inhaler; Inhale 1 puff into the lungs 2 (two) times daily. Controller  Dispense: 12 g; Refill: 11  - X-Ray Chest PA And Lateral; Future        Time spent with patient: 20 minutes    Patient with be " reevaluated in 6 weeks or sooner prn    Greater than 50% of this visit was spent counseling as described in above documentation:Yes

## 2022-02-21 DIAGNOSIS — R73.03 PREDIABETES: ICD-10-CM

## 2022-02-21 DIAGNOSIS — E78.5 HYPERLIPIDEMIA, UNSPECIFIED HYPERLIPIDEMIA TYPE: Primary | ICD-10-CM

## 2022-02-21 RX ORDER — ROSUVASTATIN CALCIUM 10 MG/1
10 TABLET, COATED ORAL DAILY
Qty: 90 TABLET | Refills: 3 | Status: SHIPPED | OUTPATIENT
Start: 2022-02-21 | End: 2022-04-20

## 2022-04-20 ENCOUNTER — OFFICE VISIT (OUTPATIENT)
Dept: FAMILY MEDICINE | Facility: CLINIC | Age: 72
End: 2022-04-20
Payer: MEDICARE

## 2022-04-20 DIAGNOSIS — J44.9 CHRONIC OBSTRUCTIVE PULMONARY DISEASE, UNSPECIFIED COPD TYPE: ICD-10-CM

## 2022-04-20 DIAGNOSIS — E03.9 HYPOTHYROIDISM, UNSPECIFIED TYPE: ICD-10-CM

## 2022-04-20 DIAGNOSIS — E78.5 HYPERLIPIDEMIA, UNSPECIFIED HYPERLIPIDEMIA TYPE: Primary | ICD-10-CM

## 2022-04-20 DIAGNOSIS — R05.3 CHRONIC COUGH: ICD-10-CM

## 2022-04-20 DIAGNOSIS — R73.03 PREDIABETES: ICD-10-CM

## 2022-04-20 DIAGNOSIS — I10 HYPERTENSION, UNSPECIFIED TYPE: ICD-10-CM

## 2022-04-20 DIAGNOSIS — E55.9 VITAMIN D DEFICIENCY: ICD-10-CM

## 2022-04-20 DIAGNOSIS — Z12.31 ENCOUNTER FOR SCREENING MAMMOGRAM FOR MALIGNANT NEOPLASM OF BREAST: ICD-10-CM

## 2022-04-20 DIAGNOSIS — E66.01 MORBID OBESITY WITH BMI OF 40.0-44.9, ADULT: ICD-10-CM

## 2022-04-20 PROCEDURE — 1159F MED LIST DOCD IN RCRD: CPT | Mod: CPTII,95,, | Performed by: FAMILY MEDICINE

## 2022-04-20 PROCEDURE — 1160F PR REVIEW ALL MEDS BY PRESCRIBER/CLIN PHARMACIST DOCUMENTED: ICD-10-PCS | Mod: CPTII,95,, | Performed by: FAMILY MEDICINE

## 2022-04-20 PROCEDURE — 1160F RVW MEDS BY RX/DR IN RCRD: CPT | Mod: CPTII,95,, | Performed by: FAMILY MEDICINE

## 2022-04-20 PROCEDURE — 99214 OFFICE O/P EST MOD 30 MIN: CPT | Mod: 95,,, | Performed by: FAMILY MEDICINE

## 2022-04-20 PROCEDURE — 3044F PR MOST RECENT HEMOGLOBIN A1C LEVEL <7.0%: ICD-10-PCS | Mod: CPTII,95,, | Performed by: FAMILY MEDICINE

## 2022-04-20 PROCEDURE — 1159F PR MEDICATION LIST DOCUMENTED IN MEDICAL RECORD: ICD-10-PCS | Mod: CPTII,95,, | Performed by: FAMILY MEDICINE

## 2022-04-20 PROCEDURE — 99214 PR OFFICE/OUTPT VISIT, EST, LEVL IV, 30-39 MIN: ICD-10-PCS | Mod: 95,,, | Performed by: FAMILY MEDICINE

## 2022-04-20 PROCEDURE — 3044F HG A1C LEVEL LT 7.0%: CPT | Mod: CPTII,95,, | Performed by: FAMILY MEDICINE

## 2022-04-20 RX ORDER — ROSUVASTATIN CALCIUM 10 MG/1
10 TABLET, COATED ORAL DAILY
Qty: 90 TABLET | Refills: 3 | COMMUNITY
Start: 2022-04-20 | End: 2023-04-20

## 2022-04-20 NOTE — PROGRESS NOTES
Subjective:       Patient ID: Aida Gupta is a 71 y.o. female.    Chief Complaint: No chief complaint on file.    HPI  Review of Systems   Constitutional: Negative for fatigue and unexpected weight change.   Respiratory: Negative for chest tightness and shortness of breath.    Cardiovascular: Negative for chest pain, palpitations and leg swelling.   Gastrointestinal: Negative for abdominal pain.   Musculoskeletal: Negative for arthralgias.   Neurological: Negative for dizziness, syncope, light-headedness and headaches.       Patient Active Problem List   Diagnosis    Hypothyroid    Gastroesophageal reflux disease without esophagitis    Morbid obesity with BMI of 40.0-44.9, adult    Hyperlipidemia    Prediabetes    Dermatitis    Dyspnea    Dysphagia    Lower esophageal ring    Gastritis determined by endoscopy    Gastric polyp    DDD (degenerative disc disease), lumbar    Lumbar radiculitis    Vitamin D deficiency    HTN (hypertension)    Post herpetic neuralgia    Ds DNA antibody positive    Positive JAVY (antinuclear antibody)    Hashimoto's disease    Cystocele with prolapse    Incomplete emptying of bladder    Recurrent UTI    Pulmonary nodule    Atherosclerosis of aorta     Patient is here for telemedicine visit  The patient location is: [place], [place]  The chief complaint leading to consultation is: f/u  Visit type: Virtual visit with synchronous audio and video  Total time spent with patient: 10-20 min  Each patient to whom he or she provides medical services by telemedicine is:  (1) informed of the relationship between the physician and patient and the respective role of any other health care provider with respect to management of the patient; and (2) notified that he or she may decline to receive medical services by telemedicine and may withdraw from such care at any time.    Notes: see below   AUDIO VISIT ONLY? no  Reviewed labs 2/022  Prediabetes  A1c 5.7      \Seen in ED  12/22/2021 for bronchitis. COVID neg.   C/o chronic cough with prod white/clear sputum. No sob or fever. Feels weak and tired. Chest x ray 2/2022 neg. pfts ordered- not done      has passed away. has Limited transportation.       HPL -Your cholesterol is high.Your cholesterol is high.  Your 10 year risk of having a heart attack or a stroke is:The 10-year ASCVD risk score (Wayzata ELEANOR Jr., et al., 2013) is: 10.1%    Values used to calculate the score:      Age: 71 years      Sex: Female      Is Non- : No      Diabetic: No      Tobacco smoker: No      Systolic Blood Pressure: 132 mmHg      Is BP treated: No      HDL Cholesterol: 42 mg/dL      Total Cholesterol: 228 mg/dL  Has not been taking crestor          Urology Dr. Castillo recurrent UTIs  Rheum Dr. Serrato- following for + JAVY with + ds DNA and antithyroid antibodies (TPO AB)- possible SLE but no current manifestations     GI Dr. Warren EGD 9/17-mild schatzki ring-dilated, gastric polyp, gerd     Chronic pain- States she can't sit down due to post herpetic neuralgia which started 2 years ago. Pain Dr. Walton has tried nerve blocks caudal 10/17 and According to notes from pain med she has had SEUN for lumbar radiculopathy 1/18     Increased to gabapentin 300mg tid. Cymbatla 30 mg a day added but could not tolerate due to nausea.  gabapentin makes her drowsy. Elavil helps with mood somewhat.  celexa started last visit but states [facility] never filled it. Still having depression and grief. Talking to [employer] about loss of  which helps. Has limited trasnportatio  Objective:      Physical Exam  Vitals and nursing note reviewed.   Constitutional:       Appearance: She is well-developed.   Cardiovascular:      Rate and Rhythm: Normal rate and regular rhythm.      Heart sounds: Normal heart sounds.   Pulmonary:      Effort: Pulmonary effort is normal.      Breath sounds: Normal breath sounds.   Skin:     General: Skin is warm and  "dry.   Neurological:      Mental Status: She is alert and oriented to person, place, and time.         Assessment:       1. Hyperlipidemia, unspecified hyperlipidemia type    2. Prediabetes    3. Hypertension, unspecified type    4. Hypothyroidism, unspecified type    5. Vitamin D deficiency    6. Morbid obesity with BMI of 40.0-44.9, adult    7. Chronic obstructive pulmonary disease, unspecified COPD type    8. Chronic cough    9. Encounter for screening mammogram for malignant neoplasm of breast        Plan:       1. Hyperlipidemia, unspecified hyperlipidemia type  restart  - rosuvastatin (CRESTOR) 10 MG tablet; Take 1 tablet (10 mg total) by mouth once daily.  Dispense: 90 tablet; Refill: 3  - CBC Auto Differential; Future  - Comprehensive Metabolic Panel; Future  - Lipid Panel; Future    2. Prediabetes  Controlled with diet  - Hemoglobin A1C; Future    3. Hypertension, unspecified type  Controlled on current medications.  Continue current medications.      4. Hypothyroidism, unspecified type  Controlled on current medications.  Continue current medications.      5. Vitamin D deficiency  Cont supplement and monitor    6. Morbid obesity with BMI of 40.0-44.9, adult  Counseled patient on his ideal body weight, health consequences of being obese and current recommendations including weekly exercise and a heart healthy diet.  Current BMI is:Estimated body mass index is 38.79 kg/m² as calculated from the following:    Height as of 2/10/22: 5' 2" (1.575 m).    Weight as of 2/10/22: 96.2 kg (212 lb 1.3 oz)..  Patient is aware that ideal BMI < 25 or  .        7. Chronic obstructive pulmonary disease, unspecified COPD type  Screen and treat as indicated:    - Complete PFT w/ bronchodilator; Future    8. Chronic cough  Screen and treat as indicated:    - Complete PFT w/ bronchodilator; Future  - Ambulatory referral/consult to Pulmonology; Future    9. Encounter for screening mammogram for malignant neoplasm of " breast  Screen and treat as indicated:    - Mammo Digital Screening Bilat; Future  - Ambulatory referral/consult to Pulmonology; Future        Time spent with patient: 20 minutes    Patient with be reevaluated in 3 months or sooner prn    Greater than 50% of this visit was spent counseling as described in above documentation:Yes

## 2022-04-21 ENCOUNTER — TELEPHONE (OUTPATIENT)
Dept: FAMILY MEDICINE | Facility: CLINIC | Age: 72
End: 2022-04-21
Payer: MEDICARE

## 2022-04-21 NOTE — TELEPHONE ENCOUNTER
Pulmonology referral: patient requests [facility] location;  speaking with patient; call dropped; unable to contact back. Being unable to schedule for [facility], staff message sent for patient assistance. Contact information to patient via portal, if needed.

## 2022-04-21 NOTE — TELEPHONE ENCOUNTER
Pulmonology referral: phone call, no answer, no voicemail. Portal message sent that [facility], at this time, does not have a Pulmonology physician staff and to contact for appointment at different location.

## 2022-04-26 NOTE — PROGRESS NOTES
Called and spoke to patient regarding follow up appointment. Patient stated that she will call back to scheduled PFT and mammogram.

## 2022-05-09 ENCOUNTER — TELEPHONE (OUTPATIENT)
Dept: PULMONOLOGY | Facility: CLINIC | Age: 72
End: 2022-05-09
Payer: MEDICARE

## 2022-05-09 ENCOUNTER — TELEPHONE (OUTPATIENT)
Dept: FAMILY MEDICINE | Facility: CLINIC | Age: 72
End: 2022-05-09
Payer: MEDICARE

## 2022-05-09 NOTE — TELEPHONE ENCOUNTER
Gave patient the next appt with Dr Diaz on July 20th. at 2:00 pm.      ----- Message from Rosa Bravo sent at 5/9/2022  9:28 AM CDT -----  Contact: Self  Patient has a referral to see a Pulmonologist, but wants to be able to see one in [place], not in [place]. Can we please contact patient regarding setting up a new patient consult, at [phone]. Thank You.

## 2022-05-09 NOTE — TELEPHONE ENCOUNTER
----- Message from Rosa Bravo sent at 5/9/2022  9:16 AM CDT -----  Contact: Self  Patient has been selected for jury duty and because of her health issues and pain in her back/hips with sitting for too long she cannot participate and they are requesting a medical release from her physician stating why she cannot participate. She is asking if Dr. Fisher can give her a medical release from Jury duty. Please call patient back to advise at [phone]. She is asking for this before Friday and wants to have her daughter pick it up, can we make 2 copies so she can keep one and give another to the courthouse. Thank You.

## 2022-05-11 ENCOUNTER — TELEPHONE (OUTPATIENT)
Dept: FAMILY MEDICINE | Facility: CLINIC | Age: 72
End: 2022-05-11
Payer: MEDICARE

## 2022-05-11 NOTE — LETTER
[facility]  [address]  [facility] [address]  Phone: [phone]  Fax: [phone] May 13, 2022    Aida Gupta  [address] [place] [address]      To Whom It May Concern:    Aida Gupta is unable to participate in jury duty due to chronic medical conditions which prohibit her from sitting/standing for prolonged periods without change of position.  Thank you for consideration of this matter.    If you have any questions or concerns, please feel free to call my office.    Sincerely,        Edel Fisher MD

## 2022-05-11 NOTE — TELEPHONE ENCOUNTER
----- Message from Alka Butler MA sent at 5/11/2022 10:27 AM CDT -----  Type: Needs Medical Advice  Who Called:  pt    Best Call Back Number: [phone]    Additional Information: pt would like to speak with the office regarding status of jury duty excuse letter--she needs this by Friday--please advise--thank you

## 2022-05-12 NOTE — TELEPHONE ENCOUNTER
Please see previous message. Called and spoke to patient regarding letter to be dismiss from jury duty.  Patient states that she will need letter by Monday 5/16/22

## 2022-05-12 NOTE — TELEPHONE ENCOUNTER
----- Message from Krupa Caputo sent at 5/12/2022  1:11 PM CDT -----  Contact: [phone]  Type: Needs Medical Advice  Who Called:  Pt    Best Call Back Number: [phone]    Additional Information: Pt calling in regards to her jury duty excuse letter. She is checking on the status of this. Pls call back

## 2022-05-13 ENCOUNTER — PATIENT MESSAGE (OUTPATIENT)
Dept: ADMINISTRATIVE | Facility: HOSPITAL | Age: 72
End: 2022-05-13
Payer: MEDICARE

## 2022-05-16 ENCOUNTER — TELEPHONE (OUTPATIENT)
Dept: FAMILY MEDICINE | Facility: CLINIC | Age: 72
End: 2022-05-16
Payer: MEDICARE

## 2022-05-16 NOTE — TELEPHONE ENCOUNTER
----- Message from Rosa Shelly sent at 5/16/2022  9:32 AM CDT -----  Contact: Self  Patient is calling in reference to the letter from Dr. Fisher excusing her from jury duty for medical reasons. Stated she needs to have it faxed to Attn: Lex Lang at [phone]. Since she was unable to pick it up, stated needs to be sent today please. Please call patient back once completed to let her know it was faxed. [phone] (home)  Thank You.

## 2022-05-17 ENCOUNTER — TELEPHONE (OUTPATIENT)
Dept: FAMILY MEDICINE | Facility: CLINIC | Age: 72
End: 2022-05-17
Payer: MEDICARE

## 2022-05-17 NOTE — TELEPHONE ENCOUNTER
----- Message from Rekha Mas sent at 5/17/2022  9:43 AM CDT -----  Contact: patient  Type: Needs Medical Advice  Who Called:  patient   Symptoms (please be specific):    How long has patient had these symptoms:    Pharmacy name and phone #:    Best Call Back Number: [phone]    Additional Information: states the letter to excuse her form jury duty was not received, states office and call [phone] to Lex Croft, or refax to him at [phone]. Please contact to advise when done.

## 2022-05-17 NOTE — TELEPHONE ENCOUNTER
Jury duty excuse letter refaxed to Lex Croft @[phone] per pt request.  Pt notified voiced understanding.

## 2022-05-17 NOTE — TELEPHONE ENCOUNTER
----- Message from Emilee Womack sent at 5/17/2022 12:19 PM CDT -----  Contact: Patient [phone]  Type: Patient Call Back    Who called: Patient     What is the request in detail: Would like to speak to nurse in regards to JURY DUTY letter that was supposed to be faxed to the court house. Patient states that she's being told that the excuse for Jury Duty hasn't been faxed. Need to speak to nurse in regards to this. Please call.     Would the patient rather a call back or a response via [facility]? Call back    Best call back number: [phone]

## 2022-05-31 ENCOUNTER — PATIENT MESSAGE (OUTPATIENT)
Dept: ADMINISTRATIVE | Facility: HOSPITAL | Age: 72
End: 2022-05-31
Payer: MEDICARE

## 2022-06-29 ENCOUNTER — PES CALL (OUTPATIENT)
Dept: ADMINISTRATIVE | Facility: CLINIC | Age: 72
End: 2022-06-29
Payer: MEDICARE

## 2022-07-20 ENCOUNTER — OFFICE VISIT (OUTPATIENT)
Dept: PULMONOLOGY | Facility: CLINIC | Age: 72
End: 2022-07-20
Payer: MEDICARE

## 2022-07-20 VITALS
WEIGHT: 210.19 LBS | BODY MASS INDEX: 38.68 KG/M2 | SYSTOLIC BLOOD PRESSURE: 154 MMHG | OXYGEN SATURATION: 94 % | HEIGHT: 62 IN | HEART RATE: 98 BPM | DIASTOLIC BLOOD PRESSURE: 112 MMHG

## 2022-07-20 DIAGNOSIS — R91.1 PULMONARY NODULE: ICD-10-CM

## 2022-07-20 DIAGNOSIS — Z12.31 ENCOUNTER FOR SCREENING MAMMOGRAM FOR MALIGNANT NEOPLASM OF BREAST: ICD-10-CM

## 2022-07-20 DIAGNOSIS — R05.3 CHRONIC COUGH: ICD-10-CM

## 2022-07-20 DIAGNOSIS — R13.10 DYSPHAGIA, UNSPECIFIED TYPE: ICD-10-CM

## 2022-07-20 DIAGNOSIS — R06.00 DYSPNEA, UNSPECIFIED TYPE: ICD-10-CM

## 2022-07-20 DIAGNOSIS — R05.9 COUGH: ICD-10-CM

## 2022-07-20 PROCEDURE — 3044F HG A1C LEVEL LT 7.0%: CPT | Mod: CPTII,S$GLB,, | Performed by: INTERNAL MEDICINE

## 2022-07-20 PROCEDURE — 1101F PR PT FALLS ASSESS DOC 0-1 FALLS W/OUT INJ PAST YR: ICD-10-PCS | Mod: CPTII,S$GLB,, | Performed by: INTERNAL MEDICINE

## 2022-07-20 PROCEDURE — 3288F PR FALLS RISK ASSESSMENT DOCUMENTED: ICD-10-PCS | Mod: CPTII,S$GLB,, | Performed by: INTERNAL MEDICINE

## 2022-07-20 PROCEDURE — 99499 RISK ADDL DX/OHS AUDIT: ICD-10-PCS | Mod: S$GLB,,, | Performed by: INTERNAL MEDICINE

## 2022-07-20 PROCEDURE — 99999 PR PBB SHADOW E&M-EST. PATIENT-LVL IV: ICD-10-PCS | Mod: PBBFAC,,, | Performed by: INTERNAL MEDICINE

## 2022-07-20 PROCEDURE — 99499 UNLISTED E&M SERVICE: CPT | Mod: S$GLB,,, | Performed by: INTERNAL MEDICINE

## 2022-07-20 PROCEDURE — 3044F PR MOST RECENT HEMOGLOBIN A1C LEVEL <7.0%: ICD-10-PCS | Mod: CPTII,S$GLB,, | Performed by: INTERNAL MEDICINE

## 2022-07-20 PROCEDURE — 1160F RVW MEDS BY RX/DR IN RCRD: CPT | Mod: CPTII,S$GLB,, | Performed by: INTERNAL MEDICINE

## 2022-07-20 PROCEDURE — 1160F PR REVIEW ALL MEDS BY PRESCRIBER/CLIN PHARMACIST DOCUMENTED: ICD-10-PCS | Mod: CPTII,S$GLB,, | Performed by: INTERNAL MEDICINE

## 2022-07-20 PROCEDURE — 99204 PR OFFICE/OUTPT VISIT, NEW, LEVL IV, 45-59 MIN: ICD-10-PCS | Mod: S$GLB,,, | Performed by: INTERNAL MEDICINE

## 2022-07-20 PROCEDURE — 3080F PR MOST RECENT DIASTOLIC BLOOD PRESSURE >= 90 MM HG: ICD-10-PCS | Mod: CPTII,S$GLB,, | Performed by: INTERNAL MEDICINE

## 2022-07-20 PROCEDURE — 1159F MED LIST DOCD IN RCRD: CPT | Mod: CPTII,S$GLB,, | Performed by: INTERNAL MEDICINE

## 2022-07-20 PROCEDURE — 3080F DIAST BP >= 90 MM HG: CPT | Mod: CPTII,S$GLB,, | Performed by: INTERNAL MEDICINE

## 2022-07-20 PROCEDURE — 1126F PR PAIN SEVERITY QUANTIFIED, NO PAIN PRESENT: ICD-10-PCS | Mod: CPTII,S$GLB,, | Performed by: INTERNAL MEDICINE

## 2022-07-20 PROCEDURE — 3077F SYST BP >= 140 MM HG: CPT | Mod: CPTII,S$GLB,, | Performed by: INTERNAL MEDICINE

## 2022-07-20 PROCEDURE — 1126F AMNT PAIN NOTED NONE PRSNT: CPT | Mod: CPTII,S$GLB,, | Performed by: INTERNAL MEDICINE

## 2022-07-20 PROCEDURE — 99999 PR PBB SHADOW E&M-EST. PATIENT-LVL IV: CPT | Mod: PBBFAC,,, | Performed by: INTERNAL MEDICINE

## 2022-07-20 PROCEDURE — 99204 OFFICE O/P NEW MOD 45 MIN: CPT | Mod: S$GLB,,, | Performed by: INTERNAL MEDICINE

## 2022-07-20 PROCEDURE — 3008F BODY MASS INDEX DOCD: CPT | Mod: CPTII,S$GLB,, | Performed by: INTERNAL MEDICINE

## 2022-07-20 PROCEDURE — 1159F PR MEDICATION LIST DOCUMENTED IN MEDICAL RECORD: ICD-10-PCS | Mod: CPTII,S$GLB,, | Performed by: INTERNAL MEDICINE

## 2022-07-20 PROCEDURE — 3008F PR BODY MASS INDEX (BMI) DOCUMENTED: ICD-10-PCS | Mod: CPTII,S$GLB,, | Performed by: INTERNAL MEDICINE

## 2022-07-20 PROCEDURE — 3077F PR MOST RECENT SYSTOLIC BLOOD PRESSURE >= 140 MM HG: ICD-10-PCS | Mod: CPTII,S$GLB,, | Performed by: INTERNAL MEDICINE

## 2022-07-20 PROCEDURE — 1101F PT FALLS ASSESS-DOCD LE1/YR: CPT | Mod: CPTII,S$GLB,, | Performed by: INTERNAL MEDICINE

## 2022-07-20 PROCEDURE — 3288F FALL RISK ASSESSMENT DOCD: CPT | Mod: CPTII,S$GLB,, | Performed by: INTERNAL MEDICINE

## 2022-07-20 RX ORDER — MONTELUKAST SODIUM 10 MG/1
10 TABLET ORAL NIGHTLY
Qty: 30 TABLET | Refills: 0 | Status: SHIPPED | OUTPATIENT
Start: 2022-07-20 | End: 2022-08-19

## 2022-07-20 NOTE — PROGRESS NOTES
Subjective:       Patient ID: Aida Gupta is a 72 y.o. female.    Chief Complaint: Shortness of Breath, Cough, and Sputum Production (white)    72 year old with cough that has been present for several years. Patient with reflux.   No lung disease.   No known asthma.   Denies fever and chills.   Sinus drainage.   Otherwise doing well.    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       Review of Systems   Constitutional: Negative for activity change and appetite change.   Respiratory: Positive for cough and dyspnea on extertion.        Objective:      Physical Exam   Constitutional: She is oriented to person, place, and time. She appears well-developed and well-nourished. No distress.   HENT:   Head: Normocephalic.   Nose: Nose normal.   Cardiovascular: Normal rate and regular rhythm.   Pulmonary/Chest: Normal expansion and effort normal.   Abdominal: Soft.   Musculoskeletal:      Cervical back: Normal range of motion and neck supple.   Neurological: She is alert and oriented to person, place, and time.   Skin: Skin is warm and dry. She is not diaphoretic.   Psychiatric: She has a normal mood and affect. Her behavior is normal.   Nursing note and vitals reviewed.    Personal Diagnostic Review  none pertinent  Pulmonary Function Tests 7/20/2022   SpO2 94   Height 62   Weight 3362.99   BMI (Calculated) 38.4   Some recent data might be hidden         Assessment:       1. Chronic cough    2. Encounter for screening mammogram for malignant neoplasm of breast    3. Cough        Outpatient Encounter Medications as of 7/20/2022   Medication Sig Dispense Refill    albuterol (PROVENTIL/VENTOLIN HFA) 90 mcg/actuation inhaler Inhale 2 puffs into the lungs every 6 (six) hours as needed for Wheezing. Rescue      amitriptyline (ELAVIL) 50 MG tablet TAKE 1 TABLET BY MOUTH ONCE DAILY IN THE EVENING 90 tablet 3    citalopram (CELEXA) 20 MG tablet Take 1 tablet (20 mg total) by mouth once daily. 90  tablet 3    ergocalciferol (VITAMIN D2) 50,000 unit Cap Take 1 capsule (50,000 Units total) by mouth every 7 days. 12 capsule 3    EUTHYROX 150 mcg tablet Take 1 tablet by mouth once daily 90 tablet 2    fluticasone propionate (FLOVENT HFA) 220 mcg/actuation inhaler Inhale 1 puff into the lungs 2 (two) times daily. Controller 12 g 11    gabapentin (NEURONTIN) 300 MG capsule TAKE 3 CAPSULES BY MOUTH THREE TIMES DAILY 270 capsule 3    meloxicam (MOBIC) 7.5 MG tablet Take 1 tablet by mouth once daily 90 tablet 3    ondansetron (ZOFRAN) 4 MG tablet TAKE 1 TABLET BY MOUTH EVERY 8 HOURS AS NEEDED FOR NAUSEA 30 tablet 3    triamcinolone acetonide 0.1% (KENALOG) 0.1 % cream Apply topically 2 (two) times daily.      doxepin (ZONALON) 5 % cream Apply thin film topically to affected area three times daily as needed (Patient not taking: Reported on 7/20/2022) 45 g 5    FLUTICASONE PROPIONATE, MICRO (FLUTICASONE PROP, MICRO, BULK, MISC) by Misc.(Non-Drug; Combo Route) route.      montelukast (SINGULAIR) 10 mg tablet Take 1 tablet (10 mg total) by mouth every evening. 30 tablet 0    omeprazole (PRILOSEC) 20 MG capsule Take 1 capsule (20 mg total) by mouth once daily. (Patient not taking: Reported on 7/20/2022) 90 capsule 1    rosuvastatin (CRESTOR) 10 MG tablet Take 1 tablet (10 mg total) by mouth once daily. 90 tablet 3     No facility-administered encounter medications on file as of 7/20/2022.     Orders Placed This Encounter   Procedures    CT Chest Without Contrast     Standing Status:   Future     Standing Expiration Date:   7/20/2023     Order Specific Question:   May the Radiologist modify the order per protocol to meet the clinical needs of the patient?     Answer:   Yes    Complete PFT with bronchodilator     Standing Status:   Future     Standing Expiration Date:   7/20/2023     Order Specific Question:   Release to patient     Answer:   Immediate       Plan:           Patient Active Problem List    Diagnosis    Hypothyroid    Gastroesophageal reflux disease without esophagitis    Morbid obesity with BMI of 40.0-44.9, adult    Hyperlipidemia    Prediabetes    Dermatitis    Dyspnea    Dysphagia    Lower esophageal ring    Gastritis determined by endoscopy    Gastric polyp    DDD (degenerative disc disease), lumbar    Lumbar radiculitis    Vitamin D deficiency    HTN (hypertension)    Post herpetic neuralgia    Ds DNA antibody positive    Positive JAVY (antinuclear antibody)    Hashimoto's disease    Cystocele with prolapse    Incomplete emptying of bladder    Recurrent UTI    Pulmonary nodule    Atherosclerosis of aorta    Cough     Pulmonary nodule  Repeat CT chest    Dyspnea  PFTs    Dysphagia  PPI    Cough  Most likely upper respiratory cough syndrome.   Flonase  Some wheezing so ADITHYA and Singulair.  Antihistamine    PFT CT chest      Bryan Daiz MD

## 2022-07-20 NOTE — ASSESSMENT & PLAN NOTE
Most likely upper respiratory cough syndrome.   Flonase  Some wheezing so ADITHYA and Singulair.  Antihistamine    PFT CT chest

## 2022-08-24 ENCOUNTER — OFFICE VISIT (OUTPATIENT)
Dept: FAMILY MEDICINE | Facility: CLINIC | Age: 72
End: 2022-08-24
Payer: MEDICARE

## 2022-08-24 ENCOUNTER — PATIENT MESSAGE (OUTPATIENT)
Dept: ADMINISTRATIVE | Facility: HOSPITAL | Age: 72
End: 2022-08-24
Payer: MEDICARE

## 2022-08-24 VITALS
HEIGHT: 62 IN | DIASTOLIC BLOOD PRESSURE: 80 MMHG | BODY MASS INDEX: 38.62 KG/M2 | TEMPERATURE: 98 F | HEART RATE: 82 BPM | WEIGHT: 209.88 LBS | OXYGEN SATURATION: 97 % | SYSTOLIC BLOOD PRESSURE: 140 MMHG

## 2022-08-24 DIAGNOSIS — J44.9 CHRONIC OBSTRUCTIVE PULMONARY DISEASE, UNSPECIFIED COPD TYPE: ICD-10-CM

## 2022-08-24 DIAGNOSIS — E03.9 HYPOTHYROIDISM, UNSPECIFIED TYPE: ICD-10-CM

## 2022-08-24 DIAGNOSIS — E78.5 HYPERLIPIDEMIA, UNSPECIFIED HYPERLIPIDEMIA TYPE: Primary | ICD-10-CM

## 2022-08-24 DIAGNOSIS — R73.03 PREDIABETES: ICD-10-CM

## 2022-08-24 DIAGNOSIS — E66.9 OBESITY (BMI 30-39.9): ICD-10-CM

## 2022-08-24 DIAGNOSIS — I10 HYPERTENSION, UNSPECIFIED TYPE: ICD-10-CM

## 2022-08-24 DIAGNOSIS — F41.8 DEPRESSION WITH ANXIETY: ICD-10-CM

## 2022-08-24 DIAGNOSIS — Z12.11 COLON CANCER SCREENING: ICD-10-CM

## 2022-08-24 DIAGNOSIS — T14.8XXA MULTIPLE SKIN TEARS: ICD-10-CM

## 2022-08-24 PROCEDURE — 99214 OFFICE O/P EST MOD 30 MIN: CPT | Mod: S$GLB,,, | Performed by: NURSE PRACTITIONER

## 2022-08-24 PROCEDURE — 3079F DIAST BP 80-89 MM HG: CPT | Mod: CPTII,S$GLB,, | Performed by: NURSE PRACTITIONER

## 2022-08-24 PROCEDURE — 99214 PR OFFICE/OUTPT VISIT, EST, LEVL IV, 30-39 MIN: ICD-10-PCS | Mod: S$GLB,,, | Performed by: NURSE PRACTITIONER

## 2022-08-24 PROCEDURE — 3077F PR MOST RECENT SYSTOLIC BLOOD PRESSURE >= 140 MM HG: ICD-10-PCS | Mod: CPTII,S$GLB,, | Performed by: NURSE PRACTITIONER

## 2022-08-24 PROCEDURE — 1101F PT FALLS ASSESS-DOCD LE1/YR: CPT | Mod: CPTII,S$GLB,, | Performed by: NURSE PRACTITIONER

## 2022-08-24 PROCEDURE — 99499 RISK ADDL DX/OHS AUDIT: ICD-10-PCS | Mod: S$GLB,,, | Performed by: NURSE PRACTITIONER

## 2022-08-24 PROCEDURE — 3288F PR FALLS RISK ASSESSMENT DOCUMENTED: ICD-10-PCS | Mod: CPTII,S$GLB,, | Performed by: NURSE PRACTITIONER

## 2022-08-24 PROCEDURE — 3288F FALL RISK ASSESSMENT DOCD: CPT | Mod: CPTII,S$GLB,, | Performed by: NURSE PRACTITIONER

## 2022-08-24 PROCEDURE — 3044F PR MOST RECENT HEMOGLOBIN A1C LEVEL <7.0%: ICD-10-PCS | Mod: CPTII,S$GLB,, | Performed by: NURSE PRACTITIONER

## 2022-08-24 PROCEDURE — 99499 UNLISTED E&M SERVICE: CPT | Mod: S$GLB,,, | Performed by: NURSE PRACTITIONER

## 2022-08-24 PROCEDURE — 99999 PR PBB SHADOW E&M-EST. PATIENT-LVL V: ICD-10-PCS | Mod: PBBFAC,,, | Performed by: NURSE PRACTITIONER

## 2022-08-24 PROCEDURE — 3077F SYST BP >= 140 MM HG: CPT | Mod: CPTII,S$GLB,, | Performed by: NURSE PRACTITIONER

## 2022-08-24 PROCEDURE — 1160F RVW MEDS BY RX/DR IN RCRD: CPT | Mod: CPTII,S$GLB,, | Performed by: NURSE PRACTITIONER

## 2022-08-24 PROCEDURE — 1160F PR REVIEW ALL MEDS BY PRESCRIBER/CLIN PHARMACIST DOCUMENTED: ICD-10-PCS | Mod: CPTII,S$GLB,, | Performed by: NURSE PRACTITIONER

## 2022-08-24 PROCEDURE — 3008F BODY MASS INDEX DOCD: CPT | Mod: CPTII,S$GLB,, | Performed by: NURSE PRACTITIONER

## 2022-08-24 PROCEDURE — 3079F PR MOST RECENT DIASTOLIC BLOOD PRESSURE 80-89 MM HG: ICD-10-PCS | Mod: CPTII,S$GLB,, | Performed by: NURSE PRACTITIONER

## 2022-08-24 PROCEDURE — 99999 PR PBB SHADOW E&M-EST. PATIENT-LVL V: CPT | Mod: PBBFAC,,, | Performed by: NURSE PRACTITIONER

## 2022-08-24 PROCEDURE — 1125F PR PAIN SEVERITY QUANTIFIED, PAIN PRESENT: ICD-10-PCS | Mod: CPTII,S$GLB,, | Performed by: NURSE PRACTITIONER

## 2022-08-24 PROCEDURE — 3044F HG A1C LEVEL LT 7.0%: CPT | Mod: CPTII,S$GLB,, | Performed by: NURSE PRACTITIONER

## 2022-08-24 PROCEDURE — 1101F PR PT FALLS ASSESS DOC 0-1 FALLS W/OUT INJ PAST YR: ICD-10-PCS | Mod: CPTII,S$GLB,, | Performed by: NURSE PRACTITIONER

## 2022-08-24 PROCEDURE — 1125F AMNT PAIN NOTED PAIN PRSNT: CPT | Mod: CPTII,S$GLB,, | Performed by: NURSE PRACTITIONER

## 2022-08-24 PROCEDURE — 1159F MED LIST DOCD IN RCRD: CPT | Mod: CPTII,S$GLB,, | Performed by: NURSE PRACTITIONER

## 2022-08-24 PROCEDURE — 3008F PR BODY MASS INDEX (BMI) DOCUMENTED: ICD-10-PCS | Mod: CPTII,S$GLB,, | Performed by: NURSE PRACTITIONER

## 2022-08-24 PROCEDURE — 1159F PR MEDICATION LIST DOCUMENTED IN MEDICAL RECORD: ICD-10-PCS | Mod: CPTII,S$GLB,, | Performed by: NURSE PRACTITIONER

## 2022-08-24 NOTE — PATIENT INSTRUCTIONS
"Russ Parra,     If you are due for any health screening(s) below please notify me so we can arrange them to be ordered and scheduled to maintain your health. Most healthy patients complete it. Don't lose out on improving your health.       Mammogram: ORDERED BUT NOT SCHEDULED  Blood Pressure <= 139/89: Yes     Breast Cancer Screening    Breast cancer is the second most common cancer in women after skin cancer, and the second leading cause of death from cancer after lung cancer. Mammograms can detect breast cancer early, which significantly increases the chances of curing the cancer.      A screening mammogram is an x-ray image of the breasts used for early breast cancer detection. It can help reduce the number of deaths from breast cancer among women. To get a clear image, the breast is placed between two plastic plates to make it flat. How often a mammogram is needed depends on your age and your breast cancer risk.    Although you are still overdue for this important screening, due to the COVID-19 pandemic, we recommend scheduling it in the near future.                   Patient Education       Checking Your Blood Pressure at Home   The Basics   Written by the doctors and editors at [facility]   How is blood pressure measured? -- Blood pressure is usually measured with a device that goes around your upper arm. This is often done in a doctor's office. But some people also check their blood pressure themselves, at home or at work.  Blood pressure is explained with 2 numbers. For instance, your blood pressure might be "140 over 90." The first (top) number is the pressure inside your arteries when your heart is tasha. The second (bottom) number is the pressure inside your arteries when your heart is relaxed. The table shows how doctors and nurses define high and normal blood pressure (table 1).  If your blood pressure gets too high, it puts you at risk for heart attack, stroke, and kidney disease. High blood " "pressure does not usually cause symptoms. But it can be serious.  What is a home blood pressure meter? -- A home blood pressure meter (or "monitor") is a device you can use to check your blood pressure yourself. It has a cuff that goes around your upper arm (figure 1). Some devices have a cuff that goes around your wrist instead. But doctors aren't sure if these work as well. The meter also has a small screen, or dial, that shows your blood pressure numbers.  There are also special meters you can wear for a day or 2. These are different because they automatically check your blood pressure throughout the day and night, even while you are sleeping. If your doctor thinks you should use one of these devices, they will talk to you about how to wear it.  Why do I need to check my blood pressure at home? -- If your doctor knows or suspects that you have high blood pressure, they might want you to check it at home. There are a few reasons for this. Your doctor might want to look at:  Whether your blood pressure measures the same at home as it did in the doctor's office  How well your blood pressure medicines are working  Changes in your blood pressure, for example, if it goes up and down  People who check their own blood pressure at home usually do better at keeping it low.  How do I choose a home blood pressure meter? -- When choosing a home blood pressure meter, you will probably want to think about:  Cost - Some devices cost more than others. You should also check to see if your insurance will help pay for your device.  Size - It's important to make sure the cuff fits your arm comfortably. Your doctor or nurse can help you with this.  How easy it is to use - You should make sure you understand how to use the device. You also need to be able to read the numbers on the screen.  You do not need a prescription to buy a home blood pressure meter. You can buy them at most pharmacies or over the internet. Your doctor or nurse " can help you choose the right device for you.  How do I check my blood pressure at home? -- Once you have a home blood pressure meter, your doctor or nurse should check it to make sure it fits you and works correctly.  When it's time to check your blood pressure:  Go to the bathroom and empty your bladder first. Having a full bladder can temporarily increase your blood pressure, making the results inaccurate.  Sit in a chair with your feet flat on the ground.  Try to breathe normally and stay calm.  Attach the cuff to your arm. Place the cuff directly on your skin, not over your clothing. The cuff should be tight enough to not slip down, but not uncomfortably tight.  Sit and relax for about 3 to 5 minutes with the cuff on.  Follow the directions that came with your device to start measuring your blood pressure. This might involve squeezing the bulb at the end of the tube to inflate the cuff (fill it with air). With some monitors, you just need to press a button to inflate the cuff. When the cuff fills with air, it feels like someone is squeezing your arm, but it should not hurt. Then you will slowly deflate the cuff (let the air out of it), or it will deflate by itself. The screen or dial will show your blood pressure numbers.  Stay seated and relax for 1 minute, then measure your blood pressure again.  How often should I check my blood pressure? -- It depends. Different people need to follow different schedules. Your doctor or nurse will tell you how often to check your blood pressure, and when. Some people need to check their blood pressure twice a day, in the morning and evening.  Your doctor or nurse will probably tell you to keep track of your blood pressure for at least a few days (table 2). Then they will look at the numbers. The reason for this is that it's normal for your blood pressure to change a bit from day to day. For example, the numbers might change depending on whether you recently had caffeine,  "just exercised, or feel stressed. Checking your blood pressure over several days - or longer - will give your doctor or nurse a better idea of what is average for you.  How should I keep track of my blood pressure? -- Some blood pressure meters will record your numbers for you, or send them to your computer or smartphone. If yours does not do this, you will need to write them down. Your doctor or nurse can help you figure out the best way to keep track of the numbers.  What if my blood pressure is high? -- Your doctor or nurse will tell you what to do if your blood pressure is high when you check it at home. If you get a number that is higher than normal, measure it again to see if it is still high. If it is very high (above a certain number, which your doctor or nurse will tell you to watch out for), you should call your doctor right away.  If your blood pressure is only a little high, your doctor or nurse might tell you to keep checking it for a few more days or weeks, and then call if it does not go back down. Then they can help you decide what to do next.  All topics are updated as new evidence becomes available and our peer review process is complete.  This topic retrieved from [employer] on: Sep 21, 2021.  Topic 189787 Version 4.0  Release: 29.4.2 - C29.263  © 2021 UpToDate, Inc. and/or its affiliates. All rights reserved.  table 1: Definition of normal and high blood pressure  Level  Top number  Bottom number    High 130 or above 80 or above   Elevated 120 to 129 79 or below   Normal 119 or below 79 or below   These definitions are from the American College of Cardiology/American Heart Association. Other expert groups might use slightly different definitions.  "Elevated blood pressure" is a term doctor or nurses use as a warning. It means you do not yet have high blood pressure, but your blood pressure is not as low as it should be for good health.  Graphic 29975 Version 6.0  figure 1: Using a home blood " pressure meter     This is an example of a person using a home blood pressure meter.  Graphic [id number] Version 1.0    table 2: 7-day diary for checking blood pressure at home  Day 1  Day 2  Day 3  Day 4  Day 5  Day 6  Day 7    Morning  1st read Morning  1st read Morning  1st read Morning  1st read Morning  1st read Morning  1st read Morning  1st read   Systolic: __________ Systolic: __________ Systolic: __________ Systolic: __________ Systolic: __________ Systolic: __________ Systolic: __________   Diastolic: __________ Diastolic: __________ Diastolic: __________ Diastolic: __________ Diastolic: __________ Diastolic: __________ Diastolic: __________   Pulse: __________ Pulse: __________ Pulse: __________ Pulse: __________ Pulse: __________ Pulse: __________ Pulse: __________   Morning  2nd read Morning  2nd read Morning  2nd read Morning  2nd read Morning  2nd read Morning  2nd read Morning  2nd read   Systolic: __________ Systolic: __________ Systolic: __________ Systolic: __________ Systolic: __________ Systolic: __________ Systolic: __________   Diastolic: __________ Diastolic: __________ Diastolic: __________ Diastolic: __________ Diastolic: __________ Diastolic: __________ Diastolic: __________   Pulse: __________ Pulse: __________ Pulse: __________ Pulse: __________ Pulse: __________ Pulse: __________ Pulse: __________   Evening  1st read Evening  1st read Evening  1st read Evening  1st read Evening  1st read Evening  1st read Evening  1st read   Systolic: __________ Systolic: __________ Systolic: __________ Systolic: __________ Systolic: __________ Systolic: __________ Systolic: __________   Diastolic: __________ Diastolic: __________ Diastolic: __________ Diastolic: __________ Diastolic: __________ Diastolic: __________ Diastolic: __________   Pulse: __________ Pulse: __________ Pulse: __________ Pulse: __________ Pulse: __________ Pulse: __________ Pulse: __________   Evening  2nd read Evening  2nd read  Evening  2nd read Evening  2nd read Evening  2nd read Evening  2nd read Evening  2nd read   Systolic: __________ Systolic: __________ Systolic: __________ Systolic: __________ Systolic: __________ Systolic: __________ Systolic: __________   Diastolic: __________ Diastolic: __________ Diastolic: __________ Diastolic: __________ Diastolic: __________ Diastolic: __________ Diastolic: __________   Pulse: __________ Pulse: __________ Pulse: __________ Pulse: __________ Pulse: __________ Pulse: __________ Pulse: __________   Notes    Notes    Notes    Notes    Notes    Notes    Notes      ____________________ ____________________ ____________________ ____________________ ____________________ ____________________ ____________________   ____________________ ____________________ ____________________ ____________________ ____________________ ____________________ ____________________   ____________________ ____________________ ____________________ ____________________ ____________________ ____________________ ____________________   Patient name: ______________________________     Patient ID: ________________________________    Primary care provider: _______________________    Average BP: _______________________________    Graphic [id number] Version 1.0  Consumer Information Use and Disclaimer   This information is not specific medical advice and does not replace information you receive from your health care provider. This is only a brief summary of general information. It does NOT include all information about conditions, illnesses, injuries, tests, procedures, treatments, therapies, discharge instructions or life-style choices that may apply to you. You must talk with your health care provider for complete information about your health and treatment options. This information should not be used to decide whether or not to accept your health care provider's advice, instructions or recommendations. Only your health care provider has  the knowledge and training to provide advice that is right for you. The use of this information is governed by the [employer] End User License Agreement, available at https://www.Editorially.SpotOnWay/en/solutions/[employer]/[email or web address]/quintin.The use of [employer] content is governed by the [employer] Terms of Use. ©2021 UpToDate, Inc. All rights reserved.  Copyright   © 2021 UpToDate, Inc. and/or its affiliates. All rights reserved.

## 2022-08-24 NOTE — PROGRESS NOTES
Subjective:       Patient ID: Aida Gupta is a 72 y.o. female.    Chief Complaint: Depression    HPI    Patient presents today for chronic conditions follow up. She is new to me. Last visit with PCP- on 4/20/22. Labs reviewed 7/14/22: a1c 5.6    7/20/22 Pulmonology: chronic cough, COPD  Past Medical History:   Diagnosis Date    Asthma     chronic bronchitis    Hx of colectomy     Shingles     1 year ago     Thyroid disease        Review of patient's allergies indicates:   Allergen Reactions    Contrast media Nausea And Vomiting         Current Outpatient Medications:     albuterol (PROVENTIL/VENTOLIN HFA) 90 mcg/actuation inhaler, Inhale 2 puffs into the lungs every 6 (six) hours as needed for Wheezing. Rescue, Disp: , Rfl:     amitriptyline (ELAVIL) 50 MG tablet, TAKE 1 TABLET BY MOUTH ONCE DAILY IN THE EVENING, Disp: 90 tablet, Rfl: 3    citalopram (CELEXA) 20 MG tablet, Take 1 tablet (20 mg total) by mouth once daily., Disp: 90 tablet, Rfl: 3    ergocalciferol (VITAMIN D2) 50,000 unit Cap, Take 1 capsule (50,000 Units total) by mouth every 7 days., Disp: 12 capsule, Rfl: 3    EUTHYROX 150 mcg tablet, Take 1 tablet by mouth once daily, Disp: 90 tablet, Rfl: 2    fluticasone propionate (FLOVENT HFA) 220 mcg/actuation inhaler, Inhale 1 puff into the lungs 2 (two) times daily. Controller, Disp: 12 g, Rfl: 11    FLUTICASONE PROPIONATE, MICRO (FLUTICASONE PROP, MICRO, BULK, MISC), by Misc.(Non-Drug; Combo Route) route., Disp: , Rfl:     gabapentin (NEURONTIN) 300 MG capsule, TAKE 3 CAPSULES BY MOUTH THREE TIMES DAILY, Disp: 270 capsule, Rfl: 3    meloxicam (MOBIC) 7.5 MG tablet, Take 1 tablet by mouth once daily, Disp: 90 tablet, Rfl: 3    ondansetron (ZOFRAN) 4 MG tablet, TAKE 1 TABLET BY MOUTH EVERY 8 HOURS AS NEEDED FOR NAUSEA, Disp: 30 tablet, Rfl: 3    rosuvastatin (CRESTOR) 10 MG tablet, Take 1 tablet (10 mg total) by mouth once daily., Disp: 90 tablet, Rfl: 3    triamcinolone acetonide 0.1%  "(KENALOG) 0.1 % cream, Apply topically 2 (two) times daily., Disp: , Rfl:     doxepin (ZONALON) 5 % cream, Apply thin film topically to affected area three times daily as needed (Patient not taking: No sig reported), Disp: 45 g, Rfl: 5    omeprazole (PRILOSEC) 20 MG capsule, Take 1 capsule (20 mg total) by mouth once daily. (Patient not taking: Reported on 7/20/2022), Disp: 90 capsule, Rfl: 1    Review of Systems   Constitutional:  Negative for unexpected weight change.   HENT:  Negative for trouble swallowing.    Eyes:  Negative for visual disturbance.   Respiratory:  Negative for shortness of breath.    Cardiovascular:  Negative for chest pain, palpitations and leg swelling.   Gastrointestinal:  Negative for blood in stool.   Genitourinary:  Negative for hematuria.   Skin:  Negative for rash.   Allergic/Immunologic: Negative for immunocompromised state.   Neurological:  Negative for headaches.   Hematological:  Does not bruise/bleed easily.   Psychiatric/Behavioral:  Negative for agitation. The patient is not nervous/anxious.      Objective:      BP (!) 140/80 (BP Location: Left arm, Patient Position: Sitting, BP Method: Large (Manual))   Pulse 82   Temp 98.2 °F (36.8 °C) (Oral)   Ht 5' 2" (1.575 m)   Wt 95.2 kg (209 lb 14.1 oz)   SpO2 97%   BMI 38.39 kg/m²   Physical Exam  Constitutional:       Appearance: She is well-developed. She is obese.   Eyes:      Pupils: Pupils are equal, round, and reactive to light.   Cardiovascular:      Rate and Rhythm: Normal rate and regular rhythm.      Heart sounds: Normal heart sounds.   Pulmonary:      Effort: Pulmonary effort is normal.      Breath sounds: Normal breath sounds.   Abdominal:      General: Bowel sounds are normal.      Palpations: Abdomen is soft.   Musculoskeletal:         General: Normal range of motion.      Cervical back: Normal range of motion.   Skin:     General: Skin is warm and dry.   Neurological:      Mental Status: She is alert and oriented to " person, place, and time.   Psychiatric:         Behavior: Behavior normal.         Thought Content: Thought content normal.         Judgment: Judgment normal.       Assessment:       1. Hyperlipidemia, unspecified hyperlipidemia type    2. Multiple skin tears    3. Hypothyroidism, unspecified type    4. Chronic obstructive pulmonary disease, unspecified COPD type    5. Hypertension, unspecified type    6. Colon cancer screening    7. Prediabetes    8. Depression with anxiety    9. Obesity (BMI 30-39.9)        Plan:       Hyperlipidemia, unspecified hyperlipidemia type  -     Lipid Panel; Future; Expected date: 08/24/2022  Stable, on Crestor  The 10-year ASCVD risk score (Ari MILLER, et al., 2019) is: 12.1%    Values used to calculate the score:      Age: 72 years      Sex: Female      Is Non- : No      Diabetic: No      Tobacco smoker: No      Systolic Blood Pressure: 140 mmHg      Is BP treated: No      HDL Cholesterol: 42 mg/dL      Total Cholesterol: 218 mg/dL   Multiple skin tears  Stable, us OTC Bactroban ointment  Hypothyroidism, unspecified type  -     TSH; Future; Expected date: 08/24/2022  -     T4, Free; Future; Expected date: 08/24/2022  Stable, continue medication  Chronic obstructive pulmonary disease, unspecified COPD type  Stable, continue resp inhaler  Hypertension, unspecified type  -     CBC W/ AUTO DIFFERENTIAL; Future; Expected date: 08/24/2022  -     COMPREHENSIVE METABOLIC PANEL; Future; Expected date: 08/24/2022  Patient taking any BP medication, will prescribed Losartan 100 mg daily.  4 weeks nurse visit  2 weeks BP log  Low sodium diet  BP Readings from Last 3 Encounters:   08/24/22 (!) 140/80   07/20/22 (!) 154/112   02/10/22 132/70      Colon cancer screening  -     Fecal Immunochemical Test (iFOBT); Future; Expected date: 08/24/2022    Prediabetes  -     Hemoglobin A1C; Future; Expected date: 08/24/2022  Stable, continue management  Hemoglobin A1C   Date Value Ref  "Range Status   07/14/2022 5.6 4.0 - 5.6 % Final     Comment:     ADA Screening Guidelines:  5.7-6.4%  Consistent with prediabetes  >or=6.5%  Consistent with diabetes    High levels of fetal hemoglobin interfere with the HbA1C  assay. Heterozygous hemoglobin variants (HbS, HgC, etc)do  not significantly interfere with this assay.   However, presence of multiple variants may affect accuracy.     02/12/2022 5.7 (H) 4.0 - 5.6 % Final     Comment:     ADA Screening Guidelines:  5.7-6.4%  Consistent with prediabetes  >or=6.5%  Consistent with diabetes    High levels of fetal hemoglobin interfere with the HbA1C  assay. Heterozygous hemoglobin variants (HbS, HgC, etc)do  not significantly interfere with this assay.   However, presence of multiple variants may affect accuracy.     05/04/2021 5.8 (H) 4.0 - 5.6 % Final     Comment:     ADA Screening Guidelines:  5.7-6.4%  Consistent with prediabetes  >or=6.5%  Consistent with diabetes    High levels of fetal hemoglobin interfere with the HbA1C  assay. Heterozygous hemoglobin variants (HbS, HgC, etc)do  not significantly interfere with this assay.   However, presence of multiple variants may affect accuracy.        Depression with anxiety  Stable, continue management  Has a talks to [facility] at [facility]  Obesity (BMI 30-39.9)    Counseled patient on his ideal body weight, health consequences of being obese and current recommendations including weekly exercise and a heart healthy diet.  Current BMI is:Estimated body mass index is 38.39 kg/m² as calculated from the following:    Height as of this encounter: 5' 2" (1.575 m).    Weight as of this encounter: 95.2 kg (209 lb 14.1 oz)..  Patient is aware that ideal BMI < 25 or Weight in (lb) to have BMI = 25: 136.4.     Talks   Patient readiness: acceptance and barriers:none    During the course of the visit the patient was educated and counseled about the following:     Hypertension:   Dietary sodium restriction.  Regular " aerobic exercise.  Obesity:   General weight loss/lifestyle modification strategies discussed (elicit support from others; identify saboteurs; non-food rewards, etc).  Regular aerobic exercise program discussed.    Goals: Hypertension: Reduce Blood Pressure and Obesity: Reduce calorie intake and BMI    Did patient meet goals/outcomes: No    The following self management tools provided: blood pressure log    Patient Instructions (the written plan) was given to the patient/family.     Time spent with patient: 15 minutes    Barriers to medications present (no )    Adverse reactions to current medications (no)    Over the counter medications reviewed (Yes)

## 2022-08-31 RX ORDER — LOSARTAN POTASSIUM 50 MG/1
50 TABLET ORAL DAILY
Qty: 90 TABLET | Status: SHIPPED | OUTPATIENT
Start: 2022-08-31 | End: 2022-08-31

## 2022-08-31 RX ORDER — LOSARTAN POTASSIUM 50 MG/1
50 TABLET ORAL DAILY
Qty: 60 TABLET | Refills: 0 | Status: SHIPPED | OUTPATIENT
Start: 2022-08-31 | End: 2023-04-27 | Stop reason: SDUPTHER

## 2022-09-07 DIAGNOSIS — Z78.0 MENOPAUSE: ICD-10-CM

## 2022-10-03 ENCOUNTER — PATIENT MESSAGE (OUTPATIENT)
Dept: FAMILY MEDICINE | Facility: CLINIC | Age: 72
End: 2022-10-03
Payer: MEDICARE

## 2022-10-11 ENCOUNTER — PATIENT MESSAGE (OUTPATIENT)
Dept: ADMINISTRATIVE | Facility: HOSPITAL | Age: 72
End: 2022-10-11
Payer: MEDICARE

## 2022-12-05 ENCOUNTER — PES CALL (OUTPATIENT)
Dept: ADMINISTRATIVE | Facility: CLINIC | Age: 72
End: 2022-12-05
Payer: MEDICARE

## 2023-01-17 ENCOUNTER — PATIENT MESSAGE (OUTPATIENT)
Dept: ADMINISTRATIVE | Facility: HOSPITAL | Age: 73
End: 2023-01-17
Payer: MEDICARE

## 2023-03-05 NOTE — TELEPHONE ENCOUNTER
Problem: Chronic Conditions and Co-morbidities  Goal: Patient's chronic conditions and co-morbidity symptoms are monitored and maintained or improved  Outcome: Progressing     Problem: Pain  Goal: Verbalizes/displays adequate comfort level or baseline comfort level  Outcome: Progressing     Problem: Skin/Tissue Integrity - Adult  Goal: Incisions, wounds, or drain sites healing without S/S of infection  Outcome: Progressing     Problem: Musculoskeletal - Adult  Goal: Return mobility to safest level of function  Outcome: Progressing  Goal: Maintain proper alignment of affected body part  Outcome: Progressing  Goal: Return ADL status to a safe level of function  Outcome: Progressing      No voicemail box set up. Unable to leave message.

## 2023-04-27 ENCOUNTER — TELEPHONE (OUTPATIENT)
Dept: FAMILY MEDICINE | Facility: CLINIC | Age: 73
End: 2023-04-27
Payer: MEDICARE

## 2023-04-27 DIAGNOSIS — I10 HYPERTENSION, UNSPECIFIED TYPE: ICD-10-CM

## 2023-04-27 NOTE — TELEPHONE ENCOUNTER
----- Message from Eren Lovellismajethro sent at 4/27/2023 12:04 PM CDT -----  Type:  Sooner Appointment Request    Caller is requesting a sooner appointment.  Caller declined first available appointment listed below.  Caller will not accept being placed on the waitlist and is requesting a message be sent to doctor.    Name of Caller:  pt  When is the first available appointment?  NA  Symptoms:  Shaking and in pain feeling weak  Best Call Back Number:  [phone]  Additional Information:  pt stated she wants to be seen ASAP she is shaking and weak. Asked to possibly get RX for her shaking. Please call back to advise asap, thanks!

## 2023-04-27 NOTE — TELEPHONE ENCOUNTER
Received fax from [facility] ([facility]) requesting refill of Losartan.  Please advise.     LR--8-31-22        LOV--8-24-22       FOV--None Noted

## 2023-04-27 NOTE — TELEPHONE ENCOUNTER
----- Message from Delaney Warren sent at 4/27/2023  2:06 PM CDT -----  Regarding: Early Appointment Scheduled for May 2023  Good Afternoon, I am trying to register this patient for a later in the day appt. The patient stated she can not get up early in the morning. Please scheduled an appointment in the afternoon for her.    Thank You,  Delaney Warren

## 2023-04-27 NOTE — TELEPHONE ENCOUNTER
Called patient in regards to needing an appointment. No answer , left voicemail to return call.   Scheduled an appointment.

## 2023-04-28 RX ORDER — LOSARTAN POTASSIUM 50 MG/1
50 TABLET ORAL DAILY
Qty: 90 TABLET | Refills: 0 | Status: SHIPPED | OUTPATIENT
Start: 2023-04-28 | End: 2023-05-12

## 2023-05-12 ENCOUNTER — OFFICE VISIT (OUTPATIENT)
Dept: FAMILY MEDICINE | Facility: CLINIC | Age: 73
End: 2023-05-12
Payer: MEDICARE

## 2023-05-12 ENCOUNTER — LAB VISIT (OUTPATIENT)
Dept: LAB | Facility: HOSPITAL | Age: 73
End: 2023-05-12
Attending: FAMILY MEDICINE
Payer: MEDICARE

## 2023-05-12 VITALS
OXYGEN SATURATION: 97 % | HEIGHT: 62 IN | SYSTOLIC BLOOD PRESSURE: 130 MMHG | RESPIRATION RATE: 16 BRPM | HEART RATE: 87 BPM | DIASTOLIC BLOOD PRESSURE: 80 MMHG | TEMPERATURE: 98 F | BODY MASS INDEX: 38.33 KG/M2 | WEIGHT: 208.31 LBS

## 2023-05-12 DIAGNOSIS — I70.0 ATHEROSCLEROSIS OF AORTA: ICD-10-CM

## 2023-05-12 DIAGNOSIS — E06.3 HASHIMOTO'S DISEASE: ICD-10-CM

## 2023-05-12 DIAGNOSIS — R73.03 PREDIABETES: ICD-10-CM

## 2023-05-12 DIAGNOSIS — E03.9 HYPOTHYROIDISM, UNSPECIFIED TYPE: ICD-10-CM

## 2023-05-12 DIAGNOSIS — G20.C PARKINSONIAN TREMOR: ICD-10-CM

## 2023-05-12 DIAGNOSIS — Z12.11 COLON CANCER SCREENING: ICD-10-CM

## 2023-05-12 DIAGNOSIS — S09.90XA HEAD TRAUMA, INITIAL ENCOUNTER: ICD-10-CM

## 2023-05-12 DIAGNOSIS — Z12.31 ENCOUNTER FOR SCREENING MAMMOGRAM FOR MALIGNANT NEOPLASM OF BREAST: ICD-10-CM

## 2023-05-12 DIAGNOSIS — E78.5 HYPERLIPIDEMIA, UNSPECIFIED HYPERLIPIDEMIA TYPE: ICD-10-CM

## 2023-05-12 DIAGNOSIS — I10 HYPERTENSION, UNSPECIFIED TYPE: Primary | ICD-10-CM

## 2023-05-12 DIAGNOSIS — E55.9 VITAMIN D DEFICIENCY: ICD-10-CM

## 2023-05-12 DIAGNOSIS — J44.9 CHRONIC OBSTRUCTIVE PULMONARY DISEASE, UNSPECIFIED COPD TYPE: ICD-10-CM

## 2023-05-12 DIAGNOSIS — F41.8 DEPRESSION WITH ANXIETY: ICD-10-CM

## 2023-05-12 DIAGNOSIS — E66.9 OBESITY (BMI 30-39.9): ICD-10-CM

## 2023-05-12 DIAGNOSIS — K21.9 GASTROESOPHAGEAL REFLUX DISEASE WITHOUT ESOPHAGITIS: ICD-10-CM

## 2023-05-12 DIAGNOSIS — N95.9 MENOPAUSAL AND POSTMENOPAUSAL DISORDER: ICD-10-CM

## 2023-05-12 DIAGNOSIS — E66.01 MORBID OBESITY WITH BMI OF 40.0-44.9, ADULT: ICD-10-CM

## 2023-05-12 LAB
ALBUMIN SERPL BCP-MCNC: 3.9 G/DL (ref 3.5–5.2)
ALP SERPL-CCNC: 70 U/L (ref 55–135)
ALT SERPL W/O P-5'-P-CCNC: 12 U/L (ref 10–44)
ANION GAP SERPL CALC-SCNC: 8 MMOL/L (ref 8–16)
AST SERPL-CCNC: 15 U/L (ref 10–40)
BASOPHILS # BLD AUTO: 0.06 K/UL (ref 0–0.2)
BASOPHILS NFR BLD: 0.6 % (ref 0–1.9)
BILIRUB SERPL-MCNC: 0.6 MG/DL (ref 0.1–1)
BUN SERPL-MCNC: 19 MG/DL (ref 8–23)
CALCIUM SERPL-MCNC: 9.9 MG/DL (ref 8.7–10.5)
CHLORIDE SERPL-SCNC: 102 MMOL/L (ref 95–110)
CO2 SERPL-SCNC: 25 MMOL/L (ref 23–29)
CREAT SERPL-MCNC: 0.8 MG/DL (ref 0.5–1.4)
DIFFERENTIAL METHOD: ABNORMAL
EOSINOPHIL # BLD AUTO: 0.2 K/UL (ref 0–0.5)
EOSINOPHIL NFR BLD: 1.9 % (ref 0–8)
ERYTHROCYTE [DISTWIDTH] IN BLOOD BY AUTOMATED COUNT: 12.8 % (ref 11.5–14.5)
EST. GFR  (NO RACE VARIABLE): >60 ML/MIN/1.73 M^2
ESTIMATED AVG GLUCOSE: 114 MG/DL (ref 68–131)
GLUCOSE SERPL-MCNC: 108 MG/DL (ref 70–110)
HBA1C MFR BLD: 5.6 % (ref 4–5.6)
HCT VFR BLD AUTO: 46.2 % (ref 37–48.5)
HGB BLD-MCNC: 14.4 G/DL (ref 12–16)
IMM GRANULOCYTES # BLD AUTO: 0.03 K/UL (ref 0–0.04)
IMM GRANULOCYTES NFR BLD AUTO: 0.3 % (ref 0–0.5)
LYMPHOCYTES # BLD AUTO: 1.8 K/UL (ref 1–4.8)
LYMPHOCYTES NFR BLD: 18 % (ref 18–48)
MCH RBC QN AUTO: 29 PG (ref 27–31)
MCHC RBC AUTO-ENTMCNC: 31.2 G/DL (ref 32–36)
MCV RBC AUTO: 93 FL (ref 82–98)
MONOCYTES # BLD AUTO: 0.6 K/UL (ref 0.3–1)
MONOCYTES NFR BLD: 6.3 % (ref 4–15)
NEUTROPHILS # BLD AUTO: 7.3 K/UL (ref 1.8–7.7)
NEUTROPHILS NFR BLD: 72.9 % (ref 38–73)
NRBC BLD-RTO: 0 /100 WBC
PLATELET # BLD AUTO: 338 K/UL (ref 150–450)
PMV BLD AUTO: 8.9 FL (ref 9.2–12.9)
POTASSIUM SERPL-SCNC: 4.3 MMOL/L (ref 3.5–5.1)
PROT SERPL-MCNC: 7.8 G/DL (ref 6–8.4)
RBC # BLD AUTO: 4.97 M/UL (ref 4–5.4)
SODIUM SERPL-SCNC: 135 MMOL/L (ref 136–145)
T4 FREE SERPL-MCNC: 1.21 NG/DL (ref 0.71–1.51)
TSH SERPL DL<=0.005 MIU/L-ACNC: 2.37 UIU/ML (ref 0.4–4)
WBC # BLD AUTO: 10.03 K/UL (ref 3.9–12.7)

## 2023-05-12 PROCEDURE — 3079F DIAST BP 80-89 MM HG: CPT | Mod: CPTII,S$GLB,, | Performed by: FAMILY MEDICINE

## 2023-05-12 PROCEDURE — 3079F PR MOST RECENT DIASTOLIC BLOOD PRESSURE 80-89 MM HG: ICD-10-PCS | Mod: CPTII,S$GLB,, | Performed by: FAMILY MEDICINE

## 2023-05-12 PROCEDURE — 3075F SYST BP GE 130 - 139MM HG: CPT | Mod: CPTII,S$GLB,, | Performed by: FAMILY MEDICINE

## 2023-05-12 PROCEDURE — 99999 PR PBB SHADOW E&M-EST. PATIENT-LVL IV: ICD-10-PCS | Mod: PBBFAC,,, | Performed by: FAMILY MEDICINE

## 2023-05-12 PROCEDURE — 99499 RISK ADDL DX/OHS AUDIT: ICD-10-PCS | Mod: S$GLB,,, | Performed by: FAMILY MEDICINE

## 2023-05-12 PROCEDURE — 85025 COMPLETE CBC W/AUTO DIFF WBC: CPT | Performed by: FAMILY MEDICINE

## 2023-05-12 PROCEDURE — 1125F PR PAIN SEVERITY QUANTIFIED, PAIN PRESENT: ICD-10-PCS | Mod: CPTII,S$GLB,, | Performed by: FAMILY MEDICINE

## 2023-05-12 PROCEDURE — 84443 ASSAY THYROID STIM HORMONE: CPT | Performed by: FAMILY MEDICINE

## 2023-05-12 PROCEDURE — 4010F ACE/ARB THERAPY RXD/TAKEN: CPT | Mod: CPTII,S$GLB,, | Performed by: FAMILY MEDICINE

## 2023-05-12 PROCEDURE — 3075F PR MOST RECENT SYSTOLIC BLOOD PRESS GE 130-139MM HG: ICD-10-PCS | Mod: CPTII,S$GLB,, | Performed by: FAMILY MEDICINE

## 2023-05-12 PROCEDURE — 1159F MED LIST DOCD IN RCRD: CPT | Mod: CPTII,S$GLB,, | Performed by: FAMILY MEDICINE

## 2023-05-12 PROCEDURE — 84439 ASSAY OF FREE THYROXINE: CPT | Performed by: FAMILY MEDICINE

## 2023-05-12 PROCEDURE — 80053 COMPREHEN METABOLIC PANEL: CPT | Performed by: FAMILY MEDICINE

## 2023-05-12 PROCEDURE — 4010F PR ACE/ARB THEARPY RXD/TAKEN: ICD-10-PCS | Mod: CPTII,S$GLB,, | Performed by: FAMILY MEDICINE

## 2023-05-12 PROCEDURE — 3008F PR BODY MASS INDEX (BMI) DOCUMENTED: ICD-10-PCS | Mod: CPTII,S$GLB,, | Performed by: FAMILY MEDICINE

## 2023-05-12 PROCEDURE — 99214 PR OFFICE/OUTPT VISIT, EST, LEVL IV, 30-39 MIN: ICD-10-PCS | Mod: S$GLB,,, | Performed by: FAMILY MEDICINE

## 2023-05-12 PROCEDURE — 1160F RVW MEDS BY RX/DR IN RCRD: CPT | Mod: CPTII,S$GLB,, | Performed by: FAMILY MEDICINE

## 2023-05-12 PROCEDURE — 99499 UNLISTED E&M SERVICE: CPT | Mod: S$GLB,,, | Performed by: FAMILY MEDICINE

## 2023-05-12 PROCEDURE — 99214 OFFICE O/P EST MOD 30 MIN: CPT | Mod: S$GLB,,, | Performed by: FAMILY MEDICINE

## 2023-05-12 PROCEDURE — 3008F BODY MASS INDEX DOCD: CPT | Mod: CPTII,S$GLB,, | Performed by: FAMILY MEDICINE

## 2023-05-12 PROCEDURE — 3044F HG A1C LEVEL LT 7.0%: CPT | Mod: CPTII,S$GLB,, | Performed by: FAMILY MEDICINE

## 2023-05-12 PROCEDURE — 99999 PR PBB SHADOW E&M-EST. PATIENT-LVL IV: CPT | Mod: PBBFAC,,, | Performed by: FAMILY MEDICINE

## 2023-05-12 PROCEDURE — 1160F PR REVIEW ALL MEDS BY PRESCRIBER/CLIN PHARMACIST DOCUMENTED: ICD-10-PCS | Mod: CPTII,S$GLB,, | Performed by: FAMILY MEDICINE

## 2023-05-12 PROCEDURE — 36415 COLL VENOUS BLD VENIPUNCTURE: CPT | Mod: PO | Performed by: FAMILY MEDICINE

## 2023-05-12 PROCEDURE — 1125F AMNT PAIN NOTED PAIN PRSNT: CPT | Mod: CPTII,S$GLB,, | Performed by: FAMILY MEDICINE

## 2023-05-12 PROCEDURE — 3044F PR MOST RECENT HEMOGLOBIN A1C LEVEL <7.0%: ICD-10-PCS | Mod: CPTII,S$GLB,, | Performed by: FAMILY MEDICINE

## 2023-05-12 PROCEDURE — 83036 HEMOGLOBIN GLYCOSYLATED A1C: CPT | Performed by: FAMILY MEDICINE

## 2023-05-12 PROCEDURE — 1159F PR MEDICATION LIST DOCUMENTED IN MEDICAL RECORD: ICD-10-PCS | Mod: CPTII,S$GLB,, | Performed by: FAMILY MEDICINE

## 2023-05-12 RX ORDER — ESCITALOPRAM OXALATE 10 MG/1
10 TABLET ORAL NIGHTLY
Qty: 30 TABLET | Refills: 2 | Status: SHIPPED | OUTPATIENT
Start: 2023-05-12 | End: 2024-01-05 | Stop reason: SDUPTHER

## 2023-05-12 RX ORDER — HYDROXYZINE HYDROCHLORIDE 25 MG/1
25 TABLET, FILM COATED ORAL 3 TIMES DAILY PRN
Qty: 90 TABLET | Refills: 0 | Status: SHIPPED | OUTPATIENT
Start: 2023-05-12 | End: 2023-08-04
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
## 2023-05-22 ENCOUNTER — TELEPHONE (OUTPATIENT)
Dept: FAMILY MEDICINE | Facility: CLINIC | Age: 73
End: 2023-05-22

## 2023-05-22 NOTE — TELEPHONE ENCOUNTER
----- Message from Chalino Orta sent at 5/22/2023  1:36 PM CDT -----  Type: Needs Medical Advice  Who Called:  Nhan/ Minor Collado Call Back Number: [phone]  Additional Information: Caller states that she would like a callback regarding needing new orders for the patient's MRI

## 2023-05-30 ENCOUNTER — TELEPHONE (OUTPATIENT)
Dept: FAMILY MEDICINE | Facility: CLINIC | Age: 73
End: 2023-05-30
Payer: MEDICARE

## 2023-05-30 NOTE — TELEPHONE ENCOUNTER
----- Message from Cindy Schmitz sent at 5/30/2023 11:46 AM CDT -----  Type: Needs Medical Advice  Who Called:  Pt  Best Call Back Number: [phone]  Additional Information: Pt states her head has been hurting so bad and she would like something for pain, also for someone to call her to discuss the results from the MRI, PL call bk to advise thanks

## 2023-06-01 ENCOUNTER — OFFICE VISIT (OUTPATIENT)
Dept: FAMILY MEDICINE | Facility: CLINIC | Age: 73
End: 2023-06-01
Payer: MEDICARE

## 2023-06-01 DIAGNOSIS — R25.1 TREMOR OF BOTH HANDS: Primary | ICD-10-CM

## 2023-06-01 DIAGNOSIS — S09.90XD TRAUMATIC INJURY OF HEAD, SUBSEQUENT ENCOUNTER: ICD-10-CM

## 2023-06-01 PROCEDURE — 3044F PR MOST RECENT HEMOGLOBIN A1C LEVEL <7.0%: ICD-10-PCS | Mod: CPTII,95,, | Performed by: NURSE PRACTITIONER

## 2023-06-01 PROCEDURE — 4010F ACE/ARB THERAPY RXD/TAKEN: CPT | Mod: CPTII,95,, | Performed by: NURSE PRACTITIONER

## 2023-06-01 PROCEDURE — 1160F RVW MEDS BY RX/DR IN RCRD: CPT | Mod: CPTII,95,, | Performed by: NURSE PRACTITIONER

## 2023-06-01 PROCEDURE — 1159F MED LIST DOCD IN RCRD: CPT | Mod: CPTII,95,, | Performed by: NURSE PRACTITIONER

## 2023-06-01 PROCEDURE — 4010F PR ACE/ARB THEARPY RXD/TAKEN: ICD-10-PCS | Mod: CPTII,95,, | Performed by: NURSE PRACTITIONER

## 2023-06-01 PROCEDURE — 99213 OFFICE O/P EST LOW 20 MIN: CPT | Mod: 95,,, | Performed by: NURSE PRACTITIONER

## 2023-06-01 PROCEDURE — 1159F PR MEDICATION LIST DOCUMENTED IN MEDICAL RECORD: ICD-10-PCS | Mod: CPTII,95,, | Performed by: NURSE PRACTITIONER

## 2023-06-01 PROCEDURE — 3044F HG A1C LEVEL LT 7.0%: CPT | Mod: CPTII,95,, | Performed by: NURSE PRACTITIONER

## 2023-06-01 PROCEDURE — 99213 PR OFFICE/OUTPT VISIT, EST, LEVL III, 20-29 MIN: ICD-10-PCS | Mod: 95,,, | Performed by: NURSE PRACTITIONER

## 2023-06-01 PROCEDURE — 1160F PR REVIEW ALL MEDS BY PRESCRIBER/CLIN PHARMACIST DOCUMENTED: ICD-10-PCS | Mod: CPTII,95,, | Performed by: NURSE PRACTITIONER

## 2023-06-01 NOTE — PROGRESS NOTES
The patient location is: [place]  The chief complaint leading to consultation is: Hand tremor    Visit type: audiovisual    Face to Face time with patient: 20 minutes  20 minutes of total time spent on the encounter, which includes face to face time and non-face to face time preparing to see the patient (eg, review of tests), Obtaining and/or reviewing separately obtained history, Documenting clinical information in the electronic or other health record, Independently interpreting results (not separately reported) and communicating results to the patient/family/caregiver, or Care coordination (not separately reported).     Each patient to whom he or she provides medical services by telemedicine is:  (1) informed of the relationship between the physician and patient and the respective role of any other health care provider with respect to management of the patient; and (2) notified that he or she may decline to receive medical services by telemedicine and may withdraw from such care at any time.    Notes:  71 y/o female patient with medical problems listed below presents for follow up of hand tremor and brain MRI result. She fell backwards hitting right side of her head 2 months ago and has had the symptoms for more than 2 weeks. Patient was seen by PCP on 5/12 and referred to brain MRI. Patient underwent Brain MRI on 5/24 which shows no acute intracranial abnormalities. Patient states symptoms are persistent mostly prominent in right hand. Patient states lives with son and her tremor wears her out.     Labs reviewed from 5/2023    Patient Active Problem List   Diagnosis    Hypothyroid    Gastroesophageal reflux disease without esophagitis    Morbid obesity with BMI of 40.0-44.9, adult    Hyperlipidemia    Prediabetes    Dermatitis    Dyspnea    Dysphagia    Lower esophageal ring    Gastritis determined by endoscopy    Gastric polyp    DDD (degenerative disc disease), lumbar    Lumbar radiculitis    Vitamin D deficiency     HTN (hypertension)    Post herpetic neuralgia    Ds DNA antibody positive    Positive JAVY (antinuclear antibody)    Hashimoto's disease    Cystocele with prolapse    Incomplete emptying of bladder    Recurrent UTI    Pulmonary nodule    Atherosclerosis of aorta    Cough      Past Surgical History:   Procedure Laterality Date    Bilateral tubal ligation      COLECTOMY      COLON SURGERY      Diverticulitis    TUBAL LIGATION          Current Outpatient Medications:     albuterol (PROVENTIL/VENTOLIN HFA) 90 mcg/actuation inhaler, Inhale 2 puffs into the lungs every 6 (six) hours as needed for Wheezing. Rescue, Disp: , Rfl:     amitriptyline (ELAVIL) 50 MG tablet, TAKE 1 TABLET BY MOUTH ONCE DAILY IN THE EVENING, Disp: 90 tablet, Rfl: 3    doxepin (ZONALON) 5 % cream, Apply thin film topically to affected area three times daily as needed (Patient not taking: No sig reported), Disp: 45 g, Rfl: 5    ergocalciferol (VITAMIN D2) 50,000 unit Cap, Take 1 capsule (50,000 Units total) by mouth every 7 days., Disp: 12 capsule, Rfl: 3    EScitalopram oxalate (LEXAPRO) 10 MG tablet, Take 1 tablet (10 mg total) by mouth every evening., Disp: 30 tablet, Rfl: 2    fluticasone propionate (FLOVENT HFA) 220 mcg/actuation inhaler, Inhale 1 puff into the lungs 2 (two) times daily. Controller, Disp: 12 g, Rfl: 11    FLUTICASONE PROPIONATE, MICRO (FLUTICASONE PROP, MICRO, BULK, MISC), by Misc.(Non-Drug; Combo Route) route., Disp: , Rfl:     gabapentin (NEURONTIN) 300 MG capsule, TAKE 3 CAPSULES BY MOUTH THREE TIMES DAILY, Disp: 270 capsule, Rfl: 3    hydrOXYzine HCL (ATARAX) 25 MG tablet, Take 1 tablet (25 mg total) by mouth 3 (three) times daily as needed for Anxiety., Disp: 90 tablet, Rfl: 0    levothyroxine (SYNTHROID) 150 MCG tablet, Take 1 tablet by mouth once daily, Disp: 90 tablet, Rfl: 1    meloxicam (MOBIC) 7.5 MG tablet, Take 1 tablet by mouth once daily, Disp: 90 tablet, Rfl: 3    omeprazole (PRILOSEC) 20 MG capsule, Take 1  capsule (20 mg total) by mouth once daily. (Patient not taking: Reported on 7/20/2022), Disp: 90 capsule, Rfl: 1    ondansetron (ZOFRAN) 4 MG tablet, TAKE 1 TABLET BY MOUTH EVERY 8 HOURS AS NEEDED FOR NAUSEA, Disp: 30 tablet, Rfl: 3    rosuvastatin (CRESTOR) 10 MG tablet, Take 1 tablet (10 mg total) by mouth once daily., Disp: 90 tablet, Rfl: 0    triamcinolone acetonide 0.1% (KENALOG) 0.1 % cream, Apply topically 2 (two) times daily., Disp: , Rfl:      Review of System:   Review of Systems   Constitutional:  Negative for chills and fever.   Respiratory:  Negative for shortness of breath.    Cardiovascular:  Negative for chest pain.   Neurological:  Positive for tremors and headaches.     Objective:  Physical Exam  Constitutional:       Appearance: Normal appearance. She is ill-appearing.        Assessment/Plan:  1. Tremor of both hands    2. Traumatic injury of head, subsequent encounter         Plan:  1. Tremor of both hands  - Ambulatory referral/consult to Neurology; Future  - Ambulatory referral/consult to Home Health; Future    2. Traumatic injury of head, subsequent encounter  - Ambulatory referral/consult to Neurology; Future  - Ambulatory referral/consult to Home Health; Future     RTC: as scheduled  [facility]

## 2023-06-02 ENCOUNTER — TELEPHONE (OUTPATIENT)
Dept: NEUROLOGY | Facility: CLINIC | Age: 73
End: 2023-06-02
Payer: MEDICARE

## 2023-06-05 ENCOUNTER — TELEPHONE (OUTPATIENT)
Dept: NEUROLOGY | Facility: CLINIC | Age: 73
End: 2023-06-05
Payer: MEDICARE

## 2023-07-20 ENCOUNTER — OFFICE VISIT (OUTPATIENT)
Dept: FAMILY MEDICINE | Facility: CLINIC | Age: 73
End: 2023-07-20
Payer: MEDICARE

## 2023-07-20 VITALS
DIASTOLIC BLOOD PRESSURE: 86 MMHG | SYSTOLIC BLOOD PRESSURE: 130 MMHG | BODY MASS INDEX: 38.67 KG/M2 | TEMPERATURE: 98 F | HEIGHT: 62 IN | OXYGEN SATURATION: 93 % | HEART RATE: 90 BPM | WEIGHT: 210.13 LBS

## 2023-07-20 DIAGNOSIS — I10 HYPERTENSION, UNSPECIFIED TYPE: Primary | ICD-10-CM

## 2023-07-20 DIAGNOSIS — N95.9 MENOPAUSAL AND POSTMENOPAUSAL DISORDER: ICD-10-CM

## 2023-07-20 DIAGNOSIS — K21.9 GASTROESOPHAGEAL REFLUX DISEASE WITHOUT ESOPHAGITIS: ICD-10-CM

## 2023-07-20 DIAGNOSIS — R73.03 PREDIABETES: ICD-10-CM

## 2023-07-20 DIAGNOSIS — E78.5 HYPERLIPIDEMIA, UNSPECIFIED HYPERLIPIDEMIA TYPE: ICD-10-CM

## 2023-07-20 DIAGNOSIS — E55.9 VITAMIN D DEFICIENCY: ICD-10-CM

## 2023-07-20 DIAGNOSIS — E66.01 MORBID OBESITY WITH BMI OF 40.0-44.9, ADULT: ICD-10-CM

## 2023-07-20 DIAGNOSIS — B02.29 POST HERPETIC NEURALGIA: ICD-10-CM

## 2023-07-20 DIAGNOSIS — E03.9 HYPOTHYROIDISM, UNSPECIFIED TYPE: ICD-10-CM

## 2023-07-20 DIAGNOSIS — Z12.11 COLON CANCER SCREENING: ICD-10-CM

## 2023-07-20 DIAGNOSIS — R25.1 TREMOR: ICD-10-CM

## 2023-07-20 DIAGNOSIS — I70.0 ATHEROSCLEROSIS OF AORTA: ICD-10-CM

## 2023-07-20 DIAGNOSIS — Z12.31 ENCOUNTER FOR SCREENING MAMMOGRAM FOR MALIGNANT NEOPLASM OF BREAST: ICD-10-CM

## 2023-07-20 PROCEDURE — 3044F PR MOST RECENT HEMOGLOBIN A1C LEVEL <7.0%: ICD-10-PCS | Mod: CPTII,S$GLB,, | Performed by: FAMILY MEDICINE

## 2023-07-20 PROCEDURE — 1100F PR PT FALLS ASSESS DOC 2+ FALLS/FALL W/INJURY/YR: ICD-10-PCS | Mod: CPTII,S$GLB,, | Performed by: FAMILY MEDICINE

## 2023-07-20 PROCEDURE — 3044F HG A1C LEVEL LT 7.0%: CPT | Mod: CPTII,S$GLB,, | Performed by: FAMILY MEDICINE

## 2023-07-20 PROCEDURE — 3288F PR FALLS RISK ASSESSMENT DOCUMENTED: ICD-10-PCS | Mod: CPTII,S$GLB,, | Performed by: FAMILY MEDICINE

## 2023-07-20 PROCEDURE — 3075F PR MOST RECENT SYSTOLIC BLOOD PRESS GE 130-139MM HG: ICD-10-PCS | Mod: CPTII,S$GLB,, | Performed by: FAMILY MEDICINE

## 2023-07-20 PROCEDURE — 3288F FALL RISK ASSESSMENT DOCD: CPT | Mod: CPTII,S$GLB,, | Performed by: FAMILY MEDICINE

## 2023-07-20 PROCEDURE — 3079F DIAST BP 80-89 MM HG: CPT | Mod: CPTII,S$GLB,, | Performed by: FAMILY MEDICINE

## 2023-07-20 PROCEDURE — 99214 OFFICE O/P EST MOD 30 MIN: CPT | Mod: S$GLB,,, | Performed by: FAMILY MEDICINE

## 2023-07-20 PROCEDURE — 99214 PR OFFICE/OUTPT VISIT, EST, LEVL IV, 30-39 MIN: ICD-10-PCS | Mod: S$GLB,,, | Performed by: FAMILY MEDICINE

## 2023-07-20 PROCEDURE — 1100F PTFALLS ASSESS-DOCD GE2>/YR: CPT | Mod: CPTII,S$GLB,, | Performed by: FAMILY MEDICINE

## 2023-07-20 PROCEDURE — 1159F PR MEDICATION LIST DOCUMENTED IN MEDICAL RECORD: ICD-10-PCS | Mod: CPTII,S$GLB,, | Performed by: FAMILY MEDICINE

## 2023-07-20 PROCEDURE — 3079F PR MOST RECENT DIASTOLIC BLOOD PRESSURE 80-89 MM HG: ICD-10-PCS | Mod: CPTII,S$GLB,, | Performed by: FAMILY MEDICINE

## 2023-07-20 PROCEDURE — 4010F PR ACE/ARB THEARPY RXD/TAKEN: ICD-10-PCS | Mod: CPTII,S$GLB,, | Performed by: FAMILY MEDICINE

## 2023-07-20 PROCEDURE — 1160F PR REVIEW ALL MEDS BY PRESCRIBER/CLIN PHARMACIST DOCUMENTED: ICD-10-PCS | Mod: CPTII,S$GLB,, | Performed by: FAMILY MEDICINE

## 2023-07-20 PROCEDURE — 1125F PR PAIN SEVERITY QUANTIFIED, PAIN PRESENT: ICD-10-PCS | Mod: CPTII,S$GLB,, | Performed by: FAMILY MEDICINE

## 2023-07-20 PROCEDURE — 4010F ACE/ARB THERAPY RXD/TAKEN: CPT | Mod: CPTII,S$GLB,, | Performed by: FAMILY MEDICINE

## 2023-07-20 PROCEDURE — 99999 PR PBB SHADOW E&M-EST. PATIENT-LVL V: CPT | Mod: PBBFAC,,, | Performed by: FAMILY MEDICINE

## 2023-07-20 PROCEDURE — 3008F BODY MASS INDEX DOCD: CPT | Mod: CPTII,S$GLB,, | Performed by: FAMILY MEDICINE

## 2023-07-20 PROCEDURE — 99999 PR PBB SHADOW E&M-EST. PATIENT-LVL V: ICD-10-PCS | Mod: PBBFAC,,, | Performed by: FAMILY MEDICINE

## 2023-07-20 PROCEDURE — 3075F SYST BP GE 130 - 139MM HG: CPT | Mod: CPTII,S$GLB,, | Performed by: FAMILY MEDICINE

## 2023-07-20 PROCEDURE — 1159F MED LIST DOCD IN RCRD: CPT | Mod: CPTII,S$GLB,, | Performed by: FAMILY MEDICINE

## 2023-07-20 PROCEDURE — 1160F RVW MEDS BY RX/DR IN RCRD: CPT | Mod: CPTII,S$GLB,, | Performed by: FAMILY MEDICINE

## 2023-07-20 PROCEDURE — 3008F PR BODY MASS INDEX (BMI) DOCUMENTED: ICD-10-PCS | Mod: CPTII,S$GLB,, | Performed by: FAMILY MEDICINE

## 2023-07-20 PROCEDURE — 1125F AMNT PAIN NOTED PAIN PRSNT: CPT | Mod: CPTII,S$GLB,, | Performed by: FAMILY MEDICINE

## 2023-07-20 RX ORDER — DOXEPIN HYDROCHLORIDE 50 MG/G
CREAM TOPICAL
Qty: 45 G | Refills: 5 | Status: SHIPPED | OUTPATIENT
Start: 2023-07-20

## 2023-07-20 RX ORDER — PROPRANOLOL HYDROCHLORIDE 60 MG/1
60 CAPSULE, EXTENDED RELEASE ORAL DAILY
Qty: 90 CAPSULE | Refills: 3 | Status: SHIPPED | OUTPATIENT
Start: 2023-07-20 | End: 2024-01-05 | Stop reason: SDUPTHER
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
## 2023-07-21 DIAGNOSIS — R11.0 NAUSEA: ICD-10-CM

## 2023-07-24 RX ORDER — ONDANSETRON 4 MG/1
TABLET, FILM COATED ORAL
Qty: 30 TABLET | Refills: 0 | Status: SHIPPED | OUTPATIENT
Start: 2023-07-24 | End: 2023-10-05

## 2023-07-24 NOTE — TELEPHONE ENCOUNTER
No care due was identified.  [facility] Embedded Care Due Messages. Reference number: [id number].   7/24/2023 9:34:56 AM CDT

## 2023-07-26 ENCOUNTER — TELEPHONE (OUTPATIENT)
Dept: FAMILY MEDICINE | Facility: CLINIC | Age: 73
End: 2023-07-26
Payer: MEDICARE

## 2023-07-26 NOTE — TELEPHONE ENCOUNTER
PA submitted for Doxepin HCl 5% cream Via covermymeds Key: [id number] - PA Case ID: [id number] - Rx #: [id number]

## 2023-08-03 ENCOUNTER — OFFICE VISIT (OUTPATIENT)
Dept: NEUROLOGY | Facility: CLINIC | Age: 73
End: 2023-08-03
Payer: MEDICARE

## 2023-08-03 VITALS
HEART RATE: 64 BPM | WEIGHT: 208.56 LBS | DIASTOLIC BLOOD PRESSURE: 92 MMHG | HEIGHT: 62 IN | BODY MASS INDEX: 38.38 KG/M2 | SYSTOLIC BLOOD PRESSURE: 138 MMHG

## 2023-08-03 DIAGNOSIS — F44.4 FUNCTIONAL NEUROLOGICAL SYMPTOM DISORDER (CONVERSION DISORDER), WITH ABNORMAL MOVEMENT: Primary | ICD-10-CM

## 2023-08-03 DIAGNOSIS — F32.A DEPRESSION, UNSPECIFIED DEPRESSION TYPE: ICD-10-CM

## 2023-08-03 DIAGNOSIS — R25.1 TREMOR OF BOTH HANDS: ICD-10-CM

## 2023-08-03 DIAGNOSIS — S09.90XD TRAUMATIC INJURY OF HEAD, SUBSEQUENT ENCOUNTER: ICD-10-CM

## 2023-08-03 DIAGNOSIS — F43.29 GRIEF REACTION WITH PROLONGED BEREAVEMENT: ICD-10-CM

## 2023-08-03 PROCEDURE — 1100F PR PT FALLS ASSESS DOC 2+ FALLS/FALL W/INJURY/YR: ICD-10-PCS | Mod: CPTII,S$GLB,, | Performed by: PSYCHIATRY & NEUROLOGY

## 2023-08-03 PROCEDURE — 3008F PR BODY MASS INDEX (BMI) DOCUMENTED: ICD-10-PCS | Mod: CPTII,S$GLB,, | Performed by: PSYCHIATRY & NEUROLOGY

## 2023-08-03 PROCEDURE — 4010F PR ACE/ARB THEARPY RXD/TAKEN: ICD-10-PCS | Mod: CPTII,S$GLB,, | Performed by: PSYCHIATRY & NEUROLOGY

## 2023-08-03 PROCEDURE — 3288F FALL RISK ASSESSMENT DOCD: CPT | Mod: CPTII,S$GLB,, | Performed by: PSYCHIATRY & NEUROLOGY

## 2023-08-03 PROCEDURE — 1159F MED LIST DOCD IN RCRD: CPT | Mod: CPTII,S$GLB,, | Performed by: PSYCHIATRY & NEUROLOGY

## 2023-08-03 PROCEDURE — 3075F PR MOST RECENT SYSTOLIC BLOOD PRESS GE 130-139MM HG: ICD-10-PCS | Mod: CPTII,S$GLB,, | Performed by: PSYCHIATRY & NEUROLOGY

## 2023-08-03 PROCEDURE — 1100F PTFALLS ASSESS-DOCD GE2>/YR: CPT | Mod: CPTII,S$GLB,, | Performed by: PSYCHIATRY & NEUROLOGY

## 2023-08-03 PROCEDURE — 99999 PR PBB SHADOW E&M-EST. PATIENT-LVL IV: CPT | Mod: PBBFAC,,, | Performed by: PSYCHIATRY & NEUROLOGY

## 2023-08-03 PROCEDURE — 4010F ACE/ARB THERAPY RXD/TAKEN: CPT | Mod: CPTII,S$GLB,, | Performed by: PSYCHIATRY & NEUROLOGY

## 2023-08-03 PROCEDURE — 1125F PR PAIN SEVERITY QUANTIFIED, PAIN PRESENT: ICD-10-PCS | Mod: CPTII,S$GLB,, | Performed by: PSYCHIATRY & NEUROLOGY

## 2023-08-03 PROCEDURE — 3044F PR MOST RECENT HEMOGLOBIN A1C LEVEL <7.0%: ICD-10-PCS | Mod: CPTII,S$GLB,, | Performed by: PSYCHIATRY & NEUROLOGY

## 2023-08-03 PROCEDURE — 99999 PR PBB SHADOW E&M-EST. PATIENT-LVL IV: ICD-10-PCS | Mod: PBBFAC,,, | Performed by: PSYCHIATRY & NEUROLOGY

## 2023-08-03 PROCEDURE — 3288F PR FALLS RISK ASSESSMENT DOCUMENTED: ICD-10-PCS | Mod: CPTII,S$GLB,, | Performed by: PSYCHIATRY & NEUROLOGY

## 2023-08-03 PROCEDURE — 99205 PR OFFICE/OUTPT VISIT, NEW, LEVL V, 60-74 MIN: ICD-10-PCS | Mod: S$GLB,,, | Performed by: PSYCHIATRY & NEUROLOGY

## 2023-08-03 PROCEDURE — 3080F PR MOST RECENT DIASTOLIC BLOOD PRESSURE >= 90 MM HG: ICD-10-PCS | Mod: CPTII,S$GLB,, | Performed by: PSYCHIATRY & NEUROLOGY

## 2023-08-03 PROCEDURE — 3008F BODY MASS INDEX DOCD: CPT | Mod: CPTII,S$GLB,, | Performed by: PSYCHIATRY & NEUROLOGY

## 2023-08-03 PROCEDURE — 1159F PR MEDICATION LIST DOCUMENTED IN MEDICAL RECORD: ICD-10-PCS | Mod: CPTII,S$GLB,, | Performed by: PSYCHIATRY & NEUROLOGY

## 2023-08-03 PROCEDURE — 3044F HG A1C LEVEL LT 7.0%: CPT | Mod: CPTII,S$GLB,, | Performed by: PSYCHIATRY & NEUROLOGY

## 2023-08-03 PROCEDURE — 99205 OFFICE O/P NEW HI 60 MIN: CPT | Mod: S$GLB,,, | Performed by: PSYCHIATRY & NEUROLOGY

## 2023-08-03 PROCEDURE — 1125F AMNT PAIN NOTED PAIN PRSNT: CPT | Mod: CPTII,S$GLB,, | Performed by: PSYCHIATRY & NEUROLOGY

## 2023-08-03 PROCEDURE — 3080F DIAST BP >= 90 MM HG: CPT | Mod: CPTII,S$GLB,, | Performed by: PSYCHIATRY & NEUROLOGY

## 2023-08-03 PROCEDURE — 3075F SYST BP GE 130 - 139MM HG: CPT | Mod: CPTII,S$GLB,, | Performed by: PSYCHIATRY & NEUROLOGY
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
## 2023-08-04 DIAGNOSIS — F41.8 DEPRESSION WITH ANXIETY: ICD-10-CM

## 2023-08-04 RX ORDER — HYDROXYZINE HYDROCHLORIDE 25 MG/1
25 TABLET, FILM COATED ORAL
Qty: 90 TABLET | Refills: 0 | Status: SHIPPED | OUTPATIENT
Start: 2023-08-04 | End: 2023-10-31

## 2023-10-04 ENCOUNTER — TELEPHONE (OUTPATIENT)
Dept: FAMILY MEDICINE | Facility: CLINIC | Age: 73
End: 2023-10-04

## 2023-10-04 DIAGNOSIS — R25.9 ABNORMAL MOVEMENTS: Primary | ICD-10-CM

## 2023-10-04 NOTE — TELEPHONE ENCOUNTER
----- Message from Alondra Simms sent at 10/4/2023  2:29 PM CDT -----  Contact: pt  Type:  Needs Medical Advice    Who Called: pt  Symptoms (please be specific): cough and congestion     How long has patient had these symptoms:  a week    Pharmacy name and phone #:    [facility] [place] - [address]  [address] [place] [address]  Phone: [phone] Fax: [phone]    Would the patient rather a call back or a response via [facility]? Call back  Best Call Back Number: [phone]    Additional Information: please call to advise  Pt also wants to talk about a referral to a different neurology    Thanks

## 2023-10-05 DIAGNOSIS — R11.0 NAUSEA: ICD-10-CM

## 2023-10-05 RX ORDER — ONDANSETRON 4 MG/1
TABLET, FILM COATED ORAL
Qty: 30 TABLET | Refills: 0 | Status: SHIPPED | OUTPATIENT
Start: 2023-10-05 | End: 2024-01-07

## 2023-10-05 NOTE — TELEPHONE ENCOUNTER
No care due was identified.  [facility] Embedded Care Due Messages. Reference number: [id number].   10/05/2023 12:00:08 PM CDT

## 2023-10-06 ENCOUNTER — TELEPHONE (OUTPATIENT)
Dept: FAMILY MEDICINE | Facility: CLINIC | Age: 73
End: 2023-10-06
Payer: MEDICARE

## 2023-10-06 NOTE — TELEPHONE ENCOUNTER
Called patient in regards to cough and congestion. States it has been present x1 week , no chest pain , SOB , has not tried anything over the counter. Not having a productive cough. No fever , more of a dry cough.States nothing helps. Advised on some methods to try over the counter , she refused , and prefers Dr Fisher prescribes her something. Will forward message to provider for further assistance.

## 2023-10-06 NOTE — TELEPHONE ENCOUNTER
----- Message from Ludmila Hines sent at 10/6/2023 10:39 AM CDT -----  Contact: Patient  Type:  Sooner Appointment Request    Caller is requesting a sooner appointment.  Caller declined first available appointment listed below.  Caller will not accept being placed on the waitlist and is requesting a message be sent to doctor.    Name of Caller:  Patient  When is the first available appointment?  10/26  Symptoms:  cough and she wants  congestion medicine.  Luis Najera at [facility] Call Back Number:  [phone]  Additional Information:  Please call the patient back at the phone number listed above to advise. Thank you!

## 2023-10-26 ENCOUNTER — OFFICE VISIT (OUTPATIENT)
Dept: FAMILY MEDICINE | Facility: CLINIC | Age: 73
End: 2023-10-26
Payer: MEDICARE

## 2023-10-26 VITALS
WEIGHT: 203.5 LBS | BODY MASS INDEX: 37.45 KG/M2 | DIASTOLIC BLOOD PRESSURE: 82 MMHG | TEMPERATURE: 98 F | SYSTOLIC BLOOD PRESSURE: 120 MMHG | HEART RATE: 98 BPM | HEIGHT: 62 IN | RESPIRATION RATE: 18 BRPM | OXYGEN SATURATION: 95 %

## 2023-10-26 DIAGNOSIS — K21.9 GASTROESOPHAGEAL REFLUX DISEASE WITHOUT ESOPHAGITIS: ICD-10-CM

## 2023-10-26 DIAGNOSIS — R05.9 COUGH, UNSPECIFIED TYPE: ICD-10-CM

## 2023-10-26 DIAGNOSIS — Z12.31 ENCOUNTER FOR SCREENING MAMMOGRAM FOR MALIGNANT NEOPLASM OF BREAST: ICD-10-CM

## 2023-10-26 DIAGNOSIS — I10 HYPERTENSION, UNSPECIFIED TYPE: ICD-10-CM

## 2023-10-26 DIAGNOSIS — E78.5 HYPERLIPIDEMIA, UNSPECIFIED HYPERLIPIDEMIA TYPE: Primary | ICD-10-CM

## 2023-10-26 DIAGNOSIS — F44.4 FUNCTIONAL NEUROLOGICAL SYMPTOM DISORDER (CONVERSION DISORDER), WITH ABNORMAL MOVEMENT: ICD-10-CM

## 2023-10-26 DIAGNOSIS — R73.03 PREDIABETES: ICD-10-CM

## 2023-10-26 DIAGNOSIS — N95.9 MENOPAUSAL AND POSTMENOPAUSAL DISORDER: ICD-10-CM

## 2023-10-26 DIAGNOSIS — Z12.11 COLON CANCER SCREENING: ICD-10-CM

## 2023-10-26 DIAGNOSIS — R53.1 GENERAL WEAKNESS: ICD-10-CM

## 2023-10-26 DIAGNOSIS — Z23 FLU VACCINE NEED: ICD-10-CM

## 2023-10-26 DIAGNOSIS — R05.3 CHRONIC COUGH: ICD-10-CM

## 2023-10-26 DIAGNOSIS — F32.A DEPRESSION, UNSPECIFIED DEPRESSION TYPE: ICD-10-CM

## 2023-10-26 DIAGNOSIS — E03.9 HYPOTHYROIDISM, UNSPECIFIED TYPE: ICD-10-CM

## 2023-10-26 DIAGNOSIS — E66.01 MORBID OBESITY WITH BMI OF 40.0-44.9, ADULT: ICD-10-CM

## 2023-10-26 DIAGNOSIS — E55.9 VITAMIN D DEFICIENCY: ICD-10-CM

## 2023-10-26 PROCEDURE — 99214 PR OFFICE/OUTPT VISIT, EST, LEVL IV, 30-39 MIN: ICD-10-PCS | Mod: S$GLB,,, | Performed by: FAMILY MEDICINE

## 2023-10-26 PROCEDURE — 99214 OFFICE O/P EST MOD 30 MIN: CPT | Mod: S$GLB,,, | Performed by: FAMILY MEDICINE

## 2023-10-26 PROCEDURE — 1160F PR REVIEW ALL MEDS BY PRESCRIBER/CLIN PHARMACIST DOCUMENTED: ICD-10-PCS | Mod: CPTII,S$GLB,, | Performed by: FAMILY MEDICINE

## 2023-10-26 PROCEDURE — 3288F PR FALLS RISK ASSESSMENT DOCUMENTED: ICD-10-PCS | Mod: CPTII,S$GLB,, | Performed by: FAMILY MEDICINE

## 2023-10-26 PROCEDURE — 3044F PR MOST RECENT HEMOGLOBIN A1C LEVEL <7.0%: ICD-10-PCS | Mod: CPTII,S$GLB,, | Performed by: FAMILY MEDICINE

## 2023-10-26 PROCEDURE — 3008F PR BODY MASS INDEX (BMI) DOCUMENTED: ICD-10-PCS | Mod: CPTII,S$GLB,, | Performed by: FAMILY MEDICINE

## 2023-10-26 PROCEDURE — 1159F PR MEDICATION LIST DOCUMENTED IN MEDICAL RECORD: ICD-10-PCS | Mod: CPTII,S$GLB,, | Performed by: FAMILY MEDICINE

## 2023-10-26 PROCEDURE — 1160F RVW MEDS BY RX/DR IN RCRD: CPT | Mod: CPTII,S$GLB,, | Performed by: FAMILY MEDICINE

## 2023-10-26 PROCEDURE — 3079F PR MOST RECENT DIASTOLIC BLOOD PRESSURE 80-89 MM HG: ICD-10-PCS | Mod: CPTII,S$GLB,, | Performed by: FAMILY MEDICINE

## 2023-10-26 PROCEDURE — 3074F SYST BP LT 130 MM HG: CPT | Mod: CPTII,S$GLB,, | Performed by: FAMILY MEDICINE

## 2023-10-26 PROCEDURE — 3074F PR MOST RECENT SYSTOLIC BLOOD PRESSURE < 130 MM HG: ICD-10-PCS | Mod: CPTII,S$GLB,, | Performed by: FAMILY MEDICINE

## 2023-10-26 PROCEDURE — 90694 VACC AIIV4 NO PRSRV 0.5ML IM: CPT | Mod: S$GLB,,, | Performed by: FAMILY MEDICINE

## 2023-10-26 PROCEDURE — 3008F BODY MASS INDEX DOCD: CPT | Mod: CPTII,S$GLB,, | Performed by: FAMILY MEDICINE

## 2023-10-26 PROCEDURE — 4010F PR ACE/ARB THEARPY RXD/TAKEN: ICD-10-PCS | Mod: CPTII,S$GLB,, | Performed by: FAMILY MEDICINE

## 2023-10-26 PROCEDURE — G0008 ADMIN INFLUENZA VIRUS VAC: HCPCS | Mod: S$GLB,,, | Performed by: FAMILY MEDICINE

## 2023-10-26 PROCEDURE — 1101F PT FALLS ASSESS-DOCD LE1/YR: CPT | Mod: CPTII,S$GLB,, | Performed by: FAMILY MEDICINE

## 2023-10-26 PROCEDURE — 1126F AMNT PAIN NOTED NONE PRSNT: CPT | Mod: CPTII,S$GLB,, | Performed by: FAMILY MEDICINE

## 2023-10-26 PROCEDURE — 99999 PR PBB SHADOW E&M-EST. PATIENT-LVL V: ICD-10-PCS | Mod: PBBFAC,,, | Performed by: FAMILY MEDICINE

## 2023-10-26 PROCEDURE — G0008 FLU VACCINE - QUADRIVALENT - ADJUVANTED: ICD-10-PCS | Mod: S$GLB,,, | Performed by: FAMILY MEDICINE

## 2023-10-26 PROCEDURE — 90694 FLU VACCINE - QUADRIVALENT - ADJUVANTED: ICD-10-PCS | Mod: S$GLB,,, | Performed by: FAMILY MEDICINE

## 2023-10-26 PROCEDURE — 1101F PR PT FALLS ASSESS DOC 0-1 FALLS W/OUT INJ PAST YR: ICD-10-PCS | Mod: CPTII,S$GLB,, | Performed by: FAMILY MEDICINE

## 2023-10-26 PROCEDURE — 3288F FALL RISK ASSESSMENT DOCD: CPT | Mod: CPTII,S$GLB,, | Performed by: FAMILY MEDICINE

## 2023-10-26 PROCEDURE — 3079F DIAST BP 80-89 MM HG: CPT | Mod: CPTII,S$GLB,, | Performed by: FAMILY MEDICINE

## 2023-10-26 PROCEDURE — 99999 PR PBB SHADOW E&M-EST. PATIENT-LVL V: CPT | Mod: PBBFAC,,, | Performed by: FAMILY MEDICINE

## 2023-10-26 PROCEDURE — 3044F HG A1C LEVEL LT 7.0%: CPT | Mod: CPTII,S$GLB,, | Performed by: FAMILY MEDICINE

## 2023-10-26 PROCEDURE — 1159F MED LIST DOCD IN RCRD: CPT | Mod: CPTII,S$GLB,, | Performed by: FAMILY MEDICINE

## 2023-10-26 PROCEDURE — 1126F PR PAIN SEVERITY QUANTIFIED, NO PAIN PRESENT: ICD-10-PCS | Mod: CPTII,S$GLB,, | Performed by: FAMILY MEDICINE

## 2023-10-26 PROCEDURE — 4010F ACE/ARB THERAPY RXD/TAKEN: CPT | Mod: CPTII,S$GLB,, | Performed by: FAMILY MEDICINE

## 2023-10-26 RX ORDER — KRILL/OM-3/DHA/EPA/PHOSPHO/AST 1000-170MG
1 CAPSULE ORAL 2 TIMES DAILY
Qty: 180 CAPSULE | Refills: 3 | Status: SHIPPED | OUTPATIENT
Start: 2023-10-26

## 2023-10-26 RX ORDER — ROSUVASTATIN CALCIUM 10 MG/1
TABLET, COATED ORAL
Qty: 90 TABLET | Refills: 3 | Status: SHIPPED | OUTPATIENT
Start: 2023-10-26

## 2023-10-26 RX ORDER — BENZONATATE 200 MG/1
200 CAPSULE ORAL 3 TIMES DAILY PRN
Qty: 30 CAPSULE | Status: SHIPPED | OUTPATIENT
Start: 2023-10-26 | End: 2023-11-05

## 2023-10-26 RX ORDER — ACETAMINOPHEN 500 MG
5000 TABLET ORAL DAILY
Qty: 90 TABLET | Refills: 3 | Status: SHIPPED | OUTPATIENT
Start: 2023-10-26 | End: 2024-03-21 | Stop reason: SDUPTHER
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
## 2024-01-04 ENCOUNTER — HOSPITAL ENCOUNTER (OUTPATIENT)
Dept: RADIOLOGY | Facility: CLINIC | Age: 74
Discharge: HOME | End: 2024-01-04
Attending: FAMILY MEDICINE
Payer: MEDICARE

## 2024-01-04 ENCOUNTER — OFFICE VISIT (OUTPATIENT)
Dept: FAMILY MEDICINE | Facility: CLINIC | Age: 74
End: 2024-01-04
Payer: MEDICARE

## 2024-01-04 ENCOUNTER — TELEPHONE (OUTPATIENT)
Dept: FAMILY MEDICINE | Facility: CLINIC | Age: 74
End: 2024-01-04

## 2024-01-04 ENCOUNTER — HOSPITAL ENCOUNTER (OUTPATIENT)
Dept: RADIOLOGY | Facility: HOSPITAL | Age: 74
Discharge: HOME | End: 2024-01-04
Attending: FAMILY MEDICINE
Payer: MEDICARE

## 2024-01-04 VITALS
WEIGHT: 202.81 LBS | HEIGHT: 62 IN | HEART RATE: 67 BPM | TEMPERATURE: 97 F | BODY MASS INDEX: 37.32 KG/M2 | OXYGEN SATURATION: 94 % | DIASTOLIC BLOOD PRESSURE: 80 MMHG | SYSTOLIC BLOOD PRESSURE: 124 MMHG

## 2024-01-04 DIAGNOSIS — J18.9 PNEUMONIA OF RIGHT LOWER LOBE DUE TO INFECTIOUS ORGANISM: ICD-10-CM

## 2024-01-04 DIAGNOSIS — R10.31 RIGHT LOWER QUADRANT PAIN: ICD-10-CM

## 2024-01-04 DIAGNOSIS — R11.2 NAUSEA AND VOMITING, UNSPECIFIED VOMITING TYPE: Primary | ICD-10-CM

## 2024-01-04 DIAGNOSIS — R30.0 DYSURIA: ICD-10-CM

## 2024-01-04 LAB
BILIRUBIN, UA POC OHS: ABNORMAL
BLOOD, UA POC OHS: ABNORMAL
CLARITY, UA POC OHS: ABNORMAL
COLOR, UA POC OHS: ABNORMAL
CTP QC/QA: YES
CTP QC/QA: YES
GLUCOSE, UA POC OHS: NEGATIVE
KETONES, UA POC OHS: NEGATIVE
LEUKOCYTES, UA POC OHS: NEGATIVE
NITRITE, UA POC OHS: NEGATIVE
PH, UA POC OHS: 6
POC MOLECULAR INFLUENZA A AGN: NEGATIVE
POC MOLECULAR INFLUENZA B AGN: NEGATIVE
PROTEIN, UA POC OHS: ABNORMAL
SARS-COV-2 RDRP RESP QL NAA+PROBE: NEGATIVE
SPECIFIC GRAVITY, UA POC OHS: >=1.03
UROBILINOGEN, UA POC OHS: 1

## 2024-01-04 PROCEDURE — 99999 PR PBB SHADOW E&M-EST. PATIENT-LVL V: CPT | Mod: PBBFAC,,, | Performed by: FAMILY MEDICINE

## 2024-01-04 PROCEDURE — 74176 CT ABD & PELVIS W/O CONTRAST: CPT | Mod: 26,,, | Performed by: RADIOLOGY

## 2024-01-04 PROCEDURE — 71046 X-RAY EXAM CHEST 2 VIEWS: CPT | Mod: 26,,, | Performed by: RADIOLOGY

## 2024-01-04 PROCEDURE — 87635 SARS-COV-2 COVID-19 AMP PRB: CPT | Mod: QW,S$GLB,, | Performed by: FAMILY MEDICINE

## 2024-01-04 PROCEDURE — 87502 INFLUENZA DNA AMP PROBE: CPT | Mod: QW,S$GLB,, | Performed by: FAMILY MEDICINE

## 2024-01-04 PROCEDURE — 99214 OFFICE O/P EST MOD 30 MIN: CPT | Mod: 25,S$GLB,, | Performed by: FAMILY MEDICINE

## 2024-01-04 PROCEDURE — 87086 URINE CULTURE/COLONY COUNT: CPT | Performed by: FAMILY MEDICINE

## 2024-01-04 PROCEDURE — 96372 THER/PROPH/DIAG INJ SC/IM: CPT | Mod: S$GLB,,, | Performed by: FAMILY MEDICINE

## 2024-01-04 PROCEDURE — 81003 URINALYSIS AUTO W/O SCOPE: CPT | Mod: QW,S$GLB,, | Performed by: FAMILY MEDICINE

## 2024-01-04 PROCEDURE — 74176 CT ABD & PELVIS W/O CONTRAST: CPT | Mod: TC

## 2024-01-04 PROCEDURE — 71046 X-RAY EXAM CHEST 2 VIEWS: CPT | Mod: TC,FY,PO

## 2024-01-04 RX ORDER — KETOROLAC TROMETHAMINE 30 MG/ML
30 INJECTION, SOLUTION INTRAMUSCULAR; INTRAVENOUS
Status: COMPLETED | OUTPATIENT
Start: 2024-01-04 | End: 2024-01-04

## 2024-01-04 RX ORDER — ONDANSETRON 4 MG/1
4 TABLET, ORALLY DISINTEGRATING ORAL
Status: COMPLETED | OUTPATIENT
Start: 2024-01-04 | End: 2024-01-04

## 2024-01-04 RX ORDER — LEVOFLOXACIN 750 MG/1
750 TABLET ORAL DAILY
Qty: 7 TABLET | Refills: 0 | Status: SHIPPED | OUTPATIENT
Start: 2024-01-04

## 2024-01-04 RX ADMIN — ONDANSETRON 4 MG: 4 TABLET, ORALLY DISINTEGRATING ORAL at 01:01

## 2024-01-04 RX ADMIN — KETOROLAC TROMETHAMINE 30 MG: 30 INJECTION, SOLUTION INTRAMUSCULAR; INTRAVENOUS at 02:01

## 2024-01-04 NOTE — TELEPHONE ENCOUNTER
----- Message from Kannan Toro MD sent at 1/4/2024  4:44 PM CST -----  Labs- UA was clear except trace blood; Chem good, CBC OK with high normal WBC.  CT was equivocal for mild diverticulitis or colitis.  The Levaquin should treat any infection in the lung and/or the colon.  Need to control the nausea, hydrate, bland diet and take the Levaquin.  ER if she worsens over the weekend

## 2024-01-04 NOTE — PROGRESS NOTES
Patient verified by name and . Patient received 4mg Zofran sublingual. Patient advised to wait in clinic for 15 minutes in case of adverse reactions. Patient demonstrated understanding.    Patient verified by name and . Patient received 30mg Toradol in right Deltoid per patient request. Patient tolerated injection well. Patient advised to wait in clinic for 15 minutes in case of adverse reactions. Patient demonstrated understanding.

## 2024-01-04 NOTE — PROGRESS NOTES
Urine obtained via in & out cath for UA, C&S as ordered. 14Fr cath observing sterile technique. Urine rosas in color, slightly cloudy, foul odor. Tolerated procedure well as evidence by no c/o pain.

## 2024-01-04 NOTE — PATIENT INSTRUCTIONS
Push fluids intake.  Drink plenty of water - but take small amounts at one time to not induce vomiting.    If unable to get fluids in, you may need to be given IV fluids in the ER or be admitted.    Further advice pending results of labs and CT scan.    Contact us/your PCP if any worsening or for any new concerns as we discussed.

## 2024-01-05 DIAGNOSIS — B02.29 POST HERPETIC NEURALGIA: ICD-10-CM

## 2024-01-05 DIAGNOSIS — F41.8 DEPRESSION WITH ANXIETY: ICD-10-CM

## 2024-01-05 DIAGNOSIS — R11.0 NAUSEA: ICD-10-CM

## 2024-01-05 DIAGNOSIS — R25.1 TREMOR: ICD-10-CM

## 2024-01-05 NOTE — TELEPHONE ENCOUNTER
No care due was identified.  [facility] Embedded Care Due Messages. Reference number: [id number].   1/05/2024 9:06:47 AM CST   No

## 2024-01-05 NOTE — TELEPHONE ENCOUNTER
----- Message from Sarina Elliott, Patient Care Assistant sent at 1/5/2024  9:09 AM CST -----  Regarding: refill  Contact: pt  Type:  RX Refill Request    Who Called:  pt   Refill or New Rx:  refill   RX Name and Strength:  propranoloL (INDERAL [place]) 60 MG 24 hr capsule  EScitalopram oxalate (LEXAPRO) 10 MG tablet    How is the patient currently taking it? 1xday   Is this a 30 day or 90 day RX:  90    Preferred Pharmacy with phone number:    [facility] - [place] - [address]  [address] [place] [address]  Phone: [phone] Fax: [phone]    Local or Mail Order:  local   Ordering Provider:  Natalia Collado Call Back Number:  [phone] (home)     Additional Information:  please call pt to advise. Thanks!

## 2024-01-06 LAB — BACTERIA UR CULT: NO GROWTH

## 2024-01-07 RX ORDER — AMITRIPTYLINE HYDROCHLORIDE 50 MG/1
TABLET, FILM COATED ORAL
Qty: 90 TABLET | Refills: 3 | Status: SHIPPED | OUTPATIENT
Start: 2024-01-07

## 2024-01-07 RX ORDER — ESCITALOPRAM OXALATE 10 MG/1
10 TABLET ORAL NIGHTLY
Qty: 90 TABLET | Refills: 3 | Status: SHIPPED | OUTPATIENT
Start: 2024-01-07

## 2024-01-07 RX ORDER — ONDANSETRON 4 MG/1
TABLET, FILM COATED ORAL
Qty: 30 TABLET | Refills: 0 | Status: SHIPPED | OUTPATIENT
Start: 2024-01-07

## 2024-01-07 RX ORDER — PROPRANOLOL HYDROCHLORIDE 60 MG/1
60 CAPSULE, EXTENDED RELEASE ORAL DAILY
Qty: 90 CAPSULE | Refills: 3 | Status: SHIPPED | OUTPATIENT
Start: 2024-01-07 | End: 2025-01-06

## 2024-01-07 NOTE — TELEPHONE ENCOUNTER
Refill Routing Note   Medication(s) are not appropriate for processing by [facility] for the following reason(s):        Outside of protocol    [facility] action(s):  Approve  Route               Appointments  past 12m or future 3m with PCP    Date Provider   Last Visit   10/26/2023 dEel Fisher MD   Next Visit   1/5/2024 Edel Fisher MD   ED visits in past 90 days: 0        Note composed:1:39 PM 01/07/2024

## 2024-01-08 ENCOUNTER — TELEPHONE (OUTPATIENT)
Dept: FAMILY MEDICINE | Facility: CLINIC | Age: 74
End: 2024-01-08
Payer: MEDICARE

## 2024-01-08 NOTE — TELEPHONE ENCOUNTER
----- Message from Krupa Caputo sent at 1/8/2024  4:21 PM CST -----  Contact: Pt [phone]  Type:  Patient Returning Call    Who Called:  Pt   Who Left Message for Patient:  NA   Does the patient know what this is regarding?:  Not sure   Best Call Back Number:  [phone]   Additional Information:  Pt stated she just missed a call from office. She is not sure what about

## 2024-01-08 NOTE — PROGRESS NOTES
Spoke to pt via phone, pt states she is still in tremendous amount of pain in her left side. Pt stated that she was still (Clogged up). Pt stated she is still not feeling any better.

## 2024-01-10 ENCOUNTER — TELEPHONE (OUTPATIENT)
Dept: FAMILY MEDICINE | Facility: CLINIC | Age: 74
End: 2024-01-10
Payer: MEDICARE

## 2024-01-10 NOTE — TELEPHONE ENCOUNTER
----- Message from Cynthia Olivera sent at 1/10/2024  3:38 PM CST -----  Regarding: diriticulis  Contact: pt  Type:  Needs Medical Advice    Who Called: pt    Symptoms (please be specific): abdomin pain     How long has patient had these symptoms:  1/3/24    Pharmacy name and phone #:    [facility] - [place] - [address]  [address] [place] [address]  Phone: [phone] Fax: [phone]    Would the patient rather a call back or a response via [facility]? Call back    Best Call Back Number: [phone]    Additional Information: urgent care doc gave pt antibiotics 1/3/24 and she has just a few left.  She was diagnosed with divaticulitis and still has pain. Pt is asking if she should be seen by the doctor or just get more antibiotics. Pain has lessened, but it is still there. Please advise.  Thank you.

## 2024-01-11 ENCOUNTER — TELEPHONE (OUTPATIENT)
Dept: FAMILY MEDICINE | Facility: CLINIC | Age: 74
End: 2024-01-11
Payer: MEDICARE

## 2024-01-11 NOTE — TELEPHONE ENCOUNTER
If pain is severe she needs to go to emergency room.  Monitor for worsening symptoms and return to clinic or go to the ER if these occur: fever > 100.4, severe abdominal pain, intractable vomiting, bleeding from the rectum or black tarry stools, dehydration, lethargy or other worsening symptoms.

## 2024-01-11 NOTE — TELEPHONE ENCOUNTER
If pain is severe or other worrisome sx then go to ED. If not severe then needs appt with me or LIZZ asap

## 2024-01-11 NOTE — TELEPHONE ENCOUNTER
Patient states that she was seen at Urgent Care last week, has finished all the antibiotics given for her side pain. Says that she is still having pain and wants to know what else can be done. Please advise.

## 2024-01-11 NOTE — TELEPHONE ENCOUNTER
----- Message from Kirsty Gaxiolalin sent at 1/11/2024  1:15 PM CST -----  Type: Needs Medical Advice  Who Called:  pt  Best Call Back Number: [phone]    Additional Information: pt is calling in regards to having been seen in the Urgent care last week for side pain. Pt says that she was given an antibiotic but is still having pain after finishing the antibiotics and needs to know what to do now. Pt is asking to speak to the dr and wants a call back. Please call back and advise. Thanks!

## 2024-01-12 NOTE — TELEPHONE ENCOUNTER
"Called patient to advise per Dr Fisher    "   If pain is severe she needs to go to emergency room.  Monitor for worsening symptoms and return to clinic or go to the ER if these occur: fever > 100.4, severe abdominal pain, intractable vomiting, bleeding from the rectum or black tarry stools, dehydration, lethargy or other worsening symptoms.     Verbalized understanding.   "

## 2024-01-19 ENCOUNTER — TELEPHONE (OUTPATIENT)
Dept: FAMILY MEDICINE | Facility: CLINIC | Age: 74
End: 2024-01-19

## 2024-01-19 ENCOUNTER — OFFICE VISIT (OUTPATIENT)
Dept: FAMILY MEDICINE | Facility: CLINIC | Age: 74
End: 2024-01-19
Payer: MEDICARE

## 2024-01-19 VITALS
SYSTOLIC BLOOD PRESSURE: 134 MMHG | WEIGHT: 183.19 LBS | HEART RATE: 97 BPM | OXYGEN SATURATION: 96 % | BODY MASS INDEX: 33.71 KG/M2 | TEMPERATURE: 97 F | DIASTOLIC BLOOD PRESSURE: 80 MMHG | HEIGHT: 62 IN

## 2024-01-19 DIAGNOSIS — R10.31 RIGHT LOWER QUADRANT PAIN: Primary | ICD-10-CM

## 2024-01-19 DIAGNOSIS — D21.9 FIBROID: ICD-10-CM

## 2024-01-19 DIAGNOSIS — R05.9 COUGH, UNSPECIFIED TYPE: Primary | ICD-10-CM

## 2024-01-19 DIAGNOSIS — R10.31 RIGHT LOWER QUADRANT PAIN: ICD-10-CM

## 2024-01-19 DIAGNOSIS — J84.10 FIBROTIC LUNG DISEASES: ICD-10-CM

## 2024-01-19 DIAGNOSIS — R11.2 NAUSEA AND VOMITING, UNSPECIFIED VOMITING TYPE: ICD-10-CM

## 2024-01-19 PROCEDURE — 99999 PR PBB SHADOW E&M-EST. PATIENT-LVL V: CPT | Mod: PBBFAC,,, | Performed by: NURSE PRACTITIONER

## 2024-01-19 PROCEDURE — 99213 OFFICE O/P EST LOW 20 MIN: CPT | Mod: S$GLB,,, | Performed by: NURSE PRACTITIONER

## 2024-01-19 RX ORDER — NYSTATIN 100000 [USP'U]/ML
4 SUSPENSION ORAL 4 TIMES DAILY
Qty: 160 ML | Refills: 0 | Status: SHIPPED | OUTPATIENT
Start: 2024-01-19 | End: 2024-01-29

## 2024-01-19 RX ORDER — ONDANSETRON 4 MG/1
4 TABLET, FILM COATED ORAL EVERY 8 HOURS PRN
Qty: 60 TABLET | Refills: 1 | Status: CANCELLED | OUTPATIENT
Start: 2024-01-19

## 2024-01-19 RX ORDER — GUAIFENESIN 600 MG/1
1200 TABLET, EXTENDED RELEASE ORAL 2 TIMES DAILY
Qty: 60 TABLET | Refills: 1 | Status: SHIPPED | OUTPATIENT
Start: 2024-01-19

## 2024-01-19 RX ORDER — BENZONATATE 200 MG/1
200 CAPSULE ORAL 3 TIMES DAILY PRN
Qty: 30 CAPSULE | Refills: 0 | Status: CANCELLED | OUTPATIENT
Start: 2024-01-19 | End: 2024-01-29

## 2024-01-19 NOTE — TELEPHONE ENCOUNTER
Spoke to patient over the phone regarding CT result, chest x-ray and labs. Patient states feels so weak, worsening nausea and RLQ abdomina pain, and unable to tolerate food.   Advised to go to ED for further evaluation of IV antibiotic and hydration.   Refer to GI, Pulmonary for chronic fibrotic change and GYN for fibroid.     Chest x-ray   FINDINGS:  Stable cardiomediastinal silhouette.  Stable appearance of the lungs.  No focal consolidation, pleural effusion or pneumothorax.  Exaggerated kyphotic curvature of the thoracic spine.     Impression:  No acute cardiopulmonary process.    CT Abdomen Pelvis without contrast  FINDINGS:  Heart: Stable size.  No pericardial effusion.  Calcification of the aortic annulus.     Lung Bases: Stable peripheral predominant interstitial reticulation and mild ground-glass density which could represent mild fibrotic change.  No new consolidation or pleural effusion.     Liver: No focal lesion.     Gallbladder: Surgically absent.     Bile Ducts: Stable mild prominence of the common bile duct, likely related to cholecystectomy status.  No intrahepatic ductal dilatation.     Pancreas: Diffuse fatty atrophy of the pancreatic parenchyma.     Spleen: Unremarkable.     Adrenals: Unremarkable.     Kidneys/ Ureters: Fluid density fullness of the bilateral renal pelvis, correlating with renal sinus cysts identified on prior CT 12/13/2019.  No renal stone.  No hydronephrosis or ureteral dilatation.     Bladder: Unremarkable.     Reproductive organs: Stable calcification within the anterior uterus which could represent a fibroid.     GI Tract/Mesentery: No bowel obstruction.  Colonic diverticulosis.  New wall thickening of the cecum and ascending colon (series 4, image 60) without pericolonic inflammatory changes.  Appendix is unremarkable.     Peritoneal Space: No ascites. No free air.     Retroperitoneum: No significant adenopathy.     Abdominal wall: Unremarkable.     Vasculature: Abdominal  aorta is normal caliber.  Moderate calcific atherosclerosis.     Bones: Degenerative changes of the spine.  No acute fracture or aggressive lesion.     Impression:     1. New nonspecific wall thickening of the cecum and ascending colon without pericolonic inflammatory changes.  Findings could relate to incomplete distension, cannot exclude mild colonic inflammation.  Recommend clinical correlation.  2. Colonic diverticulosis.  3. Additional findings as above.

## 2024-01-19 NOTE — PROGRESS NOTES
Subjective:       Patient ID: Aida Gupta is a 73 y.o. female.    Chief Complaint: Nausea, Fatigue, and Cough     HPI   74 y/o female patient with medical problems listed below presents for lingering cough, nausea, right lower abdominal pain. Patient sitting on wheelchair was accompanied by a daughter. Patient was seen in the clinic on 1/4 for cough, congestion, nausea, vomiting, dysuria with frequency and foul odor, and abdomina pain. She was tested negative for Covid and Influenza. Chest x-ray showed lungs are clear. CT Abdomen showed equivocal for mild diverticulitis or colitis. She was provided Levaquin for one week which she completed. She states feels a little improved but still has lingering constant symptoms so came in for further evaluation. Denies worsening symptoms. Denies chest pain, sob, vomiting, urinary or bowel symptoms, fever but endorses chills.   She states gets yeast infection when taking antibiotic and complains of sore tongue.     Labs reviewed- No leukocytosis, urine culture did not grow bacteria.     Patient Active Problem List   Diagnosis    Hypothyroidism    Gastroesophageal reflux disease without esophagitis    Morbid obesity with BMI of 40.0-44.9, adult    Hyperlipidemia    Prediabetes    Dermatitis    Dyspnea    Dysphagia    Lower esophageal ring    Gastritis determined by endoscopy    Gastric polyp    DDD (degenerative disc disease), lumbar    Lumbar radiculitis    Vitamin D deficiency    Hypertension    Post herpetic neuralgia    Ds DNA antibody positive    Positive JAVY (antinuclear antibody)    Hashimoto's disease    Cystocele with prolapse    Incomplete emptying of bladder    Recurrent UTI    Pulmonary nodule    Atherosclerosis of aorta    Cough    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 Rescue, Disp: , Rfl:     amitriptyline (ELAVIL) 50 MG tablet, TAKE 1 TABLET BY MOUTH ONCE DAILY IN THE EVENING, Disp: 90 tablet, Rfl: 3    cholecalciferol, vitamin D3, (VITAMIN D3) 125 mcg (5,000 unit) Tab, Take 1 tablet (5,000 Units total) by mouth once daily., Disp: 90 tablet, Rfl: 3    doxepin (ZONALON) 5 % cream, Apply thin film topically to affected area three times daily as needed, Disp: 45 g, Rfl: 5    ergocalciferol (VITAMIN D2) 50,000 unit Cap, Take 1 capsule (50,000 Units total) by mouth every 7 days., Disp: 12 capsule, Rfl: 3    EScitalopram oxalate (LEXAPRO) 10 MG tablet, Take 1 tablet (10 mg total) by mouth every evening., Disp: 90 tablet, Rfl: 3    FLUTICASONE PROPIONATE, MICRO (FLUTICASONE PROP, MICRO, BULK, MISC), by Misc.(Non-Drug; Combo Route) route., Disp: , Rfl:     gabapentin (NEURONTIN) 300 MG capsule, TAKE 3 CAPSULES BY MOUTH THREE TIMES DAILY, Disp: 270 capsule, Rfl: 3    hydrOXYzine HCL (ATARAX) 25 MG tablet, TAKE 1 TABLET BY MOUTH THREE TIMES DAILY AS NEEDED FOR ANXIETY, Disp: 90 tablet, Rfl: prn    krill-om-3-dha-epa-phospho-ast (KRILL OIL) 1,934-719-37-80 mg Cap, Take 1 each by mouth 2 (two) times daily., Disp: 180 capsule, Rfl: 3    levoFLOXacin (LEVAQUIN) 750 MG tablet, Take 1 tablet (750 mg total) by mouth once daily., Disp: 7 tablet, Rfl: 0    levothyroxine (SYNTHROID) 150 MCG tablet, Take 1 tablet by mouth once daily, Disp: 90 tablet, Rfl: 1    meloxicam (MOBIC) 7.5 MG tablet, Take 1 tablet by mouth once daily, Disp: 90 tablet, Rfl: 3    ondansetron (ZOFRAN) 4 MG tablet, TAKE 1 TABLET BY MOUTH EVERY 8 HOURS AS NEEDED FOR NAUSEA, Disp: 30 tablet, Rfl: 0    propranoloL  "(INDERAL LA) 60 MG 24 hr capsule, Take 1 capsule (60 mg total) by mouth once daily., Disp: 90 capsule, Rfl: 3    rosuvastatin (CRESTOR) 10 MG tablet, Take 1 tablet by mouth once daily, Disp: 90 tablet, Rfl: 3    triamcinolone acetonide 0.1% (KENALOG) 0.1 % cream, Apply topically 2 (two) times daily., Disp: , Rfl:     fluticasone propionate (FLOVENT HFA) 220 mcg/actuation inhaler, Inhale 1 puff into the lungs 2 (two) times daily. Controller, Disp: 12 g, Rfl: 11    guaiFENesin (MUCINEX) 600 mg 12 hr tablet, Take 2 tablets (1,200 mg total) by mouth 2 (two) times daily., Disp: 60 tablet, Rfl: 1    omeprazole (PRILOSEC) 20 MG capsule, Take 1 capsule (20 mg total) by mouth once daily. (Patient not taking: Reported on 7/20/2022), Disp: 90 capsule, Rfl: 1    Review of Systems   Constitutional:  Positive for chills. Negative for fever.   HENT:  Negative for congestion.    Respiratory:  Positive for cough. Negative for chest tightness and shortness of breath.    Cardiovascular:  Negative for chest pain and palpitations.   Gastrointestinal:  Positive for abdominal pain and nausea. Negative for blood in stool, constipation, diarrhea and vomiting.   Genitourinary:  Negative for dysuria.   Neurological:  Negative for dizziness and headaches.       Objective:   /80 (BP Location: Left forearm, Patient Position: Sitting, BP Method: Medium (Manual))   Pulse 97   Temp 97 °F (36.1 °C) (Oral)   Ht 5' 2" (1.575 m)   Wt 83.1 kg (183 lb 3.2 oz)   SpO2 96%   BMI 33.51 kg/m²         Physical Exam  Constitutional:       General: She is not in acute distress.     Appearance: Normal appearance. She is ill-appearing.   HENT:      Head: Atraumatic.   Cardiovascular:      Rate and Rhythm: Normal rate and regular rhythm.      Pulses: Normal pulses.      Heart sounds: Normal heart sounds.   Pulmonary:      Effort: Pulmonary effort is normal.      Breath sounds: Normal breath sounds.   Abdominal:      General: Abdomen is flat. Bowel " sounds are normal.      Palpations: Abdomen is soft.      Tenderness: There is abdominal tenderness.      Comments: +Tenderness in right lower abdomen    Neurological:      Mental Status: She is oriented to person, place, and time.         Assessment:       1. Cough, unspecified type    2. Nausea and vomiting, unspecified vomiting type    3. Right lower quadrant pain        Plan:       1. Cough, unspecified type  - X-Ray Chest PA And Lateral stat   - guaiFENesin (MUCINEX) 600 mg 12 hr tablet; Take 2 tablets (1,200 mg total) by mouth 2 (two) times daily.  Dispense: 60 tablet; Refill: 1    2. Nausea and vomiting, unspecified vomiting type  - CT Abdomen Pelvis  Without Contrast stat  - CBC Auto Differential; Future  - Comprehensive Metabolic Panel; Future    3. Right lower quadrant pain  - CT Abdomen Pelvis  Without Contrast stat  - CBC Auto Differential; Future  - Comprehensive Metabolic Panel; Future  - C-REACTIVE PROTEIN; Future   - Advised to go to ED if chest pain, sob, intractable nausea, vomiting, unable to tolerate the food, worsening abdominal pain, fever or any abnormal worsening symptoms     Patient with be reevaluated in  as scheduled on 1/26  or sooner taina Kenney NP

## 2024-01-22 ENCOUNTER — PATIENT MESSAGE (OUTPATIENT)
Dept: GASTROENTEROLOGY | Facility: CLINIC | Age: 74
End: 2024-01-22
Payer: MEDICARE

## 2024-01-22 ENCOUNTER — TELEPHONE (OUTPATIENT)
Dept: GASTROENTEROLOGY | Facility: CLINIC | Age: 74
End: 2024-01-22
Payer: MEDICARE

## 2024-01-22 NOTE — TELEPHONE ENCOUNTER
Call placed to Ms. Gupta in regards to referral received. No answer, unable to leave message.     Ppm sent as well.

## 2024-01-30 ENCOUNTER — PATIENT MESSAGE (OUTPATIENT)
Dept: PRIMARY CARE CLINIC | Facility: CLINIC | Age: 74
End: 2024-01-30
Payer: MEDICARE

## 2024-03-14 DIAGNOSIS — R11.0 NAUSEA: ICD-10-CM

## 2024-03-14 RX ORDER — ONDANSETRON 4 MG/1
TABLET, FILM COATED ORAL
Qty: 30 TABLET | Refills: 0 | Status: SHIPPED | OUTPATIENT
Start: 2024-03-14 | End: 2024-04-25

## 2024-03-14 NOTE — TELEPHONE ENCOUNTER
No care due was identified.  [facility] Embedded Care Due Messages. Reference number: [id number].   3/14/2024 12:33:22 PM CDT

## 2024-03-21 ENCOUNTER — TELEPHONE (OUTPATIENT)
Dept: BEHAVIORAL HEALTH | Facility: CLINIC | Age: 74
End: 2024-03-21
Payer: MEDICARE

## 2024-03-21 ENCOUNTER — OFFICE VISIT (OUTPATIENT)
Dept: FAMILY MEDICINE | Facility: CLINIC | Age: 74
End: 2024-03-21
Payer: MEDICARE

## 2024-03-21 DIAGNOSIS — E78.5 HYPERLIPIDEMIA, UNSPECIFIED HYPERLIPIDEMIA TYPE: ICD-10-CM

## 2024-03-21 DIAGNOSIS — E55.9 VITAMIN D DEFICIENCY: ICD-10-CM

## 2024-03-21 DIAGNOSIS — K21.9 GASTROESOPHAGEAL REFLUX DISEASE WITHOUT ESOPHAGITIS: ICD-10-CM

## 2024-03-21 DIAGNOSIS — I10 HYPERTENSION, UNSPECIFIED TYPE: ICD-10-CM

## 2024-03-21 DIAGNOSIS — Z74.8 ASSISTANCE NEEDED WITH TRANSPORTATION: ICD-10-CM

## 2024-03-21 DIAGNOSIS — R73.03 PREDIABETES: ICD-10-CM

## 2024-03-21 DIAGNOSIS — E03.9 HYPOTHYROIDISM, UNSPECIFIED TYPE: ICD-10-CM

## 2024-03-21 DIAGNOSIS — F41.8 DEPRESSION WITH ANXIETY: ICD-10-CM

## 2024-03-21 DIAGNOSIS — Z12.11 COLON CANCER SCREENING: ICD-10-CM

## 2024-03-21 DIAGNOSIS — N95.9 MENOPAUSAL AND POSTMENOPAUSAL DISORDER: ICD-10-CM

## 2024-03-21 DIAGNOSIS — E66.01 MORBID OBESITY WITH BMI OF 40.0-44.9, ADULT: ICD-10-CM

## 2024-03-21 DIAGNOSIS — R05.9 COUGH, UNSPECIFIED TYPE: Primary | ICD-10-CM

## 2024-03-21 DIAGNOSIS — Z12.31 ENCOUNTER FOR SCREENING MAMMOGRAM FOR MALIGNANT NEOPLASM OF BREAST: ICD-10-CM

## 2024-03-21 PROCEDURE — 99214 OFFICE O/P EST MOD 30 MIN: CPT | Mod: 95,,, | Performed by: FAMILY MEDICINE

## 2024-03-21 PROCEDURE — G2211 COMPLEX E/M VISIT ADD ON: HCPCS | Mod: 95,,, | Performed by: FAMILY MEDICINE

## 2024-03-21 RX ORDER — ERGOCALCIFEROL 1.25 MG/1
50000 CAPSULE ORAL
Qty: 24 CAPSULE | Refills: 3 | Status: SHIPPED | OUTPATIENT
Start: 2024-03-21

## 2024-03-21 RX ORDER — OMEPRAZOLE 20 MG/1
20 CAPSULE, DELAYED RELEASE ORAL DAILY
Qty: 90 CAPSULE | Refills: 3 | Status: SHIPPED | OUTPATIENT
Start: 2024-03-21 | End: 2025-03-21

## 2024-03-21 RX ORDER — ACETAMINOPHEN 500 MG
5000 TABLET ORAL DAILY
Qty: 90 TABLET | Refills: 3 | Status: SHIPPED | OUTPATIENT
Start: 2024-03-21
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
## 2024-03-21 NOTE — Clinical Note
Needs 3 month LIZZ with fasting labs and 6 month with me. Try to schedule mammo, dexa and CT chest on same day due to transportation issues

## 2024-03-21 NOTE — PROGRESS NOTES
"Primary Care Behavioral Health Integration: Initial  Date:  03/22/2024  Referral Source:  Edel Fisher MD  Type of Visit:  Video Session  Length of Appointment:  18 minutes spent face-to-face and 12 minutes spent in non face-to-face clinical care.    The patient location is:  [place] [address]  The patient phone number is: [phone]  Visit type: Virtual visit with synchronous audio and video  Each patient to whom he or she provides medical services by telemedicine is:  (1) informed of the relationship between the physician and patient and the respective role of any other health care provider with respect to management of the patient; and (2) notified that he or she may decline to receive medical services by telemedicine and may withdraw from such care at any time.    Chief Complaint/Reason for Encounter:  Depression    History of Present Illness: Aida Gupta, a 73 y.o. female referred by Edel Fisher MD.  Patient was seen, examined and chart was reviewed. Met with patient.       LPC provided a description of the [facility] program, reviewed confidentialty and limits to confidentiality, and discussed safety planning in the event patient expereinces suicidal or homicidal ideation or plans to harm herself.  Patient began this Initial Assessment by stating she does not feel like doing things anymore.  Stated she is currently taking Lexapro 10 mg for depression/anxiety.  Noted she has not been taking Hydroxyzine 25 mg three times daily as needed for anxiety.  Reported she does not like the way it makes her feel.  Stated she has to depend on her daughter to do most things for her.  Stated she feels like she is a complete burden to her daughter.  Stated her , who she depended on for most things, passed away two years ago.  "We were  for 51 years, and my life has deteriorated since his death."  Reported she feels lost without him.  Stated her son is living with her " along with his two children.  Son has been raising the children.  Noted her relationship with her son is not good.  She feels that she cannot talk to him, so she avoids him.  Reported symptoms include depressed mood, panic attacks, insomnia, decreased appetite, anhedonia, hopelessness, fatigue, and difficulty concentrating.  Patient denies suicidal ideation or any plans to harm herself or others.  Due to the severity of patient's depression and anxiety, she will be referred to Quyen Nolasco NP for medication management.  Once stabilized, patient will be referred to Amna Prather LCSW for long-term therapy.           Past Medical History:   Diagnosis Date    Asthma     chronic bronchitis    Hx of colectomy     Shingles     1 year ago     Thyroid disease          Current Outpatient Medications:     albuterol (PROVENTIL/VENTOLIN HFA) 90 mcg/actuation inhaler, Inhale 2 puffs into the lungs every 6 (six) hours as needed for Wheezing. Rescue, Disp: , Rfl:     amitriptyline (ELAVIL) 50 MG tablet, TAKE 1 TABLET BY MOUTH ONCE DAILY IN THE EVENING, Disp: 90 tablet, Rfl: 3    cholecalciferol, vitamin D3, (VITAMIN D3) 125 mcg (5,000 unit) Tab, Take 1 tablet (5,000 Units total) by mouth once daily., Disp: 90 tablet, Rfl: 3    doxepin (ZONALON) 5 % cream, Apply thin film topically to affected area three times daily as needed, Disp: 45 g, Rfl: 5    ergocalciferol (VITAMIN D2) 50,000 unit Cap, Take 1 capsule (50,000 Units total) by mouth twice a week., Disp: 24 capsule, Rfl: 3    EScitalopram oxalate (LEXAPRO) 10 MG tablet, Take 1 tablet (10 mg total) by mouth every evening., Disp: 90 tablet, Rfl: 3    fluticasone propionate (FLOVENT HFA) 220 mcg/actuation inhaler, Inhale 1 puff into the lungs 2 (two) times daily. Controller, Disp: 12 g, Rfl: 11    FLUTICASONE PROPIONATE, MICRO (FLUTICASONE PROP, MICRO, BULK, MISC), by Misc.(Non-Drug; Combo Route) route., Disp: , Rfl:     gabapentin (NEURONTIN) 300 MG capsule, TAKE 3 CAPSULES  BY MOUTH THREE TIMES DAILY, Disp: 270 capsule, Rfl: 3    guaiFENesin (MUCINEX) 600 mg 12 hr tablet, Take 2 tablets (1,200 mg total) by mouth 2 (two) times daily., Disp: 60 tablet, Rfl: 1    hydrOXYzine HCL (ATARAX) 25 MG tablet, TAKE 1 TABLET BY MOUTH THREE TIMES DAILY AS NEEDED FOR ANXIETY, Disp: 90 tablet, Rfl: prn    krill-om-3-dha-epa-phospho-ast (KRILL OIL) 1,224-870-52-80 mg Cap, Take 1 each by mouth 2 (two) times daily., Disp: 180 capsule, Rfl: 3    levoFLOXacin (LEVAQUIN) 750 MG tablet, Take 1 tablet (750 mg total) by mouth once daily., Disp: 7 tablet, Rfl: 0    levothyroxine (SYNTHROID) 150 MCG tablet, Take 1 tablet by mouth once daily, Disp: 90 tablet, Rfl: 1    meloxicam (MOBIC) 7.5 MG tablet, Take 1 tablet by mouth once daily, Disp: 90 tablet, Rfl: 3    omeprazole (PRILOSEC) 20 MG capsule, Take 1 capsule (20 mg total) by mouth once daily., Disp: 90 capsule, Rfl: 3    ondansetron (ZOFRAN) 4 MG tablet, TAKE 1 TABLET BY MOUTH EVERY 8 HOURS AS NEEDED FOR NAUSEA, Disp: 30 tablet, Rfl: 0    propranoloL (INDERAL LA) 60 MG 24 hr capsule, Take 1 capsule (60 mg total) by mouth once daily., Disp: 90 capsule, Rfl: 3    rosuvastatin (CRESTOR) 10 MG tablet, Take 1 tablet by mouth once daily, Disp: 90 tablet, Rfl: 3    triamcinolone acetonide 0.1% (KENALOG) 0.1 % cream, Apply topically 2 (two) times daily., Disp: , Rfl:     Current symptoms:  Depression Symptoms: anhedonia, insomnia, worthlessness/guilt, hopelessness, fatigue, difficulty concentrating, and decreased appetite.  Anxiety Symptoms: panic attacks.  Sleep Difficulties: difficulty falling asleep and non-restful sleep.  Manic Symptoms:  denies.  Psychosis: denies .    Risk assessment:  Patient reports no suicidal ideation  Patient reports no homicidal ideation  Patient reports no self-injurious behavior  Patient reports no violent behavior    Patient advised to call [phone] or present the the nearest ED if they experience suicidal or homicidal ideation, plan  or intent.      Psychiatric History:  Diagnosis:    Current Psychiatric Medication: Yes - Pt is taking escitalopram (Lexapro) 10 mg once daily for Depression.They are interested in medication changes.   Medication Trial History:  Medication Trials: No    Outpatient Treatment: No   Inpatient Treatment: No   Suicide Attempts: No   Access to Firearms: Yes.  Patient stated she would not use the firearms to harm herself.     History of Trauma: Denies any history of trauma.     Current and Past Substance Use:  Alcohol: none   Drugs:  Denied.   Nicotine: denied   Caffeine:  Drinks soft drinks     Mental Status Exam  General Appearance:  disheveled, lying in bed during this initial assessment   Speech: slowed, delayed, monotone      Level of Cooperation: resistant      Thought Processes: flight of ideas, poverty of thought   Mood: depressed, dysthymic, sad      Thought Content: {poverty of content   Affect: flat, restricted, shallow, sad   Orientation: Oriented x3   Memory/Attention/Concentration: Distracted   Judgment & Insight: poor   Language  intact          No data to display                    3/21/2024     1:56 PM   GAD7   1. Feeling nervous, anxious, or on edge? 3   2. Not being able to stop or control worrying? 3   3. Worrying too much about different things? 3   4. Trouble relaxing? 3   5. Being so restless that it is hard to sit still? 0   6. Becoming easily annoyed or irritable? 3   7. Feeling afraid as if something awful might happen? 0   8. If you checked off any problems, how difficult have these problems made it for you to do your work, take care of things at home, or get along with other people? 1   TIKA-7 Score 15       Impression: Initial appointment focused on gathering history, identifying treatment goals and developing a treatment plan.      Patient experiences depressed mood, difficulty concentrating, panic attacks, feelings of hopelessness, fatigue, and decreased appetite as evidenced by fear of  uncertainty and irritability, particularly as this relates to the death of her  and her poor relationship with her son.  These symptoms are making it difficult for patient to function effectively.      Diagnosis:  No diagnosis found.    Treatment Goals and Plan:   Patient is being referred to Quyen Nolasco NP for medication management.  She will then need to follow up with Amna Prather LCSW for long-term therapy.      Future treatment will utilize long term therapy to be determined by Amna Prather LCSW.      Return to Clinic: No follow-ups on file.     Patient has been referred to Psychiatry due to the severity of her depression and anxiety.

## 2024-03-22 ENCOUNTER — TELEPHONE (OUTPATIENT)
Dept: BEHAVIORAL HEALTH | Facility: CLINIC | Age: 74
End: 2024-03-22
Payer: MEDICARE

## 2024-03-22 ENCOUNTER — TELEPHONE (OUTPATIENT)
Dept: FAMILY MEDICINE | Facility: CLINIC | Age: 74
End: 2024-03-22
Payer: MEDICARE

## 2024-03-22 ENCOUNTER — CLINICAL SUPPORT (OUTPATIENT)
Dept: BEHAVIORAL HEALTH | Facility: CLINIC | Age: 74
End: 2024-03-22
Payer: COMMERCIAL

## 2024-03-22 DIAGNOSIS — F41.8 DEPRESSION WITH ANXIETY: ICD-10-CM

## 2024-03-22 DIAGNOSIS — F33.2 MAJOR DEPRESSIVE DISORDER, RECURRENT SEVERE WITHOUT PSYCHOTIC FEATURES: Primary | ICD-10-CM

## 2024-03-22 DIAGNOSIS — Z74.8 ASSISTANCE NEEDED WITH TRANSPORTATION: ICD-10-CM

## 2024-03-22 PROCEDURE — 90791 PSYCH DIAGNOSTIC EVALUATION: CPT | Mod: 95,,, | Performed by: COUNSELOR

## 2024-03-22 NOTE — TELEPHONE ENCOUNTER
----- Message from Eedl Fisher MD sent at 3/21/2024  9:46 AM CDT -----  Needs 3 month LIZZ with fasting labs and 6 month with me. Try to schedule mammo, dexa and CT chest on same day due to transportation issues

## 2024-03-22 NOTE — TELEPHONE ENCOUNTER
CHW reached out to pt seen today by Ghulam Armijo LPC. Pt symptoms are too severe for [facility] Program. I spoke with patient and asked her to pls call Amna Prather LCSW for an appointment at [phone]. Pt was asked if possible to pls call today. I will check on Monday to see if scheduled. Pt called me back to state that she called [facility] at [phone] for an appointment, she left a voice message to pls call to schedule an appt with Ms. Crescencio LCSW.

## 2024-04-16 ENCOUNTER — PATIENT OUTREACH (OUTPATIENT)
Dept: ADMINISTRATIVE | Facility: OTHER | Age: 74
End: 2024-04-16
Payer: MEDICARE

## 2024-04-16 NOTE — PROGRESS NOTES
CHW - Initial Contact    Cassidy Gupta, Community Health Worker contacted Ms. Aida Gupta  via telephone today. Ms. Gupta requested CHW call her back later.  CHW called Pt back and phone was disconnected.      Initial Outreach - Due: 4/19/2024

## 2024-04-18 ENCOUNTER — PATIENT OUTREACH (OUTPATIENT)
Dept: ADMINISTRATIVE | Facility: OTHER | Age: 74
End: 2024-04-18
Payer: MEDICARE

## 2024-04-18 NOTE — PROGRESS NOTES
CHW - Outreach Attempt    Cassidy Gupta, Community Health Worker was unable to leave a message for 3rd attempt to contact patient regarding: Community Health     CHW - Unable to Contact    Community Health Worker to close episode at this time due to three missed attempts for patient contact.

## 2024-04-24 DIAGNOSIS — R11.0 NAUSEA: ICD-10-CM

## 2024-04-24 NOTE — TELEPHONE ENCOUNTER
No care due was identified.  [facility] Embedded Care Due Messages. Reference number: [id number].   4/24/2024 5:16:11 PM CDT

## 2024-04-25 RX ORDER — ONDANSETRON 4 MG/1
TABLET, FILM COATED ORAL
Qty: 30 TABLET | Refills: 0 | Status: SHIPPED | OUTPATIENT
Start: 2024-04-25 | End: 2024-04-26 | Stop reason: SDUPTHER

## 2024-04-26 ENCOUNTER — OFFICE VISIT (OUTPATIENT)
Dept: FAMILY MEDICINE | Facility: CLINIC | Age: 74
End: 2024-04-26
Payer: MEDICARE

## 2024-04-26 ENCOUNTER — LAB VISIT (OUTPATIENT)
Dept: LAB | Facility: HOSPITAL | Age: 74
End: 2024-04-26
Attending: FAMILY MEDICINE
Payer: MEDICARE

## 2024-04-26 VITALS
BODY MASS INDEX: 36.52 KG/M2 | SYSTOLIC BLOOD PRESSURE: 127 MMHG | TEMPERATURE: 98 F | OXYGEN SATURATION: 95 % | RESPIRATION RATE: 18 BRPM | DIASTOLIC BLOOD PRESSURE: 80 MMHG | HEART RATE: 84 BPM | HEIGHT: 62 IN | WEIGHT: 198.44 LBS

## 2024-04-26 DIAGNOSIS — Z12.11 COLON CANCER SCREENING: ICD-10-CM

## 2024-04-26 DIAGNOSIS — E66.01 MORBID OBESITY WITH BMI OF 40.0-44.9, ADULT: ICD-10-CM

## 2024-04-26 DIAGNOSIS — R73.03 PREDIABETES: ICD-10-CM

## 2024-04-26 DIAGNOSIS — D69.2 OTHER NONTHROMBOCYTOPENIC PURPURA: ICD-10-CM

## 2024-04-26 DIAGNOSIS — E03.9 HYPOTHYROIDISM, UNSPECIFIED TYPE: ICD-10-CM

## 2024-04-26 DIAGNOSIS — I10 HYPERTENSION, UNSPECIFIED TYPE: ICD-10-CM

## 2024-04-26 DIAGNOSIS — F41.8 DEPRESSION WITH ANXIETY: ICD-10-CM

## 2024-04-26 DIAGNOSIS — N95.9 MENOPAUSAL AND POSTMENOPAUSAL DISORDER: ICD-10-CM

## 2024-04-26 DIAGNOSIS — F33.1 MAJOR DEPRESSIVE DISORDER, RECURRENT, MODERATE: ICD-10-CM

## 2024-04-26 DIAGNOSIS — E55.9 VITAMIN D DEFICIENCY: ICD-10-CM

## 2024-04-26 DIAGNOSIS — K21.9 GASTROESOPHAGEAL REFLUX DISEASE WITHOUT ESOPHAGITIS: ICD-10-CM

## 2024-04-26 DIAGNOSIS — J44.9 CHRONIC OBSTRUCTIVE PULMONARY DISEASE, UNSPECIFIED COPD TYPE: ICD-10-CM

## 2024-04-26 DIAGNOSIS — R11.0 NAUSEA: ICD-10-CM

## 2024-04-26 DIAGNOSIS — I70.0 ATHEROSCLEROSIS OF AORTA: ICD-10-CM

## 2024-04-26 DIAGNOSIS — E78.5 HYPERLIPIDEMIA, UNSPECIFIED HYPERLIPIDEMIA TYPE: Primary | ICD-10-CM

## 2024-04-26 DIAGNOSIS — G20.C PARKINSONIAN TREMOR: ICD-10-CM

## 2024-04-26 DIAGNOSIS — Z12.31 ENCOUNTER FOR SCREENING MAMMOGRAM FOR MALIGNANT NEOPLASM OF BREAST: ICD-10-CM

## 2024-04-26 LAB
ALBUMIN SERPL BCP-MCNC: 3.5 G/DL (ref 3.5–5.2)
ALP SERPL-CCNC: 66 U/L (ref 55–135)
ALT SERPL W/O P-5'-P-CCNC: 11 U/L (ref 10–44)
ANION GAP SERPL CALC-SCNC: 10 MMOL/L (ref 8–16)
AST SERPL-CCNC: 12 U/L (ref 10–40)
BASOPHILS # BLD AUTO: 0.05 K/UL (ref 0–0.2)
BASOPHILS NFR BLD: 0.5 % (ref 0–1.9)
BILIRUB SERPL-MCNC: 0.5 MG/DL (ref 0.1–1)
BUN SERPL-MCNC: 15 MG/DL (ref 8–23)
CALCIUM SERPL-MCNC: 9.7 MG/DL (ref 8.7–10.5)
CHLORIDE SERPL-SCNC: 106 MMOL/L (ref 95–110)
CO2 SERPL-SCNC: 24 MMOL/L (ref 23–29)
CREAT SERPL-MCNC: 0.7 MG/DL (ref 0.5–1.4)
DIFFERENTIAL METHOD BLD: ABNORMAL
EOSINOPHIL # BLD AUTO: 0.3 K/UL (ref 0–0.5)
EOSINOPHIL NFR BLD: 2.4 % (ref 0–8)
ERYTHROCYTE [DISTWIDTH] IN BLOOD BY AUTOMATED COUNT: 13 % (ref 11.5–14.5)
EST. GFR  (NO RACE VARIABLE): >60 ML/MIN/1.73 M^2
ESTIMATED AVG GLUCOSE: 111 MG/DL (ref 68–131)
GLUCOSE SERPL-MCNC: 112 MG/DL (ref 70–110)
HBA1C MFR BLD: 5.5 % (ref 4–5.6)
HCT VFR BLD AUTO: 44.5 % (ref 37–48.5)
HGB BLD-MCNC: 14.4 G/DL (ref 12–16)
IMM GRANULOCYTES # BLD AUTO: 0.04 K/UL (ref 0–0.04)
IMM GRANULOCYTES NFR BLD AUTO: 0.4 % (ref 0–0.5)
LYMPHOCYTES # BLD AUTO: 1.7 K/UL (ref 1–4.8)
LYMPHOCYTES NFR BLD: 15.7 % (ref 18–48)
MCH RBC QN AUTO: 30 PG (ref 27–31)
MCHC RBC AUTO-ENTMCNC: 32.4 G/DL (ref 32–36)
MCV RBC AUTO: 93 FL (ref 82–98)
MONOCYTES # BLD AUTO: 0.5 K/UL (ref 0.3–1)
MONOCYTES NFR BLD: 5 % (ref 4–15)
NEUTROPHILS # BLD AUTO: 8.3 K/UL (ref 1.8–7.7)
NEUTROPHILS NFR BLD: 76 % (ref 38–73)
NRBC BLD-RTO: 0 /100 WBC
PLATELET # BLD AUTO: 333 K/UL (ref 150–450)
PMV BLD AUTO: 9.2 FL (ref 9.2–12.9)
POTASSIUM SERPL-SCNC: 4 MMOL/L (ref 3.5–5.1)
PROT SERPL-MCNC: 7.6 G/DL (ref 6–8.4)
RBC # BLD AUTO: 4.8 M/UL (ref 4–5.4)
SODIUM SERPL-SCNC: 140 MMOL/L (ref 136–145)
WBC # BLD AUTO: 10.86 K/UL (ref 3.9–12.7)

## 2024-04-26 PROCEDURE — 3074F SYST BP LT 130 MM HG: CPT | Mod: CPTII,S$GLB,, | Performed by: FAMILY MEDICINE

## 2024-04-26 PROCEDURE — 80053 COMPREHEN METABOLIC PANEL: CPT | Performed by: FAMILY MEDICINE

## 2024-04-26 PROCEDURE — 82306 VITAMIN D 25 HYDROXY: CPT | Performed by: FAMILY MEDICINE

## 2024-04-26 PROCEDURE — 85025 COMPLETE CBC W/AUTO DIFF WBC: CPT | Performed by: FAMILY MEDICINE

## 2024-04-26 PROCEDURE — 3008F BODY MASS INDEX DOCD: CPT | Mod: CPTII,S$GLB,, | Performed by: FAMILY MEDICINE

## 2024-04-26 PROCEDURE — 1160F RVW MEDS BY RX/DR IN RCRD: CPT | Mod: CPTII,S$GLB,, | Performed by: FAMILY MEDICINE

## 2024-04-26 PROCEDURE — 1125F AMNT PAIN NOTED PAIN PRSNT: CPT | Mod: CPTII,S$GLB,, | Performed by: FAMILY MEDICINE

## 2024-04-26 PROCEDURE — 1101F PT FALLS ASSESS-DOCD LE1/YR: CPT | Mod: CPTII,S$GLB,, | Performed by: FAMILY MEDICINE

## 2024-04-26 PROCEDURE — 36415 COLL VENOUS BLD VENIPUNCTURE: CPT | Mod: PO | Performed by: FAMILY MEDICINE

## 2024-04-26 PROCEDURE — 3288F FALL RISK ASSESSMENT DOCD: CPT | Mod: CPTII,S$GLB,, | Performed by: FAMILY MEDICINE

## 2024-04-26 PROCEDURE — 3079F DIAST BP 80-89 MM HG: CPT | Mod: CPTII,S$GLB,, | Performed by: FAMILY MEDICINE

## 2024-04-26 PROCEDURE — 99214 OFFICE O/P EST MOD 30 MIN: CPT | Mod: S$GLB,,, | Performed by: FAMILY MEDICINE

## 2024-04-26 PROCEDURE — 83036 HEMOGLOBIN GLYCOSYLATED A1C: CPT | Performed by: FAMILY MEDICINE

## 2024-04-26 PROCEDURE — 84439 ASSAY OF FREE THYROXINE: CPT | Performed by: FAMILY MEDICINE

## 2024-04-26 PROCEDURE — 84443 ASSAY THYROID STIM HORMONE: CPT | Performed by: FAMILY MEDICINE

## 2024-04-26 PROCEDURE — 3044F HG A1C LEVEL LT 7.0%: CPT | Mod: CPTII,S$GLB,, | Performed by: FAMILY MEDICINE

## 2024-04-26 PROCEDURE — 1159F MED LIST DOCD IN RCRD: CPT | Mod: CPTII,S$GLB,, | Performed by: FAMILY MEDICINE

## 2024-04-26 PROCEDURE — 99999 PR PBB SHADOW E&M-EST. PATIENT-LVL IV: CPT | Mod: PBBFAC,,, | Performed by: FAMILY MEDICINE

## 2024-04-26 RX ORDER — KRILL/OM-3/DHA/EPA/PHOSPHO/AST 1000-170MG
1 CAPSULE ORAL 2 TIMES DAILY
Qty: 180 CAPSULE | Refills: 3 | Status: SHIPPED | OUTPATIENT
Start: 2024-04-26

## 2024-04-26 RX ORDER — ONDANSETRON 4 MG/1
4 TABLET, FILM COATED ORAL EVERY 8 HOURS PRN
Qty: 30 TABLET | Refills: 0 | Status: SHIPPED | OUTPATIENT
Start: 2024-04-26

## 2024-04-26 RX ORDER — ESCITALOPRAM OXALATE 10 MG/1
10 TABLET ORAL NIGHTLY
Qty: 90 TABLET | Refills: 3 | Status: SHIPPED | OUTPATIENT
Start: 2024-04-26

## 2024-04-26 NOTE — PROGRESS NOTES
Subjective:       Patient ID: Aida Gupta is a 73 y.o. female.    Chief Complaint: Follow-up (6mth f/u)    HPI  Review of Systems   Constitutional:  Negative for fatigue and unexpected weight change.   Respiratory:  Negative for chest tightness and shortness of breath.    Cardiovascular:  Negative for chest pain, palpitations and leg swelling.   Gastrointestinal:  Negative for abdominal pain.   Musculoskeletal:  Negative for arthralgias.   Neurological:  Negative for dizziness, syncope, light-headedness and headaches.       Patient Active Problem List   Diagnosis    Hypothyroidism    Gastroesophageal reflux disease without esophagitis    Morbid obesity with BMI of 40.0-44.9, adult    Hyperlipidemia    Prediabetes    Dermatitis    Dyspnea    Dysphagia    Lower esophageal ring    Gastritis determined by endoscopy    Gastric polyp    DDD (degenerative disc disease), lumbar    Lumbar radiculitis    Vitamin D deficiency    Hypertension    Post herpetic neuralgia    Ds DNA antibody positive    Positive JAVY (antinuclear antibody)    Hashimoto's disease    Cystocele with prolapse    Incomplete emptying of bladder    Recurrent UTI    Pulmonary nodule    Atherosclerosis of aorta    Cough    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    Reviewed labs 1/24  TFTs borderline  Vit D 14-forgets to take weekly  HPL -crestor restarted 4/22  ?  Typically refuses vaccines      has passed away. Dependent on daughter for food and transportation.  has Limited transportation.  depression sx. Lexapro added 5/23. Lots of anxiety peyman at night. Seeing counselor now ROYCE Armijo      C/o reflux, gassiness, nausea  and not taking prilosec-out      C/o chronic cough. Feels tired. Wants to sleep all the time. Feels weak. Does not have HH now.  PFTs ordered 9/23-not done     Neuro  Dr. Mitchel chairez for movement d/o 8/23. Diagnosed with functional neuro sx d/o (conversion d/o) and referred to psych   Grandson slammed door on her and she fell backwards hitting right side of her head 2 months ago.  No LOC or immediate injury other than the knot on the head. No vision changes or sudden neuro changes. C/o shaking right arm and when speaking sometimes x 2 weeks. Told she needed to go to doctor by daughter who she lives with. States shaking wears her out. Referred 5/23 to neurology for head trauma/parkinsonian tremor. Mri brain 5/23 No acute intracranial abnormalities.Has neuro appt 8/23 . Had earlier appt but missed it due to catching 2 trains on the way. Has significant transportation issues     Prediabetes  A1c 5.4-diet controlled      Pulm Dr. Diaz appt 7/22 CT chest and PFTs ordered but not done  Seen in ED 12/22/2021 for bronchitis. COVID neg.   C/o chronic cough with prod white/clear sputum. No sob or fever. Feels weak and tired. Chest x ray 2/2022 neg.         Urology Dr. Castillo recurrent UTIs  Rheum Dr. Serrato- following for + JAVY with + ds DNA and antithyroid antibodies (TPO AB)- possible SLE but no current manifestations     GI Dr. Warren EGD 9/17-mild schatzki ring-dilated, gastric polyp, gerd     Chronic pain- States she can't sit down due to post herpetic neuralgia which started 2 years ago. Pain Dr. Walton has tried nerve blocks caudal 10/17 and According to notes from pain med she has had SEUN for lumbar radiculopathy 1/18     Increased to gabapentin 300mg tid. Cymbatla 30 mg a day added but could not tolerate due to nausea.  gabapentin makes her drowsy. Elavil helps with mood somewhat.  celexa started last visit but states [facility] never filled it. Still having depression and grief. Talking to [employer] pastors about loss of  which helps. Has  limited trasnportatio  Objective:      Physical Exam  Vitals and nursing note reviewed.   Constitutional:       Appearance: She is well-developed.   Cardiovascular:      Rate and Rhythm: Normal rate and regular rhythm.      Heart sounds: Normal heart sounds.   Pulmonary:      Effort: Pulmonary effort is normal.      Breath sounds: Normal breath sounds.   Skin:     General: Skin is warm and dry.   Neurological:      Mental Status: She is alert and oriented to person, place, and time.         Assessment:       1. Hyperlipidemia, unspecified hyperlipidemia type    2. Hypothyroidism, unspecified type    3. Hypertension, unspecified type    4. Vitamin D deficiency    5. Morbid obesity with BMI of 40.0-44.9, adult    6. Prediabetes    7. Gastroesophageal reflux disease without esophagitis    8. Chronic obstructive pulmonary disease, unspecified COPD type    9. Major depressive disorder, recurrent, moderate    10. Parkinsonian tremor    11. Other nonthrombocytopenic purpura    12. Atherosclerosis of aorta    13. Encounter for screening mammogram for malignant neoplasm of breast    14. Menopausal and postmenopausal disorder    15. Colon cancer screening    16. Depression with anxiety    17. Nausea        Plan:         1. Hyperlipidemia, unspecified hyperlipidemia type  Stable condition.  Continue current medications.  Will adjust based on lab findings or if condition changes.    - krill-om-3-dha-epa-phospho-ast (KRILL OIL) 1,297-377-60-80 mg Cap; Take 1 each by mouth 2 (two) times daily.  Dispense: 180 capsule; Refill: 3    2. Hypothyroidism, unspecified type  Stable condition.  Continue current medications.  Will adjust based on lab findings or if condition changes.    - CBC Auto Differential; Future  - TSH; Future  - T4, Free; Future    3. Hypertension, unspecified type  Controlled on current medications.  Continue current medications.      4. Vitamin D deficiency  Screen and treat as indicated:    - Vitamin D;  "Future    5. Morbid obesity with BMI of 40.0-44.9, adult  Counseled patient on his ideal body weight, health consequences of being obese and current recommendations including weekly exercise and a heart healthy diet.  Current BMI is:Estimated body mass index is 36.29 kg/m² as calculated from the following:    Height as of this encounter: 5' 2" (1.575 m).    Weight as of this encounter: 90 kg (198 lb 6.6 oz)..  Patient is aware that ideal BMI < 25 or Weight in (lb) to have BMI = 25: 136.4.      6. Prediabetes   Your blood sugar is borderline high.  This means you are at risk for developing type 2 diabetes mellitus.  To lessen your risk you should exercise regularly, avoid excess carbohydrates and work toward a body mass index of less than 25.      - Comprehensive Metabolic Panel; Future  - Hemoglobin A1C; Future    7. Gastroesophageal reflux disease without esophagitis  Counseled patient on prevention of reflux with changes in diet and behavior.  I recommended avoidance of greasy and spicy foods, caffeine and eating within 3 hours of bedtime.  I counseled the patient to avoid eating large meals and instead eating more frequent small meals.  I also recommended weight loss and elevation of the head of the bed by 6 inches.  If symptoms persist after these changes medication may be needed to control GERD.      8. Chronic obstructive pulmonary disease, unspecified COPD type  Cont current regimen  - Ambulatory referral/consult to Pulmonology; Future    9. Major depressive disorder, recurrent, moderate  Cont current mgmt    10. Parkinsonian tremor  Cont monitoring and neuro care    11. Other nonthrombocytopenic purpura  Reassurance. Encourage good skin care    12. Atherosclerosis of aorta  Cont to lower risk factors    13. Encounter for screening mammogram for malignant neoplasm of breast  Screen and treat as indicated:    - Mammo Digital Screening Bilat w/ Horace; Future    14. Menopausal and postmenopausal disorder  Screen " and treat as indicated:    - DXA Bone Density Axial Skeleton 1 or more sites; Future    15. Colon cancer screening  Patient declines a colonoscopy after discussing benefits and risks. Patient is willing to do FOBT x3  or FIT or Cologuard test at home understanding it less effective at detecting cancers/masses/polyps than a screening colonoscopy.    - Cologuard Screening (Multitarget Stool DNA); Future  - Cologuard Screening (Multitarget Stool DNA)    16. Depression with anxiety  treat  - EScitalopram oxalate (LEXAPRO) 10 MG tablet; Take 1 tablet (10 mg total) by mouth every evening.  Dispense: 90 tablet; Refill: 3    17. Nausea  Treat prn  - ondansetron (ZOFRAN) 4 MG tablet; Take 1 tablet (4 mg total) by mouth every 8 (eight) hours as needed for Nausea.  Dispense: 30 tablet; Refill: 0      Time spent with patient: 20 minutes    Patient with be reevaluated in 3 months or sooner prn    Greater than 50% of this visit was spent counseling as described in above documentation:Yes

## 2024-04-27 LAB
25(OH)D3+25(OH)D2 SERPL-MCNC: 17 NG/ML (ref 30–96)
T4 FREE SERPL-MCNC: 0.83 NG/DL (ref 0.71–1.51)
TSH SERPL DL<=0.005 MIU/L-ACNC: 11.21 UIU/ML (ref 0.4–4)

## 2024-04-29 DIAGNOSIS — E03.9 HYPOTHYROIDISM, UNSPECIFIED TYPE: Primary | ICD-10-CM

## 2024-04-29 DIAGNOSIS — E55.9 VITAMIN D DEFICIENCY: ICD-10-CM

## 2024-04-29 DIAGNOSIS — E78.5 HYPERLIPIDEMIA, UNSPECIFIED HYPERLIPIDEMIA TYPE: ICD-10-CM

## 2024-04-29 DIAGNOSIS — R73.03 PREDIABETES: ICD-10-CM

## 2024-07-22 DIAGNOSIS — M54.16 LUMBAR RADICULITIS: ICD-10-CM

## 2024-07-22 RX ORDER — GABAPENTIN 300 MG/1
900 CAPSULE ORAL 3 TIMES DAILY
Qty: 270 CAPSULE | Refills: 3 | Status: SHIPPED | OUTPATIENT
Start: 2024-07-22

## 2024-07-22 NOTE — TELEPHONE ENCOUNTER
No care due was identified.  [facility] Embedded Care Due Messages. Reference number: [id number].   7/22/2024 2:33:16 PM CDT

## 2024-07-22 NOTE — TELEPHONE ENCOUNTER
----- Message from Adelina Strong sent at 7/22/2024 10:54 AM CDT -----  Contact: Patient  Type:  RX Refill Request    Who Called: Patient     Refill or New Rx:refill    RX Name and Strength:gabapentin (NEURONTIN) 300 MG capsule      How is the patient currently taking it? (ex. 1XDay):As prescribed     Is this a 30 day or 90 day RX:  Preferred Pharmacy with phone number:  [facility] - [place] - [address]  [address] [place] [address]  Phone: [phone] Fax: [phone]      Local or Mail Order:Local    Ordering Provider: Natalia      Would the patient rather a call back or a response via [facility]? Call    Best Call Back Number:[phone] (home)     Additional Information: Please advise

## 2024-08-14 DIAGNOSIS — R05.3 CHRONIC COUGH: ICD-10-CM

## 2024-08-14 NOTE — TELEPHONE ENCOUNTER
No care due was identified.  [facility] Embedded Care Due Messages. Reference number: [id number].   8/14/2024 2:55:55 AM CDT

## 2024-08-15 RX ORDER — FLUTICASONE PROPIONATE 220 UG/1
1 AEROSOL, METERED RESPIRATORY (INHALATION) 2 TIMES DAILY
Qty: 12 G | Refills: 11 | Status: SHIPPED | OUTPATIENT
Start: 2024-08-15 | End: 2025-08-15

## 2024-08-17 DIAGNOSIS — R05.3 CHRONIC COUGH: ICD-10-CM

## 2024-08-17 NOTE — TELEPHONE ENCOUNTER
No care due was identified.  [facility] Embedded Care Due Messages. Reference number: [id number].   8/17/2024 8:59:21 AM CDT

## 2024-08-18 NOTE — TELEPHONE ENCOUNTER
Refill Routing Note   Medication(s) are not appropriate for processing by [facility] for the following reason(s):        Other    [facility] action(s):  Defer      Medication Therapy Plan: Pharmacy is asking to change, because Insurance only covers brand but brand is on back order      Appointments  past 12m or future 3m with PCP    Date Provider   Last Visit   4/26/2024 Edel Fisher MD   Next Visit   10/25/2024 Edel Fisher MD   ED visits in past 90 days: 0        Note composed:9:28 PM 08/17/2024

## 2024-08-19 RX ORDER — FLUTICASONE PROPIONATE 220 UG/1
1 AEROSOL, METERED RESPIRATORY (INHALATION) 2 TIMES DAILY
Qty: 12 G | Refills: 11 | Status: SHIPPED | OUTPATIENT
Start: 2024-08-19 | End: 2024-08-20

## 2024-08-20 ENCOUNTER — TELEPHONE (OUTPATIENT)
Dept: FAMILY MEDICINE | Facility: CLINIC | Age: 74
End: 2024-08-20
Payer: MEDICARE

## 2024-08-20 DIAGNOSIS — J44.9 CHRONIC OBSTRUCTIVE PULMONARY DISEASE, UNSPECIFIED COPD TYPE: Primary | ICD-10-CM

## 2024-08-20 RX ORDER — FLUTICASONE PROPIONATE AND SALMETEROL 100; 50 UG/1; UG/1
1 POWDER RESPIRATORY (INHALATION) 2 TIMES DAILY
Qty: 60 EACH | Refills: 5 | Status: SHIPPED | OUTPATIENT
Start: 2024-08-20 | End: 2025-08-20

## 2024-08-20 NOTE — TELEPHONE ENCOUNTER
----- Message from Yazan Schmitz sent at 8/20/2024  3:31 PM CDT -----  Regarding: pharm  Type:  Pharmacy Calling to Clarify an RX    Name of Caller:  Suzette   Pharmacy Name:    [facility] - [place] - [address]  [address] [place] [address]  Phone: [phone] Fax: [phone]     Prescription Name:  FLOVENT  mcg/actuation inhaler 12 g 11 8/19/2024 -   Sig - Route: INHALE 1 PUFF BY MOUTH TWICE DAILY - Inhalation   Sent to pharmacy as: FLOVENT  mcg/actuation inhaler   E-Prescribing Status: Receipt confirmed by pharmacy (8/19/2024  6:29 PM CDT)       What do they need to clarify?:  brand no longer in stock.  Best Call Back Number:  [phone]  Additional Information:  She would like to know if the provider would like to switch brands. Thanks

## 2024-08-21 NOTE — TELEPHONE ENCOUNTER
I called in advair to replace the flovent. If this is not covered then patient should call her insurance for a list of preferred drugs

## 2024-10-25 ENCOUNTER — OFFICE VISIT (OUTPATIENT)
Dept: FAMILY MEDICINE | Facility: CLINIC | Age: 74
End: 2024-10-25
Payer: MEDICARE

## 2024-10-25 ENCOUNTER — LAB VISIT (OUTPATIENT)
Dept: LAB | Facility: HOSPITAL | Age: 74
End: 2024-10-25
Attending: FAMILY MEDICINE
Payer: MEDICARE

## 2024-10-25 VITALS
DIASTOLIC BLOOD PRESSURE: 80 MMHG | SYSTOLIC BLOOD PRESSURE: 130 MMHG | RESPIRATION RATE: 18 BRPM | HEIGHT: 62 IN | OXYGEN SATURATION: 96 % | TEMPERATURE: 98 F | HEART RATE: 85 BPM | BODY MASS INDEX: 32.49 KG/M2 | WEIGHT: 176.56 LBS

## 2024-10-25 DIAGNOSIS — E78.5 HYPERLIPIDEMIA, UNSPECIFIED HYPERLIPIDEMIA TYPE: ICD-10-CM

## 2024-10-25 DIAGNOSIS — Z12.11 COLON CANCER SCREENING: ICD-10-CM

## 2024-10-25 DIAGNOSIS — B02.29 POST HERPETIC NEURALGIA: ICD-10-CM

## 2024-10-25 DIAGNOSIS — K21.9 GASTROESOPHAGEAL REFLUX DISEASE WITHOUT ESOPHAGITIS: ICD-10-CM

## 2024-10-25 DIAGNOSIS — M54.16 LUMBAR RADICULITIS: ICD-10-CM

## 2024-10-25 DIAGNOSIS — Z12.31 ENCOUNTER FOR SCREENING MAMMOGRAM FOR MALIGNANT NEOPLASM OF BREAST: ICD-10-CM

## 2024-10-25 DIAGNOSIS — M19.049 HAND ARTHRITIS: ICD-10-CM

## 2024-10-25 DIAGNOSIS — F41.8 DEPRESSION WITH ANXIETY: ICD-10-CM

## 2024-10-25 DIAGNOSIS — E55.9 VITAMIN D DEFICIENCY: ICD-10-CM

## 2024-10-25 DIAGNOSIS — R73.03 PREDIABETES: ICD-10-CM

## 2024-10-25 DIAGNOSIS — R11.0 NAUSEA: ICD-10-CM

## 2024-10-25 DIAGNOSIS — E03.9 HYPOTHYROIDISM, UNSPECIFIED TYPE: ICD-10-CM

## 2024-10-25 DIAGNOSIS — E66.01 MORBID OBESITY WITH BMI OF 40.0-44.9, ADULT: ICD-10-CM

## 2024-10-25 DIAGNOSIS — N95.9 MENOPAUSAL AND POSTMENOPAUSAL DISORDER: ICD-10-CM

## 2024-10-25 DIAGNOSIS — R25.1 TREMOR: ICD-10-CM

## 2024-10-25 DIAGNOSIS — I10 HYPERTENSION, UNSPECIFIED TYPE: Primary | ICD-10-CM

## 2024-10-25 DIAGNOSIS — Z23 FLU VACCINE NEED: ICD-10-CM

## 2024-10-25 LAB
25(OH)D3+25(OH)D2 SERPL-MCNC: 19 NG/ML (ref 30–96)
ALBUMIN SERPL BCP-MCNC: 3.6 G/DL (ref 3.5–5.2)
ALP SERPL-CCNC: 77 U/L (ref 40–150)
ALT SERPL W/O P-5'-P-CCNC: 13 U/L (ref 10–44)
ANION GAP SERPL CALC-SCNC: 11 MMOL/L (ref 8–16)
AST SERPL-CCNC: 15 U/L (ref 10–40)
BASOPHILS # BLD AUTO: 0.05 K/UL (ref 0–0.2)
BASOPHILS NFR BLD: 0.5 % (ref 0–1.9)
BILIRUB SERPL-MCNC: 0.5 MG/DL (ref 0.1–1)
BUN SERPL-MCNC: 12 MG/DL (ref 8–23)
CALCIUM SERPL-MCNC: 10 MG/DL (ref 8.7–10.5)
CHLORIDE SERPL-SCNC: 104 MMOL/L (ref 95–110)
CHOLEST SERPL-MCNC: 204 MG/DL (ref 120–199)
CHOLEST/HDLC SERPL: 5.1 {RATIO} (ref 2–5)
CO2 SERPL-SCNC: 24 MMOL/L (ref 23–29)
CREAT SERPL-MCNC: 0.8 MG/DL (ref 0.5–1.4)
DIFFERENTIAL METHOD BLD: ABNORMAL
EOSINOPHIL # BLD AUTO: 0.2 K/UL (ref 0–0.5)
EOSINOPHIL NFR BLD: 2.3 % (ref 0–8)
ERYTHROCYTE [DISTWIDTH] IN BLOOD BY AUTOMATED COUNT: 13.1 % (ref 11.5–14.5)
EST. GFR  (NO RACE VARIABLE): >60 ML/MIN/1.73 M^2
ESTIMATED AVG GLUCOSE: 105 MG/DL (ref 68–131)
GLUCOSE SERPL-MCNC: 136 MG/DL (ref 70–110)
HBA1C MFR BLD: 5.3 % (ref 4–5.6)
HCT VFR BLD AUTO: 43.7 % (ref 37–48.5)
HDLC SERPL-MCNC: 40 MG/DL (ref 40–75)
HDLC SERPL: 19.6 % (ref 20–50)
HGB BLD-MCNC: 14.4 G/DL (ref 12–16)
IMM GRANULOCYTES # BLD AUTO: 0.04 K/UL (ref 0–0.04)
IMM GRANULOCYTES NFR BLD AUTO: 0.4 % (ref 0–0.5)
LDLC SERPL CALC-MCNC: 111.8 MG/DL (ref 63–159)
LYMPHOCYTES # BLD AUTO: 1.6 K/UL (ref 1–4.8)
LYMPHOCYTES NFR BLD: 15.7 % (ref 18–48)
MCH RBC QN AUTO: 30.8 PG (ref 27–31)
MCHC RBC AUTO-ENTMCNC: 33 G/DL (ref 32–36)
MCV RBC AUTO: 93 FL (ref 82–98)
MONOCYTES # BLD AUTO: 0.6 K/UL (ref 0.3–1)
MONOCYTES NFR BLD: 5.8 % (ref 4–15)
NEUTROPHILS # BLD AUTO: 7.7 K/UL (ref 1.8–7.7)
NEUTROPHILS NFR BLD: 75.3 % (ref 38–73)
NONHDLC SERPL-MCNC: 164 MG/DL
NRBC BLD-RTO: 0 /100 WBC
PLATELET # BLD AUTO: 346 K/UL (ref 150–450)
PMV BLD AUTO: 9.1 FL (ref 9.2–12.9)
POTASSIUM SERPL-SCNC: 4.1 MMOL/L (ref 3.5–5.1)
PROT SERPL-MCNC: 7.7 G/DL (ref 6–8.4)
RBC # BLD AUTO: 4.68 M/UL (ref 4–5.4)
SODIUM SERPL-SCNC: 139 MMOL/L (ref 136–145)
T4 FREE SERPL-MCNC: 1.56 NG/DL (ref 0.71–1.51)
TRIGL SERPL-MCNC: 261 MG/DL (ref 30–150)
TSH SERPL DL<=0.005 MIU/L-ACNC: 1.92 UIU/ML (ref 0.4–4)
WBC # BLD AUTO: 10.29 K/UL (ref 3.9–12.7)

## 2024-10-25 PROCEDURE — 96372 THER/PROPH/DIAG INJ SC/IM: CPT | Mod: 59,S$GLB,, | Performed by: FAMILY MEDICINE

## 2024-10-25 PROCEDURE — 90653 IIV ADJUVANT VACCINE IM: CPT | Mod: S$GLB,,, | Performed by: FAMILY MEDICINE

## 2024-10-25 PROCEDURE — 3079F DIAST BP 80-89 MM HG: CPT | Mod: CPTII,S$GLB,, | Performed by: FAMILY MEDICINE

## 2024-10-25 PROCEDURE — 36415 COLL VENOUS BLD VENIPUNCTURE: CPT | Mod: PO | Performed by: FAMILY MEDICINE

## 2024-10-25 PROCEDURE — 80053 COMPREHEN METABOLIC PANEL: CPT | Performed by: FAMILY MEDICINE

## 2024-10-25 PROCEDURE — 99999 PR PBB SHADOW E&M-EST. PATIENT-LVL IV: CPT | Mod: PBBFAC,,, | Performed by: FAMILY MEDICINE

## 2024-10-25 PROCEDURE — 99214 OFFICE O/P EST MOD 30 MIN: CPT | Mod: 25,S$GLB,, | Performed by: FAMILY MEDICINE

## 2024-10-25 PROCEDURE — 3075F SYST BP GE 130 - 139MM HG: CPT | Mod: CPTII,S$GLB,, | Performed by: FAMILY MEDICINE

## 2024-10-25 PROCEDURE — 3008F BODY MASS INDEX DOCD: CPT | Mod: CPTII,S$GLB,, | Performed by: FAMILY MEDICINE

## 2024-10-25 PROCEDURE — 83036 HEMOGLOBIN GLYCOSYLATED A1C: CPT | Performed by: FAMILY MEDICINE

## 2024-10-25 PROCEDURE — 80061 LIPID PANEL: CPT | Performed by: FAMILY MEDICINE

## 2024-10-25 PROCEDURE — 3044F HG A1C LEVEL LT 7.0%: CPT | Mod: CPTII,S$GLB,, | Performed by: FAMILY MEDICINE

## 2024-10-25 PROCEDURE — 1125F AMNT PAIN NOTED PAIN PRSNT: CPT | Mod: CPTII,S$GLB,, | Performed by: FAMILY MEDICINE

## 2024-10-25 PROCEDURE — 82306 VITAMIN D 25 HYDROXY: CPT | Performed by: FAMILY MEDICINE

## 2024-10-25 PROCEDURE — 84443 ASSAY THYROID STIM HORMONE: CPT | Performed by: FAMILY MEDICINE

## 2024-10-25 PROCEDURE — 85025 COMPLETE CBC W/AUTO DIFF WBC: CPT | Performed by: FAMILY MEDICINE

## 2024-10-25 PROCEDURE — 1160F RVW MEDS BY RX/DR IN RCRD: CPT | Mod: CPTII,S$GLB,, | Performed by: FAMILY MEDICINE

## 2024-10-25 PROCEDURE — 84439 ASSAY OF FREE THYROXINE: CPT | Performed by: FAMILY MEDICINE

## 2024-10-25 PROCEDURE — G0008 ADMIN INFLUENZA VIRUS VAC: HCPCS | Mod: S$GLB,,, | Performed by: FAMILY MEDICINE

## 2024-10-25 PROCEDURE — 1159F MED LIST DOCD IN RCRD: CPT | Mod: CPTII,S$GLB,, | Performed by: FAMILY MEDICINE

## 2024-10-25 RX ORDER — GABAPENTIN 300 MG/1
900 CAPSULE ORAL 3 TIMES DAILY
Qty: 270 CAPSULE | Refills: 3 | Status: SHIPPED | OUTPATIENT
Start: 2024-10-25

## 2024-10-25 RX ORDER — ROSUVASTATIN CALCIUM 10 MG/1
TABLET, COATED ORAL
Qty: 90 TABLET | Refills: 3 | Status: SHIPPED | OUTPATIENT
Start: 2024-10-25

## 2024-10-25 RX ORDER — DICLOFENAC SODIUM 10 MG/G
2 GEL TOPICAL 4 TIMES DAILY PRN
Qty: 450 G | Status: SHIPPED | OUTPATIENT
Start: 2024-10-25

## 2024-10-25 RX ORDER — ONDANSETRON 4 MG/1
4 TABLET, FILM COATED ORAL EVERY 8 HOURS PRN
Qty: 30 TABLET | Refills: 3 | Status: SHIPPED | OUTPATIENT
Start: 2024-10-25

## 2024-10-25 RX ORDER — PROPRANOLOL HYDROCHLORIDE 60 MG/1
60 CAPSULE, EXTENDED RELEASE ORAL DAILY
Qty: 90 CAPSULE | Refills: 3 | Status: SHIPPED | OUTPATIENT
Start: 2024-10-25 | End: 2025-10-25

## 2024-10-25 RX ORDER — LEVOTHYROXINE SODIUM 150 UG/1
150 TABLET ORAL DAILY
Qty: 90 TABLET | Refills: 1 | Status: SHIPPED | OUTPATIENT
Start: 2024-10-25

## 2024-10-25 RX ORDER — AMITRIPTYLINE HYDROCHLORIDE 50 MG/1
50 TABLET, FILM COATED ORAL NIGHTLY
Qty: 90 TABLET | Refills: 3 | Status: SHIPPED | OUTPATIENT
Start: 2024-10-25

## 2024-10-25 RX ORDER — OMEPRAZOLE 20 MG/1
20 CAPSULE, DELAYED RELEASE ORAL DAILY
Qty: 90 CAPSULE | Refills: 3 | Status: SHIPPED | OUTPATIENT
Start: 2024-10-25 | End: 2025-10-25

## 2024-10-25 RX ORDER — KETOROLAC TROMETHAMINE 30 MG/ML
60 INJECTION, SOLUTION INTRAMUSCULAR; INTRAVENOUS
Status: COMPLETED | OUTPATIENT
Start: 2024-10-25 | End: 2024-10-25

## 2024-10-25 RX ORDER — ESCITALOPRAM OXALATE 10 MG/1
10 TABLET ORAL NIGHTLY
Qty: 90 TABLET | Refills: 3 | Status: SHIPPED | OUTPATIENT
Start: 2024-10-25

## 2024-10-25 RX ADMIN — KETOROLAC TROMETHAMINE 60 MG: 30 INJECTION, SOLUTION INTRAMUSCULAR; INTRAVENOUS at 03:10

## 2024-10-25 NOTE — PROGRESS NOTES
Subjective:       Patient ID: Aida Gupta is a 74 y.o. female.    Chief Complaint: Follow-up (6mth f/u)    Patient Active Problem List   Diagnosis    Hypothyroidism    Gastroesophageal reflux disease without esophagitis    Morbid obesity with BMI of 40.0-44.9, adult    Hyperlipidemia    Prediabetes    Dermatitis    Dyspnea    Dysphagia    Lower esophageal ring    Gastritis determined by endoscopy    Gastric polyp    DDD (degenerative disc disease), lumbar    Lumbar radiculitis    Vitamin D deficiency    Hypertension    Post herpetic neuralgia    Ds DNA antibody positive    Positive JAVY (antinuclear antibody)    Hashimoto's disease    Cystocele with prolapse    Incomplete emptying of bladder    Recurrent UTI    Pulmonary nodule    Atherosclerosis of aorta    Cough    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    Reviewed labs 4/2024   History of Present Illness      Here alone.               Review of Systems   Constitutional:  Negative for fatigue and unexpected weight change.   Respiratory:  Negative for chest tightness and shortness of breath.    Cardiovascular:  Negative for chest pain, palpitations and leg swelling.   Gastrointestinal:  Negative for abdominal pain.   Musculoskeletal:  Positive for arthralgias and back pain.        Joints of hands pain   Neurological:  Negative for dizziness, syncope, light-headedness and headaches.   Psychiatric/Behavioral:  Positive for dysphoric mood.       Relevant History:  Typically refuses vaccines     Psych  has passed away. Dependent on daughter for food and transportation.  has Limited transportation.  depression sx. Lexapro added 5/23. Lots of anxiety peyman at night. Seeing  counselor now ROYCE Armijo      GI C/o reflux, gassiness, nausea  and not taking prilosec-out   GI Dr. Warren EGD 9/17-mild schatzki ring-dilated, gastric polyp, gerd     Neuro  Dr. Mitchel chairez for movement d/o 8/23. Diagnosed with functional neuro sx d/o (conversion d/o) and referred to psych   Grandson slammed door on her and she fell backwards hitting right side of her head 2 months ago.  No LOC or immediate injury other than the knot on the head. No vision changes or sudden neuro changes. C/o shaking right arm and when speaking sometimes x 2 weeks. Told she needed to go to doctor by daughter who she lives with. States shaking wears her out. Referred 5/23 to neurology for head trauma/parkinsonian tremor. Mri brain 5/23 No acute intracranial abnormalities.Has neuro appt 8/23 . Had earlier appt but missed it due to catching 2 trains on the way. Has significant transportation issues     Endocrine Prediabetes  A1c 5.5-diet controlled  Hypothyroid borderline TFTs on 150 mcg  Vit D low      Pulm Dr. Diaz appt 7/22 CT chest and PFTs ordered but not done  Seen in ED 12/22/2021 for bronchitis. COVID neg.   C/o chronic cough with prod white/clear sputum. No sob or fever. Feels weak and tired. Chest x ray 2/2022 neg.  C/o chronic cough. Feels tired. Wants to sleep all the time. Feels weak. Does not have HH now.  PFTs ordered 9/23-not done     Urology Dr. Castillo recurrent UTIs  Rheum Dr. Serrato- following for + JAVY with + ds DNA and antithyroid antibodies (TPO AB)- possible SLE but no current manifestations     Chronic pain- States she can't sit down due to post herpetic neuralgia which started 2 years ago. Pain Dr. Walton has tried nerve blocks caudal 10/17 and According to notes from pain med she has had SEUN for lumbar radiculopathy 1/18   Increased to gabapentin 300mg tid. Cymbatla 30 mg a day added but could not tolerate due to nausea.  gabapentin makes her drowsy. Elavil helps with mood somewhat.  celexa started  last visit but states [facility] never [facility] it. Still having depression and grief. Talking to [employer] pastors about loss of  which helps. Has limited trasnportatio  Objective:      Physical Exam  Vitals and nursing note reviewed.   Constitutional:       Appearance: She is well-developed.   Cardiovascular:      Rate and Rhythm: Normal rate and regular rhythm.      Heart sounds: Normal heart sounds.   Pulmonary:      Effort: Pulmonary effort is normal.      Breath sounds: Normal breath sounds.   Skin:     General: Skin is warm and dry.   Neurological:      Mental Status: She is alert and oriented to person, place, and time.   Psychiatric:         Mood and Affect: Affect is blunt and flat.         Behavior: Behavior is slowed.       Uses walker  Assessment:       ICD-10-CM ICD-9-CM    1. Hypertension, unspecified type  I10 401.9       2. Flu vaccine need  Z23 V04.81 influenza (adjuvanted) (Fluad) 45 mcg/0.5 mL IM vaccine (> or = 64 yo) 0.5 mL      3. Hyperlipidemia, unspecified hyperlipidemia type  E78.5 272.4 Comprehensive Metabolic Panel      Lipid Panel      rosuvastatin (CRESTOR) 10 MG tablet      4. Hypothyroidism, unspecified type  E03.9 244.9 CBC Auto Differential      TSH      T4, Free      levothyroxine (SYNTHROID) 150 MCG tablet      5. Prediabetes  R73.03 790.29 Hemoglobin A1C      6. Vitamin D deficiency  E55.9 268.9 Vitamin D      7. Morbid obesity with BMI of 40.0-44.9, adult  E66.01 278.01     Z68.41 V85.41       8. Encounter for screening mammogram for malignant neoplasm of breast  Z12.31 V76.12 Mammo Digital Screening Bilat w/ Horace      9. Menopausal and postmenopausal disorder  N95.9 627.9 DXA Bone Density Axial Skeleton 1 or more sites      10. Post herpetic neuralgia  B02.29 053.19 amitriptyline (ELAVIL) 50 MG tablet      11. Depression with anxiety  F41.8 300.4 EScitalopram oxalate (LEXAPRO) 10 MG tablet      12. Nausea  R11.0 787.02 ondansetron (ZOFRAN) 4 MG tablet      13. Tremor  R25.1  781.0 propranoloL (INDERAL [place]) 60 MG 24 hr capsule      14. Gastroesophageal reflux disease without esophagitis  K21.9 530.81 omeprazole (PRILOSEC) 20 MG capsule      15. Lumbar radiculitis  M54.16 724.4 gabapentin (NEURONTIN) 300 MG capsule      gabapentin (NEURONTIN) 300 MG capsule      ketorolac injection 60 mg      16. Hand arthritis  M19.049 716.94 diclofenac sodium (VOLTAREN ARTHRITIS PAIN) 1 % Gel      17. Colon cancer screening  Z12.11 V76.51 Cologuard Screening (Multitarget Stool DNA)      Cologuard Screening (Multitarget Stool DNA)         Plan:   1. Hypertension, unspecified type (Primary)  Controlled on current medications.  Continue current medications.      2. Flu vaccine need  Immunize today.  Counseled patient on risks, benefits and side effects.  Patient elected to proceed with vaccination.    - influenza (adjuvanted) (Fluad) 45 mcg/0.5 mL IM vaccine (> or = 66 yo) 0.5 mL    3. Hyperlipidemia, unspecified hyperlipidemia type  Controlled on current medications.  Continue current medications.    - Comprehensive Metabolic Panel; Future  - Lipid Panel; Future  - rosuvastatin (CRESTOR) 10 MG tablet; Take 1 tablet by mouth once daily  Dispense: 90 tablet; Refill: 3    4. Hypothyroidism, unspecified type  Stable condition.  Continue current medications.  Will adjust based on lab findings or if condition changes.    - CBC Auto Differential; Future  - TSH; Future  - T4, Free; Future  - levothyroxine (SYNTHROID) 150 MCG tablet; Take 1 tablet (150 mcg total) by mouth once daily.  Dispense: 90 tablet; Refill: 1    5. Prediabetes  Stable condition.  Continue current medications.  Will adjust based on lab findings or if condition changes.    - Hemoglobin A1C; Future    6. Vitamin D deficiency  Screen and treat as indicated:    - Vitamin D; Future    7. Morbid obesity with BMI of 40.0-44.9, adult  Counseled patient on his ideal body weight, health consequences of being obese and current recommendations including  "weekly exercise and a heart healthy diet.  Current BMI is:Estimated body mass index is 32.3 kg/m² as calculated from the following:    Height as of this encounter: 5' 2" (1.575 m).    Weight as of this encounter: 80.1 kg (176 lb 9.4 oz)..  Patient is aware that ideal BMI < 25 or Weight in (lb) to have BMI = 25: 136.4.      8. Encounter for screening mammogram for malignant neoplasm of breast  Screen and treat as indicated:    - Mammo Digital Screening Bilat w/ Horace; Future    9. Menopausal and postmenopausal disorder  Screen and treat as indicated:    - DXA Bone Density Axial Skeleton 1 or more sites; Future    10. Post herpetic neuralgia  continue  - amitriptyline (ELAVIL) 50 MG tablet; Take 1 tablet (50 mg total) by mouth every evening.  Dispense: 90 tablet; Refill: 3    11. Depression with anxiety  continue  - EScitalopram oxalate (LEXAPRO) 10 MG tablet; Take 1 tablet (10 mg total) by mouth every evening.  Dispense: 90 tablet; Refill: 3    12. Nausea  Continue prn  - ondansetron (ZOFRAN) 4 MG tablet; Take 1 tablet (4 mg total) by mouth every 8 (eight) hours as needed for Nausea.  Dispense: 30 tablet; Refill: 3    13. Tremor  continue  - propranoloL (INDERAL [place]) 60 MG 24 hr capsule; Take 1 capsule (60 mg total) by mouth once daily.  Dispense: 90 capsule; Refill: 3    14. Gastroesophageal reflux disease without esophagitis  Controlled on current medications.  Continue current medications.    - omeprazole (PRILOSEC) 20 MG capsule; Take 1 capsule (20 mg total) by mouth once daily.  Dispense: 90 capsule; Refill: 3    15. Lumbar radiculitis  continue  - gabapentin (NEURONTIN) 300 MG capsule; Take 3 capsules (900 mg total) by mouth 3 (three) times daily.  Dispense: 270 capsule; Refill: 3  - gabapentin (NEURONTIN) 300 MG capsule; Take 3 capsules (900 mg total) by mouth 3 (three) times daily.  Dispense: 270 capsule; Refill: 3  - ketorolac injection 60 mg    16. Hand arthritis  Apply prn  - diclofenac sodium (VOLTAREN " ARTHRITIS PAIN) 1 % Gel; Apply 2 g topically 4 (four) times daily as needed (arthritis).  Dispense: 450 g; Refill: PRN    17. Colon cancer screening  Patient declines a colonoscopy after discussing benefits and risks. Patient is willing to do FOBT x3  or FIT or Cologuard test at home understanding it less effective at detecting cancers/masses/polyps than a screening colonoscopy.    - Cologuard Screening (Multitarget Stool DNA); Future  - Cologuard Screening (Multitarget Stool DNA)      Time spent with patient: 20 minutes  Patient with be reevaluated in 3 months or sooner prn  Greater than 50% of this visit was spent counseling as described in above documentation:Yes  This note was generated with the assistance of ambient listening technology. Verbal consent was obtained by the patient and accompanying visitor(s) for the recording of patient appointment to facilitate this note. I attest to having reviewed and edited the generated note for accuracy, though some syntax or spelling errors may persist. Please contact the author of this note for any clarification.

## 2024-12-02 NOTE — PROGRESS NOTES
Per  patient in clinic today for pvr by in and out catheter. Patient unable to empty bladder she states she did not feel she could urinate. 10F in and out catheter placed draining 175ml of urine. Urine sent for u/a and urine culture   headache/complains of pain/discomfort

## 2025-03-25 DIAGNOSIS — E03.9 HYPOTHYROIDISM, UNSPECIFIED TYPE: ICD-10-CM

## 2025-03-25 RX ORDER — LEVOTHYROXINE SODIUM 150 UG/1
150 TABLET ORAL
Qty: 90 TABLET | Refills: 1 | Status: SHIPPED | OUTPATIENT
Start: 2025-03-25

## 2025-03-25 NOTE — TELEPHONE ENCOUNTER
Refill Decision Note   Aida Alonso  is requesting a refill authorization.  Brief Assessment and Rationale for Refill:  Approve     Medication Therapy Plan:         Comments:     Note composed:6:26 PM 03/25/2025

## 2025-03-25 NOTE — TELEPHONE ENCOUNTER
No care due was identified.  [facility] Embedded Care Due Messages. Reference number: [id number].   3/25/2025 12:52:09 PM CDT

## 2025-05-02 ENCOUNTER — OFFICE VISIT (OUTPATIENT)
Dept: FAMILY MEDICINE | Facility: CLINIC | Age: 75
End: 2025-05-02
Payer: MEDICARE

## 2025-05-02 ENCOUNTER — LAB VISIT (OUTPATIENT)
Dept: LAB | Facility: HOSPITAL | Age: 75
End: 2025-05-02
Attending: FAMILY MEDICINE
Payer: MEDICARE

## 2025-05-02 VITALS
OXYGEN SATURATION: 96 % | WEIGHT: 191.13 LBS | HEART RATE: 90 BPM | TEMPERATURE: 98 F | SYSTOLIC BLOOD PRESSURE: 138 MMHG | DIASTOLIC BLOOD PRESSURE: 62 MMHG | BODY MASS INDEX: 35.17 KG/M2 | HEIGHT: 62 IN

## 2025-05-02 DIAGNOSIS — Z12.31 ENCOUNTER FOR SCREENING MAMMOGRAM FOR MALIGNANT NEOPLASM OF BREAST: ICD-10-CM

## 2025-05-02 DIAGNOSIS — E03.9 HYPOTHYROIDISM, UNSPECIFIED TYPE: Primary | ICD-10-CM

## 2025-05-02 DIAGNOSIS — J44.9 CHRONIC OBSTRUCTIVE PULMONARY DISEASE, UNSPECIFIED COPD TYPE: ICD-10-CM

## 2025-05-02 DIAGNOSIS — N95.9 MENOPAUSAL AND POSTMENOPAUSAL DISORDER: ICD-10-CM

## 2025-05-02 DIAGNOSIS — E55.9 VITAMIN D DEFICIENCY: ICD-10-CM

## 2025-05-02 DIAGNOSIS — R73.03 PREDIABETES: ICD-10-CM

## 2025-05-02 DIAGNOSIS — K21.9 GASTROESOPHAGEAL REFLUX DISEASE WITHOUT ESOPHAGITIS: ICD-10-CM

## 2025-05-02 DIAGNOSIS — M54.16 LUMBAR RADICULITIS: ICD-10-CM

## 2025-05-02 DIAGNOSIS — E66.01 MORBID OBESITY WITH BMI OF 40.0-44.9, ADULT: ICD-10-CM

## 2025-05-02 DIAGNOSIS — E78.5 HYPERLIPIDEMIA, UNSPECIFIED HYPERLIPIDEMIA TYPE: ICD-10-CM

## 2025-05-02 DIAGNOSIS — K52.9 CHRONIC DIARRHEA: ICD-10-CM

## 2025-05-02 DIAGNOSIS — I10 HYPERTENSION, UNSPECIFIED TYPE: ICD-10-CM

## 2025-05-02 DIAGNOSIS — R11.2 NAUSEA AND VOMITING, UNSPECIFIED VOMITING TYPE: ICD-10-CM

## 2025-05-02 DIAGNOSIS — R10.31 RIGHT LOWER QUADRANT PAIN: ICD-10-CM

## 2025-05-02 DIAGNOSIS — J30.1 SEASONAL ALLERGIC RHINITIS DUE TO POLLEN: ICD-10-CM

## 2025-05-02 DIAGNOSIS — F41.8 DEPRESSION WITH ANXIETY: ICD-10-CM

## 2025-05-02 DIAGNOSIS — E03.9 HYPOTHYROIDISM, UNSPECIFIED TYPE: ICD-10-CM

## 2025-05-02 LAB
ABSOLUTE EOSINOPHIL (OHS): 0.24 K/UL
ABSOLUTE MONOCYTE (OHS): 0.65 K/UL (ref 0.3–1)
ABSOLUTE NEUTROPHIL COUNT (OHS): 8.21 K/UL (ref 1.8–7.7)
ALBUMIN SERPL BCP-MCNC: 3.6 G/DL (ref 3.5–5.2)
ALP SERPL-CCNC: 67 UNIT/L (ref 40–150)
ALT SERPL W/O P-5'-P-CCNC: 11 UNIT/L (ref 10–44)
ANION GAP (OHS): 13 MMOL/L (ref 8–16)
AST SERPL-CCNC: 15 UNIT/L (ref 11–45)
BASOPHILS # BLD AUTO: 0.05 K/UL
BASOPHILS NFR BLD AUTO: 0.5 %
BILIRUB SERPL-MCNC: 0.5 MG/DL (ref 0.1–1)
BUN SERPL-MCNC: 13 MG/DL (ref 8–23)
CALCIUM SERPL-MCNC: 9.6 MG/DL (ref 8.7–10.5)
CHLORIDE SERPL-SCNC: 104 MMOL/L (ref 95–110)
CO2 SERPL-SCNC: 23 MMOL/L (ref 23–29)
CREAT SERPL-MCNC: 0.7 MG/DL (ref 0.5–1.4)
EAG (OHS): 100 MG/DL (ref 68–131)
ERYTHROCYTE [DISTWIDTH] IN BLOOD BY AUTOMATED COUNT: 13.1 % (ref 11.5–14.5)
GFR SERPLBLD CREATININE-BSD FMLA CKD-EPI: >60 ML/MIN/1.73/M2
GLUCOSE SERPL-MCNC: 116 MG/DL (ref 70–110)
HBA1C MFR BLD: 5.1 % (ref 4–5.6)
HCT VFR BLD AUTO: 47.3 % (ref 37–48.5)
HGB BLD-MCNC: 14.9 GM/DL (ref 12–16)
IMM GRANULOCYTES # BLD AUTO: 0.02 K/UL (ref 0–0.04)
IMM GRANULOCYTES NFR BLD AUTO: 0.2 % (ref 0–0.5)
LYMPHOCYTES # BLD AUTO: 1.88 K/UL (ref 1–4.8)
MCH RBC QN AUTO: 29.9 PG (ref 27–31)
MCHC RBC AUTO-ENTMCNC: 31.5 G/DL (ref 32–36)
MCV RBC AUTO: 95 FL (ref 82–98)
NUCLEATED RBC (/100WBC) (OHS): 0 /100 WBC
PLATELET # BLD AUTO: 350 K/UL (ref 150–450)
PMV BLD AUTO: 9.2 FL (ref 9.2–12.9)
POTASSIUM SERPL-SCNC: 4.5 MMOL/L (ref 3.5–5.1)
PROT SERPL-MCNC: 7.7 GM/DL (ref 6–8.4)
RBC # BLD AUTO: 4.99 M/UL (ref 4–5.4)
RELATIVE EOSINOPHIL (OHS): 2.2 %
RELATIVE LYMPHOCYTE (OHS): 17 % (ref 18–48)
RELATIVE MONOCYTE (OHS): 5.9 % (ref 4–15)
RELATIVE NEUTROPHIL (OHS): 74.2 % (ref 38–73)
SODIUM SERPL-SCNC: 140 MMOL/L (ref 136–145)
T4 FREE SERPL-MCNC: 1.44 NG/DL (ref 0.71–1.51)
TSH SERPL-ACNC: 1.72 UIU/ML (ref 0.4–4)
WBC # BLD AUTO: 11.05 K/UL (ref 3.9–12.7)

## 2025-05-02 PROCEDURE — 84443 ASSAY THYROID STIM HORMONE: CPT

## 2025-05-02 PROCEDURE — 1101F PT FALLS ASSESS-DOCD LE1/YR: CPT | Mod: CPTII,S$GLB,, | Performed by: FAMILY MEDICINE

## 2025-05-02 PROCEDURE — 3008F BODY MASS INDEX DOCD: CPT | Mod: CPTII,S$GLB,, | Performed by: FAMILY MEDICINE

## 2025-05-02 PROCEDURE — 3044F HG A1C LEVEL LT 7.0%: CPT | Mod: CPTII,S$GLB,, | Performed by: FAMILY MEDICINE

## 2025-05-02 PROCEDURE — 99214 OFFICE O/P EST MOD 30 MIN: CPT | Mod: 25,S$GLB,, | Performed by: FAMILY MEDICINE

## 2025-05-02 PROCEDURE — 3075F SYST BP GE 130 - 139MM HG: CPT | Mod: CPTII,S$GLB,, | Performed by: FAMILY MEDICINE

## 2025-05-02 PROCEDURE — 99999 PR PBB SHADOW E&M-EST. PATIENT-LVL V: CPT | Mod: PBBFAC,,, | Performed by: FAMILY MEDICINE

## 2025-05-02 PROCEDURE — 84439 ASSAY OF FREE THYROXINE: CPT

## 2025-05-02 PROCEDURE — 96372 THER/PROPH/DIAG INJ SC/IM: CPT | Mod: S$GLB,,, | Performed by: FAMILY MEDICINE

## 2025-05-02 PROCEDURE — 83036 HEMOGLOBIN GLYCOSYLATED A1C: CPT

## 2025-05-02 PROCEDURE — 3288F FALL RISK ASSESSMENT DOCD: CPT | Mod: CPTII,S$GLB,, | Performed by: FAMILY MEDICINE

## 2025-05-02 PROCEDURE — 36415 COLL VENOUS BLD VENIPUNCTURE: CPT | Mod: PO

## 2025-05-02 PROCEDURE — 85025 COMPLETE CBC W/AUTO DIFF WBC: CPT

## 2025-05-02 PROCEDURE — 1125F AMNT PAIN NOTED PAIN PRSNT: CPT | Mod: CPTII,S$GLB,, | Performed by: FAMILY MEDICINE

## 2025-05-02 PROCEDURE — 3078F DIAST BP <80 MM HG: CPT | Mod: CPTII,S$GLB,, | Performed by: FAMILY MEDICINE

## 2025-05-02 PROCEDURE — 1160F RVW MEDS BY RX/DR IN RCRD: CPT | Mod: CPTII,S$GLB,, | Performed by: FAMILY MEDICINE

## 2025-05-02 PROCEDURE — 82947 ASSAY GLUCOSE BLOOD QUANT: CPT

## 2025-05-02 PROCEDURE — 1159F MED LIST DOCD IN RCRD: CPT | Mod: CPTII,S$GLB,, | Performed by: FAMILY MEDICINE

## 2025-05-02 RX ORDER — KETOROLAC TROMETHAMINE 30 MG/ML
60 INJECTION, SOLUTION INTRAMUSCULAR; INTRAVENOUS
Status: COMPLETED | OUTPATIENT
Start: 2025-05-02 | End: 2025-05-02

## 2025-05-02 RX ORDER — LEVOCETIRIZINE DIHYDROCHLORIDE 5 MG/1
5 TABLET, FILM COATED ORAL NIGHTLY PRN
Qty: 30 TABLET | Status: SHIPPED | OUTPATIENT
Start: 2025-05-02 | End: 2026-05-02

## 2025-05-02 RX ORDER — ESCITALOPRAM OXALATE 20 MG/1
20 TABLET ORAL NIGHTLY
Qty: 90 TABLET | Refills: 3 | Status: SHIPPED | OUTPATIENT
Start: 2025-05-02

## 2025-05-02 RX ORDER — FLUTICASONE PROPIONATE 50 MCG
1 SPRAY, SUSPENSION (ML) NASAL DAILY
Qty: 16 G | Status: SHIPPED | OUTPATIENT
Start: 2025-05-02

## 2025-05-02 RX ORDER — CHOLESTYRAMINE 4 G/9G
4 POWDER, FOR SUSPENSION ORAL DAILY
Qty: 90 PACKET | Refills: 3 | Status: SHIPPED | OUTPATIENT
Start: 2025-05-02 | End: 2026-05-02

## 2025-05-02 RX ORDER — GABAPENTIN 300 MG/1
600 CAPSULE ORAL 2 TIMES DAILY
Qty: 360 CAPSULE | Refills: 3 | Status: SHIPPED | OUTPATIENT
Start: 2025-05-02

## 2025-05-02 RX ADMIN — KETOROLAC TROMETHAMINE 60 MG: 30 INJECTION, SOLUTION INTRAMUSCULAR; INTRAVENOUS at 03:05

## 2025-05-02 NOTE — PROGRESS NOTES
"Subjective:       Patient ID: Aida Gupta is a 74 y.o. female.    Chief Complaint: Follow-up (6 months)    Problem List[1]  Patient is here for a chronic conditions follow up.    Reviewed labs 10/2024  History of Present Illness    CHIEF COMPLAINT:  Ms. Gupta presents today for back, hip, and buttock pain    PAIN ASSESSMENT:  She reports severe, intermittent pain particularly during car rides, stating she "cannot tolerate it anymore." She is currently taking 3 tablets of Tylenol for pain management.    MEDICATIONS:  She takes thyroid medication and anti-nausea medication. She takes Gabapentin daily instead of prescribed 3 times daily due to significant fatigue affecting her mobility to bathroom. She continues Lexapro at bedtime for mood management.    MEDICAL HISTORY:  She has history of colon surgery for diverticulitis several years ago with subsequent chronic diarrhea that has progressively worsened despite dietary modifications.    CURRENT SYMPTOMS:  She reports nasal congestion, particularly at night.    SOCIAL HISTORY:  She lives at home with her son. Her daughter lives across the street and assists with groceries and meal preparation.      ROS:  ROS findings as noted in HPI.        Review of Systems   Constitutional:  Negative for fatigue and unexpected weight change.   HENT:  Positive for rhinorrhea.    Respiratory:  Negative for chest tightness and shortness of breath.    Cardiovascular:  Negative for chest pain, palpitations and leg swelling.   Gastrointestinal:  Positive for diarrhea. Negative for abdominal pain.   Musculoskeletal:  Positive for back pain. Negative for arthralgias.   Neurological:  Negative for dizziness, syncope, light-headedness and headaches.   Psychiatric/Behavioral:  Positive for dysphoric mood. The patient is nervous/anxious.       Relevant History:  Typically refuses vaccines     Psych  has passed away. Dependent on daughter for food and transportation.  has Limited " transportation.  depression sx. Lexapro added 5/23. Lots of anxiety peyman at night. Seeing counselor now ROYCE Armijo      GI C/o reflux, gassiness, nausea  and not taking prilosec-out   GI Dr. Warren EGD 9/17-mild schatzki ring-dilated, gastric polyp, gerd  H/o colon resection due to diverticultiis.  Has had problems with diarrhea since then     Neuro  Dr. Mitchel chairez for movement d/o 8/23. Diagnosed with functional neuro sx d/o (conversion d/o) and referred to psych   Grandson slammed door on her and she fell backwards hitting right side of her head 2 months ago.  No LOC or immediate injury other than the knot on the head. No vision changes or sudden neuro changes. C/o shaking right arm and when speaking sometimes x 2 weeks. Told she needed to go to doctor by daughter who she lives with. States shaking wears her out. Referred 5/23 to neurology for head trauma/parkinsonian tremor. Mri brain 5/23 No acute intracranial abnormalities.Has neuro appt 8/23 . Had earlier appt but missed it due to catching 2 trains on the way. Has significant transportation issues     Endocrine Prediabetes  A1c 5.5-diet controlled  Hypothyroid borderline TFTs on 150 mcg  Vit D low      Pulm Dr. Diaz appt 7/22 CT chest and PFTs ordered but not done  Seen in ED 12/22/2021 for bronchitis. COVID neg.   C/o chronic cough with prod white/clear sputum. No sob or fever. Feels weak and tired. Chest x ray 2/2022 neg.  C/o chronic cough. Feels tired. Wants to sleep all the time. Feels weak. Does not have HH now.  PFTs ordered 9/23-not done     Urology Dr. Castillo recurrent UTIs  Rheum Dr. Serrato- following for + JAVY with + ds DNA and antithyroid antibodies (TPO AB)- possible SLE but no current manifestations     Chronic pain- States she can't sit down due to post herpetic neuralgia which started 2 years ago. Pain Dr. Walton has tried nerve blocks caudal 10/17 and According to notes from pain med she has had SEUN for lumbar radiculopathy 1/18    Increased to gabapentin 300mg tid. Cymbatla 30 mg a day added but could not tolerate due to nausea.  gabapentin makes her drowsy. Elavil helps with mood somewhat.  celexa started last visit but states [facility] never filled it. Still having depression and grief. Talking to [employer] pastors about loss of  which helps. Has limited trasnportatio  Objective:      Physical Exam  Vitals and nursing note reviewed.   Constitutional:       Appearance: She is well-developed.   Cardiovascular:      Rate and Rhythm: Normal rate and regular rhythm.      Heart sounds: Normal heart sounds.   Pulmonary:      Effort: Pulmonary effort is normal.      Breath sounds: Normal breath sounds.   Skin:     General: Skin is warm and dry.   Neurological:      Mental Status: She is alert and oriented to person, place, and time.   Psychiatric:         Mood and Affect: Mood is depressed. Affect is labile and tearful.         Assessment:       ICD-10-CM ICD-9-CM    1. Hypothyroidism, unspecified type  E03.9 244.9 CBC Auto Differential      TSH      T4, Free      2. Hypertension, unspecified type  I10 401.9       3. Hyperlipidemia, unspecified hyperlipidemia type  E78.5 272.4 Comprehensive Metabolic Panel      4. Prediabetes  R73.03 790.29 Hemoglobin A1C      5. Vitamin D deficiency  E55.9 268.9       6. Morbid obesity with BMI of 40.0-44.9, adult  E66.01 278.01     Z68.41 V85.41       7. Menopausal and postmenopausal disorder  N95.9 627.9 DXA Bone Density Axial Skeleton 1 or more sites      8. Gastroesophageal reflux disease without esophagitis  K21.9 530.81       9. Depression with anxiety  F41.8 300.4 EScitalopram oxalate (LEXAPRO) 20 MG tablet      10. Chronic obstructive pulmonary disease, unspecified COPD type  J44.9 496       11. Encounter for screening mammogram for malignant neoplasm of breast  Z12.31 V76.12 Mammo Digital Screening Bilat w/ Horace (XPD)      12. Lumbar radiculitis  M54.16 724.4 gabapentin (NEURONTIN) 300 MG capsule       "ketorolac injection 60 mg      Ambulatory referral/consult to Pain Clinic      Ambulatory referral/consult to Outpatient Case Management      13. Chronic diarrhea  K52.9 787.91 cholestyramine (QUESTRAN) 4 gram packet      14. Seasonal allergic rhinitis due to pollen  J30.1 477.0 fluticasone propionate (FLONASE) 50 mcg/actuation nasal spray      levocetirizine (XYZAL) 5 MG tablet         Plan:   1. Hypothyroidism, unspecified type (Primary)  Controlled on current medications.  Continue current medications.    - CBC Auto Differential; Future  - TSH; Future  - T4, Free; Future    2. Hypertension, unspecified type  Controlled on current medications.  Continue current medications.      3. Hyperlipidemia, unspecified hyperlipidemia type  Stable condition.  Continue current medications.  Will adjust based on lab findings or if condition changes.    - Comprehensive Metabolic Panel; Future    4. Prediabetes  Controlled with diet  - Hemoglobin A1C; Future    5. Vitamin D deficiency  Screen and treat as indicated:      6. Morbid obesity with BMI of 40.0-44.9, adult  Counseled patient on his ideal body weight, health consequences of being obese and current recommendations including weekly exercise and a heart healthy diet.  Current BMI is:Estimated body mass index is 34.96 kg/m² as calculated from the following:    Height as of this encounter: 5' 2" (1.575 m).    Weight as of this encounter: 86.7 kg (191 lb 2.2 oz)..  Patient is aware that ideal BMI < 25 or Weight in (lb) to have BMI = 25: 136.4.      7. Menopausal and postmenopausal disorder  Screen and treat as indicated:    - DXA Bone Density Axial Skeleton 1 or more sites; Future    8. Gastroesophageal reflux disease without esophagitis  Controlled on current medications.  Continue current medications.  Prilosec 20 mg daily    9. Depression with anxiety  Increase   - EScitalopram oxalate (LEXAPRO) 20 MG tablet; Take 1 tablet (20 mg total) by mouth every evening.  Dispense: " 90 tablet; Refill: 3    10. Chronic obstructive pulmonary disease, unspecified COPD type  Cont current regimen    11. Encounter for screening mammogram for malignant neoplasm of breast  Screen and treat as indicated:    - Mammo Digital Screening Bilat w/ Horace (XPD); Future    12. Lumbar radiculitis  Increase to  - gabapentin (NEURONTIN) 300 MG capsule; Take 2 capsules (600 mg total) by mouth 2 (two) times daily.  Dispense: 360 capsule; Refill: 3  - ketorolac injection 60 mg  - Ambulatory referral/consult to Pain Clinic; Future  - Ambulatory referral/consult to Outpatient Case Management    13. Chronic diarrhea  add  - cholestyramine (QUESTRAN) 4 gram packet; Take 1 packet (4 g total) by mouth once daily.  Dispense: 90 packet; Refill: 3    14. Seasonal allergic rhinitis due to pollen  Recommend otc non-sedating antihistamine such as Loratadine and/or steroid nasal spray such as Flonase as directed and as needed.  Please return to clinic if symptoms persist after these interventions.    - fluticasone propionate (FLONASE) 50 mcg/actuation nasal spray; 1 spray (50 mcg total) by Each Nostril route once daily.  Dispense: 16 g; Refill: PRN  - levocetirizine (XYZAL) 5 MG tablet; Take 1 tablet (5 mg total) by mouth nightly as needed for Allergies (congestion).  Dispense: 30 tablet; Refill: PRN  Assessment & Plan    - Discussed risks associated with sedating pain medications, including addiction and dependency.  - Increased gabapentin to 600 mg twice daily to improve pain management.  - Administered Toradol injection in office for immediate pain relief.  - Increased Lexapro (escitalopram) to 20 mg daily for mood management.  - Started Questran powder to be mixed with water daily for diarrhea, describing it as an orange-flavored powder mixed with water.  - Ordered labs to check lab values.  - Follow up in 3 months to assess effectiveness of medication changes.         Time spent with patient: 20 minutes  Patient with be  reevaluated in 3 months or sooner prn  Greater than 50% of this visit was spent counseling as described in above documentation:Yes  This note was generated with the assistance of ambient listening technology. Verbal consent was obtained by the patient and accompanying visitor(s) for the recording of patient appointment to facilitate this note. I attest to having reviewed and edited the generated note for accuracy, though some syntax or spelling errors may persist. Please contact the author of this note for any clarification.            [1]   Patient Active Problem List  Diagnosis    Hypothyroidism    Gastroesophageal reflux disease without esophagitis    Morbid obesity with BMI of 40.0-44.9, adult    Hyperlipidemia    Prediabetes    Dermatitis    Dyspnea    Dysphagia    Lower esophageal ring    Gastritis determined by endoscopy    Gastric polyp    DDD (degenerative disc disease), lumbar    Lumbar radiculitis    Vitamin D deficiency    Hypertension    Post herpetic neuralgia    Ds DNA antibody positive    Positive JAVY (antinuclear antibody)    Hashimoto's disease    Cystocele with prolapse    Incomplete emptying of bladder    Recurrent UTI    Pulmonary nodule    Atherosclerosis of aorta    Cough    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), with abnormal movement    Depression    Chronic obstructive pulmonary disease, unspecified COPD type    Major depressive disorder, recurrent, moderate    Parkinsonian tremor    Other nonthrombocytopenic purpura

## 2025-05-05 ENCOUNTER — RESULTS FOLLOW-UP (OUTPATIENT)
Dept: FAMILY MEDICINE | Facility: CLINIC | Age: 75
End: 2025-05-05

## 2025-05-29 ENCOUNTER — HOSPITAL ENCOUNTER (INPATIENT)
Facility: HOSPITAL | Age: 75
LOS: 11 days | Discharge: NURSING, REHAB OR LONG-TERM CARE | DRG: 321 | End: 2025-06-09
Attending: INTERNAL MEDICINE | Admitting: INTERNAL MEDICINE
Payer: MEDICARE

## 2025-05-29 DIAGNOSIS — I21.3 ST ELEVATION MYOCARDIAL INFARCTION (STEMI), UNSPECIFIED ARTERY: ICD-10-CM

## 2025-05-29 DIAGNOSIS — J44.9 CHRONIC OBSTRUCTIVE PULMONARY DISEASE, UNSPECIFIED COPD TYPE: Primary | ICD-10-CM

## 2025-05-29 DIAGNOSIS — R07.9 CHEST PAIN: ICD-10-CM

## 2025-05-29 DIAGNOSIS — E55.9 VITAMIN D DEFICIENCY: ICD-10-CM

## 2025-05-29 DIAGNOSIS — I21.3 STEMI (ST ELEVATION MYOCARDIAL INFARCTION): ICD-10-CM

## 2025-05-29 PROBLEM — L30.4 INTERTRIGO: Status: ACTIVE | Noted: 2025-05-29

## 2025-05-29 LAB
POC ACTIVATED CLOTTING TIME K: 181 SEC (ref 74–137)
POC ACTIVATED CLOTTING TIME K: 227 SEC (ref 74–137)
POC ACTIVATED CLOTTING TIME K: 250 SEC (ref 74–137)
POC ACTIVATED CLOTTING TIME K: 285 SEC (ref 74–137)
SAMPLE: ABNORMAL

## 2025-05-29 PROCEDURE — 93454 CORONARY ARTERY ANGIO S&I: CPT | Mod: XU | Performed by: INTERNAL MEDICINE

## 2025-05-29 PROCEDURE — 92978 ENDOLUMINL IVUS OCT C 1ST: CPT | Performed by: INTERNAL MEDICINE

## 2025-05-29 PROCEDURE — C1725 CATH, TRANSLUMIN NON-LASER: HCPCS | Performed by: INTERNAL MEDICINE

## 2025-05-29 PROCEDURE — 25000003 PHARM REV CODE 250

## 2025-05-29 PROCEDURE — 20000000 HC ICU ROOM

## 2025-05-29 PROCEDURE — C1753 CATH, INTRAVAS ULTRASOUND: HCPCS | Performed by: INTERNAL MEDICINE

## 2025-05-29 PROCEDURE — 99153 MOD SED SAME PHYS/QHP EA: CPT | Performed by: INTERNAL MEDICINE

## 2025-05-29 PROCEDURE — 4A023N7 MEASUREMENT OF CARDIAC SAMPLING AND PRESSURE, LEFT HEART, PERCUTANEOUS APPROACH: ICD-10-PCS | Performed by: INTERNAL MEDICINE

## 2025-05-29 PROCEDURE — C9606 PERC D-E COR REVASC W AMI S: HCPCS | Mod: LD | Performed by: INTERNAL MEDICINE

## 2025-05-29 PROCEDURE — 25500020 PHARM REV CODE 255: Performed by: INTERNAL MEDICINE

## 2025-05-29 PROCEDURE — 93010 ELECTROCARDIOGRAM REPORT: CPT | Mod: ,,, | Performed by: GENERAL PRACTICE

## 2025-05-29 PROCEDURE — B240ZZ3 ULTRASONOGRAPHY OF SINGLE CORONARY ARTERY, INTRAVASCULAR: ICD-10-PCS | Performed by: INTERNAL MEDICINE

## 2025-05-29 PROCEDURE — 027034Z DILATION OF CORONARY ARTERY, ONE ARTERY WITH DRUG-ELUTING INTRALUMINAL DEVICE, PERCUTANEOUS APPROACH: ICD-10-PCS | Performed by: INTERNAL MEDICINE

## 2025-05-29 PROCEDURE — 99152 MOD SED SAME PHYS/QHP 5/>YRS: CPT | Performed by: INTERNAL MEDICINE

## 2025-05-29 PROCEDURE — 63600175 PHARM REV CODE 636 W HCPCS: Performed by: INTERNAL MEDICINE

## 2025-05-29 PROCEDURE — 93005 ELECTROCARDIOGRAM TRACING: CPT | Performed by: GENERAL PRACTICE

## 2025-05-29 PROCEDURE — B211YZZ FLUOROSCOPY OF MULTIPLE CORONARY ARTERIES USING OTHER CONTRAST: ICD-10-PCS | Performed by: INTERNAL MEDICINE

## 2025-05-29 PROCEDURE — 25000003 PHARM REV CODE 250: Performed by: INTERNAL MEDICINE

## 2025-05-29 PROCEDURE — C1887 CATHETER, GUIDING: HCPCS | Performed by: INTERNAL MEDICINE

## 2025-05-29 PROCEDURE — C1769 GUIDE WIRE: HCPCS | Performed by: INTERNAL MEDICINE

## 2025-05-29 PROCEDURE — C1894 INTRO/SHEATH, NON-LASER: HCPCS | Performed by: INTERNAL MEDICINE

## 2025-05-29 RX ORDER — LIDOCAINE HYDROCHLORIDE 10 MG/ML
INJECTION, SOLUTION EPIDURAL; INFILTRATION; INTRACAUDAL; PERINEURAL
Status: DISCONTINUED | OUTPATIENT
Start: 2025-05-29 | End: 2025-05-29 | Stop reason: HOSPADM

## 2025-05-29 RX ORDER — ACETAMINOPHEN 325 MG/1
650 TABLET ORAL EVERY 4 HOURS PRN
Status: DISCONTINUED | OUTPATIENT
Start: 2025-05-29 | End: 2025-06-09 | Stop reason: HOSPADM

## 2025-05-29 RX ORDER — MIDAZOLAM HYDROCHLORIDE 1 MG/ML
INJECTION INTRAMUSCULAR; INTRAVENOUS
Status: DISCONTINUED | OUTPATIENT
Start: 2025-05-29 | End: 2025-05-29 | Stop reason: HOSPADM

## 2025-05-29 RX ORDER — PANTOPRAZOLE SODIUM 40 MG/1
40 TABLET, DELAYED RELEASE ORAL DAILY
Status: DISCONTINUED | OUTPATIENT
Start: 2025-05-30 | End: 2025-06-09 | Stop reason: HOSPADM

## 2025-05-29 RX ORDER — NITROGLYCERIN 20 MG/100ML
INJECTION INTRAVENOUS
Status: DISCONTINUED | OUTPATIENT
Start: 2025-05-29 | End: 2025-06-09 | Stop reason: HOSPADM

## 2025-05-29 RX ORDER — SODIUM CHLORIDE 9 MG/ML
INJECTION, SOLUTION INTRAVENOUS CONTINUOUS
Status: ACTIVE | OUTPATIENT
Start: 2025-05-29 | End: 2025-05-30

## 2025-05-29 RX ORDER — SODIUM,POTASSIUM PHOSPHATES 280-250MG
2 POWDER IN PACKET (EA) ORAL
Status: DISCONTINUED | OUTPATIENT
Start: 2025-05-29 | End: 2025-06-09 | Stop reason: HOSPADM

## 2025-05-29 RX ORDER — HEPARIN SODIUM 10000 [USP'U]/ML
INJECTION, SOLUTION INTRAVENOUS; SUBCUTANEOUS
Status: DISCONTINUED | OUTPATIENT
Start: 2025-05-29 | End: 2025-05-29 | Stop reason: HOSPADM

## 2025-05-29 RX ORDER — ATORVASTATIN CALCIUM 40 MG/1
40 TABLET, FILM COATED ORAL NIGHTLY
Status: DISCONTINUED | OUTPATIENT
Start: 2025-05-29 | End: 2025-06-09 | Stop reason: HOSPADM

## 2025-05-29 RX ORDER — IBUPROFEN 200 MG
24 TABLET ORAL
Status: DISCONTINUED | OUTPATIENT
Start: 2025-05-29 | End: 2025-06-09 | Stop reason: HOSPADM

## 2025-05-29 RX ORDER — HYDROCODONE BITARTRATE AND ACETAMINOPHEN 5; 325 MG/1; MG/1
1 TABLET ORAL EVERY 6 HOURS PRN
Refills: 0 | Status: DISCONTINUED | OUTPATIENT
Start: 2025-05-29 | End: 2025-05-30

## 2025-05-29 RX ORDER — ONDANSETRON HYDROCHLORIDE 2 MG/ML
4 INJECTION, SOLUTION INTRAVENOUS EVERY 6 HOURS PRN
Status: DISCONTINUED | OUTPATIENT
Start: 2025-05-29 | End: 2025-06-09 | Stop reason: HOSPADM

## 2025-05-29 RX ORDER — GABAPENTIN 300 MG/1
600 CAPSULE ORAL 2 TIMES DAILY PRN
Status: DISCONTINUED | OUTPATIENT
Start: 2025-05-29 | End: 2025-06-09 | Stop reason: HOSPADM

## 2025-05-29 RX ORDER — ESCITALOPRAM OXALATE 10 MG/1
20 TABLET ORAL NIGHTLY
Status: DISCONTINUED | OUTPATIENT
Start: 2025-05-29 | End: 2025-06-09 | Stop reason: HOSPADM

## 2025-05-29 RX ORDER — CLOPIDOGREL BISULFATE 75 MG/1
75 TABLET ORAL DAILY
Status: DISCONTINUED | OUTPATIENT
Start: 2025-05-29 | End: 2025-06-09 | Stop reason: HOSPADM

## 2025-05-29 RX ORDER — FENTANYL CITRATE 50 UG/ML
INJECTION, SOLUTION INTRAMUSCULAR; INTRAVENOUS
Status: DISCONTINUED | OUTPATIENT
Start: 2025-05-29 | End: 2025-05-29 | Stop reason: HOSPADM

## 2025-05-29 RX ORDER — LANOLIN ALCOHOL/MO/W.PET/CERES
800 CREAM (GRAM) TOPICAL
Status: DISCONTINUED | OUTPATIENT
Start: 2025-05-29 | End: 2025-06-09 | Stop reason: HOSPADM

## 2025-05-29 RX ORDER — IBUPROFEN 200 MG
16 TABLET ORAL
Status: DISCONTINUED | OUTPATIENT
Start: 2025-05-29 | End: 2025-06-09 | Stop reason: HOSPADM

## 2025-05-29 RX ORDER — IODIXANOL 320 MG/ML
INJECTION, SOLUTION INTRAVASCULAR
Status: DISCONTINUED | OUTPATIENT
Start: 2025-05-29 | End: 2025-05-29 | Stop reason: HOSPADM

## 2025-05-29 RX ORDER — AMOXICILLIN 250 MG
1 CAPSULE ORAL DAILY PRN
Status: DISCONTINUED | OUTPATIENT
Start: 2025-05-29 | End: 2025-06-04

## 2025-05-29 RX ORDER — ALUMINUM HYDROXIDE, MAGNESIUM HYDROXIDE, AND SIMETHICONE 1200; 120; 1200 MG/30ML; MG/30ML; MG/30ML
30 SUSPENSION ORAL 4 TIMES DAILY PRN
Status: DISCONTINUED | OUTPATIENT
Start: 2025-05-29 | End: 2025-06-09 | Stop reason: HOSPADM

## 2025-05-29 RX ORDER — GLUCAGON 1 MG
1 KIT INJECTION
Status: DISCONTINUED | OUTPATIENT
Start: 2025-05-29 | End: 2025-06-09 | Stop reason: HOSPADM

## 2025-05-29 RX ORDER — MICONAZOLE NITRATE 2 G/100G
POWDER TOPICAL 3 TIMES DAILY
Status: DISCONTINUED | OUTPATIENT
Start: 2025-05-30 | End: 2025-06-05

## 2025-05-29 RX ORDER — MUPIROCIN 20 MG/G
OINTMENT TOPICAL 2 TIMES DAILY
Status: COMPLETED | OUTPATIENT
Start: 2025-05-29 | End: 2025-06-03

## 2025-05-29 RX ORDER — NITROGLYCERIN 5 MG/ML
INJECTION, SOLUTION INTRAVENOUS
Status: DISCONTINUED | OUTPATIENT
Start: 2025-05-29 | End: 2025-05-29 | Stop reason: HOSPADM

## 2025-05-29 RX ORDER — TALC
6 POWDER (GRAM) TOPICAL NIGHTLY PRN
Status: DISCONTINUED | OUTPATIENT
Start: 2025-05-29 | End: 2025-06-09 | Stop reason: HOSPADM

## 2025-05-29 RX ORDER — ASPIRIN 81 MG/1
81 TABLET ORAL DAILY
Status: DISCONTINUED | OUTPATIENT
Start: 2025-05-30 | End: 2025-06-09 | Stop reason: HOSPADM

## 2025-05-29 RX ORDER — DIPHENHYDRAMINE HYDROCHLORIDE 50 MG/ML
INJECTION, SOLUTION INTRAMUSCULAR; INTRAVENOUS
Status: DISCONTINUED | OUTPATIENT
Start: 2025-05-29 | End: 2025-05-29 | Stop reason: HOSPADM

## 2025-05-29 RX ORDER — HYDROXYZINE HYDROCHLORIDE 25 MG/1
25 TABLET, FILM COATED ORAL 3 TIMES DAILY PRN
Status: DISCONTINUED | OUTPATIENT
Start: 2025-05-29 | End: 2025-06-09 | Stop reason: HOSPADM

## 2025-05-29 RX ORDER — ACETAMINOPHEN 325 MG/1
650 TABLET ORAL EVERY 8 HOURS PRN
Status: DISCONTINUED | OUTPATIENT
Start: 2025-05-29 | End: 2025-06-09 | Stop reason: HOSPADM

## 2025-05-29 RX ORDER — CLOPIDOGREL BISULFATE 75 MG/1
75 TABLET ORAL DAILY
Status: DISCONTINUED | OUTPATIENT
Start: 2025-05-30 | End: 2025-05-29

## 2025-05-29 RX ORDER — NALOXONE HCL 0.4 MG/ML
0.02 VIAL (ML) INJECTION
Status: DISCONTINUED | OUTPATIENT
Start: 2025-05-29 | End: 2025-06-09 | Stop reason: HOSPADM

## 2025-05-29 RX ADMIN — ATORVASTATIN CALCIUM 40 MG: 40 TABLET, FILM COATED ORAL at 11:05

## 2025-05-29 RX ADMIN — SODIUM CHLORIDE: 9 INJECTION, SOLUTION INTRAVENOUS at 11:05

## 2025-05-29 RX ADMIN — ESCITALOPRAM OXALATE 20 MG: 10 TABLET ORAL at 11:05

## 2025-05-29 RX ADMIN — MUPIROCIN 1 G: 20 OINTMENT TOPICAL at 11:05

## 2025-05-29 RX ADMIN — CLOPIDOGREL 75 MG: 75 TABLET ORAL at 11:05

## 2025-05-29 NOTE — Clinical Note
150 ml of contrast were injected throughout the case. Refill request received for Vyvanse from patient MyChart. Refill authorization last sent 3/21/19 #30 with 0 refills to [facility]. Refill too early per protocol. Pt notified via MyChart.

## 2025-05-29 NOTE — Clinical Note
The catheter was inserted into the and was inserted over the wire into the left coronary artery. An angiography was performed of the left coronary arteries. Multiple views were taken. The angiography was performed via power injection.

## 2025-05-29 NOTE — Clinical Note
The catheter was inserted into the and was inserted over the wire into the right coronary artery. An angiography was performed of the right coronary arteries. Multiple views were taken. The angiography was performed via power injection.

## 2025-05-29 NOTE — Clinical Note
The catheter was inserted into the, was removed from the and was inserted over the wire into the left anterior descending. An angiography was performed of the left coronary arteries. The angiography was performed via power injection.

## 2025-05-30 ENCOUNTER — CLINICAL SUPPORT (OUTPATIENT)
Dept: CARDIOLOGY | Facility: HOSPITAL | Age: 75
End: 2025-05-30
Payer: MEDICARE

## 2025-05-30 DIAGNOSIS — R07.9 CHEST PAIN, UNSPECIFIED TYPE: ICD-10-CM

## 2025-05-30 DIAGNOSIS — I21.3 ST ELEVATION MYOCARDIAL INFARCTION (STEMI), UNSPECIFIED ARTERY: Primary | ICD-10-CM

## 2025-05-30 LAB
ABSOLUTE EOSINOPHIL (SMH): 0 K/UL
ABSOLUTE EOSINOPHIL (SMH): 0 K/UL
ABSOLUTE MONOCYTE (SMH): 0.17 K/UL (ref 0.3–1)
ABSOLUTE MONOCYTE (SMH): 0.28 K/UL (ref 0.3–1)
ABSOLUTE NEUTROPHIL COUNT (SMH): 11.6 K/UL (ref 1.8–7.7)
ABSOLUTE NEUTROPHIL COUNT (SMH): 12.6 K/UL (ref 1.8–7.7)
ANION GAP (SMH): 10 MMOL/L (ref 8–16)
BASOPHILS # BLD AUTO: 0.02 K/UL
BASOPHILS # BLD AUTO: 0.03 K/UL
BASOPHILS NFR BLD AUTO: 0.2 %
BASOPHILS NFR BLD AUTO: 0.2 %
BUN SERPL-MCNC: 16 MG/DL (ref 8–23)
CALCIUM SERPL-MCNC: 9 MG/DL (ref 8.7–10.5)
CHLORIDE SERPL-SCNC: 105 MMOL/L (ref 95–110)
CO2 SERPL-SCNC: 22 MMOL/L (ref 23–29)
CREAT SERPL-MCNC: 0.6 MG/DL (ref 0.5–1.4)
ERYTHROCYTE [DISTWIDTH] IN BLOOD BY AUTOMATED COUNT: 13.3 % (ref 11.5–14.5)
ERYTHROCYTE [DISTWIDTH] IN BLOOD BY AUTOMATED COUNT: 13.3 % (ref 11.5–14.5)
GFR SERPLBLD CREATININE-BSD FMLA CKD-EPI: >60 ML/MIN/1.73/M2
GLUCOSE SERPL-MCNC: 142 MG/DL (ref 70–110)
HCT VFR BLD AUTO: 40.6 % (ref 37–48.5)
HCT VFR BLD AUTO: 41.5 % (ref 37–48.5)
HGB BLD-MCNC: 13.4 GM/DL (ref 12–16)
HGB BLD-MCNC: 13.5 GM/DL (ref 12–16)
IMM GRANULOCYTES # BLD AUTO: 0.05 K/UL (ref 0–0.04)
IMM GRANULOCYTES # BLD AUTO: 0.08 K/UL (ref 0–0.04)
IMM GRANULOCYTES NFR BLD AUTO: 0.4 % (ref 0–0.5)
IMM GRANULOCYTES NFR BLD AUTO: 0.6 % (ref 0–0.5)
LYMPHOCYTES # BLD AUTO: 0.78 K/UL (ref 1–4.8)
LYMPHOCYTES # BLD AUTO: 1 K/UL (ref 1–4.8)
MAGNESIUM SERPL-MCNC: 2.1 MG/DL (ref 1.6–2.6)
MCH RBC QN AUTO: 29.9 PG (ref 27–31)
MCH RBC QN AUTO: 30 PG (ref 27–31)
MCHC RBC AUTO-ENTMCNC: 32.5 G/DL (ref 32–36)
MCHC RBC AUTO-ENTMCNC: 33 G/DL (ref 32–36)
MCV RBC AUTO: 91 FL (ref 82–98)
MCV RBC AUTO: 92 FL (ref 82–98)
NUCLEATED RBC (/100WBC) (SMH): 0 /100 WBC
NUCLEATED RBC (/100WBC) (SMH): 0 /100 WBC
PLATELET # BLD AUTO: 280 K/UL (ref 150–450)
PLATELET # BLD AUTO: 299 K/UL (ref 150–450)
PMV BLD AUTO: 8.4 FL (ref 9.2–12.9)
PMV BLD AUTO: 8.5 FL (ref 9.2–12.9)
POTASSIUM SERPL-SCNC: 4.7 MMOL/L (ref 3.5–5.1)
RA PRESSURE ESTIMATED: 3 MMHG
RBC # BLD AUTO: 4.48 M/UL (ref 4–5.4)
RBC # BLD AUTO: 4.5 M/UL (ref 4–5.4)
RELATIVE EOSINOPHIL (SMH): 0 % (ref 0–8)
RELATIVE EOSINOPHIL (SMH): 0 % (ref 0–8)
RELATIVE LYMPHOCYTE (SMH): 5.7 % (ref 18–48)
RELATIVE LYMPHOCYTE (SMH): 7.7 % (ref 18–48)
RELATIVE MONOCYTE (SMH): 1.2 % (ref 4–15)
RELATIVE MONOCYTE (SMH): 2.2 % (ref 4–15)
RELATIVE NEUTROPHIL (SMH): 89.5 % (ref 38–73)
RELATIVE NEUTROPHIL (SMH): 92.3 % (ref 38–73)
SODIUM SERPL-SCNC: 137 MMOL/L (ref 136–145)
WBC # BLD AUTO: 12.94 K/UL (ref 3.9–12.7)
WBC # BLD AUTO: 13.67 K/UL (ref 3.9–12.7)

## 2025-05-30 PROCEDURE — 51798 US URINE CAPACITY MEASURE: CPT

## 2025-05-30 PROCEDURE — 25000003 PHARM REV CODE 250: Performed by: INTERNAL MEDICINE

## 2025-05-30 PROCEDURE — 80048 BASIC METABOLIC PNL TOTAL CA: CPT

## 2025-05-30 PROCEDURE — 93306 TTE W/DOPPLER COMPLETE: CPT | Mod: 26,,, | Performed by: INTERNAL MEDICINE

## 2025-05-30 PROCEDURE — 94799 UNLISTED PULMONARY SVC/PX: CPT

## 2025-05-30 PROCEDURE — 93010 ELECTROCARDIOGRAM REPORT: CPT | Mod: ,,, | Performed by: INTERNAL MEDICINE

## 2025-05-30 PROCEDURE — 85025 COMPLETE CBC W/AUTO DIFF WBC: CPT

## 2025-05-30 PROCEDURE — 63600175 PHARM REV CODE 636 W HCPCS

## 2025-05-30 PROCEDURE — 94761 N-INVAS EAR/PLS OXIMETRY MLT: CPT

## 2025-05-30 PROCEDURE — 83735 ASSAY OF MAGNESIUM: CPT

## 2025-05-30 PROCEDURE — 99900035 HC TECH TIME PER 15 MIN (STAT)

## 2025-05-30 PROCEDURE — 99233 SBSQ HOSP IP/OBS HIGH 50: CPT | Mod: ,,, | Performed by: INTERNAL MEDICINE

## 2025-05-30 PROCEDURE — 25000003 PHARM REV CODE 250

## 2025-05-30 PROCEDURE — 25000003 PHARM REV CODE 250: Performed by: STUDENT IN AN ORGANIZED HEALTH CARE EDUCATION/TRAINING PROGRAM

## 2025-05-30 PROCEDURE — 93005 ELECTROCARDIOGRAM TRACING: CPT | Performed by: INTERNAL MEDICINE

## 2025-05-30 PROCEDURE — 85025 COMPLETE CBC W/AUTO DIFF WBC: CPT | Performed by: INTERNAL MEDICINE

## 2025-05-30 PROCEDURE — 20000000 HC ICU ROOM

## 2025-05-30 PROCEDURE — 93306 TTE W/DOPPLER COMPLETE: CPT

## 2025-05-30 RX ORDER — HYDROCODONE BITARTRATE AND ACETAMINOPHEN 10; 325 MG/1; MG/1
1 TABLET ORAL EVERY 4 HOURS PRN
Refills: 0 | Status: DISCONTINUED | OUTPATIENT
Start: 2025-05-30 | End: 2025-06-09 | Stop reason: HOSPADM

## 2025-05-30 RX ORDER — HYDROCODONE BITARTRATE AND ACETAMINOPHEN 5; 325 MG/1; MG/1
1 TABLET ORAL EVERY 4 HOURS PRN
Refills: 0 | Status: DISCONTINUED | OUTPATIENT
Start: 2025-05-30 | End: 2025-06-09 | Stop reason: HOSPADM

## 2025-05-30 RX ADMIN — PANTOPRAZOLE SODIUM 40 MG: 40 TABLET, DELAYED RELEASE ORAL at 06:05

## 2025-05-30 RX ADMIN — MICONAZOLE NITRATE: 20 POWDER TOPICAL at 09:05

## 2025-05-30 RX ADMIN — MUPIROCIN 1 G: 20 OINTMENT TOPICAL at 09:05

## 2025-05-30 RX ADMIN — ATORVASTATIN CALCIUM 40 MG: 40 TABLET, FILM COATED ORAL at 08:05

## 2025-05-30 RX ADMIN — ONDANSETRON 4 MG: 2 INJECTION INTRAMUSCULAR; INTRAVENOUS at 05:05

## 2025-05-30 RX ADMIN — GABAPENTIN 600 MG: 300 CAPSULE ORAL at 03:05

## 2025-05-30 RX ADMIN — CLOPIDOGREL 75 MG: 75 TABLET ORAL at 09:05

## 2025-05-30 RX ADMIN — ESCITALOPRAM OXALATE 20 MG: 10 TABLET ORAL at 08:05

## 2025-05-30 RX ADMIN — MICONAZOLE NITRATE: 20 POWDER TOPICAL at 03:05

## 2025-05-30 RX ADMIN — GABAPENTIN 600 MG: 300 CAPSULE ORAL at 10:05

## 2025-05-30 RX ADMIN — ASPIRIN 81 MG: 81 TABLET ORAL at 09:05

## 2025-05-30 RX ADMIN — ONDANSETRON 4 MG: 2 INJECTION INTRAMUSCULAR; INTRAVENOUS at 06:05

## 2025-05-30 RX ADMIN — HYDROCODONE BITARTRATE AND ACETAMINOPHEN 1 TABLET: 5; 325 TABLET ORAL at 09:05

## 2025-05-30 RX ADMIN — HYDROCODONE BITARTRATE AND ACETAMINOPHEN 1 TABLET: 5; 325 TABLET ORAL at 12:05

## 2025-05-30 RX ADMIN — HYDROCODONE BITARTRATE AND ACETAMINOPHEN 1 TABLET: 10; 325 TABLET ORAL at 05:05

## 2025-05-30 RX ADMIN — MICONAZOLE NITRATE: 20 POWDER TOPICAL at 08:05

## 2025-05-30 RX ADMIN — HYDROCODONE BITARTRATE AND ACETAMINOPHEN 1 TABLET: 5; 325 TABLET ORAL at 02:05

## 2025-05-30 RX ADMIN — SODIUM CHLORIDE 250 ML: 9 INJECTION, SOLUTION INTRAVENOUS at 05:05

## 2025-05-30 RX ADMIN — SENNOSIDES, DOCUSATE SODIUM 1 TABLET: 50; 8.6 TABLET, FILM COATED ORAL at 09:05

## 2025-05-30 RX ADMIN — ONDANSETRON 4 MG: 2 INJECTION INTRAMUSCULAR; INTRAVENOUS at 10:05

## 2025-05-30 RX ADMIN — MUPIROCIN 1 G: 20 OINTMENT TOPICAL at 08:05

## 2025-05-30 RX ADMIN — LEVOTHYROXINE SODIUM 150 MCG: 0.1 TABLET ORAL at 06:05

## 2025-05-30 NOTE — PLAN OF CARE
Problem: Adult Inpatient Plan of Care  Goal: Absence of Hospital-Acquired Illness or Injury  Outcome: Progressing  Goal: Readiness for Transition of Care  Outcome: Progressing     Problem: Wound  Goal: Optimal Coping  Outcome: Progressing  Goal: Optimal Functional Ability  Outcome: Progressing  Goal: Absence of Infection Signs and Symptoms  Outcome: Progressing  Goal: Improved Oral Intake  Outcome: Progressing     Problem: Fall Injury Risk  Goal: Absence of Fall and Fall-Related Injury  Outcome: Progressing

## 2025-05-30 NOTE — HPI
This is a very pleasant 74-year-old lady with comorbid conditions of hypothyroidism, GERD, hyperlipidemia presents to outlying Emergency Department with complaints of intermittent chest pain or loss few days.  Patient reports she felt as though it may has been heartburn and treated herself as such with no improvement in symptoms and due to the persistence she presented to the ED. patient found to have ST elevations, Dr. Bailey was contacted and could STEMI and cath lab were emergently activated.  Patient is now status post heart catheterization with bulky calcifications noted.  There was consideration for transfer to [facility] for complex PCI, however, joint decision made to proceed.  Patient is now status post drug-eluting stent to the LAD.  Admission to ICU status post procedure.  On assessment patient is calm, cooperative and in no acute distress.  She is without chest pain.  She is vitally stable.

## 2025-05-30 NOTE — HOSPITAL COURSE
Patient with hypothyroidism, GERD, hyperlipidemia presented with chest pain.  Concerns for STEMI, cardiology consulted and patient taken to cath lab emergently.  Status post PCI of the ostial LAD, heavy calcium burden.  Started on dual antiplatelet therapy. PT/OT consulted.  SNF placement pending. Had some shortness of breath, possible from large neck and positioning, given breathing treatments and steroids. Abx stopped, as procalcitonin was negative. Case management worked to procure a SNF bed.  Patient has remained hemodynamically stable.  Patient was seen and examined on day of discharge, she is okay for discharge at this time.  She will be discharged to skilled nursing facility.  Patient will follow up outpatient with the primary care physician and with Cardiology.  She will be continued on dual antiplatelet therapy.

## 2025-05-30 NOTE — CONSULTS
"[facility]  Department of Cardiology  Consult Note      PATIENT NAME: Aida Gupta    MRN: [id number]  TODAY'S DATE: 05/29/2025  ADMIT DATE: 5/29/2025                          CONSULT REQUESTED BY: Lia Bailey MD    SUBJECTIVE     PRINCIPAL PROBLEM:  Chest pain     HPI:  74 year old female who presented to [facility] ED with chest pain and was noted to have evidence of STEMI.  Patient was given ASA 324mg po x 1, plavix 300mg po x1 and heparin 5000 Units IV and transferred emergently for coronary angiogram. Cath lab activated.       REASON FOR CONSULT:  From ED: STEMI      Review of patient's allergies indicates:   Allergen Reactions    Contrast media Nausea And Vomiting       Past Medical History:   Diagnosis Date    Asthma     chronic bronchitis    Hx of colectomy     Shingles     1 year ago     Thyroid disease      Past Surgical History:   Procedure Laterality Date    Bilateral tubal ligation      COLECTOMY      COLON SURGERY      Diverticulitis    TUBAL LIGATION       Social History[1]     REVIEW OF SYSTEMS  Per HPI    OBJECTIVE     VITAL SIGNS (Most Recent)       VENTILATION STATUS                 I & O (Last 24H):No intake or output data in the 24 hours ending 05/29/25 1953    WEIGHTS  Wt Readings from Last 1 Encounters:   05/29/25 1811 86.2 kg (190 lb)       PHYSICAL EXAM  CONSTITUTIONAL: No fever, no chills  HEENT: Normocephalic, atraumatic,pupils reactive to light                 NECK:  No JVD no carotid bruit  CVS: S1S2+, RRR  LUNGS: Clear  ABDOMEN: Soft, NT, BS+  EXTREMITIES: No cyanosis, edema  : No danielle catheter  NEURO: AAO X 3  PSY: Normal affect      HOME MEDICATIONS:Medications Ordered Prior to Encounter[2]    SCHEDULED MEDS:    CONTINUOUS INFUSIONS:    PRN MEDS:    LABS AND DIAGNOSTICS     CBC LAST 3 DAYS  No results for input(s): "WBC", "RBC", "HGB", "HCT", "MCV", "MCH", "MCHC", "RDW", "PLT", "MPV", "GRAN", "LYMPH", "MONO", "BASO", "NRBC" in the last 168 " "hours.    COAGULATION LAST 3 DAYS  Recent Labs   Lab 05/29/25  1847   INR 1.0   APTT 26.9       CHEMISTRY LAST 3 DAYS  Recent Labs   Lab 05/29/25  1844      K 4.5      CO2 19*   ANIONGAP 15   BUN 17   CREATININE 0.8   *   CALCIUM 9.9   ALBUMIN 3.9   PROT 8.1   ALKPHOS 70   ALT 8*   AST 27   BILITOT 0.9       CARDIAC PROFILE LAST 3 DAYS  Recent Labs   Lab 05/29/25  1844   *   CPK 36   TROPONINIHS 79*       ENDOCRINE LAST 3 DAYS  No results for input(s): "TSH", "PROCAL" in the last 168 hours.    LAST ARTERIAL BLOOD GAS  ABG  No results for input(s): "PH", "PO2", "PCO2", "HCO3", "BE" in the last 168 hours.    LAST 7 DAYS MICROBIOLOGY   Microbiology Results (last 7 days)       ** No results found for the last 168 hours. **            MOST RECENT IMAGING  CT Abdomen Pelvis  Without Contrast  Narrative: EXAMINATION:  CT ABDOMEN PELVIS WITHOUT CONTRAST    CLINICAL HISTORY:  Nausea/vomiting;RLQ abdominal pain (Age >= 14y); Nausea with vomiting, unspecified    TECHNIQUE:  Low dose axial images, sagittal and coronal reformations were obtained from the lung bases to the pubic symphysis, without contrast.    COMPARISON:  CT 01/04/2024, 12/13/2019    FINDINGS:  Heart: Stable size.  No pericardial effusion.  Calcification of the aortic annulus.    Lung Bases: Stable peripheral predominant interstitial reticulation and mild ground-glass density which could represent mild fibrotic change.  No new consolidation or pleural effusion.    Liver: No focal lesion.    Gallbladder: Surgically absent.    Bile Ducts: Stable mild prominence of the common bile duct, likely related to cholecystectomy status.  No intrahepatic ductal dilatation.    Pancreas: Diffuse fatty atrophy of the pancreatic parenchyma.    Spleen: Unremarkable.    Adrenals: Unremarkable.    Kidneys/ Ureters: Fluid density fullness of the bilateral renal pelvis, correlating with renal sinus cysts identified on prior CT 12/13/2019.  No renal stone.  " No hydronephrosis or ureteral dilatation.    Bladder: Unremarkable.    Reproductive organs: Stable calcification within the anterior uterus which could represent a fibroid.    GI Tract/Mesentery: No bowel obstruction.  Colonic diverticulosis.  New wall thickening of the cecum and ascending colon (series 4, image 60) without pericolonic inflammatory changes.  Appendix is unremarkable.    Peritoneal Space: No ascites. No free air.    Retroperitoneum: No significant adenopathy.    Abdominal wall: Unremarkable.    Vasculature: Abdominal aorta is normal caliber.  Moderate calcific atherosclerosis.    Bones: Degenerative changes of the spine.  No acute fracture or aggressive lesion.  Impression: 1. New nonspecific wall thickening of the cecum and ascending colon without pericolonic inflammatory changes.  Findings could relate to incomplete distension, cannot exclude mild colonic inflammation.  Recommend clinical correlation.  2. Colonic diverticulosis.  3. Additional findings as above.    Electronically signed by: Wade Pate  Date:    01/19/2024  Time:    14:16  X-Ray Chest PA And Lateral  Narrative: EXAMINATION:  XR CHEST PA AND LATERAL    CLINICAL HISTORY:  Cough, unspecified    TECHNIQUE:  PA and lateral views of the chest were performed.    COMPARISON:  01/04/2024    FINDINGS:  Stable cardiomediastinal silhouette.  Stable appearance of the lungs.  No focal consolidation, pleural effusion or pneumothorax.  Exaggerated kyphotic curvature of the thoracic spine.  Impression: No acute cardiopulmonary process.    Electronically signed by: Wade Pate  Date:    01/19/2024  Time:    13:16      ECHOCARDIOGRAM RESULTS (last 5)  No results found for this or any previous visit.      CURRENT/PREVIOUS VISIT EKG  Results for orders placed or performed during the hospital encounter of 12/22/21   EKG 12-lead    Collection Time: 12/22/21  7:10 PM    Narrative    Test Reason : R06.02,    Vent. Rate : 095 BPM     Atrial Rate : 095  BPM     P-R Int : 134 ms          QRS Dur : 098 ms      QT Int : 380 ms       P-R-T Axes : 044 025 049 degrees     QTc Int : 477 ms    Normal sinus rhythm  Incomplete right bundle branch block  Borderline Abnormal ECG  When compared with ECG of 10-JUL-2017 06:21,  No significant change was found  Confirmed by DHARA MATHUR MD ([id number]) on 2021 9:44:52 AM    Referred By: AVRIL   SELF           Confirmed By:DHARA MATHUR MD           ASSESSMENT/PLAN:     There are no active hospital problems to display for this patient.      ASSESSMENT & PLAN:   STEMI  - Plan for emergent coronary angiogram and possible PCI  - Patient will be pretreated given contrast allergy.   - Rest of plan to follow after coronary angiogram.   - Of note, patient is a Christian - no blood products.     Lia Bailey MD  Date of Service: 2025  7:53 PM       [1]   Social History  Tobacco Use    Smoking status: Former     Current packs/day: 0.00     Average packs/day: 3.0 packs/day for 15.0 years (45.0 ttl pk-yrs)     Types: Cigarettes     Start date:      Quit date:      Years since quittin.4    Smokeless tobacco: Never   Substance Use Topics    Alcohol use: No    Drug use: No   [2]   Current Facility-Administered Medications on File Prior to Encounter   Medication Dose Route Frequency Provider Last Rate Last Admin    [COMPLETED] aluminum-magnesium hydroxide-simethicone 200-200-20 mg/5 mL suspension 30 mL  30 mL Oral Once Makeda Cain, NP   30 mL at 25    And    [COMPLETED] LIDOcaine viscous HCl 2% oral solution 15 mL  15 mL Oral Once Makeda Cain NP   15 mL at 25    [COMPLETED] clopidogreL tablet 300 mg  300 mg Oral Once Zeeshan Mejia MD   300 mg at 25    [COMPLETED] heparin (porcine) injection 5,000 Units  5,000 Units Intravenous ED 1 Time Zeeshan Mejia MD   5,000 Units at 25    [COMPLETED] morphine injection 4 mg  4 mg Intravenous ED 1 Time Zeeshan Mejia MD    4 mg at 05/29/25 1843    [COMPLETED] nitroGLYCERIN 2% TD oint ointment 0.5 inch  0.5 inch Topical (Top) ED 1 Time Zeeshan Mejia MD   0.5 inch at 05/29/25 1841    [COMPLETED] ondansetron injection 4 mg  4 mg Intravenous ED 1 Time Zeeshan Mejia MD   4 mg at 05/29/25 1841    [DISCONTINUED] aspirin chewable tablet 81 mg  81 mg Oral Daily Zeeshan Mejia MD        [DISCONTINUED] aspirin tablet 325 mg  325 mg Oral Daily Makeda Cain NP   325 mg at 05/29/25 1822    [DISCONTINUED] clopidogreL tablet 75 mg  75 mg Oral Daily Zeeshan Mejia MD        [DISCONTINUED] enoxaparin injection 30 mg  30 mg Intravenous Once Zeeshan Mejia MD        [DISCONTINUED] enoxaparin injection 90 mg  1 mg/kg Subcutaneous Q12H Zeeshan Mejia MD        [DISCONTINUED] enoxaparin injection 90 mg  1 mg/kg Subcutaneous Q12H Zeeshan Mejia MD        [COMPLETED] sodium chloride 0.9% bolus 1,000 mL 1,000 mL  1,000 mL Intravenous ED 1 Time Zeeshan Mejia  mL/hr at 05/29/25 1852 1,000 mL at 05/29/25 1852    [DISCONTINUED] tenecteplase (TNKase) IV KIT 45 mg  45 mg Intravenous Once Zeeshan Mejia MD         Current Outpatient Medications on File Prior to Encounter   Medication Sig Dispense Refill    albuterol (PROVENTIL/VENTOLIN HFA) 90 mcg/actuation inhaler Inhale 2 puffs into the lungs every 6 (six) hours as needed for Wheezing. Rescue      amitriptyline (ELAVIL) 50 MG tablet Take 1 tablet (50 mg total) by mouth every evening. 90 tablet 3    cholecalciferol, vitamin D3, (VITAMIN D3) 125 mcg (5,000 unit) Tab Take 1 tablet (5,000 Units total) by mouth once daily. 90 tablet 3    cholestyramine (QUESTRAN) 4 gram packet Take 1 packet (4 g total) by mouth once daily. 90 packet 3    diclofenac sodium (VOLTAREN ARTHRITIS PAIN) 1 % Gel Apply 2 g topically 4 (four) times daily as needed (arthritis). 450 g PRN    ergocalciferol (VITAMIN D2) 50,000 unit Cap Take 1 capsule (50,000 Units total) by mouth twice a week. 24 capsule 3     EScitalopram oxalate (LEXAPRO) 20 MG tablet Take 1 tablet (20 mg total) by mouth every evening. 90 tablet 3    fluticasone propionate (FLONASE) 50 mcg/actuation nasal spray 1 spray (50 mcg total) by Each Nostril route once daily. 16 g PRN    fluticasone-salmeterol diskus inhaler 100-50 mcg Inhale 1 puff into the lungs 2 (two) times daily. Controller 60 each 5    gabapentin (NEURONTIN) 300 MG capsule Take 2 capsules (600 mg total) by mouth 2 (two) times daily. 360 capsule 3    hydrOXYzine HCL (ATARAX) 25 MG tablet TAKE 1 TABLET BY MOUTH THREE TIMES DAILY AS NEEDED FOR ANXIETY 90 tablet prn    levocetirizine (XYZAL) 5 MG tablet Take 1 tablet (5 mg total) by mouth nightly as needed for Allergies (congestion). 30 tablet PRN    levothyroxine (SYNTHROID) 150 MCG tablet Take 1 tablet by mouth once daily 90 tablet 1    meloxicam (MOBIC) 7.5 MG tablet Take 1 tablet by mouth once daily 90 tablet 3    omeprazole (PRILOSEC) 20 MG capsule Take 1 capsule (20 mg total) by mouth once daily. 90 capsule 3    ondansetron (ZOFRAN) 4 MG tablet Take 1 tablet (4 mg total) by mouth every 8 (eight) hours as needed for Nausea. 30 tablet 3    propranoloL (INDERAL LA) 60 MG 24 hr capsule Take 1 capsule (60 mg total) by mouth once daily. 90 capsule 3    rosuvastatin (CRESTOR) 10 MG tablet Take 1 tablet by mouth once daily 90 tablet 3    triamcinolone acetonide 0.1% (KENALOG) 0.1 % cream Apply topically 2 (two) times daily.

## 2025-05-30 NOTE — PROGRESS NOTES
[facility]  Progress Note    Patient Name: Aida Gupta  MRN: [id number]  Patient Class: IP- Inpatient   Admission Date: 5/29/2025  Length of Stay: 1 days  Attending Physician: Chasity Vargas MD  Primary Care Provider: Edel Fisher MD        Subjective     Principal Problem:STEMI (ST elevation myocardial infarction)        HPI:  This is a very pleasant 74-year-old lady with comorbid conditions of hypothyroidism, GERD, [facility]lipidemia [facility] of intermittent chest pain or loss few days.  Patient reports she felt as though it may has been heartburn and treated herself as such with no improvement in symptoms and due to the persistence she presented to the ED. patient found to have ST elevations, Dr. Bailey was contacted and could STEMI and cath lab were emergently activated.  Patient is now status post heart catheterization with bulky calcifications noted.  There was consideration for transfer to [facility] for complex PCI, however, joint decision made to proceed.  Patient is now status post drug-eluting stent to the LAD.  Admission to ICU status post procedure.  On assessment patient is calm, cooperative and in no acute distress.  She is without chest pain.  She is vitally stable.    Overview/Hospital Course:  Patient with hypothyroidism, GERD, hyperlipidemia presented with chest pain.  Concerns for STEMI, cardiology consulted and patient taken to cath lab emergently.  Status post PCI of the ostial LAD, heavy calcium burden.  Started on dual antiplatelet therapy.    Interval History:  Patient seen and examined this morning, no acute complaints.  Echocardiogram pending.    Review of Systems   Constitutional:  Negative for appetite change.   Respiratory:  Negative for shortness of breath.    Cardiovascular:  Negative for chest pain.     Objective:     Vital Signs (Most Recent):  Temp: 97.5 °F (36.4 °C) (05/30/25 1145)  Pulse: 82  (05/30/25 1130)  Resp: 16 (05/30/25 1130)  BP: 109/65 (05/30/25 1130)  SpO2: 96 % (05/30/25 1130) Vital Signs (24h Range):  Temp:  [97.5 °F (36.4 °C)-98.2 °F (36.8 °C)] 97.5 °F (36.4 °C)  Pulse:  [] 82  Resp:  [14-31] 16  SpO2:  [93 %-100 %] 96 %  BP: (101-152)/(62-89) 109/65  Arterial Line BP: (113-144)/(62-99) 126/99     Weight: 86.5 kg (190 lb 11.2 oz)  Body mass index is 34.88 kg/m².    Intake/Output Summary (Last 24 hours) at 5/30/2025 1227  Last data filed at 5/30/2025 0900  Gross per 24 hour   Intake 895.04 ml   Output 550 ml   Net 345.04 ml         Physical Exam  Constitutional:       General: She is not in acute distress.  Cardiovascular:      Rate and Rhythm: Normal rate and regular rhythm.   Pulmonary:      Effort: No respiratory distress.      Breath sounds: No wheezing.   Neurological:      General: No focal deficit present.      Mental Status: She is alert and oriented to person, place, and time.               Significant Labs: All pertinent labs within the past 24 hours have been reviewed.  CBC:   Recent Labs   Lab 05/30/25  0126 05/30/25  0339   WBC 13.67* 12.94*   HGB 13.5 13.4   HCT 41.5 40.6    299     CMP:   Recent Labs   Lab 05/29/25  1844 05/30/25  0339    137   K 4.5 4.7    105   CO2 19* 22*   * 142*   BUN 17 16   CREATININE 0.8 0.6   CALCIUM 9.9 9.0   PROT 8.1  --    ALBUMIN 3.9  --    BILITOT 0.9  --    ALKPHOS 70  --    AST 27  --    ALT 8*  --    ANIONGAP 15 10       Significant Imaging: I have reviewed all pertinent imaging results/findings within the past 24 hours.      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   EMILY was placed to the ostial/proximal/mid LAD   lifelong ASA, plavix for at least 1 year, high intensity statin    Hypothyroidism  Resume synthroid    Gastroesophageal reflux disease without  esophagitis  Continue PPI    Hyperlipidemia  Continue statin    Intertrigo  Nystatin to breast folds and groin    VTE Risk Mitigation (From admission, onward)      None            Discharge Planning   OSMAN: 5/31/2025     Code Status: Full Code   Medical Readiness for Discharge Date:                    Chasity Vargas MD  Department of Hospital Medicine   [facility]

## 2025-05-30 NOTE — PLAN OF CARE
[facility]  Initial Discharge Assessment       Primary Care Provider: Edel Fisher MD    Admission Diagnosis: ST elevation myocardial infarction (STEMI), unspecified artery [I21.3]    Admission Date: 5/29/2025  Expected Discharge Date: 5/31/2025     verified demographics, insurance, supports, and PCP via phone with Daughter/Minor [phone].  Daughter reported if patient has HH recs, patient will refuse. Patient verified at home DME: listed below.  Patient verified No HH, No Dialysis, No Blood Thinners, and No Oxygen.     Patient verified pharmacy of choice: [facility] on [address]  Patient confirmed Daughter/Minor [phone] will be source of transportation at the time of discharge.     Transition of Care Barriers: None    Payor: [employer] [facility] / Plan: [employer] [facility] SNP / Product Type: Medicare Advantage /     Extended Emergency Contact Information  Primary Emergency Contact: Minor Harrington  Mobile Phone: [phone]  Relation: Daughter  Preferred language: English   needed? No    Discharge Plan A: Home with family  Discharge Plan B: Home with family      [facility] - [place] - [address]  [address] [place] [address]  Phone: [phone] Fax: [phone]    [facility] - [place], [place] - [address] [facility]  [address] [facility] [address]  Phone: [phone] Fax: [phone]    [id number] [place] - [address] [facility] [address]  [address] [address]  Phone: [phone] Fax: [phone]      Initial Assessment (most recent)       Adult Discharge Assessment - 05/30/25 1544          Discharge Assessment    Assessment Type Discharge Planning Assessment     Confirmed/corrected address, phone number and insurance Yes     Confirmed Demographics Correct on Facesheet     Source of Information family     If unable to respond/provide  information was family/caregiver contacted? Yes     Contact Name/Number Nhan/Minor [phone]     Communicated OSMAN with patient/caregiver Yes     People in Home child(laura), adult     Name(s) of People in Home Son     Facility Arrived From: Home     Do you expect to return to your current living situation? Yes     Do you have help at home or someone to help you manage your care at home? Yes     Who are your caregiver(s) and their phone number(s)? Nhan/Minor [phone]     Prior to hospitilization cognitive status: Unable to Assess     Current cognitive status: Unable to Assess     Walking or Climbing Stairs Difficulty yes     Dressing/Bathing Difficulty yes     Equipment Currently Used at Home bedside commode;wheelchair;walker, rolling     Readmission within 30 days? No     Patient currently being followed by outpatient case management? No     Do you currently have service(s) that help you manage your care at home? No     Do you take prescription medications? Yes     Do you have prescription coverage? Yes     Is the patient taking medications as prescribed? yes     Who is going to help you get home at discharge? Daughter/Minor [phone]     How do you get to doctors appointments? family or friend will provide     Are you on dialysis? No     Do you take coumadin? No     Discharge Plan A Home with family     Discharge Plan B Home with family     DME Needed Upon Discharge  none     Discharge Plan discussed with: Adult children     Transition of Care Barriers None

## 2025-05-30 NOTE — H&P
[facility]  History & Physical    Patient Name: Aida Gupta  MRN: [id number]  Patient Class: IP- Inpatient  Admission Date: 5/29/2025  Attending Physician: Barbara Paige MD   Primary Care Provider: Edel Fisher MD         Patient information was obtained from patient and ER records.     Subjective:     Principal Problem:STEMI (ST elevation myocardial infarction)    Chief Complaint: No chief complaint on file.       HPI: This is a very pleasant 74-year-old lady with comorbid conditions of hypothyroidism, GERD, hyperlipidemia presents to outlying Emergency Department with complaints of intermittent chest pain or loss few days.  Patient reports she felt as though it may has been heartburn and treated herself as such with no improvement in symptoms and due to the persistence she presented to the ED. patient found to have ST elevations, Dr. Bailey was contacted and could STEMI and cath lab were emergently activated.  Patient is now status post heart catheterization with bulky calcifications noted.  There was consideration for transfer to [facility] for complex PCI, however, joint decision made to proceed.  Patient is now status post drug-eluting stent to the LAD.  Admission to ICU status post procedure.  On assessment patient is calm, cooperative and in no acute distress.  She is without chest pain.  She is vitally stable.    Past Medical History:   Diagnosis Date    Asthma     chronic bronchitis    Hx of colectomy     Shingles     1 year ago     Thyroid disease        Past Surgical History:   Procedure Laterality Date    Bilateral tubal ligation      COLECTOMY      COLON SURGERY      Diverticulitis    TUBAL LIGATION         Review of patient's allergies indicates:   Allergen Reactions    Contrast media Nausea And Vomiting       Current Facility-Administered Medications on File Prior to Encounter   Medication    [COMPLETED] aluminum-magnesium hydroxide-simethicone  200-200-20 mg/5 mL suspension 30 mL    And    [COMPLETED] LIDOcaine viscous HCl 2% oral solution 15 mL    [COMPLETED] clopidogreL tablet 300 mg    [COMPLETED] heparin (porcine) injection 5,000 Units    [COMPLETED] morphine injection 4 mg    [COMPLETED] nitroGLYCERIN 2% TD oint ointment 0.5 inch    [COMPLETED] ondansetron injection 4 mg    [DISCONTINUED] aspirin chewable tablet 81 mg    [DISCONTINUED] aspirin tablet 325 mg    [DISCONTINUED] clopidogreL tablet 75 mg    [DISCONTINUED] enoxaparin injection 30 mg    [DISCONTINUED] enoxaparin injection 90 mg    [DISCONTINUED] enoxaparin injection 90 mg    [COMPLETED] sodium chloride 0.9% bolus 1,000 mL 1,000 mL    [DISCONTINUED] tenecteplase (TNKase) IV KIT 45 mg     Current Outpatient Medications on File Prior to Encounter   Medication Sig    albuterol (PROVENTIL/VENTOLIN HFA) 90 mcg/actuation inhaler Inhale 2 puffs into the lungs every 6 (six) hours as needed for Wheezing. Rescue    amitriptyline (ELAVIL) 50 MG tablet Take 1 tablet (50 mg total) by mouth every evening.    cholecalciferol, vitamin D3, (VITAMIN D3) 125 mcg (5,000 unit) Tab Take 1 tablet (5,000 Units total) by mouth once daily.    cholestyramine (QUESTRAN) 4 gram packet Take 1 packet (4 g total) by mouth once daily.    diclofenac sodium (VOLTAREN ARTHRITIS PAIN) 1 % Gel Apply 2 g topically 4 (four) times daily as needed (arthritis).    ergocalciferol (VITAMIN D2) 50,000 unit Cap Take 1 capsule (50,000 Units total) by mouth twice a week.    EScitalopram oxalate (LEXAPRO) 20 MG tablet Take 1 tablet (20 mg total) by mouth every evening.    fluticasone propionate (FLONASE) 50 mcg/actuation nasal spray 1 spray (50 mcg total) by Each Nostril route once daily.    fluticasone-salmeterol diskus inhaler 100-50 mcg Inhale 1 puff into the lungs 2 (two) times daily. Controller    gabapentin (NEURONTIN) 300 MG capsule Take 2 capsules (600 mg total) by mouth 2 (two) times daily.    hydrOXYzine HCL (ATARAX) 25 MG tablet  TAKE 1 TABLET BY MOUTH THREE TIMES DAILY AS NEEDED FOR ANXIETY    levocetirizine (XYZAL) 5 MG tablet Take 1 tablet (5 mg total) by mouth nightly as needed for Allergies (congestion).    levothyroxine (SYNTHROID) 150 MCG tablet Take 1 tablet by mouth once daily    meloxicam (MOBIC) 7.5 MG tablet Take 1 tablet by mouth once daily    omeprazole (PRILOSEC) 20 MG capsule Take 1 capsule (20 mg total) by mouth once daily.    ondansetron (ZOFRAN) 4 MG tablet Take 1 tablet (4 mg total) by mouth every 8 (eight) hours as needed for Nausea.    propranoloL (INDERAL LA) 60 MG 24 hr capsule Take 1 capsule (60 mg total) by mouth once daily.    rosuvastatin (CRESTOR) 10 MG tablet Take 1 tablet by mouth once daily    triamcinolone acetonide 0.1% (KENALOG) 0.1 % cream Apply topically 2 (two) times daily.     Family History       Problem Relation (Age of Onset)    Breast cancer Sister          Tobacco Use    Smoking status: Former     Current packs/day: 0.00     Average packs/day: 3.0 packs/day for 15.0 years (45.0 ttl pk-yrs)     Types: Cigarettes     Start date:      Quit date:      Years since quittin.4    Smokeless tobacco: Never   Substance and Sexual Activity    Alcohol use: No    Drug use: No    Sexual activity: Not on file     Review of Systems   Constitutional:  Negative for chills and fever.   Respiratory:  Negative for shortness of breath.    Cardiovascular:  Positive for chest pain.   Psychiatric/Behavioral:  Negative for agitation and confusion.      Objective:     Vital Signs (Most Recent):    Vital Signs (24h Range):  Temp:  [97.6 °F (36.4 °C)] 97.6 °F (36.4 °C)  Pulse:  [114-126] 126  Resp:  [20-31] 31  SpO2:  [93 %-95 %] 93 %  BP: (152)/(84) 152/84        There is no height or weight on file to calculate BMI.     Physical Exam  Constitutional:       General: She is not in acute distress.     Appearance: She is not toxic-appearing.   Eyes:      Extraocular Movements: Extraocular movements intact.  "  Cardiovascular:      Rate and Rhythm: Normal rate and regular rhythm.   Pulmonary:      Effort: Pulmonary effort is normal.   Abdominal:      Palpations: Abdomen is soft.   Skin:     General: Skin is warm and dry.      Comments: Raised erythema under breast folds and in groin   Neurological:      General: No focal deficit present.      Mental Status: She is alert and oriented to person, place, and time.   Psychiatric:         Mood and Affect: Mood normal.         Behavior: Behavior normal.                Significant Labs: All pertinent labs within the past 24 hours have been reviewed.  BMP:   Recent Labs   Lab 05/29/25  1844   *      K 4.5      CO2 19*   BUN 17   CREATININE 0.8   CALCIUM 9.9     CBC: No results for input(s): "WBC", "HGB", "HCT", "PLT" in the last 48 hours.    Significant Imaging: I have reviewed all pertinent imaging results/findings within the past 24 hours.  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   EMILY was placed to the ostial/proximal/mid LAD   lifelong ASA, plavix for at least 1 year, high intensity statin    Hypothyroidism  Resume synthroid    Gastroesophageal reflux disease without esophagitis  Resume PPI    Hyperlipidemia  Resume statin    Intertrigo  Nystatin to breast folds and groin    VTE Risk Mitigation (From admission, onward)      None          Critical care time spent on the evaluation and treatment of severe organ dysfunction, review of pertinent labs and imaging studies, discussions with consulting providers and discussions with patient/family: 35 minutes.                  Barbara Paige MD  Department of Hospital Medicine  [facility]          "

## 2025-05-30 NOTE — SUBJECTIVE & OBJECTIVE
Interval History:  Patient seen and examined this morning, no acute complaints.  Echocardiogram pending.    Review of Systems   Constitutional:  Negative for appetite change.   Respiratory:  Negative for shortness of breath.    Cardiovascular:  Negative for chest pain.     Objective:     Vital Signs (Most Recent):  Temp: 97.5 °F (36.4 °C) (05/30/25 1145)  Pulse: 82 (05/30/25 1130)  Resp: 16 (05/30/25 1130)  BP: 109/65 (05/30/25 1130)  SpO2: 96 % (05/30/25 1130) Vital Signs (24h Range):  Temp:  [97.5 °F (36.4 °C)-98.2 °F (36.8 °C)] 97.5 °F (36.4 °C)  Pulse:  [] 82  Resp:  [14-31] 16  SpO2:  [93 %-100 %] 96 %  BP: (101-152)/(62-89) 109/65  Arterial Line BP: (113-144)/(62-99) 126/99     Weight: 86.5 kg (190 lb 11.2 oz)  Body mass index is 34.88 kg/m².    Intake/Output Summary (Last 24 hours) at 5/30/2025 1227  Last data filed at 5/30/2025 0900  Gross per 24 hour   Intake 895.04 ml   Output 550 ml   Net 345.04 ml         Physical Exam  Constitutional:       General: She is not in acute distress.  Cardiovascular:      Rate and Rhythm: Normal rate and regular rhythm.   Pulmonary:      Effort: No respiratory distress.      Breath sounds: No wheezing.   Neurological:      General: No focal deficit present.      Mental Status: She is alert and oriented to person, place, and time.               Significant Labs: All pertinent labs within the past 24 hours have been reviewed.  CBC:   Recent Labs   Lab 05/30/25  0126 05/30/25  0339   WBC 13.67* 12.94*   HGB 13.5 13.4   HCT 41.5 40.6    299     CMP:   Recent Labs   Lab 05/29/25  1844 05/30/25  0339    137   K 4.5 4.7    105   CO2 19* 22*   * 142*   BUN 17 16   CREATININE 0.8 0.6   CALCIUM 9.9 9.0   PROT 8.1  --    ALBUMIN 3.9  --    BILITOT 0.9  --    ALKPHOS 70  --    AST 27  --    ALT 8*  --    ANIONGAP 15 10       Significant Imaging: I have reviewed all pertinent imaging results/findings within the past 24 hours.

## 2025-05-30 NOTE — CONSULTS
05/30/25 1412        Wound 05/29/25 2005 Puncture Right anterior Groin   Date First Assessed/Time First Assessed: 05/29/25 2005   Primary Wound Type: Puncture  Side: Right  Orientation: anterior  Location: Groin   Appearance   (pressure device in place)        Wound 05/29/25 2250 Moisture associated dermatitis Left Breast   Date First Assessed/Time First Assessed: 05/29/25 2250   Present on Original Admission: Yes  Primary Wound Type: Moisture associated dermatitis  Side: Left  Location: Breast   Wound Image    Distribution localized   Configuration/Shape asymmetric   Borders irregular   Characteristics redness/erythema   Color red   Rash Care cleansed with;soap and water  (applied miconazole 2 % powder)   Dressing Appearance Open to air   Drainage Amount Small   Drainage Characteristics/Odor Malodorous;Creamy        Wound 05/29/25 2250 Moisture associated dermatitis Left Groin   Date First Assessed/Time First Assessed: 05/29/25 2250   Present on Original Admission: Yes  Primary Wound Type: Moisture associated dermatitis  Side: Left  Location: Groin   Wound Image    Distribution localized   Configuration/Shape asymmetric   Borders irregular   Characteristics redness/erythema   Color red   Rash Care cleansed with;soap and water  (applied miconazole 2% powder)        Wound 05/29/25 2250 Other (comment) Right Leg   Date First Assessed/Time First Assessed: 05/29/25 2250   Present on Original Admission: Yes  Primary Wound Type: (c) Other (comment)  Side: Right  Location: Leg   Dressing Appearance Open to air   Drainage Amount None   Appearance   (dry small abrasion)        Wound 05/30/25 1600 Rash Right Breast   Date First Assessed/Time First Assessed: 05/30/25 1600   Present on Original Admission: Yes  Primary Wound Type: (c) Rash  Side: Right  Location: Breast   Wound Image    Distribution localized   Configuration/Shape asymmetric   Borders irregular   Characteristics redness/erythema   Color red   Rash Care  cleansed with;soap and water;antifungal applied        Wound 25 141 Moisture associated dermatitis Buttocks   Date First Assessed/Time First Assessed: 25   Present on Original Admission: Yes  Primary Wound Type: (c) Moisture associated dermatitis  Location: (c) Buttocks   Wound Image   (photo did not up load)   Dressing Appearance Intact  (removed)   Drainage Amount None   Appearance Red  (red split  fold)   Tissue loss description Partial thickness   Red (%), Wound Tissue Color 100 %   Periwound Area Moist;Redness   Care Cleansed with:;Soap and water   Dressing Applied  (miconazole powder)   Off Loading Pillow   Complete skin assessment with assistance of patients nurse.

## 2025-05-30 NOTE — PROGRESS NOTES
[facility]  Department of Cardiology  Consult Note      PATIENT NAME: Aida Gupta    MRN: [id number]  TODAY'S DATE: 05/30/2025  ADMIT DATE: 5/29/2025                          CONSULT REQUESTED BY: Chasity Vargas MD    SUBJECTIVE   PRINCIPAL PROBLEM:  Chest pain      Interval events:   Patient s/p PCI to ostial/proximal LAD.  No chest pain or hemodynamic instability.       HPI:  74 year old female who presented to [facility] ED with chest pain and was noted to have evidence of STEMI.  Patient was given ASA 324mg po x 1, plavix 300mg po x1 and heparin 5000 Units IV and transferred emergently for coronary angiogram. Cath lab activated.         REASON FOR CONSULT:  From ED: STEMI      Review of patient's allergies indicates:   Allergen Reactions    Contrast media Nausea And Vomiting       Past Medical History:   Diagnosis Date    Asthma     chronic bronchitis    Hx of colectomy     Shingles     1 year ago     Thyroid disease      Past Surgical History:   Procedure Laterality Date    Bilateral tubal ligation      COLECTOMY      COLON SURGERY      Diverticulitis    LEFT HEART CATHETERIZATION Left 5/29/2025    Procedure: Left heart cath;  Surgeon: Lia Bailey MD;  Location: [facility] CATH/EP LAB;  Service: Cardiology;  Laterality: Left;    TUBAL LIGATION       Social History[1]     REVIEW OF SYSTEMS  Per HPI    OBJECTIVE     VITAL SIGNS (Most Recent)  Temp: 97.5 °F (36.4 °C) (05/30/25 1145)  Pulse: 82 (05/30/25 1130)  Resp: 16 (05/30/25 1751)  BP: 109/65 (05/30/25 1130)  SpO2: 96 % (05/30/25 1130)    VENTILATION STATUS  Resp: 16 (05/30/25 1751)  SpO2: 96 % (05/30/25 1130)           I & O (Last 24H):  Intake/Output Summary (Last 24 hours) at 5/30/2025 1752  Last data filed at 5/30/2025 1333  Gross per 24 hour   Intake 1195.04 ml   Output 550 ml   Net 645.04 ml       WEIGHTS  Wt Readings from Last 1 Encounters:   05/29/25 2230 86.5 kg (190 lb 11.2 oz)       PHYSICAL EXAM  CONSTITUTIONAL: No fever,  no chills  HEENT: Normocephalic, atraumatic,pupils reactive to light                 NECK:  No JVD no carotid bruit  CVS: S1S2+, RRR  LUNGS: Clear  ABDOMEN: Soft, NT, BS+  EXTREMITIES: No cyanosis, edema; no hematoma, no ecchymosis, no bruits.   : No danielle catheter  NEURO: AAO X 3  PSY: Normal affect      HOME MEDICATIONS:Medications Ordered Prior to Encounter[2]    SCHEDULED MEDS:   aspirin  81 mg Oral Daily    atorvastatin  40 mg Oral QHS    clopidogreL  75 mg Oral Daily    EScitalopram oxalate  20 mg Oral QHS    levothyroxine  150 mcg Oral Before breakfast    miconazole NITRATE 2 %   Topical (Top) TID    mupirocin   Nasal BID    pantoprazole  40 mg Oral Daily    sodium chloride 0.9%  250 mL Intravenous Once       CONTINUOUS INFUSIONS:   nitroGLYCERIN in 5 % dextrose    Continuous PRN   Stopped at 05/29/25 2230       PRN MEDS:  Current Facility-Administered Medications:     acetaminophen, 650 mg, Oral, Q8H PRN    acetaminophen, 650 mg, Oral, Q4H PRN    aluminum-magnesium hydroxide-simethicone, 30 mL, Oral, QID PRN    dextrose 50%, 12.5 g, Intravenous, PRN    dextrose 50%, 25 g, Intravenous, PRN    gabapentin, 600 mg, Oral, BID PRN    glucagon (human recombinant), 1 mg, Intramuscular, PRN    glucose, 16 g, Oral, PRN    glucose, 24 g, Oral, PRN    HYDROcodone-acetaminophen, 1 tablet, Oral, Q4H PRN    HYDROcodone-acetaminophen, 1 tablet, Oral, Q4H PRN    hydrOXYzine HCL, 25 mg, Oral, TID PRN    magnesium oxide, 800 mg, Oral, PRN    magnesium oxide, 800 mg, Oral, PRN    melatonin, 6 mg, Oral, Nightly PRN    naloxone, 0.02 mg, Intravenous, PRN    nitroGLYCERIN in 5 % dextrose, , , Continuous PRN    ondansetron, 4 mg, Intravenous, Q6H PRN    potassium bicarbonate, 35 mEq, Oral, PRN    potassium bicarbonate, 50 mEq, Oral, PRN    potassium bicarbonate, 60 mEq, Oral, PRN    potassium, sodium phosphates, 2 packet, Oral, PRN    potassium, sodium phosphates, 2 packet, Oral, PRN    potassium, sodium phosphates, 2 packet,  "Oral, PRN    senna-docusate, 1 tablet, Oral, Daily PRN    LABS AND DIAGNOSTICS     CBC LAST 3 DAYS  Recent Labs   Lab 05/30/25  0126 05/30/25  0339   WBC 13.67* 12.94*   RBC 4.50 4.48   HGB 13.5 13.4   HCT 41.5 40.6   MCV 92 91   MCH 30.0 29.9   MCHC 32.5 33.0   RDW 13.3 13.3    299   MPV 8.4* 8.5*   NRBC 0 0       COAGULATION LAST 3 DAYS  Recent Labs   Lab 05/29/25  1847   INR 1.0   APTT 26.9       CHEMISTRY LAST 3 DAYS  Recent Labs   Lab 05/29/25  1844 05/30/25  0339    137   K 4.5 4.7    105   CO2 19* 22*   ANIONGAP 15 10   BUN 17 16   CREATININE 0.8 0.6   * 142*   CALCIUM 9.9 9.0   MG  --  2.1   ALBUMIN 3.9  --    PROT 8.1  --    ALKPHOS 70  --    ALT 8*  --    AST 27  --    BILITOT 0.9  --        CARDIAC PROFILE LAST 3 DAYS  Recent Labs   Lab 05/29/25  1844   *   CPK 36   TROPONINIHS 79*       ENDOCRINE LAST 3 DAYS  No results for input(s): "TSH", "PROCAL" in the last 168 hours.    LAST ARTERIAL BLOOD GAS  ABG  No results for input(s): "PH", "PO2", "PCO2", "HCO3", "BE" in the last 168 hours.    LAST 7 DAYS MICROBIOLOGY   Microbiology Results (last 7 days)       ** No results found for the last 168 hours. **            MOST RECENT IMAGING  Echo    Technically limited study with poor visualization of endocardial   borders and valvular structures.    Left Ventricle: The left ventricle is normal in size. Ventricular mass   is normal. Mildly increased wall thickness. There is concentric   remodeling. Regional wall motion abnormalities present.  Mild to moderate   apical hypokinesis noted. There is low normal systolic function with a   visually estimated ejection fraction of 50 - 55%.    Right Ventricle: The right ventricle is normal in size Systolic   function is normal.    Aortic Valve: Not well visualized due to poor acoustic window. The   aortic valve is a trileaflet valve. Moderately calcified cusps.    Mitral Valve: Mildly calcified leaflets. There is moderate mitral   annular " calcification. There is mild regurgitation.    Tricuspid Valve: There is mild regurgitation.    Pericardium: There is a small effusion. No indication of cardiac   tamponade.      ECHOCARDIOGRAM RESULTS (last 5)  Results for orders placed during the hospital encounter of 05/29/25    Echo    Interpretation Summary    Technically limited study with poor visualization of endocardial borders and valvular structures.    Left Ventricle: The left ventricle is normal in size. Ventricular mass is normal. Mildly increased wall thickness. There is concentric remodeling. Regional wall motion abnormalities present.  Mild to moderate apical hypokinesis noted. There is low normal systolic function with a visually estimated ejection fraction of 50 - 55%.    Right Ventricle: The right ventricle is normal in size Systolic function is normal.    Aortic Valve: Not well visualized due to poor acoustic window. The aortic valve is a trileaflet valve. Moderately calcified cusps.    Mitral Valve: Mildly calcified leaflets. There is moderate mitral annular calcification. There is mild regurgitation.    Tricuspid Valve: There is mild regurgitation.    Pericardium: There is a small effusion. No indication of cardiac tamponade.      CURRENT/PREVIOUS VISIT EKG  Results for orders placed or performed during the hospital encounter of 05/29/25   EKG 12-LEAD on arrival to floor    Collection Time: 05/30/25  9:24 AM   Result Value Ref Range    QRS Duration 94 ms    [facility] QTC Calculation 449 ms    Narrative    Test Reason : I21.3,    Vent. Rate :  79 BPM     Atrial Rate :  79 BPM     P-R Int : 158 ms          QRS Dur :  94 ms      QT Int : 392 ms       P-R-T Axes :  17  12  62 degrees    QTcB Int : 449 ms    Normal sinus rhythm  Incomplete right bundle branch block  Borderline Abnormal ECG  No previous ECGs available    Referred By: [facility]           Confirmed By:            ASSESSMENT/PLAN:     Active Hospital Problems    Diagnosis    *STEMI (ST  elevation myocardial infarction)    Intertrigo    Hyperlipidemia    Hypothyroidism    Gastroesophageal reflux disease without esophagitis       ASSESSMENT & PLAN:      STEMI  - Patient underwent urgent coronary angiogram and high risk PCI to ostial/proximal LAD.    - Continue DAPT (aspirin/plavix), statin  - TTE  - Will add GDMT based on echo results.     Lia Bailey MD  Date of Service: 2025  5:52 PM         [1]   Social History  Tobacco Use    Smoking status: Former     Current packs/day: 0.00     Average packs/day: 3.0 packs/day for 15.0 years (45.0 ttl pk-yrs)     Types: Cigarettes     Start date:      Quit date:      Years since quittin.4    Smokeless tobacco: Never   Substance Use Topics    Alcohol use: No    Drug use: No   [2]   Current Facility-Administered Medications on File Prior to Encounter   Medication Dose Route Frequency Provider Last Rate Last Admin    [COMPLETED] aluminum-magnesium hydroxide-simethicone 200-200-20 mg/5 mL suspension 30 mL  30 mL Oral Once Makeda Cain NP   30 mL at 25    And    [COMPLETED] LIDOcaine viscous HCl 2% oral solution 15 mL  15 mL Oral Once Makeda Cain NP   15 mL at 25    [COMPLETED] clopidogreL tablet 300 mg  300 mg Oral Once Zeeshan Mejia MD   300 mg at 25    [COMPLETED] heparin (porcine) injection 5,000 Units  5,000 Units Intravenous ED 1 Time Zeeshan Mejia MD   5,000 Units at 25    [COMPLETED] morphine injection 4 mg  4 mg Intravenous ED 1 Time Zeeshan Mejia MD   4 mg at 25    [COMPLETED] nitroGLYCERIN 2% TD oint ointment 0.5 inch  0.5 inch Topical (Top) ED 1 Time Zeeshan Mejia MD   0.5 inch at 25    [COMPLETED] ondansetron injection 4 mg  4 mg Intravenous ED 1 Time Zeeshan Mejia MD   4 mg at 25    [DISCONTINUED] aspirin chewable tablet 81 mg  81 mg Oral Daily Zeeshan Mejia MD        [DISCONTINUED] aspirin tablet 325 mg  325 mg Oral Daily  Makeda Cain, NP   325 mg at 05/29/25 1822    [DISCONTINUED] clopidogreL tablet 75 mg  75 mg Oral Daily Zeeshan Mejia MD        [DISCONTINUED] enoxaparin injection 30 mg  30 mg Intravenous Once Zeeshan Mejia MD        [DISCONTINUED] enoxaparin injection 90 mg  1 mg/kg Subcutaneous Q12H Zeeshan Mejia MD        [DISCONTINUED] enoxaparin injection 90 mg  1 mg/kg Subcutaneous Q12H Zeeshan Mejia MD        [COMPLETED] sodium chloride 0.9% bolus 1,000 mL 1,000 mL  1,000 mL Intravenous ED 1 Time Zeeshan Mejia  mL/hr at 05/29/25 1852 1,000 mL at 05/29/25 1852    [DISCONTINUED] tenecteplase (TNKase) IV KIT 45 mg  45 mg Intravenous Once Zeeshan Mejia MD         Current Outpatient Medications on File Prior to Encounter   Medication Sig Dispense Refill    albuterol (PROVENTIL/VENTOLIN HFA) 90 mcg/actuation inhaler Inhale 2 puffs into the lungs every 6 (six) hours as needed for Wheezing. Rescue      amitriptyline (ELAVIL) 50 MG tablet Take 1 tablet (50 mg total) by mouth every evening. 90 tablet 3    cholecalciferol, vitamin D3, (VITAMIN D3) 125 mcg (5,000 unit) Tab Take 1 tablet (5,000 Units total) by mouth once daily. 90 tablet 3    cholestyramine (QUESTRAN) 4 gram packet Take 1 packet (4 g total) by mouth once daily. 90 packet 3    diclofenac sodium (VOLTAREN ARTHRITIS PAIN) 1 % Gel Apply 2 g topically 4 (four) times daily as needed (arthritis). 450 g PRN    ergocalciferol (VITAMIN D2) 50,000 unit Cap Take 1 capsule (50,000 Units total) by mouth twice a week. 24 capsule 3    EScitalopram oxalate (LEXAPRO) 20 MG tablet Take 1 tablet (20 mg total) by mouth every evening. 90 tablet 3    fluticasone propionate (FLONASE) 50 mcg/actuation nasal spray 1 spray (50 mcg total) by Each Nostril route once daily. 16 g PRN    fluticasone-salmeterol diskus inhaler 100-50 mcg Inhale 1 puff into the lungs 2 (two) times daily. Controller 60 each 5    gabapentin (NEURONTIN) 300 MG capsule Take 2 capsules (600 mg total)  by mouth 2 (two) times daily. 360 capsule 3    hydrOXYzine HCL (ATARAX) 25 MG tablet TAKE 1 TABLET BY MOUTH THREE TIMES DAILY AS NEEDED FOR ANXIETY 90 tablet prn    levocetirizine (XYZAL) 5 MG tablet Take 1 tablet (5 mg total) by mouth nightly as needed for Allergies (congestion). 30 tablet PRN    levothyroxine (SYNTHROID) 150 MCG tablet Take 1 tablet by mouth once daily 90 tablet 1    meloxicam (MOBIC) 7.5 MG tablet Take 1 tablet by mouth once daily 90 tablet 3    omeprazole (PRILOSEC) 20 MG capsule Take 1 capsule (20 mg total) by mouth once daily. 90 capsule 3    ondansetron (ZOFRAN) 4 MG tablet Take 1 tablet (4 mg total) by mouth every 8 (eight) hours as needed for Nausea. 30 tablet 3    propranoloL (INDERAL LA) 60 MG 24 hr capsule Take 1 capsule (60 mg total) by mouth once daily. 90 capsule 3    rosuvastatin (CRESTOR) 10 MG tablet Take 1 tablet by mouth once daily 90 tablet 3    triamcinolone acetonide 0.1% (KENALOG) 0.1 % cream Apply topically 2 (two) times daily.

## 2025-05-30 NOTE — SUBJECTIVE & OBJECTIVE
Past Medical History:   Diagnosis Date    Asthma     chronic bronchitis    Hx of colectomy     Shingles     1 year ago     Thyroid disease        Past Surgical History:   Procedure Laterality Date    Bilateral tubal ligation      COLECTOMY      COLON SURGERY      Diverticulitis    TUBAL LIGATION         Review of patient's allergies indicates:   Allergen Reactions    Contrast media Nausea And Vomiting       Current Facility-Administered Medications on File Prior to Encounter   Medication    [COMPLETED] aluminum-magnesium hydroxide-simethicone 200-200-20 mg/5 mL suspension 30 mL    And    [COMPLETED] LIDOcaine viscous HCl 2% oral solution 15 mL    [COMPLETED] clopidogreL tablet 300 mg    [COMPLETED] heparin (porcine) injection 5,000 Units    [COMPLETED] morphine injection 4 mg    [COMPLETED] nitroGLYCERIN 2% TD oint ointment 0.5 inch    [COMPLETED] ondansetron injection 4 mg    [DISCONTINUED] aspirin chewable tablet 81 mg    [DISCONTINUED] aspirin tablet 325 mg    [DISCONTINUED] clopidogreL tablet 75 mg    [DISCONTINUED] enoxaparin injection 30 mg    [DISCONTINUED] enoxaparin injection 90 mg    [DISCONTINUED] enoxaparin injection 90 mg    [COMPLETED] sodium chloride 0.9% bolus 1,000 mL 1,000 mL    [DISCONTINUED] tenecteplase (TNKase) IV KIT 45 mg     Current Outpatient Medications on File Prior to Encounter   Medication Sig    albuterol (PROVENTIL/VENTOLIN HFA) 90 mcg/actuation inhaler Inhale 2 puffs into the lungs every 6 (six) hours as needed for Wheezing. Rescue    amitriptyline (ELAVIL) 50 MG tablet Take 1 tablet (50 mg total) by mouth every evening.    cholecalciferol, vitamin D3, (VITAMIN D3) 125 mcg (5,000 unit) Tab Take 1 tablet (5,000 Units total) by mouth once daily.    cholestyramine (QUESTRAN) 4 gram packet Take 1 packet (4 g total) by mouth once daily.    diclofenac sodium (VOLTAREN ARTHRITIS PAIN) 1 % Gel Apply 2 g topically 4 (four) times daily as needed (arthritis).    ergocalciferol (VITAMIN D2)  50,000 unit Cap Take 1 capsule (50,000 Units total) by mouth twice a week.    EScitalopram oxalate (LEXAPRO) 20 MG tablet Take 1 tablet (20 mg total) by mouth every evening.    fluticasone propionate (FLONASE) 50 mcg/actuation nasal spray 1 spray (50 mcg total) by Each Nostril route once daily.    fluticasone-salmeterol diskus inhaler 100-50 mcg Inhale 1 puff into the lungs 2 (two) times daily. Controller    gabapentin (NEURONTIN) 300 MG capsule Take 2 capsules (600 mg total) by mouth 2 (two) times daily.    hydrOXYzine HCL (ATARAX) 25 MG tablet TAKE 1 TABLET BY MOUTH THREE TIMES DAILY AS NEEDED FOR ANXIETY    levocetirizine (XYZAL) 5 MG tablet Take 1 tablet (5 mg total) by mouth nightly as needed for Allergies (congestion).    levothyroxine (SYNTHROID) 150 MCG tablet Take 1 tablet by mouth once daily    meloxicam (MOBIC) 7.5 MG tablet Take 1 tablet by mouth once daily    omeprazole (PRILOSEC) 20 MG capsule Take 1 capsule (20 mg total) by mouth once daily.    ondansetron (ZOFRAN) 4 MG tablet Take 1 tablet (4 mg total) by mouth every 8 (eight) hours as needed for Nausea.    propranoloL (INDERAL LA) 60 MG 24 hr capsule Take 1 capsule (60 mg total) by mouth once daily.    rosuvastatin (CRESTOR) 10 MG tablet Take 1 tablet by mouth once daily    triamcinolone acetonide 0.1% (KENALOG) 0.1 % cream Apply topically 2 (two) times daily.     Family History       Problem Relation (Age of Onset)    Breast cancer Sister          Tobacco Use    Smoking status: Former     Current packs/day: 0.00     Average packs/day: 3.0 packs/day for 15.0 years (45.0 ttl pk-yrs)     Types: Cigarettes     Start date:      Quit date:      Years since quittin.4    Smokeless tobacco: Never   Substance and Sexual Activity    Alcohol use: No    Drug use: No    Sexual activity: Not on file     Review of Systems   Constitutional:  Negative for chills and fever.   Respiratory:  Negative for shortness of breath.    Cardiovascular:  Positive  "for chest pain.   Psychiatric/Behavioral:  Negative for agitation and confusion.      Objective:     Vital Signs (Most Recent):    Vital Signs (24h Range):  Temp:  [97.6 °F (36.4 °C)] 97.6 °F (36.4 °C)  Pulse:  [114-126] 126  Resp:  [20-31] 31  SpO2:  [93 %-95 %] 93 %  BP: (152)/(84) 152/84        There is no height or weight on file to calculate BMI.     Physical Exam  Constitutional:       General: She is not in acute distress.     Appearance: She is not toxic-appearing.   Eyes:      Extraocular Movements: Extraocular movements intact.   Cardiovascular:      Rate and Rhythm: Normal rate and regular rhythm.   Pulmonary:      Effort: Pulmonary effort is normal.   Abdominal:      Palpations: Abdomen is soft.   Skin:     General: Skin is warm and dry.      Comments: Raised erythema under breast folds and in groin   Neurological:      General: No focal deficit present.      Mental Status: She is alert and oriented to person, place, and time.   Psychiatric:         Mood and Affect: Mood normal.         Behavior: Behavior normal.                Significant Labs: All pertinent labs within the past 24 hours have been reviewed.  BMP:   Recent Labs   Lab 05/29/25  1844   *      K 4.5      CO2 19*   BUN 17   CREATININE 0.8   CALCIUM 9.9     CBC: No results for input(s): "WBC", "HGB", "HCT", "PLT" in the last 48 hours.    Significant Imaging: I have reviewed all pertinent imaging results/findings within the past 24 hours.  "

## 2025-05-30 NOTE — ASSESSMENT & PLAN NOTE
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   EMILY was placed to the ostial/proximal/mid LAD   lifelong ASA, plavix for at least 1 year, high intensity statin

## 2025-05-30 NOTE — SEDATION DOCUMENTATION
Pre-sedation Assessment:    1. ASA Score: ASA 4 - Patient with severe systemic disease that is a constant threat to life  2. Mallampati Class: II (hard and soft palate, upper portion of tonsils anduvula visible)  3. Patient or family history of any reaction to anesthesia or sedation: Yes, No  4. Plan for Sedation: Moderate  5. H&P within 30 days of the procedure and updated within 24 hrs of admission or registration: Yes

## 2025-05-30 NOTE — BRIEF OP NOTE
[facility]  Brief Operative Note  Cardiology    SUMMARY     Surgery Date: 5/29/2025     Surgeons and Role:     * Lia Bailey MD - Primary    Assisting Surgeon: None    Pre-op Diagnosis:  ST elevation myocardial infarction (STEMI), unspecified artery [I21.3]    Post-op Diagnosis: Post-Op Diagnosis Codes:     * ST elevation myocardial infarction (STEMI), unspecified artery [I21.3]    Procedure Performed: Coronary angiogram, PCI of ostial LAD    Procedure(s) (LRB):  Left heart cath (Left)    Technical Procedures Used:  Ultrasound guided access of right common femoral arterial access site.   Coronary angiogram  PCI of ostial LAD  Attempted IVUS of ostial LAD - would not cross into distal left main due to heavy calcium burden  Moderate sedation.     Operative Findings:   74 year old female who [facility] with chest pain and evidence of STEMI.  Patient given ASA 325mg, Plavix 300mg, and heparin 5000 Units. Patient chest pain free upon arrival to [facility] and ST elevations completely resolved.  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  Patient was chest pain free, hemodynamically stable, electrically stable and with resolution of ST elevations; hence, conferred with [facility] (called at 8:29pm, spoke with interventional cardiologist on call at 9:01pm) about transfer for high risk PCI.  After discussion with [facility], decision made to proceed with PCI at this facility.  Attempted IVUS of ostial LAD; however, there was evidence of heavy circumferential calcium.  Considered evaluation for surgical revascularization; however, patient appears unlikely to be an optimal surgical candidate and the distal LAD appears diffusely disease and a poor target for bypass.  A 2.5 x 25 EMILY was placed to the ostial/proximal/mid LAD with good angiographic results and no evidence procedural complications.   Recommend lifelong ASA, plavix for at least 1 year, high intensity statin, and risk factor modification.      Estimated Blood Loss: 30 cc          Specimens:   Specimen (24h ago, onward)      None

## 2025-05-30 NOTE — PLAN OF CARE
"Patient found lying in bed. Awakes to voice. Orientated x4. Hard of hearing. With muscle tremor as she normally does, and worse to left arm.    Poor dentition, missing teeth. With large areas of intertriginous moisture dermatitis under bilat breasts, pannus , consult wound care, cleaned, miconazole powder.    This morning she had right femoral long sheath connected to transducer. She c/o small amount of feeling like "my chest is about to try to start tightening up". I did another 12 lead (had done one earlier this morning as well). Similar to before, md in room. Dr. Bailey pulled right groin sheath and held pressure. Thereafter, cath lab nurse came and applied 30ml air balloon femoral holding device. In general patient has c/o right leg pain which is worse when repositioning (I am keeping hob flat and no flexion at the hip, educated her on that). The groin has remained soft no bruising, with distal pulse on right foot as before.    W/ decreased appetite just had a few bites.    She was straight cath'd this morning at around 6am and so far had not voided. She is saline locked so attempt to encourage fluid when I am there otherwise I bladder scanned her at around 1330 and only got maximum of 125mL, has not voided.    The bed is low and alarm on. Clinical alarms reviewed and armed. Monitor strip printed and reviewed, sinus rhythm. Hob is flat. Log rolling q2h, lines and extremities padded. SCDs in place / on.    ....  Some members from her [employer] came and visited, patient happy to see them. They had some power of  paperwork that they all did amongst themselves. I told them I was not allowed to be a witness. Apparently the listed poa is  a [employer] leader. Copied paper it's in the chart.    No void my whole shift bladder scan first 125 second towards end of shift only about 250 discussed w/ md . Push fluids. Given small bolus.    After >6 hours had elapsed since sheath removed, I deflated and removed air pressure " device and replaced w/ gauze and tegaderm. Groin still soft not bruising no bleeding, distal pulse intact. Cathlab nurse came and verified. Still c/o leg pain and given norco otherwise taking naps throughout the day, says feels better now that she can move the leg around though, was feeling stiff.    Not much appetite felt full after a few bites and some c/o nausea, given zofran to good effect.

## 2025-05-31 LAB
ABSOLUTE EOSINOPHIL (SMH): 0.35 K/UL
ABSOLUTE MONOCYTE (SMH): 0.74 K/UL (ref 0.3–1)
ABSOLUTE NEUTROPHIL COUNT (SMH): 7.6 K/UL (ref 1.8–7.7)
ANION GAP (SMH): 6 MMOL/L (ref 8–16)
BASOPHILS # BLD AUTO: 0.06 K/UL
BASOPHILS NFR BLD AUTO: 0.6 %
BUN SERPL-MCNC: 18 MG/DL (ref 8–23)
CALCIUM SERPL-MCNC: 8.8 MG/DL (ref 8.7–10.5)
CHLORIDE SERPL-SCNC: 106 MMOL/L (ref 95–110)
CO2 SERPL-SCNC: 26 MMOL/L (ref 23–29)
CREAT SERPL-MCNC: 0.8 MG/DL (ref 0.5–1.4)
ERYTHROCYTE [DISTWIDTH] IN BLOOD BY AUTOMATED COUNT: 13.4 % (ref 11.5–14.5)
GFR SERPLBLD CREATININE-BSD FMLA CKD-EPI: >60 ML/MIN/1.73/M2
GLUCOSE SERPL-MCNC: 107 MG/DL (ref 70–110)
HCT VFR BLD AUTO: 38.4 % (ref 37–48.5)
HGB BLD-MCNC: 12.2 GM/DL (ref 12–16)
IMM GRANULOCYTES # BLD AUTO: 0.06 K/UL (ref 0–0.04)
IMM GRANULOCYTES NFR BLD AUTO: 0.6 % (ref 0–0.5)
LYMPHOCYTES # BLD AUTO: 1.52 K/UL (ref 1–4.8)
MAGNESIUM SERPL-MCNC: 1.8 MG/DL (ref 1.6–2.6)
MCH RBC QN AUTO: 29.8 PG (ref 27–31)
MCHC RBC AUTO-ENTMCNC: 31.8 G/DL (ref 32–36)
MCV RBC AUTO: 94 FL (ref 82–98)
NUCLEATED RBC (/100WBC) (SMH): 0 /100 WBC
OHS QRS DURATION: 94 MS
OHS QRS DURATION: 96 MS
OHS QTC CALCULATION: 438 MS
OHS QTC CALCULATION: 449 MS
PLATELET # BLD AUTO: 268 K/UL (ref 150–450)
PMV BLD AUTO: 8.8 FL (ref 9.2–12.9)
POTASSIUM SERPL-SCNC: 4.3 MMOL/L (ref 3.5–5.1)
RBC # BLD AUTO: 4.09 M/UL (ref 4–5.4)
RELATIVE EOSINOPHIL (SMH): 3.4 % (ref 0–8)
RELATIVE LYMPHOCYTE (SMH): 14.7 % (ref 18–48)
RELATIVE MONOCYTE (SMH): 7.1 % (ref 4–15)
RELATIVE NEUTROPHIL (SMH): 73.6 % (ref 38–73)
SODIUM SERPL-SCNC: 138 MMOL/L (ref 136–145)
WBC # BLD AUTO: 10.37 K/UL (ref 3.9–12.7)

## 2025-05-31 PROCEDURE — 97165 OT EVAL LOW COMPLEX 30 MIN: CPT

## 2025-05-31 PROCEDURE — 80048 BASIC METABOLIC PNL TOTAL CA: CPT

## 2025-05-31 PROCEDURE — 94761 N-INVAS EAR/PLS OXIMETRY MLT: CPT

## 2025-05-31 PROCEDURE — 85025 COMPLETE CBC W/AUTO DIFF WBC: CPT

## 2025-05-31 PROCEDURE — 63600175 PHARM REV CODE 636 W HCPCS

## 2025-05-31 PROCEDURE — 97535 SELF CARE MNGMENT TRAINING: CPT

## 2025-05-31 PROCEDURE — 51798 US URINE CAPACITY MEASURE: CPT

## 2025-05-31 PROCEDURE — 83735 ASSAY OF MAGNESIUM: CPT

## 2025-05-31 PROCEDURE — 36415 COLL VENOUS BLD VENIPUNCTURE: CPT

## 2025-05-31 PROCEDURE — 25000242 PHARM REV CODE 250 ALT 637 W/ HCPCS

## 2025-05-31 PROCEDURE — 25000003 PHARM REV CODE 250: Performed by: STUDENT IN AN ORGANIZED HEALTH CARE EDUCATION/TRAINING PROGRAM

## 2025-05-31 PROCEDURE — 99233 SBSQ HOSP IP/OBS HIGH 50: CPT | Mod: ,,, | Performed by: INTERNAL MEDICINE

## 2025-05-31 PROCEDURE — 99900035 HC TECH TIME PER 15 MIN (STAT)

## 2025-05-31 PROCEDURE — 11000001 HC ACUTE MED/SURG PRIVATE ROOM

## 2025-05-31 PROCEDURE — 27000221 HC OXYGEN, UP TO 24 HOURS

## 2025-05-31 PROCEDURE — 25000003 PHARM REV CODE 250

## 2025-05-31 PROCEDURE — 97162 PT EVAL MOD COMPLEX 30 MIN: CPT

## 2025-05-31 PROCEDURE — 94640 AIRWAY INHALATION TREATMENT: CPT

## 2025-05-31 PROCEDURE — 25000003 PHARM REV CODE 250: Performed by: INTERNAL MEDICINE

## 2025-05-31 RX ORDER — IPRATROPIUM BROMIDE AND ALBUTEROL SULFATE 2.5; .5 MG/3ML; MG/3ML
3 SOLUTION RESPIRATORY (INHALATION)
Status: DISCONTINUED | OUTPATIENT
Start: 2025-05-31 | End: 2025-06-01

## 2025-05-31 RX ORDER — IPRATROPIUM BROMIDE AND ALBUTEROL SULFATE 2.5; .5 MG/3ML; MG/3ML
SOLUTION RESPIRATORY (INHALATION)
Status: DISPENSED
Start: 2025-05-31 | End: 2025-05-31

## 2025-05-31 RX ORDER — METOPROLOL TARTRATE 25 MG/1
12.5 TABLET ORAL 2 TIMES DAILY
Status: DISCONTINUED | OUTPATIENT
Start: 2025-05-31 | End: 2025-06-09 | Stop reason: HOSPADM

## 2025-05-31 RX ADMIN — MICONAZOLE NITRATE: 20 POWDER TOPICAL at 02:05

## 2025-05-31 RX ADMIN — IPRATROPIUM BROMIDE AND ALBUTEROL SULFATE 3 ML: 2.5; .5 SOLUTION RESPIRATORY (INHALATION) at 07:05

## 2025-05-31 RX ADMIN — GABAPENTIN 600 MG: 300 CAPSULE ORAL at 09:05

## 2025-05-31 RX ADMIN — METOPROLOL TARTRATE 12.5 MG: 25 TABLET, FILM COATED ORAL at 12:05

## 2025-05-31 RX ADMIN — ASPIRIN 81 MG: 81 TABLET ORAL at 09:05

## 2025-05-31 RX ADMIN — MUPIROCIN 1 G: 20 OINTMENT TOPICAL at 09:05

## 2025-05-31 RX ADMIN — METOPROLOL TARTRATE 12.5 MG: 25 TABLET, FILM COATED ORAL at 09:05

## 2025-05-31 RX ADMIN — HYDROCODONE BITARTRATE AND ACETAMINOPHEN 1 TABLET: 10; 325 TABLET ORAL at 10:05

## 2025-05-31 RX ADMIN — LEVOTHYROXINE SODIUM 150 MCG: 0.1 TABLET ORAL at 06:05

## 2025-05-31 RX ADMIN — PANTOPRAZOLE SODIUM 40 MG: 40 TABLET, DELAYED RELEASE ORAL at 06:05

## 2025-05-31 RX ADMIN — MICONAZOLE NITRATE: 20 POWDER TOPICAL at 09:05

## 2025-05-31 RX ADMIN — CLOPIDOGREL 75 MG: 75 TABLET ORAL at 09:05

## 2025-05-31 RX ADMIN — ONDANSETRON 4 MG: 2 INJECTION INTRAMUSCULAR; INTRAVENOUS at 03:05

## 2025-05-31 RX ADMIN — ATORVASTATIN CALCIUM 40 MG: 40 TABLET, FILM COATED ORAL at 09:05

## 2025-05-31 RX ADMIN — HYDROCODONE BITARTRATE AND ACETAMINOPHEN 1 TABLET: 10; 325 TABLET ORAL at 05:05

## 2025-05-31 RX ADMIN — ONDANSETRON 4 MG: 2 INJECTION INTRAMUSCULAR; INTRAVENOUS at 06:05

## 2025-05-31 RX ADMIN — ONDANSETRON 4 MG: 2 INJECTION INTRAMUSCULAR; INTRAVENOUS at 09:05

## 2025-05-31 RX ADMIN — IPRATROPIUM BROMIDE AND ALBUTEROL SULFATE 3 ML: 2.5; .5 SOLUTION RESPIRATORY (INHALATION) at 06:05

## 2025-05-31 RX ADMIN — IPRATROPIUM BROMIDE AND ALBUTEROL SULFATE 3 ML: 2.5; .5 SOLUTION RESPIRATORY (INHALATION) at 01:05

## 2025-05-31 RX ADMIN — SENNOSIDES, DOCUSATE SODIUM 1 TABLET: 50; 8.6 TABLET, FILM COATED ORAL at 09:05

## 2025-05-31 RX ADMIN — HYDROCODONE BITARTRATE AND ACETAMINOPHEN 1 TABLET: 5; 325 TABLET ORAL at 06:05

## 2025-05-31 RX ADMIN — ESCITALOPRAM OXALATE 20 MG: 10 TABLET ORAL at 09:05

## 2025-05-31 NOTE — PLAN OF CARE
Problem: Adult Inpatient Plan of Care  Goal: Absence of Hospital-Acquired Illness or Injury  Outcome: Progressing  Goal: Readiness for Transition of Care  Outcome: Progressing     Problem: Wound  Goal: Optimal Coping  Outcome: Progressing  Goal: Absence of Infection Signs and Symptoms  Outcome: Progressing  Goal: Improved Oral Intake  Outcome: Progressing  Goal: Optimal Pain Control and Function  Outcome: Progressing     Problem: Skin Injury Risk Increased  Goal: Skin Health and Integrity  Outcome: Progressing     Problem: Fall Injury Risk  Goal: Absence of Fall and Fall-Related Injury  Outcome: Progressing      330mEgo Now        NAME: Roxie Davis is a 41 y o  male  : 1978    MRN: [id number]  DATE: 2022  TIME: 5:42 PM    Assessment and Plan   Sprain of right shoulder, unspecified shoulder sprain type, initial encounter [S43 401A]  1  Sprain of right shoulder, unspecified shoulder sprain type, initial encounter  XR shoulder 2+ vw right         Patient Instructions     Shoulder sprain  Rest, ice, elevate  Follow up with PCP in 3-5 days  Proceed to  ER if symptoms worsen  Chief Complaint     Chief Complaint   Patient presents with    Shoulder Injury     right shoulder pain after falling onto it while skiing today         History of Present Illness       43-year-old male presents complaining of right shoulder pain  States he was skiing today fell onto the right side and developed pain to the right shoulder, the pain has worsened as time goes by  Denies head trauma, loss of consciousness      Review of Systems   Review of Systems   Constitutional: Negative  HENT: Negative  Eyes: Negative  Respiratory: Negative  Negative for apnea, cough, choking, chest tightness, shortness of breath, wheezing and stridor  Cardiovascular: Negative  Negative for chest pain  Musculoskeletal: Positive for arthralgias           Current Medications       Current Outpatient Medications:     DULoxetine (CYMBALTA) 60 mg delayed release capsule, TAKE 1 CAPSULE BY MOUTH EVERY DAY, Disp: 30 capsule, Rfl: 3    methylphenidate (CONCERTA) 54 MG ER tablet, TAKE 1 TABLET BY MOUTH EVERY DAY, MAXIMUM DAILY DOSE OF 1 TABLET PER DAY, Disp: 30 tablet, Rfl: 0    naproxen (NAPROSYN) 500 mg tablet, Take 1 tablet (500 mg total) by mouth 2 (two) times a day with meals (Patient not taking: Reported on 2022 ), Disp: 60 tablet, Rfl: 1    sildenafil (VIAGRA) 50 MG tablet, TAKE 1 TABLET BY MOUTH EVERY DAY AS NEEDED FOR ERECTILE DYSFUNCTION, Disp: 10 tablet, Rfl: 1    valACYclovir (VALTREX) 1,000 mg tablet, Take HbA1C 8.9 on 4/6/24  Home:  Basaglar 30U qhs/Metformin  Here:  Lantus 25U qhs/Metformin 500mg BID  Blood sugars were elevated in the setting of recent use of steroids but improving   Steroids were discontinued as of 5/11/24  Continue with accuchecks/SSI/DM diet  Since last 2 FBS have been in the 80s, will reduce the Lantus to 16U   1 tablet (1,000 mg total) by mouth 2 (two) times a day for 10 days, Disp: 20 tablet, Rfl: 0    Current Allergies     Allergies as of 01/23/2022    (No Known Allergies)            The following portions of the patient's history were reviewed and updated as appropriate: allergies, current medications, past family history, past medical history, past social history, past surgical history and problem list      Past Medical History:   Diagnosis Date    Bilateral carpal tunnel syndrome     Last Assessed 12/23/2015       Past Surgical History:   Procedure Laterality Date    NY REVISE MEDIAN N/CARPAL TUNNEL SURG Left 10/25/2016    Procedure: CARPAL TUNNEL RELEASE ;  Surgeon: Raghu Peters DO;  [facility];  Service: Orthopedics    NY REVISE MEDIAN N/CARPAL TUNNEL SURG Right 3/22/2016    Procedure: CARPAL TUNNEL RELEASE ;  Surgeon: Raghu Peters DO;  [facility];  Service: Orthopedics    VASECTOMY      Vas Deferens       Family History   Problem Relation Age of Onset    Hypertension Father         Essential         Medications have been verified  Objective   /85   Pulse 98   Temp 98 °F (36 7 °C)   Resp 18   Ht 6' 1" (1 854 m)   Wt 93 4 kg (206 lb)   SpO2 96%   BMI 27 18 kg/m²        Physical Exam     Physical Exam  Constitutional:       General: He is not in acute distress  Appearance: He is well-developed  He is not diaphoretic  Cardiovascular:      Rate and Rhythm: Normal rate and regular rhythm  Heart sounds: Normal heart sounds  Pulmonary:      Effort: Pulmonary effort is normal  No respiratory distress  Breath sounds: Normal breath sounds  No wheezing or rales  Chest:      Chest wall: No tenderness  Musculoskeletal:      Right shoulder: Tenderness and bony tenderness present  No swelling, deformity, effusion, laceration or crepitus  Decreased range of motion  Normal strength  Normal pulse        Left shoulder: Normal       Cervical back: Normal range of motion and neck supple  Lymphadenopathy:      Cervical: No cervical adenopathy             Patient advised that the Robaxin may cause drowsiness

## 2025-05-31 NOTE — PT/OT/SLP EVAL
"Occupational Therapy   Evaluation    Name: Aida Gupta  MRN: [id number]  Admitting Diagnosis: STEMI (ST elevation myocardial infarction)  Recent Surgery: Procedure(s) (LRB):  Left heart cath (Left) 2 Days Post-Op    Recommendations:     Discharge Recommendations: Moderate Intensity Therapy  Discharge Equipment Recommendations:  none  Barriers to discharge:   (Increase physcial assistance for ADLs and functional mobility)    Assessment:     Aida Gupta is a 74 y.o. female with a medical diagnosis of STEMI (ST elevation myocardial infarction).  She presents with fatigue from previous Physical Therapy session. She performed g/h at bed level.  She had a difficult time staying awake due to fatigue.  Performance deficits affecting function: impaired endurance, weakness, impaired self care skills, impaired functional mobility, gait instability, impaired balance, decreased upper extremity function, decreased lower extremity function, decreased safety awareness, pain, impaired cardiopulmonary response to activity, impaired skin.      Rehab Prognosis: Fair; patient would benefit from acute skilled OT services to address these deficits and reach maximum level of function.       Plan:     Patient to be seen 5 x/week to address the above listed problems via self-care/home management, therapeutic activities, therapeutic exercises  Plan of Care Expires: 06/30/25  Plan of Care Reviewed with: patient    Subjective     Chief Complaint: "They already asked me these questions."  Patient/Family Comments/goals: to return home.    Occupational Profile:  Living Environment: She lives with her son and grandson with a daughter that lives across the street who takes care of her.  Previous level of function: Mod I with rolling walker for short distances in the home. She was getting Supervison assistance from her daughter for bathing.  She mainly wears a night gown and was independent with dressing.  She rarely leaves the house.  " She was mainly staying in the bed and watching TV or playing games on her computer.  Roles and Routines: sedentary  Equipment Used at Home: walker, rolling, wheelchair, bedside commode  Assistance upon Discharge: facility staff and then family    Pain/Comfort:  Pain Rating 1:  (not rated)  Pain Addressed 1: Reposition, Distraction, Cessation of Activity    Patients cultural, spiritual, Moravian conflicts given the current situation: no    Objective:     Communicated with: nurse prior to session.  Patient found supine with peripheral IV, telemetry, blood pressure cuff, pulse ox (continuous), PureWick, SCD upon OT entry to room.    General Precautions: Standard, fall  Orthopedic Precautions: N/A  Braces: N/A  Respiratory Status: Nasal cannula, flow 2 L/min    Occupational Performance:    Bed Mobility:    Patient completed Rolling/Turning to Right with maximal assistance for pressure relief.  Pillows placed behind left flank and between knees for comfort and pressure relief.    Activities of Daily Living:  Grooming: set up assistance for face washing, Min a for hair combing due to fatigue.      Cognitive/Visual Perceptual:  Cognitive/Psychosocial Skills:     -       Oriented to: Person, Place, Time, and Situation   -       Follows Commands/attention:Follows two-step commands  -       Communication: clear/fluent  -       Memory: No Deficits noted  -       Safety awareness/insight to disability: intact   -       Mood/Affect/Coping skills/emotional control: Labile, Cooperative, and Lethargic    Physical Exam:  Dominant hand:    -       Right  Upper Extremity Range of Motion:     -       Right Upper Extremity: WNL  -       Left Upper Extremity: WFL  Upper Extremity Strength:    -       Right Upper Extremity: 3+/5  -       Left Upper Extremity: 4/5   Strength:    -       Right Upper Extremity: WFL  -       Left Upper Extremity: WFL  Fine Motor Coordination:    -       Intact  Gross motor coordination:   WFL    AMPAC 6  Click ADL:  AMPAC Total Score: 9    Treatment & Education:  She was educated on Role of Occupational Therapy, goals and plan of care.  Discussed importance of OOB activity and functional mobility to negate the negative effects of prolonged bedrest during this hospitalization, safe transfers and d/c planning recommendations.     Patient left right sidelying with all lines intact, call button in reach, bed alarm on, and nurse present    GOALS:   Multidisciplinary Problems       Occupational Therapy Goals          Problem: Occupational Therapy    Goal Priority Disciplines Outcome Interventions   Occupational Therapy Goal     OT, PT/OT     Description: Goals to be met by: 06/30/2025     Patient will increase functional independence with ADLs by performing:    Feeding with Minimal Assistance.  UE Dressing with Minimal Assistance.  LE Dressing with Minimal Assistance.  Grooming while standing at sink with Minimal Assistance.  Toileting from bedside commode with Minimal Assistance for hygiene and clothing management.   Rolling to Bilateral with Minimal Assistance.   Supine to sit with Contact Guard Assistance.  Stand pivot transfers with Contact Guard Assistance.  Toilet transfer to bedside commode with Stand-by Assistance.                         DME Justifications:  No DME recommended requiring DME justifications    History:     Past Medical History:   Diagnosis Date    Asthma     chronic bronchitis    Hx of colectomy     Shingles     1 year ago     Thyroid disease          Past Surgical History:   Procedure Laterality Date    Bilateral tubal ligation      COLECTOMY      COLON SURGERY      Diverticulitis    LEFT HEART CATHETERIZATION Left 5/29/2025    Procedure: Left heart cath;  Surgeon: Lia Bailey MD;  Location: [facility] CATH/EP LAB;  Service: Cardiology;  Laterality: Left;    TUBAL LIGATION         Time Tracking:     OT Date of Treatment: 05/31/25  OT Start Time: 1120  OT Stop Time: 1143  OT Total Time (min): 23  min    Billable Minutes:Evaluation 15  Self Care/Home Management 8    5/31/2025

## 2025-05-31 NOTE — ASSESSMENT & PLAN NOTE
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   EMILY was placed to the ostial/proximal/mid LAD   lifelong ASA, plavix for at least 1 year, high intensity statin  Ok to dc per cardio  PT/OT consulted, patient may need placement   ECHO showed ef 50-55

## 2025-05-31 NOTE — CARE UPDATE
05/31/25 0652   Patient Assessment/Suction   Level of Consciousness (AVPU) alert   Respiratory Effort Normal;Unlabored   All Lung Fields Breath Sounds wheezes, expiratory   Rhythm/Pattern, Respiratory unlabored   Skin Integrity   $ Wound Care Tech Time 15 min   Area Observed Bridge of nose   Skin Appearance without discoloration   PRE-TX-O2   Device (Oxygen Therapy) simple face mask  (oxymask)   Flow (L/min) (Oxygen Therapy) 2   SpO2 100 %   Pulse Oximetry Type Continuous   $ Pulse Oximetry - Multiple Charge Pulse Oximetry - Multiple   Pulse 82   Resp 14   Aerosol Therapy   $ Aerosol Therapy Charges Aerosol Treatment   Daily Review of Necessity (SVN) completed   Respiratory Treatment Status (SVN) given   Treatment Route (SVN) mask;oxygen   Patient Position HOB elevated   Post Treatment Assessment (SVN) increased aeration;wheezing decreased;vital signs unchanged   Signs of Intolerance (SVN) none

## 2025-05-31 NOTE — PROGRESS NOTES
[facility]  Department of Cardiology  Consult Note      PATIENT NAME: Aida Gupta    MRN: [id number]  TODAY'S DATE: 05/31/2025  ADMIT DATE: 5/29/2025                          CONSULT REQUESTED BY: Chasity Vargas MD    SUBJECTIVE   PRINCIPAL PROBLEM:  Chest pain      Interval events:   5/30/2025: Patient s/p PCI to ostial/proximal LAD.  No chest pain or hemodynamic instability.     5/31/2025:  No chest pain, hemodynamically stable.         HPI:  74 year old female who presented to [facility] ED with chest pain and was noted to have evidence of STEMI.  Patient was given ASA 324mg po x 1, plavix 300mg po x1 and heparin 5000 Units IV and transferred emergently for coronary angiogram. Cath lab activated.         REASON FOR CONSULT:  From ED: STEMI         Review of patient's allergies indicates:   Allergen Reactions    Contrast media Nausea And Vomiting       Past Medical History:   Diagnosis Date    Asthma     chronic bronchitis    Hx of colectomy     Shingles     1 year ago     Thyroid disease      Past Surgical History:   Procedure Laterality Date    Bilateral tubal ligation      COLECTOMY      COLON SURGERY      Diverticulitis    LEFT HEART CATHETERIZATION Left 5/29/2025    Procedure: Left heart cath;  Surgeon: Lia Bailey MD;  Location: [facility] CATH/EP LAB;  Service: Cardiology;  Laterality: Left;    TUBAL LIGATION       Social History[1]     REVIEW OF SYSTEMS  Per HPI    OBJECTIVE     VITAL SIGNS (Most Recent)  Temp: 97.8 °F (36.6 °C) (05/31/25 0400)  Pulse: 82 (05/31/25 0652)  Resp: 14 (05/31/25 0652)  BP: 118/63 (05/31/25 0635)  SpO2: 100 % (05/31/25 0652)    VENTILATION STATUS  Resp: 14 (05/31/25 0652)  SpO2: 100 % (05/31/25 0652)           I & O (Last 24H):  Intake/Output Summary (Last 24 hours) at 5/31/2025 0727  Last data filed at 5/31/2025 0600  Gross per 24 hour   Intake 1265.33 ml   Output 345 ml   Net 920.33 ml       WEIGHTS  Wt Readings from Last 1 Encounters:   05/29/25 2230  86.5 kg (190 lb 11.2 oz)       PHYSICAL EXAM  CONSTITUTIONAL: No fever, no chills  HEENT: Normocephalic, atraumatic,pupils reactive to light                 NECK:  No JVD no carotid bruit  CVS: S1S2+, RRR  LUNGS: Clear  ABDOMEN: Soft, NT, BS+  EXTREMITIES: No cyanosis, edema; no hematoma, no ecchymosis, no bruits.   : No danielle catheter  NEURO: AAO X 3  PSY: Normal affect         HOME MEDICATIONS:Medications Ordered Prior to Encounter[2]    SCHEDULED MEDS:   albuterol-ipratropium        albuterol-ipratropium  3 mL Nebulization Q6H WAKE    aspirin  81 mg Oral Daily    atorvastatin  40 mg Oral QHS    clopidogreL  75 mg Oral Daily    EScitalopram oxalate  20 mg Oral QHS    levothyroxine  150 mcg Oral Before breakfast    miconazole NITRATE 2 %   Topical (Top) TID    mupirocin   Nasal BID    pantoprazole  40 mg Oral Daily       CONTINUOUS INFUSIONS:   nitroGLYCERIN in 5 % dextrose    Continuous PRN   Stopped at 05/29/25 2230       PRN MEDS:  Current Facility-Administered Medications:     acetaminophen, 650 mg, Oral, Q8H PRN    acetaminophen, 650 mg, Oral, Q4H PRN    albuterol-ipratropium, , ,     aluminum-magnesium hydroxide-simethicone, 30 mL, Oral, QID PRN    dextrose 50%, 12.5 g, Intravenous, PRN    dextrose 50%, 25 g, Intravenous, PRN    gabapentin, 600 mg, Oral, BID PRN    glucagon (human recombinant), 1 mg, Intramuscular, PRN    glucose, 16 g, Oral, PRN    glucose, 24 g, Oral, PRN    HYDROcodone-acetaminophen, 1 tablet, Oral, Q4H PRN    HYDROcodone-acetaminophen, 1 tablet, Oral, Q4H PRN    hydrOXYzine HCL, 25 mg, Oral, TID PRN    magnesium oxide, 800 mg, Oral, PRN    magnesium oxide, 800 mg, Oral, PRN    melatonin, 6 mg, Oral, Nightly PRN    naloxone, 0.02 mg, Intravenous, PRN    nitroGLYCERIN in 5 % dextrose, , , Continuous PRN    ondansetron, 4 mg, Intravenous, Q6H PRN    potassium bicarbonate, 35 mEq, Oral, PRN    potassium bicarbonate, 50 mEq, Oral, PRN    potassium bicarbonate, 60 mEq, Oral, PRN    potassium,  "sodium phosphates, 2 packet, Oral, PRN    potassium, sodium phosphates, 2 packet, Oral, PRN    potassium, sodium phosphates, 2 packet, Oral, PRN    senna-docusate, 1 tablet, Oral, Daily PRN    LABS AND DIAGNOSTICS     CBC LAST 3 DAYS  Recent Labs   Lab 05/30/25  0126 05/30/25  0339 05/31/25  0626   WBC 13.67* 12.94* 10.37   RBC 4.50 4.48 4.09   HGB 13.5 13.4 12.2   HCT 41.5 40.6 38.4   MCV 92 91 94   MCH 30.0 29.9 29.8   MCHC 32.5 33.0 31.8*   RDW 13.3 13.3 13.4    299 268   MPV 8.4* 8.5* 8.8*   NRBC 0 0 0       COAGULATION LAST 3 DAYS  Recent Labs   Lab 05/29/25  1847   INR 1.0   APTT 26.9       CHEMISTRY LAST 3 DAYS  Recent Labs   Lab 05/29/25  1844 05/30/25  0339    137   K 4.5 4.7    105   CO2 19* 22*   ANIONGAP 15 10   BUN 17 16   CREATININE 0.8 0.6   * 142*   CALCIUM 9.9 9.0   MG  --  2.1   ALBUMIN 3.9  --    PROT 8.1  --    ALKPHOS 70  --    ALT 8*  --    AST 27  --    BILITOT 0.9  --        CARDIAC PROFILE LAST 3 DAYS  Recent Labs   Lab 05/29/25  1844   *   CPK 36   TROPONINIHS 79*       ENDOCRINE LAST 3 DAYS  No results for input(s): "TSH", "PROCAL" in the last 168 hours.    LAST ARTERIAL BLOOD GAS  ABG  No results for input(s): "PH", "PO2", "PCO2", "HCO3", "BE" in the last 168 hours.    LAST 7 DAYS MICROBIOLOGY   Microbiology Results (last 7 days)       ** No results found for the last 168 hours. **            MOST RECENT IMAGING  Echo    Technically limited study with poor visualization of endocardial   borders and valvular structures.    Left Ventricle: The left ventricle is normal in size. Ventricular mass   is normal. Mildly increased wall thickness. There is concentric   remodeling. Regional wall motion abnormalities present.  Mild to moderate   apical hypokinesis noted. There is low normal systolic function with a   visually estimated ejection fraction of 50 - 55%.    Right Ventricle: The right ventricle is normal in size Systolic   function is normal.    Aortic " Valve: Not well visualized due to poor acoustic window. The   aortic valve is a trileaflet valve. Moderately calcified cusps.    Mitral Valve: Mildly calcified leaflets. There is moderate mitral   annular calcification. There is mild regurgitation.    Tricuspid Valve: There is mild regurgitation.    Pericardium: There is a small effusion. No indication of cardiac   tamponade.      ECHOCARDIOGRAM RESULTS (last 5)  Results for orders placed during the hospital encounter of 05/29/25    Echo    Interpretation Summary    Technically limited study with poor visualization of endocardial borders and valvular structures.    Left Ventricle: The left ventricle is normal in size. Ventricular mass is normal. Mildly increased wall thickness. There is concentric remodeling. Regional wall motion abnormalities present.  Mild to moderate apical hypokinesis noted. There is low normal systolic function with a visually estimated ejection fraction of 50 - 55%.    Right Ventricle: The right ventricle is normal in size Systolic function is normal.    Aortic Valve: Not well visualized due to poor acoustic window. The aortic valve is a trileaflet valve. Moderately calcified cusps.    Mitral Valve: Mildly calcified leaflets. There is moderate mitral annular calcification. There is mild regurgitation.    Tricuspid Valve: There is mild regurgitation.    Pericardium: There is a small effusion. No indication of cardiac tamponade.      CURRENT/PREVIOUS VISIT EKG  Results for orders placed or performed during the hospital encounter of 05/29/25   EKG 12-LEAD on arrival to floor    Collection Time: 05/30/25  9:24 AM   Result Value Ref Range    QRS Duration 94 ms    OHS QTC Calculation 449 ms    Narrative    Test Reason : I21.3,    Vent. Rate :  79 BPM     Atrial Rate :  79 BPM     P-R Int : 158 ms          QRS Dur :  94 ms      QT Int : 392 ms       P-R-T Axes :  17  12  62 degrees    QTcB Int : 449 ms    Normal sinus rhythm  Incomplete right bundle  branch block  Borderline Abnormal ECG  No previous ECGs available    Referred By: LIA BAILEY           Confirmed By:            ASSESSMENT/PLAN:     Active Hospital Problems    Diagnosis    *STEMI (ST elevation myocardial infarction)    Intertrigo    Hyperlipidemia    Hypothyroidism    Gastroesophageal reflux disease without esophagitis       ASSESSMENT & PLAN:   STEMI  - Patient underwent urgent coronary angiogram and high risk PCI to ostial/proximal LAD.    - Continue DAPT (aspirin/plavix), statin  -  Recommend starting low dose b-blocker Metoprolol tartrate 12.5mg po bid   - From a cardiac standpoint, patient stable for discharge - she may benefit from STR (consider OT/PT) consult   - Recommend close interval follow up in cardiology clinic upon discharge.  Given coronary anatomy including small distal left main, small ostial LAD, and high diagonal vessel which is at risk of being jailed, if patient has a recurrence of chest pain, she will need a reshoot and possible surgical evaluation.        Lia Bailey MD, MPH  Date of Service: 2025  7:27 AM          [1]   Social History  Tobacco Use    Smoking status: Former     Current packs/day: 0.00     Average packs/day: 3.0 packs/day for 15.0 years (45.0 ttl pk-yrs)     Types: Cigarettes     Start date:      Quit date:      Years since quittin.4    Smokeless tobacco: Never   Substance Use Topics    Alcohol use: No    Drug use: No   [2]   No current facility-administered medications on file prior to encounter.     Current Outpatient Medications on File Prior to Encounter   Medication Sig Dispense Refill    albuterol (PROVENTIL/VENTOLIN HFA) 90 mcg/actuation inhaler Inhale 2 puffs into the lungs every 6 (six) hours as needed for Wheezing. Rescue      amitriptyline (ELAVIL) 50 MG tablet Take 1 tablet (50 mg total) by mouth every evening. 90 tablet 3    cholecalciferol, vitamin D3, (VITAMIN D3) 125 mcg (5,000 unit) Tab Take 1 tablet (5,000 Units  total) by mouth once daily. 90 tablet 3    cholestyramine (QUESTRAN) 4 gram packet Take 1 packet (4 g total) by mouth once daily. 90 packet 3    diclofenac sodium (VOLTAREN ARTHRITIS PAIN) 1 % Gel Apply 2 g topically 4 (four) times daily as needed (arthritis). 450 g PRN    ergocalciferol (VITAMIN D2) 50,000 unit Cap Take 1 capsule (50,000 Units total) by mouth twice a week. 24 capsule 3    EScitalopram oxalate (LEXAPRO) 20 MG tablet Take 1 tablet (20 mg total) by mouth every evening. 90 tablet 3    fluticasone propionate (FLONASE) 50 mcg/actuation nasal spray 1 spray (50 mcg total) by Each Nostril route once daily. 16 g PRN    fluticasone-salmeterol diskus inhaler 100-50 mcg Inhale 1 puff into the lungs 2 (two) times daily. Controller 60 each 5    gabapentin (NEURONTIN) 300 MG capsule Take 2 capsules (600 mg total) by mouth 2 (two) times daily. 360 capsule 3    hydrOXYzine HCL (ATARAX) 25 MG tablet TAKE 1 TABLET BY MOUTH THREE TIMES DAILY AS NEEDED FOR ANXIETY 90 tablet prn    levocetirizine (XYZAL) 5 MG tablet Take 1 tablet (5 mg total) by mouth nightly as needed for Allergies (congestion). 30 tablet PRN    levothyroxine (SYNTHROID) 150 MCG tablet Take 1 tablet by mouth once daily 90 tablet 1    meloxicam (MOBIC) 7.5 MG tablet Take 1 tablet by mouth once daily 90 tablet 3    omeprazole (PRILOSEC) 20 MG capsule Take 1 capsule (20 mg total) by mouth once daily. 90 capsule 3    ondansetron (ZOFRAN) 4 MG tablet Take 1 tablet (4 mg total) by mouth every 8 (eight) hours as needed for Nausea. 30 tablet 3    propranoloL (INDERAL LA) 60 MG 24 hr capsule Take 1 capsule (60 mg total) by mouth once daily. 90 capsule 3    rosuvastatin (CRESTOR) 10 MG tablet Take 1 tablet by mouth once daily 90 tablet 3    triamcinolone acetonide 0.1% (KENALOG) 0.1 % cream Apply topically 2 (two) times daily.

## 2025-05-31 NOTE — CARE UPDATE
05/30/25 2001   Patient Assessment/Suction   Level of Consciousness (AVPU) alert   Respiratory Effort Normal;Unlabored   Expansion/Accessory Muscles/Retractions no use of accessory muscles;no retractions   All Lung Fields Breath Sounds wheezes, expiratory   Rhythm/Pattern, Respiratory unlabored;pattern regular;no shortness of breath reported   Cough Frequency no cough   PRE-TX-O2   Device (Oxygen Therapy) room air   SpO2 96 %   Pulse Oximetry Type Continuous   Pulse 87   Resp 20

## 2025-05-31 NOTE — SUBJECTIVE & OBJECTIVE
Interval History: Pt seen this morning. No acute events. Stable.     Review of Systems   Constitutional:  Negative for appetite change.   Respiratory:  Negative for shortness of breath.    Cardiovascular:  Negative for chest pain.     Objective:     Vital Signs (Most Recent):  Temp: 97.8 °F (36.6 °C) (05/31/25 0400)  Pulse: 82 (05/31/25 0652)  Resp: 14 (05/31/25 0652)  BP: 118/63 (05/31/25 0635)  SpO2: 100 % (05/31/25 0652) Vital Signs (24h Range):  Temp:  [97.3 °F (36.3 °C)-97.8 °F (36.6 °C)] 97.8 °F (36.6 °C)  Pulse:  [] 82  Resp:  [7-27] 14  SpO2:  [92 %-100 %] 100 %  BP: ()/(52-87) 118/63     Weight: 85.4 kg (188 lb 4.4 oz)  Body mass index is 34.44 kg/m².    Intake/Output Summary (Last 24 hours) at 5/31/2025 1131  Last data filed at 5/31/2025 0600  Gross per 24 hour   Intake 965.33 ml   Output 345 ml   Net 620.33 ml         Physical Exam  Constitutional:       General: She is not in acute distress.  Cardiovascular:      Rate and Rhythm: Normal rate and regular rhythm.   Pulmonary:      Effort: No respiratory distress.      Breath sounds: No wheezing.   Neurological:      General: No focal deficit present.      Mental Status: She is alert and oriented to person, place, and time.               Significant Labs: All pertinent labs within the past 24 hours have been reviewed.  CBC:   Recent Labs   Lab 05/30/25  0126 05/30/25 0339 05/31/25 0626   WBC 13.67* 12.94* 10.37   HGB 13.5 13.4 12.2   HCT 41.5 40.6 38.4    299 268     CMP:   Recent Labs   Lab 05/29/25  1844 05/30/25  0339 05/31/25 0626    137 138   K 4.5 4.7 4.3    105 106   CO2 19* 22* 26   * 142* 107   BUN 17 16 18   CREATININE 0.8 0.6 0.8   CALCIUM 9.9 9.0 8.8   PROT 8.1  --   --    ALBUMIN 3.9  --   --    BILITOT 0.9  --   --    ALKPHOS 70  --   --    AST 27  --   --    ALT 8*  --   --    ANIONGAP 15 10 6*       Significant Imaging: I have reviewed all pertinent imaging results/findings within the past 24 hours.

## 2025-05-31 NOTE — PT/OT/SLP EVAL
Physical Therapy Evaluation    Patient Name:  Aida Gupta   MRN:  [id number]    Recommendations:     Discharge Recommendations: Moderate Intensity Therapy   Discharge Equipment Recommendations: none   Barriers to discharge: increase assist with mobility, high fall risk,    Assessment:     Aida Gupta is a 74 y.o. female admitted with a medical diagnosis of STEMI (ST elevation myocardial infarction).  She presents with the following impairments/functional limitations: impaired endurance, weakness, impaired self care skills, impaired functional mobility, gait instability, impaired balance, decreased lower extremity function, decreased safety awareness, pain, impaired cardiopulmonary response to activity.    Pt found in bed with HOB elevated. RN requested assist to get patient OOB to chair. Pt requires min A with bed mobility. Sit to stand and SPT from bed to chair with mod A and RW. After a brief seated rest break patient agreeable to ambulation. Pt ambulated 20 ft with RW and min A. Pt only tolerate OOB x 20 mins and assist patient back to bed with RN    Rehab Prognosis: Fair; patient would benefit from acute skilled PT services to address these deficits and reach maximum level of function.    Recent Surgery: Procedure(s) (LRB):  Left heart cath (Left) 2 Days Post-Op    Plan:     During this hospitalization, patient to be seen daily to address the identified rehab impairments via gait training, therapeutic activities, therapeutic exercises, neuromuscular re-education and progress toward the following goals:    Plan of Care Expires:  06/30/25    Subjective     Chief Complaint: left pain at site of angio  Patient/Family Comments/goals: return to PLOF  Pain/Comfort:  Pain Rating 1: other (see comments) (did not quantify)  Location 1: leg  Pain Addressed 1: Reposition, Distraction, Cessation of Activity    Patients cultural, spiritual, Catholic conflicts given the current situation: no    Living  Environment:  Pt lives with her son and grandson in a one story home with no THAD  Prior to admission, patients level of function was MI RW short household distances.  Equipment used at home: walker, rolling, wheelchair, bedside commode.  DME owned (not currently used): none.  Upon discharge, patient will have assistance from family.    Objective:     Communicated with RN prior to session.  Patient found HOB elevated with peripheral IV, telemetry, blood pressure cuff, pulse ox (continuous), PureWick  upon PT entry to room.    General Precautions: Standard, fall  Orthopedic Precautions:N/A   Braces: N/A  Respiratory Status: Room air    Exams:  RLE ROM: WFL  RLE Strength: WFL  LLE ROM: WFL  LLE Strength: WFL    Functional Mobility:  Bed Mobility:     Supine to Sit: minimum assistance  Sit to Supine: minimum assistance and of 2 persons  Transfers:     Sit to Stand:  moderate assistance with rolling walker  Bed to Chair: moderate assistance with  rolling walker  using  Stand Pivot  Gait: 20 ft with RW and min A       AM-PAC 6 CLICK MOBILITY  Total Score:12       Treatment & Education:  Pt educated on POC, discharge recommendation, importance of time OOB, proper form with mobility, need for assist with mobility, use of call bell to seek assistance as needed and fall prevention      Patient left up in chair with all lines intact, call button in reach, bed alarm on, and RN present.    GOALS:   Multidisciplinary Problems       Physical Therapy Goals          Problem: Physical Therapy    Goal Priority Disciplines Outcome Interventions   Physical Therapy Goal     PT, PT/OT Progressing    Description: Goals to be met by: 25     Patient will increase functional independence with mobility by performin. Supine to sit with Supervision  2. Sit to stand transfer with Supervision  3. Bed to chair transfer with Supervision using Rolling Walker  4. Gait  x 100 feet with Supervision using Rolling Walker.                               DME Justifications:  No DME recommended requiring DME justifications    History:     Past Medical History:   Diagnosis Date    Asthma     chronic bronchitis    Hx of colectomy     Shingles     1 year ago     Thyroid disease        Past Surgical History:   Procedure Laterality Date    Bilateral tubal ligation      COLECTOMY      COLON SURGERY      Diverticulitis    LEFT HEART CATHETERIZATION Left 5/29/2025    Procedure: Left heart cath;  Surgeon: Lia Bailey MD;  Location: [facility] CATH/EP LAB;  Service: Cardiology;  Laterality: Left;    TUBAL LIGATION         Time Tracking:     PT Received On: 05/31/25  PT Start Time: 0900     PT Stop Time: 0913  PT Total Time (min): 13 min     Billable Minutes: Evaluation 13      05/31/2025

## 2025-05-31 NOTE — PROGRESS NOTES
[facility]  Progress Note    Patient Name: Aida Gupta  MRN: [id number]  Patient Class: IP- Inpatient   Admission Date: 5/29/2025  Length of Stay: 2 days  Attending Physician: Chasity Vargas MD  Primary Care Provider: Edel Fisher MD        Subjective     Principal Problem:STEMI (ST elevation myocardial infarction)        HPI:  This is a very pleasant 74-year-old lady with comorbid conditions of hypothyroidism, GERD, [facility] of intermittent chest pain or loss few days.  Patient reports she felt as though it may has been heartburn and treated herself as such with no improvement in symptoms and due to the persistence she presented to the ED. patient found to have ST elevations, Dr. Bailey was contacted and could STEMI and cath lab were emergently activated.  Patient is now status post heart catheterization with bulky calcifications noted.  There was consideration for transfer to [facility] for complex PCI, however, joint decision made to proceed.  Patient is now status post drug-eluting stent to the LAD.  Admission to ICU status post procedure.  On assessment patient is calm, cooperative and in no acute distress.  She is without chest pain.  She is vitally stable.    Overview/Hospital Course:  Patient with hypothyroidism, GERD, hyperlipidemia presented with chest pain.  Concerns for STEMI, cardiology consulted and patient taken to cath lab emergently.  Status post PCI of the ostial LAD, heavy calcium burden.  Started on dual antiplatelet therapy. PT/OT consulted. May need SNF placement.     Interval History: Pt seen this morning. No acute events. Stable.     Review of Systems   Constitutional:  Negative for appetite change.   Respiratory:  Negative for shortness of breath.    Cardiovascular:  Negative for chest pain.     Objective:     Vital Signs (Most Recent):  Temp: 97.8 °F (36.6 °C) (05/31/25 0400)  Pulse: 82  (05/31/25 0652)  Resp: 14 (05/31/25 0652)  BP: 118/63 (05/31/25 0635)  SpO2: 100 % (05/31/25 0652) Vital Signs (24h Range):  Temp:  [97.3 °F (36.3 °C)-97.8 °F (36.6 °C)] 97.8 °F (36.6 °C)  Pulse:  [] 82  Resp:  [7-27] 14  SpO2:  [92 %-100 %] 100 %  BP: ()/(52-87) 118/63     Weight: 85.4 kg (188 lb 4.4 oz)  Body mass index is 34.44 kg/m².    Intake/Output Summary (Last 24 hours) at 5/31/2025 1131  Last data filed at 5/31/2025 0600  Gross per 24 hour   Intake 965.33 ml   Output 345 ml   Net 620.33 ml         Physical Exam  Constitutional:       General: She is not in acute distress.  Cardiovascular:      Rate and Rhythm: Normal rate and regular rhythm.   Pulmonary:      Effort: No respiratory distress.      Breath sounds: No wheezing.   Neurological:      General: No focal deficit present.      Mental Status: She is alert and oriented to person, place, and time.               Significant Labs: All pertinent labs within the past 24 hours have been reviewed.  CBC:   Recent Labs   Lab 05/30/25  0126 05/30/25 0339 05/31/25 0626   WBC 13.67* 12.94* 10.37   HGB 13.5 13.4 12.2   HCT 41.5 40.6 38.4    299 268     CMP:   Recent Labs   Lab 05/29/25  1844 05/30/25 0339 05/31/25 0626    137 138   K 4.5 4.7 4.3    105 106   CO2 19* 22* 26   * 142* 107   BUN 17 16 18   CREATININE 0.8 0.6 0.8   CALCIUM 9.9 9.0 8.8   PROT 8.1  --   --    ALBUMIN 3.9  --   --    BILITOT 0.9  --   --    ALKPHOS 70  --   --    AST 27  --   --    ALT 8*  --   --    ANIONGAP 15 10 6*       Significant Imaging: I have reviewed all pertinent imaging results/findings within the past 24 hours.      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   EMILY was placed to the ostial/proximal/mid LAD   lifelong ASA, plavix for at least 1 year, high intensity statin  Ok to dc per  cardio  PT/OT consulted, patient may need placement   ECHO showed ef 50-55    Hypothyroidism  Resume synthroid    Gastroesophageal reflux disease without esophagitis  Continue PPI    Hyperlipidemia  Continue statin    Intertrigo  Nystatin to breast folds and groin    VTE Risk Mitigation (From admission, onward)      None            Discharge Planning   OSMAN: 6/1/2025     Code Status: Full Code   Medical Readiness for Discharge Date:   Discharge Plan A: Home with family          Please place Justification for DME        Chasity Vargas MD  Department of Hospital Medicine   [facility]

## 2025-06-01 PROBLEM — R06.02 SHORTNESS OF BREATH: Status: ACTIVE | Noted: 2017-07-28

## 2025-06-01 LAB
ABSOLUTE EOSINOPHIL (SMH): 0.4 K/UL
ABSOLUTE MONOCYTE (SMH): 0.68 K/UL (ref 0.3–1)
ABSOLUTE NEUTROPHIL COUNT (SMH): 5.6 K/UL (ref 1.8–7.7)
ANION GAP (SMH): 7 MMOL/L (ref 8–16)
BASOPHILS # BLD AUTO: 0.05 K/UL
BASOPHILS NFR BLD AUTO: 0.6 %
BUN SERPL-MCNC: 14 MG/DL (ref 8–23)
CALCIUM SERPL-MCNC: 9 MG/DL (ref 8.7–10.5)
CHLORIDE SERPL-SCNC: 102 MMOL/L (ref 95–110)
CO2 SERPL-SCNC: 26 MMOL/L (ref 23–29)
CREAT SERPL-MCNC: 0.7 MG/DL (ref 0.5–1.4)
ERYTHROCYTE [DISTWIDTH] IN BLOOD BY AUTOMATED COUNT: 13.4 % (ref 11.5–14.5)
GFR SERPLBLD CREATININE-BSD FMLA CKD-EPI: >60 ML/MIN/1.73/M2
GLUCOSE SERPL-MCNC: 123 MG/DL (ref 70–110)
HCT VFR BLD AUTO: 40.2 % (ref 37–48.5)
HGB BLD-MCNC: 12.5 GM/DL (ref 12–16)
IMM GRANULOCYTES # BLD AUTO: 0.05 K/UL (ref 0–0.04)
IMM GRANULOCYTES NFR BLD AUTO: 0.6 % (ref 0–0.5)
LYMPHOCYTES # BLD AUTO: 1.72 K/UL (ref 1–4.8)
MAGNESIUM SERPL-MCNC: 2.1 MG/DL (ref 1.6–2.6)
MCH RBC QN AUTO: 30 PG (ref 27–31)
MCHC RBC AUTO-ENTMCNC: 31.1 G/DL (ref 32–36)
MCV RBC AUTO: 96 FL (ref 82–98)
NUCLEATED RBC (/100WBC) (SMH): 0 /100 WBC
PLATELET # BLD AUTO: 243 K/UL (ref 150–450)
PMV BLD AUTO: 9.4 FL (ref 9.2–12.9)
POTASSIUM SERPL-SCNC: 4.5 MMOL/L (ref 3.5–5.1)
PROCALCITONIN SERPL-MCNC: <0.05 NG/ML
RBC # BLD AUTO: 4.17 M/UL (ref 4–5.4)
RELATIVE EOSINOPHIL (SMH): 4.7 % (ref 0–8)
RELATIVE LYMPHOCYTE (SMH): 20.4 % (ref 18–48)
RELATIVE MONOCYTE (SMH): 8 % (ref 4–15)
RELATIVE NEUTROPHIL (SMH): 65.7 % (ref 38–73)
SODIUM SERPL-SCNC: 135 MMOL/L (ref 136–145)
WBC # BLD AUTO: 8.45 K/UL (ref 3.9–12.7)

## 2025-06-01 PROCEDURE — 25000003 PHARM REV CODE 250

## 2025-06-01 PROCEDURE — 25000003 PHARM REV CODE 250: Performed by: INTERNAL MEDICINE

## 2025-06-01 PROCEDURE — 27000221 HC OXYGEN, UP TO 24 HOURS

## 2025-06-01 PROCEDURE — 36415 COLL VENOUS BLD VENIPUNCTURE: CPT | Performed by: STUDENT IN AN ORGANIZED HEALTH CARE EDUCATION/TRAINING PROGRAM

## 2025-06-01 PROCEDURE — 99900035 HC TECH TIME PER 15 MIN (STAT)

## 2025-06-01 PROCEDURE — 36415 COLL VENOUS BLD VENIPUNCTURE: CPT

## 2025-06-01 PROCEDURE — 63600175 PHARM REV CODE 636 W HCPCS: Performed by: STUDENT IN AN ORGANIZED HEALTH CARE EDUCATION/TRAINING PROGRAM

## 2025-06-01 PROCEDURE — 25000242 PHARM REV CODE 250 ALT 637 W/ HCPCS: Performed by: INTERNAL MEDICINE

## 2025-06-01 PROCEDURE — 94664 DEMO&/EVAL PT USE INHALER: CPT

## 2025-06-01 PROCEDURE — 25000242 PHARM REV CODE 250 ALT 637 W/ HCPCS: Performed by: STUDENT IN AN ORGANIZED HEALTH CARE EDUCATION/TRAINING PROGRAM

## 2025-06-01 PROCEDURE — 63600175 PHARM REV CODE 636 W HCPCS

## 2025-06-01 PROCEDURE — 83735 ASSAY OF MAGNESIUM: CPT

## 2025-06-01 PROCEDURE — 97116 GAIT TRAINING THERAPY: CPT | Mod: CQ

## 2025-06-01 PROCEDURE — 94640 AIRWAY INHALATION TREATMENT: CPT

## 2025-06-01 PROCEDURE — 25000003 PHARM REV CODE 250: Performed by: STUDENT IN AN ORGANIZED HEALTH CARE EDUCATION/TRAINING PROGRAM

## 2025-06-01 PROCEDURE — 94761 N-INVAS EAR/PLS OXIMETRY MLT: CPT

## 2025-06-01 PROCEDURE — 11000001 HC ACUTE MED/SURG PRIVATE ROOM

## 2025-06-01 PROCEDURE — 94660 CPAP INITIATION&MGMT: CPT

## 2025-06-01 PROCEDURE — 80048 BASIC METABOLIC PNL TOTAL CA: CPT

## 2025-06-01 PROCEDURE — 97530 THERAPEUTIC ACTIVITIES: CPT | Mod: CQ

## 2025-06-01 PROCEDURE — 85025 COMPLETE CBC W/AUTO DIFF WBC: CPT

## 2025-06-01 PROCEDURE — 84145 PROCALCITONIN (PCT): CPT | Performed by: STUDENT IN AN ORGANIZED HEALTH CARE EDUCATION/TRAINING PROGRAM

## 2025-06-01 RX ORDER — SIMETHICONE 80 MG
2 TABLET,CHEWABLE ORAL 3 TIMES DAILY PRN
Status: DISCONTINUED | OUTPATIENT
Start: 2025-06-01 | End: 2025-06-09 | Stop reason: HOSPADM

## 2025-06-01 RX ORDER — BUDESONIDE 0.5 MG/2ML
0.5 INHALANT ORAL EVERY 12 HOURS
Status: DISCONTINUED | OUTPATIENT
Start: 2025-06-01 | End: 2025-06-09 | Stop reason: HOSPADM

## 2025-06-01 RX ORDER — DEXAMETHASONE SODIUM PHOSPHATE 10 MG/ML
10 INJECTION INTRAMUSCULAR; INTRAVENOUS EVERY 24 HOURS
Status: DISCONTINUED | OUTPATIENT
Start: 2025-06-01 | End: 2025-06-06

## 2025-06-01 RX ORDER — FLUTICASONE PROPIONATE AND SALMETEROL 100; 50 UG/1; UG/1
1 POWDER RESPIRATORY (INHALATION) 2 TIMES DAILY
Status: DISCONTINUED | OUTPATIENT
Start: 2025-06-01 | End: 2025-06-01

## 2025-06-01 RX ORDER — IPRATROPIUM BROMIDE AND ALBUTEROL SULFATE 2.5; .5 MG/3ML; MG/3ML
3 SOLUTION RESPIRATORY (INHALATION) EVERY 4 HOURS PRN
Status: DISCONTINUED | OUTPATIENT
Start: 2025-06-01 | End: 2025-06-09 | Stop reason: HOSPADM

## 2025-06-01 RX ORDER — ARFORMOTEROL TARTRATE 15 UG/2ML
15 SOLUTION RESPIRATORY (INHALATION) 2 TIMES DAILY
Status: DISCONTINUED | OUTPATIENT
Start: 2025-06-01 | End: 2025-06-09 | Stop reason: HOSPADM

## 2025-06-01 RX ORDER — FUROSEMIDE 10 MG/ML
20 INJECTION INTRAMUSCULAR; INTRAVENOUS ONCE
Status: COMPLETED | OUTPATIENT
Start: 2025-06-01 | End: 2025-06-01

## 2025-06-01 RX ADMIN — METOPROLOL TARTRATE 12.5 MG: 25 TABLET, FILM COATED ORAL at 09:06

## 2025-06-01 RX ADMIN — HYDROCODONE BITARTRATE AND ACETAMINOPHEN 1 TABLET: 5; 325 TABLET ORAL at 05:06

## 2025-06-01 RX ADMIN — ONDANSETRON 4 MG: 2 INJECTION INTRAMUSCULAR; INTRAVENOUS at 03:06

## 2025-06-01 RX ADMIN — MICONAZOLE NITRATE: 20 POWDER TOPICAL at 08:06

## 2025-06-01 RX ADMIN — ONDANSETRON 4 MG: 2 INJECTION INTRAMUSCULAR; INTRAVENOUS at 11:06

## 2025-06-01 RX ADMIN — ATORVASTATIN CALCIUM 40 MG: 40 TABLET, FILM COATED ORAL at 08:06

## 2025-06-01 RX ADMIN — PANTOPRAZOLE SODIUM 40 MG: 40 TABLET, DELAYED RELEASE ORAL at 06:06

## 2025-06-01 RX ADMIN — FUROSEMIDE 20 MG: 10 INJECTION, SOLUTION INTRAMUSCULAR; INTRAVENOUS at 04:06

## 2025-06-01 RX ADMIN — GABAPENTIN 600 MG: 300 CAPSULE ORAL at 03:06

## 2025-06-01 RX ADMIN — ARFORMOTEROL TARTRATE 15 MCG: 15 SOLUTION RESPIRATORY (INHALATION) at 09:06

## 2025-06-01 RX ADMIN — ARFORMOTEROL TARTRATE 15 MCG: 15 SOLUTION RESPIRATORY (INHALATION) at 08:06

## 2025-06-01 RX ADMIN — SIMETHICONE 160 MG: 80 TABLET, CHEWABLE ORAL at 11:06

## 2025-06-01 RX ADMIN — METOPROLOL TARTRATE 12.5 MG: 25 TABLET, FILM COATED ORAL at 08:06

## 2025-06-01 RX ADMIN — HYDROCODONE BITARTRATE AND ACETAMINOPHEN 1 TABLET: 10; 325 TABLET ORAL at 11:06

## 2025-06-01 RX ADMIN — HYDROCODONE BITARTRATE AND ACETAMINOPHEN 1 TABLET: 10; 325 TABLET ORAL at 09:06

## 2025-06-01 RX ADMIN — BUDESONIDE INHALATION 0.5 MG: 0.5 SUSPENSION RESPIRATORY (INHALATION) at 09:06

## 2025-06-01 RX ADMIN — ASPIRIN 81 MG: 81 TABLET ORAL at 09:06

## 2025-06-01 RX ADMIN — MICONAZOLE NITRATE: 20 POWDER TOPICAL at 03:06

## 2025-06-01 RX ADMIN — MUPIROCIN 1 G: 20 OINTMENT TOPICAL at 08:06

## 2025-06-01 RX ADMIN — ESCITALOPRAM OXALATE 20 MG: 10 TABLET ORAL at 08:06

## 2025-06-01 RX ADMIN — MUPIROCIN 1 G: 20 OINTMENT TOPICAL at 09:06

## 2025-06-01 RX ADMIN — HYDROXYZINE HYDROCHLORIDE 25 MG: 25 TABLET, FILM COATED ORAL at 06:06

## 2025-06-01 RX ADMIN — SIMETHICONE 160 MG: 80 TABLET, CHEWABLE ORAL at 09:06

## 2025-06-01 RX ADMIN — CLOPIDOGREL 75 MG: 75 TABLET ORAL at 09:06

## 2025-06-01 RX ADMIN — BUDESONIDE INHALATION 0.5 MG: 0.5 SUSPENSION RESPIRATORY (INHALATION) at 08:06

## 2025-06-01 RX ADMIN — MICONAZOLE NITRATE: 20 POWDER TOPICAL at 09:06

## 2025-06-01 RX ADMIN — ONDANSETRON 4 MG: 2 INJECTION INTRAMUSCULAR; INTRAVENOUS at 06:06

## 2025-06-01 RX ADMIN — DEXAMETHASONE SODIUM PHOSPHATE 10 MG: 10 INJECTION INTRAMUSCULAR; INTRAVENOUS at 09:06

## 2025-06-01 RX ADMIN — IPRATROPIUM BROMIDE AND ALBUTEROL SULFATE 3 ML: 2.5; .5 SOLUTION RESPIRATORY (INHALATION) at 04:06

## 2025-06-01 RX ADMIN — LEVOTHYROXINE SODIUM 150 MCG: 0.1 TABLET ORAL at 06:06

## 2025-06-01 NOTE — NURSING
Patient transferred to [address] [place] [address] via bed with 02 and monitor.  Patient tolerated well.  No complaints or s/s of distress.  VS Stable.

## 2025-06-01 NOTE — PROGRESS NOTES
Automatic Inhaler to Nebulizer Interchange    fluticasone/salmeterol (Advair HFA) 180 mcg/84 mcg changed to budesonide 0.5 mg twice daily AND arformoterol 15 mcg twice daily per [facility] Automatic Therapeutic Substitutions Protocol.    Please contact pharmacy at extension [phone] with any questions.     Thank you,   Cassidy Jeffrey

## 2025-06-01 NOTE — CARE UPDATE
06/01/25 0911   Patient Assessment/Suction   Level of Consciousness (AVPU) alert   Respiratory Effort Unlabored   All Lung Fields Breath Sounds diminished;wheezes, expiratory   Rhythm/Pattern, Respiratory unlabored   Cough Frequency frequent   Cough Type congested;fair;nonproductive   Skin Integrity   $ Wound Care Tech Time 15 min   Area Observed Bridge of nose;Nares   Skin Appearance without discoloration   PRE-TX-O2   Device (Oxygen Therapy) simple face mask   $ Is the patient on Low Flow Oxygen? Yes   Flow (L/min) (Oxygen Therapy) 2   SpO2 99 %   Pulse 88   Resp 20   Aerosol Therapy   $ Aerosol Therapy Charges Aerosol Treatment  (brovana)   Daily Review of Necessity (SVN) completed   Respiratory Treatment Status (SVN) given   Treatment Route (SVN) mask;oxygen   Patient Position HOB elevated   Post Treatment Assessment (SVN) vital signs unchanged;increased aeration   Signs of Intolerance (SVN) none   Vibratory PEP Therapy   $ Vibratory PEP Charges Acapella Therapy   $ Vibratory PEP Tech Time Charges 15 min   Daily Review of Necessity (PEP Therapy) completed   Type (PEP Therapy) vibratory/oscillatory   Device (PEP Therapy) flutter   Route (PEP Therapy) mouthpiece   Breaths per Cycle (PEP Therapy) 10   Cycles (PEP Therapy) 1   Settings (PEP Therapy) PEP 4   Patient Position (PEP Therapy) HOB elevated   Post Treatment Assessment (PEP) increased aeration;vital signs unchanged   Signs of Intolerance (PEP Therapy) none

## 2025-06-01 NOTE — PROGRESS NOTES
[facility] Medicine  Progress Note    Patient Name: Aida Gupta  MRN: [id number]  Patient Class: IP- Inpatient   Admission Date: 5/29/2025  Length of Stay: 3 days  Attending Physician: Chasity Vargas MD  Primary Care Provider: Edel Fisher MD        Subjective     Principal Problem:STEMI (ST elevation myocardial infarction)        HPI:  This is a very pleasant 74-year-old lady with comorbid conditions of hypothyroidism, GERD, [facility] of [facility] chest pain or loss few days.  Patient reports she felt as though it may has been heartburn and treated herself as such with no improvement in symptoms and due to the persistence she presented to the ED. patient found to have ST elevations, Dr. Bailey was contacted and could STEMI and cath lab were emergently activated.  Patient is now status post heart catheterization with bulky calcifications noted.  There was consideration for transfer to [facility] for complex PCI, however, joint decision made to proceed.  Patient is now status post drug-eluting stent to the LAD.  Admission to ICU status post procedure.  On assessment patient is calm, cooperative and in no acute distress.  She is without chest pain.  She is vitally stable.    Overview/Hospital Course:  Patient with hypothyroidism, GERD, hyperlipidemia presented with chest pain.  Concerns for STEMI, cardiology consulted and patient taken to cath lab emergently.  Status post PCI of the ostial LAD, heavy calcium burden.  Started on dual antiplatelet therapy. PT/OT consulted.  SNF placement pending. Had some of breath, seems to be stridor, possible from large neck and positioning, given breathing treatments and steroids.     Interval History:  Patient seen and examined this morning, reports some shortness for breath.  Appears to be stridor, patient has head tilted forward and seems to be in a uncomfortable position.  We will  give some steroids and breathing treatments and monitor and reposition patient.    Review of Systems   Constitutional:  Negative for appetite change.   Respiratory:  Positive for shortness of breath.    Cardiovascular:  Negative for chest pain.     Objective:     Vital Signs (Most Recent):  Temp: 98.2 °F (36.8 °C) (06/01/25 0400)  Pulse: 73 (06/01/25 1412)  Resp: 14 (06/01/25 1412)  BP: 133/75 (06/01/25 0929)  SpO2: 99 % (06/01/25 1412) Vital Signs (24h Range):  Temp:  [97.7 °F (36.5 °C)-98.4 °F (36.9 °C)] 98.2 °F (36.8 °C)  Pulse:  [62-88] 73  Resp:  [0-29] 14  SpO2:  [90 %-100 %] 99 %  BP: (104-150)/(60-91) 133/75     Weight: 85.4 kg (188 lb 4.4 oz)  Body mass index is 34.44 kg/m².    Intake/Output Summary (Last 24 hours) at 6/1/2025 1522  Last data filed at 6/1/2025 0616  Gross per 24 hour   Intake 300 ml   Output 450 ml   Net -150 ml         Physical Exam  Constitutional:       General: She is in acute distress.   Cardiovascular:      Rate and Rhythm: Normal rate and regular rhythm.   Pulmonary:      Effort: Respiratory distress present.      Breath sounds: Stridor present.   Neurological:      General: No focal deficit present.      Mental Status: She is alert and oriented to person, place, and time.               Significant Labs: All pertinent labs within the past 24 hours have been reviewed.  CBC:   Recent Labs   Lab 05/31/25 0626 06/01/25 0335   WBC 10.37 8.45   HGB 12.2 12.5   HCT 38.4 40.2    243     CMP:   Recent Labs   Lab 05/31/25 0626 06/01/25  0335    135*   K 4.3 4.5    102   CO2 26 26    123*   BUN 18 14   CREATININE 0.8 0.7   CALCIUM 8.8 9.0   ANIONGAP 6* 7*       Significant Imaging: I have reviewed all pertinent imaging results/findings within the past 24 hours.      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   EMILY was  placed to the ostial/proximal/mid LAD   lifelong ASA, plavix for at least 1 year, high intensity statin  Ok to dc per cardio  PT/OT consulted, patient may need placement   ECHO showed ef 50-55    Hypothyroidism  Resume synthroid    Gastroesophageal reflux disease without esophagitis  Continue PPI    Hyperlipidemia  Continue statin    Intertrigo  Nystatin to breast folds and groin    Shortness of breath  Sats are stable  Maybe positioning and size of pts neck causing obstruction, reposition   Trial of steroids and duonebs    Cxr showing possible fluid vs infection. F/u procal, dc abx if unremarkable   Will try lasix also   Repeat cxr in the morning   VTE Risk Mitigation (From admission, onward)      None            Discharge Planning   OSMAN: 6/1/2025     Code Status: Full Code   Medical Readiness for Discharge Date:   Discharge Plan A: Home with family                Please place Justification for DME        Chasity Vargas MD  Department of Hospital Medicine   [facility]

## 2025-06-01 NOTE — SUBJECTIVE & OBJECTIVE
Interval History:  Patient seen and examined this morning, reports some shortness for breath.  Appears to be stridor, patient has head tilted forward and seems to be in a uncomfortable position.  We will give some steroids and breathing treatments and monitor and reposition patient.    Review of Systems   Constitutional:  Negative for appetite change.   Respiratory:  Positive for shortness of breath.    Cardiovascular:  Negative for chest pain.     Objective:     Vital Signs (Most Recent):  Temp: 98.2 °F (36.8 °C) (06/01/25 0400)  Pulse: 73 (06/01/25 1412)  Resp: 14 (06/01/25 1412)  BP: 133/75 (06/01/25 0929)  SpO2: 99 % (06/01/25 1412) Vital Signs (24h Range):  Temp:  [97.7 °F (36.5 °C)-98.4 °F (36.9 °C)] 98.2 °F (36.8 °C)  Pulse:  [62-88] 73  Resp:  [0-29] 14  SpO2:  [90 %-100 %] 99 %  BP: (104-150)/(60-91) 133/75     Weight: 85.4 kg (188 lb 4.4 oz)  Body mass index is 34.44 kg/m².    Intake/Output Summary (Last 24 hours) at 6/1/2025 1522  Last data filed at 6/1/2025 0616  Gross per 24 hour   Intake 300 ml   Output 450 ml   Net -150 ml         Physical Exam  Constitutional:       General: She is in acute distress.   Cardiovascular:      Rate and Rhythm: Normal rate and regular rhythm.   Pulmonary:      Effort: Respiratory distress present.      Breath sounds: Stridor present.   Neurological:      General: No focal deficit present.      Mental Status: She is alert and oriented to person, place, and time.               Significant Labs: All pertinent labs within the past 24 hours have been reviewed.  CBC:   Recent Labs   Lab 05/31/25 0626 06/01/25 0335   WBC 10.37 8.45   HGB 12.2 12.5   HCT 38.4 40.2    243     CMP:   Recent Labs   Lab 05/31/25 0626 06/01/25  0335    135*   K 4.3 4.5    102   CO2 26 26    123*   BUN 18 14   CREATININE 0.8 0.7   CALCIUM 8.8 9.0   ANIONGAP 6* 7*       Significant Imaging: I have reviewed all pertinent imaging results/findings within the past 24 hours.

## 2025-06-01 NOTE — PLAN OF CARE
Problem: Adult Inpatient Plan of Care  Goal: Optimal Comfort and Wellbeing  Outcome: Progressing  Goal: Readiness for Transition of Care  Outcome: Progressing     Problem: Wound  Goal: Optimal Coping  Outcome: Progressing  Goal: Optimal Functional Ability  Outcome: Progressing  Goal: Absence of Infection Signs and Symptoms  Outcome: Progressing  Goal: Optimal Pain Control and Function  Outcome: Progressing  Goal: Skin Health and Integrity  Outcome: Progressing  Goal: Optimal Wound Healing  Outcome: Progressing     Problem: Skin Injury Risk Increased  Goal: Skin Health and Integrity  Outcome: Progressing

## 2025-06-01 NOTE — ASSESSMENT & PLAN NOTE
Sats are stable  Maybe positioning and size of pts neck causing obstruction, reposition   Trial of steroids and duonebs    Cxr showing possible fluid vs infection. F/u procal, dc abx if unremarkable   Will try lasix also   Repeat cxr in the morning

## 2025-06-01 NOTE — PT/OT/SLP PROGRESS
Physical Therapy Treatment    Patient Name:  Aida Gupta   MRN:  [id number]    Recommendations:     Discharge Recommendations: Moderate Intensity Therapy  Discharge Equipment Recommendations: none  Barriers to discharge: increased assist with mobility, decreased activity tolerance, balance deficits    Assessment:     Aida Gupta is a 74 y.o. female admitted with a medical diagnosis of STEMI (ST elevation myocardial infarction).  She presents with the following impairments/functional limitations: impaired endurance, weakness, impaired self care skills, impaired functional mobility, gait instability, impaired balance, decreased upper extremity function, decreased lower extremity function, decreased safety awareness, pain, impaired cardiopulmonary response to activity, impaired skin.    Pt sleeping and easily roused by calling name. Pt reports being on bed pan but unable to have BM. Mod assist to roll for bed pan to be removed. Pt reports back pain and request pain medication and nausea medications. Nurse with other pt but informed post session.    Pt performed supine to sit transfer with mod assist and verbal cuing. Initial left lateral lean requiring mod assist to correct. Pt able to maintain balance EOB with contact guard assist.    Pt performed sit to stand transfer with RW and min assist. Slightly posterior lean but able to correct with facilitation and min assist.    Pt ambulated 20' with RW and min assist. Pt with slow pacing and flexed posture. Decreased step height and length. Pt complained of back pain.    Mod assist to return to bed. Max assist to scoot towards HOB with bed in trendelenburg.    Rehab Prognosis: Fair; patient would benefit from acute skilled PT services to address these deficits and reach maximum level of function.    Recent Surgery: Procedure(s) (LRB):  Left heart cath (Left) 3 Days Post-Op    Plan:     During this hospitalization, patient to be seen daily to address the  identified rehab impairments via gait training, therapeutic activities, therapeutic exercises, neuromuscular re-education and progress toward the following goals:    Plan of Care Expires:  25    Subjective     Chief Complaint: back pain  Patient/Family Comments/goals: to get better  Pain/Comfort:  Pain Rating 1: other (see comments) (not rated)  Location 1: back  Pain Addressed 1: Reposition, Distraction, Cessation of Activity, Nurse notified  Pain Rating Post-Intervention 1: other (see comments) (not rated)      Objective:     Communicated with nurse prior to session.  Patient found HOB elevated with telemetry, blood pressure cuff, pulse ox (continuous), PureWick, SCD, oxygen upon PT entry to room.     General Precautions: Standard, fall  Orthopedic Precautions: N/A  Braces: N/A  Respiratory Status: 2 L/m O2 via Oxymask     Functional Mobility:  Bed Mobility:     Scooting: maximal assistance and trendelenburg  Supine to Sit: moderate assistance  Sit to Supine: moderate assistance  Transfers:     Sit to Stand:  minimum assistance with rolling walker  Gait: x 20' with RW and Demario, deficits as stated above      AM-PAC 6 CLICK MOBILITY          Treatment & Education:  Pt educated on importance of time OOB, importance of intermittent mobility, safe techniques for transfers/ambulation, discharge recommendations/options, and use of call light for assistance and fall prevention.      Patient left HOB elevated with all lines intact, call button in reach, and nurse notified..    GOALS:   Multidisciplinary Problems       Physical Therapy Goals          Problem: Physical Therapy    Goal Priority Disciplines Outcome Interventions   Physical Therapy Goal     PT, PT/OT Progressing    Description: Goals to be met by: 25     Patient will increase functional independence with mobility by performin. Supine to sit with Supervision  2. Sit to stand transfer with Supervision  3. Bed to chair transfer with Supervision  using Rolling Walker  4. Gait  x 100 feet with Supervision using Rolling Walker.                              DME Justifications:  No DME recommended requiring DME justifications    Time Tracking:     PT Received On: 06/01/25  PT Start Time: 1053     PT Stop Time: 1116  PT Total Time (min): 23 min     Billable Minutes: Gait Training 10 and Therapeutic Activity 13    Treatment Type: Treatment  PT/PTA: PTA     Number of PTA visits since last PT visit: 1 06/01/2025

## 2025-06-01 NOTE — PLAN OF CARE
Patient found lying in bed. Awakes spontaneously gcs 15 calm pleasant.    We assisted her to chair with moderate+ assistance w/ pt and walker, and walked a few steps in the room but had a hard time. Once she was in the chair she just had her head on the table and was c/o pain in back and in general. Also appeared a bit orthostatic. Asked to go back to bed, done.    Not eating much just one or two bites of meals, feels nauseous and full. Poor dentition. Consult dietician.    Given bowel regimen. Passing gas soft belly, asked to be put on bedpan but no result so far. Attempt to push fluid. Has been oliguric. Was straight cath'd yesterday and previously for small amounts. I did spot bladder scan around midday ,could not appreciate almost anything. I had changed purewick. Finally she voided 50mL yellow urine per purewick (did not appear very concentrated), and perhaps another 50mL in the bed.    With intertriginous moisture dermatitis pannus and bilat breasts and gluteal cleft, cleaned, dried, wick w/ pillowcases, miconazole powder.    Generally just sleeping throughout the day. Not much spontaneous conversation. Generally flat affect / withdrawn.    She had some audible wheeze this morning which improved w/ breathing treatment. Afterwards did ok while awake on room air but otherwise needs ~2lpm oxymask when sleeping. No cough. Encourage pulmonary toilet.    The right groin s/p angiogram site is soft , removed gauze, open to air no bruising.    Given norco to good effect for general aches and pains, and c/o right leg pain. Distal pulses to feet as before. And given zofran prn nausea to good effect.    The bed is low and alarm on. Clinical alarms reviewed and armed. Monitor strip printed and reviewed, sinus rhythm 90s. Turning q2h, lines and extremities padded, float heels w/ boots, offloading. SCDs in place / on.    I called and updated daughter Minor and also informed her that [employer] members were seeking to do power of   (copy in the chart. Not notarized however although there is ongoing research to verify if that is necessary. If it becomes necessary the lady who was in the room doing the paperwork yesterday was Jazmin Avila [phone]). I did speak with the patient in private and confirmed that that is what she wants and was not coerced into doing that.    No visitors today.

## 2025-06-01 NOTE — CARE UPDATE
05/31/25 1918   Patient Assessment/Suction   Level of Consciousness (AVPU) alert   Respiratory Effort Normal;Unlabored   Expansion/Accessory Muscles/Retractions no use of accessory muscles;no retractions   All Lung Fields Breath Sounds wheezes, expiratory   Rhythm/Pattern, Respiratory unlabored;pattern regular   Cough Frequency no cough   Skin Integrity   $ Wound Care Tech Time 15 min   Area Observed Left;Right;Cheek;Bridge of nose;Back of head   Skin Appearance without discoloration   PRE-TX-O2   Device (Oxygen Therapy) high flow mask   $ Is the patient on Low Flow Oxygen? Yes   Flow (L/min) (Oxygen Therapy) 4   SpO2 99 %   Pulse Oximetry Type Continuous   Pulse 67   Resp 15   Aerosol Therapy   $ Aerosol Therapy Charges Aerosol Treatment  (duo)   Daily Review of Necessity (SVN) completed   Respiratory Treatment Status (SVN) given   Treatment Route (SVN) mask;oxygen   Patient Position HOB elevated   Post Treatment Assessment (SVN) increased aeration   Signs of Intolerance (SVN) none   Breath Sounds Post-Respiratory Treatment   Throughout All Fields Post-Treatment All Fields   Throughout All Fields Post-Treatment aeration increased   Post-treatment Heart Rate (beats/min) 67   Post-treatment Resp Rate (breaths/min) 15

## 2025-06-02 LAB
ABSOLUTE EOSINOPHIL (SMH): 0 K/UL
ABSOLUTE MONOCYTE (SMH): 0.55 K/UL (ref 0.3–1)
ABSOLUTE NEUTROPHIL COUNT (SMH): 9.5 K/UL (ref 1.8–7.7)
ANION GAP (SMH): 8 MMOL/L (ref 8–16)
BASOPHILS # BLD AUTO: 0.01 K/UL
BASOPHILS NFR BLD AUTO: 0.1 %
BUN SERPL-MCNC: 15 MG/DL (ref 8–23)
CALCIUM SERPL-MCNC: 9.6 MG/DL (ref 8.7–10.5)
CHLORIDE SERPL-SCNC: 98 MMOL/L (ref 95–110)
CO2 SERPL-SCNC: 29 MMOL/L (ref 23–29)
CREAT SERPL-MCNC: 0.7 MG/DL (ref 0.5–1.4)
ERYTHROCYTE [DISTWIDTH] IN BLOOD BY AUTOMATED COUNT: 13.1 % (ref 11.5–14.5)
GFR SERPLBLD CREATININE-BSD FMLA CKD-EPI: >60 ML/MIN/1.73/M2
GLUCOSE SERPL-MCNC: 199 MG/DL (ref 70–110)
HCT VFR BLD AUTO: 38.3 % (ref 37–48.5)
HGB BLD-MCNC: 12.5 GM/DL (ref 12–16)
IMM GRANULOCYTES # BLD AUTO: 0.05 K/UL (ref 0–0.04)
IMM GRANULOCYTES NFR BLD AUTO: 0.5 % (ref 0–0.5)
LYMPHOCYTES # BLD AUTO: 0.74 K/UL (ref 1–4.8)
MAGNESIUM SERPL-MCNC: 2.1 MG/DL (ref 1.6–2.6)
MCH RBC QN AUTO: 29.8 PG (ref 27–31)
MCHC RBC AUTO-ENTMCNC: 32.6 G/DL (ref 32–36)
MCV RBC AUTO: 91 FL (ref 82–98)
NUCLEATED RBC (/100WBC) (SMH): 0 /100 WBC
PLATELET # BLD AUTO: 294 K/UL (ref 150–450)
PMV BLD AUTO: 8.7 FL (ref 9.2–12.9)
POTASSIUM SERPL-SCNC: 4.5 MMOL/L (ref 3.5–5.1)
PROCALCITONIN SERPL-MCNC: <0.05 NG/ML
RBC # BLD AUTO: 4.19 M/UL (ref 4–5.4)
RELATIVE EOSINOPHIL (SMH): 0 % (ref 0–8)
RELATIVE LYMPHOCYTE (SMH): 6.8 % (ref 18–48)
RELATIVE MONOCYTE (SMH): 5.1 % (ref 4–15)
RELATIVE NEUTROPHIL (SMH): 87.5 % (ref 38–73)
SODIUM SERPL-SCNC: 135 MMOL/L (ref 136–145)
WBC # BLD AUTO: 10.84 K/UL (ref 3.9–12.7)

## 2025-06-02 PROCEDURE — 94640 AIRWAY INHALATION TREATMENT: CPT

## 2025-06-02 PROCEDURE — 27000221 HC OXYGEN, UP TO 24 HOURS

## 2025-06-02 PROCEDURE — 84145 PROCALCITONIN (PCT): CPT | Performed by: INTERNAL MEDICINE

## 2025-06-02 PROCEDURE — 36415 COLL VENOUS BLD VENIPUNCTURE: CPT | Performed by: STUDENT IN AN ORGANIZED HEALTH CARE EDUCATION/TRAINING PROGRAM

## 2025-06-02 PROCEDURE — 83735 ASSAY OF MAGNESIUM: CPT

## 2025-06-02 PROCEDURE — 63600175 PHARM REV CODE 636 W HCPCS

## 2025-06-02 PROCEDURE — 97535 SELF CARE MNGMENT TRAINING: CPT

## 2025-06-02 PROCEDURE — 25000242 PHARM REV CODE 250 ALT 637 W/ HCPCS: Performed by: STUDENT IN AN ORGANIZED HEALTH CARE EDUCATION/TRAINING PROGRAM

## 2025-06-02 PROCEDURE — 25000003 PHARM REV CODE 250: Performed by: INTERNAL MEDICINE

## 2025-06-02 PROCEDURE — 99900031 HC PATIENT EDUCATION (STAT)

## 2025-06-02 PROCEDURE — 36415 COLL VENOUS BLD VENIPUNCTURE: CPT | Performed by: INTERNAL MEDICINE

## 2025-06-02 PROCEDURE — 94664 DEMO&/EVAL PT USE INHALER: CPT

## 2025-06-02 PROCEDURE — 63600175 PHARM REV CODE 636 W HCPCS: Performed by: INTERNAL MEDICINE

## 2025-06-02 PROCEDURE — 25000003 PHARM REV CODE 250: Performed by: STUDENT IN AN ORGANIZED HEALTH CARE EDUCATION/TRAINING PROGRAM

## 2025-06-02 PROCEDURE — 63600175 PHARM REV CODE 636 W HCPCS: Performed by: STUDENT IN AN ORGANIZED HEALTH CARE EDUCATION/TRAINING PROGRAM

## 2025-06-02 PROCEDURE — 99900035 HC TECH TIME PER 15 MIN (STAT)

## 2025-06-02 PROCEDURE — 94761 N-INVAS EAR/PLS OXIMETRY MLT: CPT

## 2025-06-02 PROCEDURE — 11000001 HC ACUTE MED/SURG PRIVATE ROOM

## 2025-06-02 PROCEDURE — 80048 BASIC METABOLIC PNL TOTAL CA: CPT | Performed by: STUDENT IN AN ORGANIZED HEALTH CARE EDUCATION/TRAINING PROGRAM

## 2025-06-02 PROCEDURE — 97116 GAIT TRAINING THERAPY: CPT | Mod: CQ

## 2025-06-02 PROCEDURE — 25000003 PHARM REV CODE 250

## 2025-06-02 PROCEDURE — 85025 COMPLETE CBC W/AUTO DIFF WBC: CPT

## 2025-06-02 RX ORDER — FUROSEMIDE 10 MG/ML
40 INJECTION INTRAMUSCULAR; INTRAVENOUS ONCE
Status: COMPLETED | OUTPATIENT
Start: 2025-06-02 | End: 2025-06-02

## 2025-06-02 RX ADMIN — MUPIROCIN 1 G: 20 OINTMENT TOPICAL at 09:06

## 2025-06-02 RX ADMIN — ARFORMOTEROL TARTRATE 15 MCG: 15 SOLUTION RESPIRATORY (INHALATION) at 08:06

## 2025-06-02 RX ADMIN — ATORVASTATIN CALCIUM 40 MG: 40 TABLET, FILM COATED ORAL at 08:06

## 2025-06-02 RX ADMIN — MUPIROCIN 1 G: 20 OINTMENT TOPICAL at 08:06

## 2025-06-02 RX ADMIN — ARFORMOTEROL TARTRATE 15 MCG: 15 SOLUTION RESPIRATORY (INHALATION) at 06:06

## 2025-06-02 RX ADMIN — ASPIRIN 81 MG: 81 TABLET ORAL at 09:06

## 2025-06-02 RX ADMIN — ESCITALOPRAM OXALATE 20 MG: 10 TABLET ORAL at 08:06

## 2025-06-02 RX ADMIN — HYDROCODONE BITARTRATE AND ACETAMINOPHEN 1 TABLET: 10; 325 TABLET ORAL at 05:06

## 2025-06-02 RX ADMIN — HYDROCODONE BITARTRATE AND ACETAMINOPHEN 1 TABLET: 10; 325 TABLET ORAL at 08:06

## 2025-06-02 RX ADMIN — DEXAMETHASONE SODIUM PHOSPHATE 10 MG: 10 INJECTION INTRAMUSCULAR; INTRAVENOUS at 09:06

## 2025-06-02 RX ADMIN — METOPROLOL TARTRATE 12.5 MG: 25 TABLET, FILM COATED ORAL at 09:06

## 2025-06-02 RX ADMIN — FUROSEMIDE 40 MG: 10 INJECTION, SOLUTION INTRAMUSCULAR; INTRAVENOUS at 01:06

## 2025-06-02 RX ADMIN — PANTOPRAZOLE SODIUM 40 MG: 40 TABLET, DELAYED RELEASE ORAL at 05:06

## 2025-06-02 RX ADMIN — MICONAZOLE NITRATE: 20 POWDER TOPICAL at 09:06

## 2025-06-02 RX ADMIN — SENNOSIDES, DOCUSATE SODIUM 1 TABLET: 50; 8.6 TABLET, FILM COATED ORAL at 05:06

## 2025-06-02 RX ADMIN — METOPROLOL TARTRATE 12.5 MG: 25 TABLET, FILM COATED ORAL at 08:06

## 2025-06-02 RX ADMIN — LEVOTHYROXINE SODIUM 150 MCG: 0.1 TABLET ORAL at 05:06

## 2025-06-02 RX ADMIN — HYDROCODONE BITARTRATE AND ACETAMINOPHEN 1 TABLET: 10; 325 TABLET ORAL at 01:06

## 2025-06-02 RX ADMIN — ONDANSETRON 4 MG: 2 INJECTION INTRAMUSCULAR; INTRAVENOUS at 10:06

## 2025-06-02 RX ADMIN — MICONAZOLE NITRATE: 20 POWDER TOPICAL at 03:06

## 2025-06-02 RX ADMIN — ONDANSETRON 4 MG: 2 INJECTION INTRAMUSCULAR; INTRAVENOUS at 07:06

## 2025-06-02 RX ADMIN — BUDESONIDE INHALATION 0.5 MG: 0.5 SUSPENSION RESPIRATORY (INHALATION) at 06:06

## 2025-06-02 RX ADMIN — CLOPIDOGREL 75 MG: 75 TABLET ORAL at 09:06

## 2025-06-02 RX ADMIN — Medication 6 MG: at 08:06

## 2025-06-02 RX ADMIN — ONDANSETRON 4 MG: 2 INJECTION INTRAMUSCULAR; INTRAVENOUS at 02:06

## 2025-06-02 RX ADMIN — MICONAZOLE NITRATE: 20 POWDER TOPICAL at 08:06

## 2025-06-02 RX ADMIN — BUDESONIDE INHALATION 0.5 MG: 0.5 SUSPENSION RESPIRATORY (INHALATION) at 08:06

## 2025-06-02 NOTE — CARE UPDATE
06/01/25 2108   PRE-TX-O2   Device (Oxygen Therapy) other (comment)  (AVAPS)   SpO2 96 %   Pulse Oximetry Type Intermittent   $ Pulse Oximetry - Multiple Charge Pulse Oximetry - Multiple   Resp 18   Preset CPAP/BiPAP Settings   Mode Of Delivery AVAPS   $ CPAP/BiPAP Daily Charge 1   CPAP/BIPAP charged w/in last 24 h YES   $ Initial CPAP/BiPAP Setup? Yes   $ Is patient using? Yes   Equipment Type V60   Humidifier not applicable   EPAP (cm H2O) 10   AVAPS Min P (cm H2O) 14   AVAPS Max P (cm H2O) 30   Set Tidal Volume (mL) 400 mL   Set Rate (Breaths/Min) 16   ITime (sec) 0.9   Rise Time (sec) 3   Patient CPAP/BiPAP Settings   CPAP/BIPAP ID 3   RR Total (Breaths/Min) 24   Tidal Volume (mL) 393   VE Minute Ventilation (L/min) 9.6 L/min   Peak Inspiratory Pressure (cm H2O) 13   TiTOT (%) 38   Total Leak (L/Min) 26   Patient Trigger - ST Mode Only (%) 100   CPAP/BiPAP Backup Settings   EPAP Backup 10 cmH2O   CPAP/BiPAP Alarms   High Pressure (cm H2O) 40   Low Pressure (cm H2O) 7   Minute Ventilation (L/Min) 3   High RR (breaths/min) 40   Low RR (breaths/min) 10   Apnea (Sec) 20

## 2025-06-02 NOTE — PT/OT/SLP PROGRESS
Occupational Therapy   Treatment    Name: Aida Gupta  MRN: [id number]  Admitting Diagnosis:  STEMI (ST elevation myocardial infarction)  4 Days Post-Op    Recommendations:     Discharge Recommendations: Moderate Intensity Therapy  Discharge Equipment Recommendations:  none  Barriers to discharge:   (increased physical assistance with ADLs and functional mobility.)    Assessment:     Aida Gupta is a 74 y.o. female with a medical diagnosis of STEMI (ST elevation myocardial infarction).  She presents with general weakness. Performance deficits affecting function are weakness, impaired endurance, impaired self care skills, impaired functional mobility, gait instability, impaired balance, decreased safety awareness, decreased lower extremity function, decreased upper extremity function.     Rehab Prognosis:  Fair; patient would benefit from acute skilled OT services to address these deficits and reach maximum level of function.       Plan:     Patient to be seen 5 x/week to address the above listed problems via self-care/home management, therapeutic activities, therapeutic exercises  Plan of Care Expires: 06/30/25  Plan of Care Reviewed with: patient    Subjective     Chief Complaint: General weakness  Patient/Family Comments/goals: improved functional mobility and ADL independence.   Pain/Comfort:  Pain Rating 1: 0/10  Pain Rating Post-Intervention 1: 0/10    Objective:     Communicated with: nurse prior to session.  Patient found HOB elevated with telemetry, peripheral IV, PureWick, oxygen upon OT entry to room.    General Precautions: Standard, fall    Orthopedic Precautions:N/A  Braces: N/A  Respiratory Status: Nasal cannula, flow 2 L/min     Occupational Performance:     Bed Mobility:    Patient completed Scooting/Bridging with minimum assistance  Patient completed Supine to Sit with minimum assistance  Patient completed Sit to Supine with minimum assistance   Performed unsupported sitting EOB with  contact guard assistance.     Activities of Daily Living:  Grooming: contact guard assistance to brush teeth and wash face sitting EOB.       [facility] 6 Click ADL:      Treatment & Education:  Patient encouraged to sit up in chair in future sessions.     Patient left HOB elevated with all lines intact, call button in reach, and bed alarm on    GOALS:   Multidisciplinary Problems       Occupational Therapy Goals          Problem: Occupational Therapy    Goal Priority Disciplines Outcome Interventions   Occupational Therapy Goal     OT, PT/OT     Description: Goals to be met by: 06/30/2025     Patient will increase functional independence with ADLs by performing:    Feeding with Minimal Assistance.  UE Dressing with Minimal Assistance.  LE Dressing with Minimal Assistance.  Grooming while standing at sink with Minimal Assistance.  Toileting from bedside commode with Minimal Assistance for hygiene and clothing management.   Rolling to Bilateral with Minimal Assistance.   Supine to sit with Contact Guard Assistance.  Stand pivot transfers with Contact Guard Assistance.  Toilet transfer to bedside commode with Stand-by Assistance.                         Time Tracking:     OT Date of Treatment: 06/02/25  OT Start Time: 0930  OT Stop Time: 0947  OT Total Time (min): 17 min    Billable Minutes:Self Care/Home Management 17    OT/[place]: OT          6/2/2025

## 2025-06-02 NOTE — ASSESSMENT & PLAN NOTE
Sats are stable  Maybe positioning and size of pts neck causing obstruction, reposition   Trial of steroids and duonebs    Cxr showing possible fluid vs infection. F/u procal, dc abx if unremarkable   Will try lasix also

## 2025-06-02 NOTE — PROGRESS NOTES
[facility]  Progress Note    Patient Name: Aida Gupta  MRN: [id number]  Patient Class: IP- Inpatient   Admission Date: 5/29/2025  Length of Stay: 4 days  Attending Physician: Edel Jameson MD  Primary Care Provider: Edel Fisher MD        Subjective     Principal Problem:STEMI (ST elevation myocardial infarction)        HPI:  This is a very pleasant 74-year-old lady with comorbid conditions of hypothyroidism, GERD, [facility] complaints of intermittent chest pain or loss few days.  Patient reports she felt as though it may has been heartburn and treated herself as such with no improvement in symptoms and due to the persistence she presented to the ED. patient found to have ST elevations, Dr. Bailey was contacted and could STEMI and cath lab were emergently activated.  Patient is now status post heart catheterization with bulky calcifications noted.  There was consideration for transfer to [facility] for complex PCI, however, joint decision made to proceed.  Patient is now status post drug-eluting stent to the LAD.  Admission to ICU status post procedure.  On assessment patient is calm, cooperative and in no acute distress.  She is without chest pain.  She is vitally stable.    Overview/Hospital Course:  Patient with hypothyroidism, GERD, hyperlipidemia presented with chest pain.  Concerns for STEMI, cardiology consulted and patient taken to cath lab emergently.  Status post PCI of the ostial LAD, heavy calcium burden.  Started on dual antiplatelet therapy. PT/OT consulted.  SNF placement pending. Had some shortness of breath, seems to be stridor, possible from large neck and positioning, given breathing treatments and steroids. Abx started.    Interval History:  Patient seen and examined this morning, reports some shortness of breath.  We will give some steroids and breathing treatments and monitor and reposition  patient.    Review of Systems   Constitutional:  Negative for appetite change.   Respiratory:  Positive for shortness of breath.    Cardiovascular:  Negative for chest pain.     Objective:     Vital Signs (Most Recent):  Temp: 98.6 °F (37 °C) (06/02/25 1114)  Pulse: 65 (06/02/25 1114)  Resp: 16 (06/02/25 1114)  BP: 128/84 (06/02/25 1114)  SpO2: 96 % (06/02/25 1114) Vital Signs (24h Range):  Temp:  [97.6 °F (36.4 °C)-98.6 °F (37 °C)] 98.6 °F (37 °C)  Pulse:  [65-82] 65  Resp:  [13-19] 16  SpO2:  [91 %-99 %] 96 %  BP: (110-146)/(69-96) 128/84     Weight: 95.5 kg (210 lb 8.6 oz)  Body mass index is 38.51 kg/m².    Intake/Output Summary (Last 24 hours) at 6/2/2025 1227  Last data filed at 6/2/2025 0523  Gross per 24 hour   Intake 300 ml   Output 1400 ml   Net -1100 ml         Physical Exam  Constitutional:       General: She is in acute distress.   Cardiovascular:      Rate and Rhythm: Normal rate and regular rhythm.   Pulmonary:      Comments: Diminished breath sounds bases  Neurological:      General: No focal deficit present.      Mental Status: She is alert and oriented to person, place, and time.               Significant Labs: All pertinent labs within the past 24 hours have been reviewed.  CBC:   Recent Labs   Lab 06/01/25  0335 06/02/25  0534   WBC 8.45 10.84   HGB 12.5 12.5   HCT 40.2 38.3    294     CMP:   Recent Labs   Lab 06/01/25  0335 06/02/25  0534   * 135*   K 4.5 4.5    98   CO2 26 29   * 199*   BUN 14 15   CREATININE 0.7 0.7   CALCIUM 9.0 9.6   ANIONGAP 7* 8       Significant Imaging: I have reviewed all pertinent imaging results/findings within the past 24 hours.      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   EMILY was placed to the ostial/proximal/mid LAD   lifelong ASA, plavix for at least 1 year, high intensity statin  PT/OT  consulted, recommend moderate intensity therapy  ECHO showed ef 50-55    Hypothyroidism  Continue synthroid    Gastroesophageal reflux disease without esophagitis  Continue PPI    Hyperlipidemia  Continue statin    Intertrigo  Nystatin to breast folds and groin    Shortness of breath  Sats are stable  Maybe positioning and size of pts neck causing obstruction, reposition   Trial of steroids and duonebs    Cxr showing possible fluid vs infection. F/u procal, dc abx if unremarkable   Will try lasix also     VTE Risk Mitigation (From admission, onward)      None            Discharge Planning   OSMAN: 6/5/2025     Code Status: Full Code   Medical Readiness for Discharge Date:   Discharge Plan A: Home with family   Discharge Delays: None known at this time            Please place Justification for DME        Edel Jameson MD, MD  Department of Hospital Medicine   [facility]

## 2025-06-02 NOTE — CARE UPDATE
06/01/25 2108   PRE-TX-O2   Device (Oxygen Therapy) other (comment)  (AVAPS)   Oxygen Concentration (%) 30   SpO2 96 %   Pulse Oximetry Type Intermittent   $ Pulse Oximetry - Multiple Charge Pulse Oximetry - Multiple   Resp 18   Ready to Wean/Extubation Screen   FIO2<=50 (chart decimal) 0.3   Preset CPAP/BiPAP Settings   Mode Of Delivery AVAPS   $ CPAP/BiPAP Daily Charge 1   CPAP/BIPAP charged w/in last 24 h YES   $ Initial CPAP/BiPAP Setup? Yes   $ Is patient using? Yes   Equipment Type V60   Humidifier not applicable   EPAP (cm H2O) 10   AVAPS Min P (cm H2O) 14   AVAPS Max P (cm H2O) 30   Set Tidal Volume (mL) 400 mL   Set Rate (Breaths/Min) 16   ITime (sec) 0.9   Rise Time (sec) 3   Patient CPAP/BiPAP Settings   CPAP/BIPAP ID 3   RR Total (Breaths/Min) 24   Tidal Volume (mL) 393   VE Minute Ventilation (L/min) 9.6 L/min   Peak Inspiratory Pressure (cm H2O) 13   TiTOT (%) 38   Total Leak (L/Min) 26   Patient Trigger - ST Mode Only (%) 100   CPAP/BiPAP Backup Settings   EPAP Backup 10 cmH2O   CPAP/BiPAP Alarms   High Pressure (cm H2O) 40   Low Pressure (cm H2O) 7   Minute Ventilation (L/Min) 3   High RR (breaths/min) 40   Low RR (breaths/min) 10   Apnea (Sec) 20

## 2025-06-02 NOTE — SUBJECTIVE & OBJECTIVE
Interval History:  Patient seen and examined this morning, reports some shortness of breath.  We will give some steroids and breathing treatments and monitor and reposition patient.    Review of Systems   Constitutional:  Negative for appetite change.   Respiratory:  Positive for shortness of breath.    Cardiovascular:  Negative for chest pain.     Objective:     Vital Signs (Most Recent):  Temp: 98.6 °F (37 °C) (06/02/25 1114)  Pulse: 65 (06/02/25 1114)  Resp: 16 (06/02/25 1114)  BP: 128/84 (06/02/25 1114)  SpO2: 96 % (06/02/25 1114) Vital Signs (24h Range):  Temp:  [97.6 °F (36.4 °C)-98.6 °F (37 °C)] 98.6 °F (37 °C)  Pulse:  [65-82] 65  Resp:  [13-19] 16  SpO2:  [91 %-99 %] 96 %  BP: (110-146)/(69-96) 128/84     Weight: 95.5 kg (210 lb 8.6 oz)  Body mass index is 38.51 kg/m².    Intake/Output Summary (Last 24 hours) at 6/2/2025 1227  Last data filed at 6/2/2025 0523  Gross per 24 hour   Intake 300 ml   Output 1400 ml   Net -1100 ml         Physical Exam  Constitutional:       General: She is in acute distress.   Cardiovascular:      Rate and Rhythm: Normal rate and regular rhythm.   Pulmonary:      Comments: Diminished breath sounds bases  Neurological:      General: No focal deficit present.      Mental Status: She is alert and oriented to person, place, and time.               Significant Labs: All pertinent labs within the past 24 hours have been reviewed.  CBC:   Recent Labs   Lab 06/01/25  0335 06/02/25  0534   WBC 8.45 10.84   HGB 12.5 12.5   HCT 40.2 38.3    294     CMP:   Recent Labs   Lab 06/01/25  0335 06/02/25  0534   * 135*   K 4.5 4.5    98   CO2 26 29   * 199*   BUN 14 15   CREATININE 0.7 0.7   CALCIUM 9.0 9.6   ANIONGAP 7* 8       Significant Imaging: I have reviewed all pertinent imaging results/findings within the past 24 hours.

## 2025-06-02 NOTE — CARE UPDATE
06/02/25 0658   Patient Assessment/Suction   Level of Consciousness (AVPU) alert   Respiratory Effort Normal;Unlabored   All Lung Fields Breath Sounds Anterior:;Lateral:;diminished   Rhythm/Pattern, Respiratory unlabored;pattern regular;depth regular   Cough Frequency no cough   Skin Integrity   $ Wound Care Tech Time 15 min   Area Observed Left;Right;Behind ear;Nares   Skin Appearance without discoloration   PRE-TX-O2   Device (Oxygen Therapy) high flow mask   $ Is the patient on Low Flow Oxygen? Yes   Flow (L/min) (Oxygen Therapy) 2   SpO2 96 %   Pulse Oximetry Type Intermittent   $ Pulse Oximetry - Multiple Charge Pulse Oximetry - Multiple   Pulse 68   Resp 17   Aerosol Therapy   $ Aerosol Therapy Charges Aerosol Treatment   Daily Review of Necessity (SVN) completed   Respiratory Treatment Status (SVN) given   Treatment Route (SVN) mask;oxygen   Patient Position HOB elevated   Post Treatment Assessment (SVN) breath sounds improved   Signs of Intolerance (SVN) none   Breath Sounds Post-Respiratory Treatment   Throughout All Fields Post-Treatment All Fields   Throughout All Fields Post-Treatment aeration increased   Post-treatment Heart Rate (beats/min) 65   Post-treatment Resp Rate (breaths/min) 17   Vibratory PEP Therapy   $ Vibratory PEP Charges Aerob[employer] Therapy   $ Vibratory PEP Equipment Aerob[employer] Equipment   $ Vibratory PEP Tech Time Charges 15 min   Daily Review of Necessity (PEP Therapy) completed   Type (PEP Therapy) vibratory/oscillatory   Device (PEP Therapy) flutter   Route (PEP Therapy) mouthpiece   Breaths per Cycle (PEP Therapy) 10   Cycles (PEP Therapy) 1   Settings (PEP Therapy) PEP 3   Patient Position (PEP Therapy) HOB elevated   Post Treatment Assessment (PEP) breath sounds unchanged   Signs of Intolerance (PEP Therapy) none   Education   $ Education Bronchodilator;15 min

## 2025-06-02 NOTE — CONSULTS
[facility]  Adult Nutrition   Consult Note (Initial Assessment)       SUMMARY     Recommendations  Recommendation/Intervention: 1. Continue Heart Healthy diet. 2. RD met [facility] patient for lunch choices. 3.  to meet with patient daily for menu choices.  Nutrition Goal Status: new  Communication of RD Recs: reviewed with RN    Nutrition Goals:  PO intake will meet >75% of estimated needs by RD follow up.    Nutrition Interventions: Fat modified diet, Cholesterol modified diet, and Sodium modified diet      Nutrition Diagnosis   Nutrition PES Problem: Inadequate protein energy intake  Nutrition PES Etiology: Other (comment) (decreased appetite)  Nutrition PES Signs and Symptoms:  (< 25% EEN / EPN)  Nutrition PES Status: New      Dietitian Rounds Brief  RD consult fo r< 25% intake of meals. Patient taking breathing treatment at visit. Pt declined oral nutrition supplements offered. RD obtained lunch menu choices and  to obtain daily.  May consider an appetite stimulant should intake not improve in 48 hours. Last BM 5/30/25. RD to follow for intake, and status change PRN.    Nutrition Related Social Determinants of Health: SDOH: None Identified  Food Insecurity: No Food Insecurity (6/1/2025)    Hunger Vital Sign     Worried About Running Out of Food in the Last Year: Never true     Ran Out of Food in the Last Year: Never true   Recent Concern: Food Insecurity - Food Insecurity Present (5/2/2025)    Hunger Vital Sign     Worried About Running Out of Food in the Last Year: Sometimes true     Ran Out of Food in the Last Year: Never true         Malnutrition Assessment      No evidence of malnutrition at this time.                                 Diet order:   Diet Heart Healthy Standard Tray  Oral Nutrition Supplement: Patient declined             Evaluation of Received Nutrient/Fluid Intake  Energy Calories Required: not meeting needs  Protein Required: not meeting needs  Fluid Required: meeting  "needs  Tolerance: tolerating     % Intake of Estimated Energy Needs: 25 - 50 %  % Meal Intake: 25 - 50 %      Intake/Output Summary (Last 24 hours) at 6/2/2025 134  Last data filed at 6/2/2025 0523  Gross per 24 hour   Intake 300 ml   Output 1400 ml   Net -1100 ml        Anthropometrics  Height: 5' 2" (157.5 cm)  Height (inches): 62 in  Height Method: Measured  Weight: 95.5 kg (210 lb 8.6 oz)  Weight (lb): 210.54 lb  Weight Method: Bed Scale  Ideal Body Weight (IBW), Female: 110 lb  % Ideal Body Weight, Female (lb): 170.91 %  BMI (Calculated): 38.5  BMI Grade: 35 - 39.9 - obesity - grade II       Estimated/Assessed Needs   No signs of malnutrition at this time.        Reason for Assessment  Reason For Assessment: consult  Diagnosis: cardiac disease (NSTEMI)  General Information Comments: Patient  of hypothyroidism, GERD, hyperlipidemia presents to [facility] Emergency Department with complaints of intermittent chest pain for the past  few days.    Final diagnoses:  None     Past Medical History:   Diagnosis Date    Asthma     chronic bronchitis    Hx of colectomy     Shingles     1 year ago     Thyroid disease         Nutrition/Diet History  Nutrition Intake History: No reported poor intake or appetite prior to admit.  Food Preferences:  obtained.  Spiritual, Cultural Beliefs, Gnosticism Practices, Values that Affect Care: no  Food Allergies: NKFA  Factors Affecting Nutritional Intake: decreased appetite    Nutrition Risk Screen          Wound 05/30/25 1600 Rash Right Breast-Wound Image: Images linked       Wound 05/30/25 1412 Moisture associated dermatitis Buttocks-Wound Image: Images linked       Wound 05/29/25 2250 Moisture associated dermatitis Left Breast-Wound Image: Images linked       Wound 05/29/25 2250 Moisture associated dermatitis Left Groin-Wound Image: Images linked       Wound 05/29/25 2250 Other (comment) Right Leg-Wound Image: Images linked  MST Score: 0  Have you recently lost weight without " trying?: No  Weight loss score: 0  Have you been eating poorly because of a decreased appetite?: No  Appetite score: 0       Weight History:  Wt Readings from Last 10 Encounters:   06/01/25 95.5 kg (210 lb 8.6 oz)   05/29/25 86.2 kg (190 lb)   05/02/25 86.7 kg (191 lb 2.2 oz)   10/25/24 80.1 kg (176 lb 9.4 oz)   04/26/24 90 kg (198 lb 6.6 oz)   01/19/24 83.1 kg (183 lb 3.2 oz)   01/04/24 92 kg (202 lb 13.2 oz)   10/26/23 92.3 kg (203 lb 7.8 oz)   08/03/23 94.6 kg (208 lb 8.9 oz)   07/20/23 95.3 kg (210 lb 1.6 oz)        Lab/Procedures/Meds:   Pertinent Labs Reviewed: reviewed  Pertinent Medications Reviewed: reviewed  Medications:Pertinent Medications Reviewed  Scheduled Meds:   budesonide  0.5 mg Nebulization Q12H    And    arformoteroL  15 mcg Nebulization BID    aspirin  81 mg Oral Daily    atorvastatin  40 mg Oral QHS    azithromycin  500 mg Intravenous Q24H    clopidogreL  75 mg Oral Daily    dexAMETHasone injection  10 mg Intravenous Daily    EScitalopram oxalate  20 mg Oral QHS    levothyroxine  150 mcg Oral Before breakfast    metoprolol tartrate  12.5 mg Oral BID    miconazole NITRATE 2 %   Topical (Top) TID    mupirocin   Nasal BID    pantoprazole  40 mg Oral Daily     Continuous Infusions:   nitroGLYCERIN in 5 % dextrose    Continuous PRN   Stopped at 05/29/25 2230     PRN Meds:.  Current Facility-Administered Medications:     acetaminophen, 650 mg, Oral, Q8H PRN    acetaminophen, 650 mg, Oral, Q4H PRN    albuterol-ipratropium, 3 mL, Nebulization, Q4H PRN    aluminum-magnesium hydroxide-simethicone, 30 mL, Oral, QID PRN    dextrose 50%, 12.5 g, Intravenous, PRN    dextrose 50%, 25 g, Intravenous, PRN    gabapentin, 600 mg, Oral, BID PRN    glucagon (human recombinant), 1 mg, Intramuscular, PRN    glucose, 16 g, Oral, PRN    glucose, 24 g, Oral, PRN    HYDROcodone-acetaminophen, 1 tablet, Oral, Q4H PRN    HYDROcodone-acetaminophen, 1 tablet, Oral, Q4H PRN    hydrOXYzine HCL, 25 mg, Oral, TID PRN     "magnesium oxide, 800 mg, Oral, PRN    magnesium oxide, 800 mg, Oral, PRN    melatonin, 6 mg, Oral, Nightly PRN    naloxone, 0.02 mg, Intravenous, PRN    nitroGLYCERIN in 5 % dextrose, , , Continuous PRN    ondansetron, 4 mg, Intravenous, Q6H PRN    potassium bicarbonate, 35 mEq, Oral, PRN    potassium bicarbonate, 50 mEq, Oral, PRN    potassium bicarbonate, 60 mEq, Oral, PRN    potassium, sodium phosphates, 2 packet, Oral, PRN    potassium, sodium phosphates, 2 packet, Oral, PRN    potassium, sodium phosphates, 2 packet, Oral, PRN    senna-docusate, 1 tablet, Oral, Daily PRN    simethicone, 2 tablet, Oral, TID PRN    Labs: Pertinent Labs Reviewed  Clinical Chemistry:  Recent Labs   Lab 05/29/25  1844 05/30/25 0339 06/02/25  0534      < > 135*   K 4.5   < > 4.5      < > 98   CO2 19*   < > 29   *   < > 199*   BUN 17   < > 15   CREATININE 0.8   < > 0.7   CALCIUM 9.9   < > 9.6   PROT 8.1  --   --    ALBUMIN 3.9  --   --    BILITOT 0.9  --   --    ALKPHOS 70  --   --    AST 27  --   --    ALT 8*  --   --    ANIONGAP 15   < > 8   MG  --    < > 2.1   LIPASE 13  --   --     < > = values in this interval not displayed.     CBC:   Recent Labs   Lab 06/02/25  0534   WBC 10.84   RBC 4.19   HGB 12.5   HCT 38.3      MCV 91   MCH 29.8   MCHC 32.6     Lipid Panel:  No results for input(s): "CHOL", "HDL", "LDLCALC", "TRIG", "CHOLHDL" in the last 168 hours.  Cardiac Profile:  Recent Labs   Lab 05/29/25 1844   *   CPK 36     Inflammatory Labs:  Recent Labs   Lab 05/29/25 1844   CRP 52.5*     Diabetes:  No results for input(s): "HGBA1C", "POCTGLUCOSE" in the last 168 hours.  Thyroid & Parathyroid:  No results for input(s): "TSH", "FREET4", "F7DTFDR", "E2FIYMH", "THYROIDAB" in the last 168 hours.    Monitor and Evaluation  Monitor and Evaluation: Food and beverage intake     Discharge Planning  Nutrition Discharge Planning: Therapeutic diet (comments)  Therapeutic diet (comments): Heart " Healthydiet    Nutrition Risk  Level of Risk/Frequency of Follow-up:  (2 x / week)     Nutrition Follow-Up  RD Follow-up?: Yes

## 2025-06-02 NOTE — PLAN OF CARE
Dc reassessment  Pt has increase SOB today and CXR was suspect per MD.     Patient DOES NOT WANT HOME HEALTH PLEASE DO NOT OFFER TO PATIENT.      06/02/25 1159   Discharge Reassessment   Assessment Type Discharge Planning Reassessment   Did the patient's condition or plan change since previous assessment? Yes   Discharge Plan discussed with: Patient   Communicated OSMAN with patient/caregiver No   Discharge Plan A Home with family   Discharge Plan B Home with family   DME Needed Upon Discharge  none   Transition of Care Barriers None   Why the patient remains in the hospital Requires continued medical care   Post-Acute Status   Discharge Delays None known at this time

## 2025-06-02 NOTE — ASSESSMENT & PLAN NOTE
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   EMILY was placed to the ostial/proximal/mid LAD   lifelong ASA, plavix for at least 1 year, high intensity statin  PT/OT consulted, recommend moderate intensity therapy  ECHO showed ef 50-55

## 2025-06-02 NOTE — CARE UPDATE
06/01/25 2011   Aerosol Therapy   $ Aerosol Therapy Charges Aerosol Treatment  (Pulmicort given)   Daily Review of Necessity (SVN) completed   Signs of Intolerance (SVN) none   Vibratory PEP Therapy   $ Vibratory PEP Charges Aerobika Therapy   $ Vibratory PEP Tech Time Charges 15 min   Daily Review of Necessity (PEP Therapy) completed   Type (PEP Therapy) vibratory/oscillatory   Device (PEP Therapy) flutter   Route (PEP Therapy) mouthpiece   Breaths per Cycle (PEP Therapy) 8   Cycles (PEP Therapy) 1   Settings (PEP Therapy) PEP 3   Patient Position (PEP Therapy) HOB elevated   Post Treatment Assessment (PEP) breath sounds unchanged   Signs of Intolerance (PEP Therapy) none

## 2025-06-02 NOTE — PT/OT/SLP PROGRESS
"Physical Therapy Treatment    Patient Name:  Aida Gupta   MRN:  [id number]    Recommendations:     Discharge Recommendations: Moderate Intensity Therapy  Discharge Equipment Recommendations: none  Barriers to discharge: decreased endurance, decreased activity tolerance    Assessment:     Aida Gupta is a 74 y.o. female admitted with a medical diagnosis of STEMI (ST elevation myocardial infarction).  She presents with the following impairments/functional limitations: impaired endurance, weakness, impaired self care skills, impaired functional mobility, gait instability, impaired balance, decreased upper extremity function, decreased lower extremity function, decreased safety awareness, impaired cardiopulmonary response to activity, impaired skin     Pt was found in supine with head of bed elevated. Pt agreeable to treatment.     Pt performed supine to sit transfer with Demario. As pt sat up, pt reported leg pain where the bed rail was touching her R leg. Verbal cuing to get pt to scoot hips forward so that both legs are clear of bed rail and are in contact with the floor. Pt performed sit to stand transfer with rolling walker and contact guard assist. Pt reported back pain during transfer. Pt ambulated 20 ft around the bed with rolling walker and contact guard assist.     Pt reported "my knee is about to buckle" and abruptly sat down at the edge of bed. Verbal cuing for breathing techniques as pt was short of breath after ambulation. Pt returned to supine with head of bed elevated, all lines intact, call button in reach, and bed alarm on.     Rehab Prognosis: Fair; patient would benefit from acute skilled PT services to address these deficits and reach maximum level of function.    Recent Surgery: Procedure(s) (LRB):  Left heart cath (Left) 4 Days Post-Op    Plan:     During this hospitalization, patient to be seen daily to address the identified rehab impairments via gait training, therapeutic " activities, therapeutic exercises, neuromuscular re-education and progress toward the following goals:    Plan of Care Expires:  25    Subjective     Chief Complaint: back pain and knee buckling  Patient/Family Comments/goals: get stronger   Pain/Comfort:  Pain Rating 1: other (see comments)  Location 1: back      Objective:     Communicated with nurse prior to session.  Patient found HOB elevated with telemetry, blood pressure cuff, pulse ox (continuous), PureWick, SCD, oxygen upon PT entry to room.     General Precautions: Standard, fall  Orthopedic Precautions: N/A  Braces: N/A  Respiratory Status: oxymask 3 L/m O2      Functional Mobility:  Bed Mobility:     Supine to Sit: minimum assistance  Sit to Supine: minimum assistance  Transfers:     Sit to Stand:  contact guard assistance with rolling walker  Gait: 20 ft with RW and CGA       AM-PAC 6 CLICK MOBILITY          Treatment & Education:  Pt educated on importance of time OOB, importance of intermittent mobility, safe techniques for transfers/ambulation, discharge recommendations/options, and use of call light for assistance and fall prevention.     Patient left HOB elevated with all lines intact, call button in reach, bed alarm on, and nurse notified..    GOALS:   Multidisciplinary Problems       Physical Therapy Goals          Problem: Physical Therapy    Goal Priority Disciplines Outcome Interventions   Physical Therapy Goal     PT, PT/OT Progressing    Description: Goals to be met by: 25     Patient will increase functional independence with mobility by performin. Supine to sit with Supervision  2. Sit to stand transfer with Supervision  3. Bed to chair transfer with Supervision using Rolling Walker  4. Gait  x 100 feet with Supervision using Rolling Walker.                                Time Tracking:     PT Received On: 25  PT Start Time: 1122     PT Stop Time: 1138  PT Total Time (min): 16 min     Billable Minutes: Gait Training  16    Treatment Type: Treatment  PT/PTA: PTA     Number of PTA visits since last PT visit: 2     06/02/2025

## 2025-06-02 NOTE — CARE UPDATE
06/01/25 2006   Patient Assessment/Suction   Level of Consciousness (AVPU) alert   Respiratory Effort Normal;Unlabored   Expansion/Accessory Muscles/Retractions retractions minimal;tracheal tugging;no retractions;no use of accessory muscles   All Lung Fields Breath Sounds diminished   Rhythm/Pattern, Respiratory unlabored;pattern regular;no shortness of breath reported   Cough Frequency no cough   Skin Integrity   $ Wound Care Tech Time 15 min   Area Observed Bridge of nose;Behind ear   Skin Appearance without discoloration   PRE-TX-O2   Device (Oxygen Therapy) other (comment)  (Oxymask)   Flow (L/min) (Oxygen Therapy) 3   SpO2 96 %   Pulse Oximetry Type Intermittent   $ Pulse Oximetry - Multiple Charge Pulse Oximetry - Multiple   Aerosol Therapy   $ Aerosol Therapy Charges Aerosol Treatment  (Brovana given)   Daily Review of Necessity (SVN) completed   Respiratory Treatment Status (SVN) given   Treatment Route (SVN) mask;oxygen   Patient Position HOB elevated   Post Treatment Assessment (SVN) breath sounds unchanged   Signs of Intolerance (SVN) none   Respiratory Evaluation   $ Care Plan Tech Time 15 min

## 2025-06-03 LAB
ABSOLUTE EOSINOPHIL (SMH): 0 K/UL
ABSOLUTE MONOCYTE (SMH): 0.63 K/UL (ref 0.3–1)
ABSOLUTE NEUTROPHIL COUNT (SMH): 8.7 K/UL (ref 1.8–7.7)
ANION GAP (SMH): 7 MMOL/L (ref 8–16)
BASOPHILS # BLD AUTO: 0.01 K/UL
BASOPHILS NFR BLD AUTO: 0.1 %
BUN SERPL-MCNC: 25 MG/DL (ref 8–23)
CALCIUM SERPL-MCNC: 9.8 MG/DL (ref 8.7–10.5)
CHLORIDE SERPL-SCNC: 98 MMOL/L (ref 95–110)
CO2 SERPL-SCNC: 34 MMOL/L (ref 23–29)
CREAT SERPL-MCNC: 0.7 MG/DL (ref 0.5–1.4)
ERYTHROCYTE [DISTWIDTH] IN BLOOD BY AUTOMATED COUNT: 13 % (ref 11.5–14.5)
GFR SERPLBLD CREATININE-BSD FMLA CKD-EPI: >60 ML/MIN/1.73/M2
GLUCOSE SERPL-MCNC: 151 MG/DL (ref 70–110)
HCT VFR BLD AUTO: 38.2 % (ref 37–48.5)
HGB BLD-MCNC: 12.6 GM/DL (ref 12–16)
IMM GRANULOCYTES # BLD AUTO: 0.05 K/UL (ref 0–0.04)
IMM GRANULOCYTES NFR BLD AUTO: 0.5 % (ref 0–0.5)
LYMPHOCYTES # BLD AUTO: 0.99 K/UL (ref 1–4.8)
MAGNESIUM SERPL-MCNC: 2.2 MG/DL (ref 1.6–2.6)
MCH RBC QN AUTO: 30.3 PG (ref 27–31)
MCHC RBC AUTO-ENTMCNC: 33 G/DL (ref 32–36)
MCV RBC AUTO: 92 FL (ref 82–98)
NUCLEATED RBC (/100WBC) (SMH): 0 /100 WBC
PLATELET # BLD AUTO: 338 K/UL (ref 150–450)
PMV BLD AUTO: 8.7 FL (ref 9.2–12.9)
POTASSIUM SERPL-SCNC: 4.4 MMOL/L (ref 3.5–5.1)
RBC # BLD AUTO: 4.16 M/UL (ref 4–5.4)
RELATIVE EOSINOPHIL (SMH): 0 % (ref 0–8)
RELATIVE LYMPHOCYTE (SMH): 9.5 % (ref 18–48)
RELATIVE MONOCYTE (SMH): 6.1 % (ref 4–15)
RELATIVE NEUTROPHIL (SMH): 83.8 % (ref 38–73)
SODIUM SERPL-SCNC: 139 MMOL/L (ref 136–145)
WBC # BLD AUTO: 10.4 K/UL (ref 3.9–12.7)

## 2025-06-03 PROCEDURE — 99900031 HC PATIENT EDUCATION (STAT)

## 2025-06-03 PROCEDURE — 11000001 HC ACUTE MED/SURG PRIVATE ROOM

## 2025-06-03 PROCEDURE — 99900035 HC TECH TIME PER 15 MIN (STAT)

## 2025-06-03 PROCEDURE — 25000003 PHARM REV CODE 250: Performed by: INTERNAL MEDICINE

## 2025-06-03 PROCEDURE — 25000242 PHARM REV CODE 250 ALT 637 W/ HCPCS: Performed by: STUDENT IN AN ORGANIZED HEALTH CARE EDUCATION/TRAINING PROGRAM

## 2025-06-03 PROCEDURE — 63600175 PHARM REV CODE 636 W HCPCS: Performed by: INTERNAL MEDICINE

## 2025-06-03 PROCEDURE — 80048 BASIC METABOLIC PNL TOTAL CA: CPT | Performed by: STUDENT IN AN ORGANIZED HEALTH CARE EDUCATION/TRAINING PROGRAM

## 2025-06-03 PROCEDURE — 25000003 PHARM REV CODE 250: Performed by: STUDENT IN AN ORGANIZED HEALTH CARE EDUCATION/TRAINING PROGRAM

## 2025-06-03 PROCEDURE — 94761 N-INVAS EAR/PLS OXIMETRY MLT: CPT

## 2025-06-03 PROCEDURE — 25000003 PHARM REV CODE 250

## 2025-06-03 PROCEDURE — 94640 AIRWAY INHALATION TREATMENT: CPT

## 2025-06-03 PROCEDURE — 97110 THERAPEUTIC EXERCISES: CPT

## 2025-06-03 PROCEDURE — 83735 ASSAY OF MAGNESIUM: CPT

## 2025-06-03 PROCEDURE — 63600175 PHARM REV CODE 636 W HCPCS: Performed by: STUDENT IN AN ORGANIZED HEALTH CARE EDUCATION/TRAINING PROGRAM

## 2025-06-03 PROCEDURE — 97535 SELF CARE MNGMENT TRAINING: CPT

## 2025-06-03 PROCEDURE — 99221 1ST HOSP IP/OBS SF/LOW 40: CPT | Mod: ,,, | Performed by: FAMILY MEDICINE

## 2025-06-03 PROCEDURE — 27000221 HC OXYGEN, UP TO 24 HOURS

## 2025-06-03 PROCEDURE — 36415 COLL VENOUS BLD VENIPUNCTURE: CPT | Performed by: STUDENT IN AN ORGANIZED HEALTH CARE EDUCATION/TRAINING PROGRAM

## 2025-06-03 PROCEDURE — 97530 THERAPEUTIC ACTIVITIES: CPT

## 2025-06-03 PROCEDURE — 85025 COMPLETE CBC W/AUTO DIFF WBC: CPT

## 2025-06-03 PROCEDURE — 63600175 PHARM REV CODE 636 W HCPCS

## 2025-06-03 RX ORDER — FUROSEMIDE 10 MG/ML
40 INJECTION INTRAMUSCULAR; INTRAVENOUS ONCE
Status: COMPLETED | OUTPATIENT
Start: 2025-06-03 | End: 2025-06-03

## 2025-06-03 RX ADMIN — PANTOPRAZOLE SODIUM 40 MG: 40 TABLET, DELAYED RELEASE ORAL at 05:06

## 2025-06-03 RX ADMIN — BUDESONIDE INHALATION 0.5 MG: 0.5 SUSPENSION RESPIRATORY (INHALATION) at 07:06

## 2025-06-03 RX ADMIN — HYDROXYZINE HYDROCHLORIDE 25 MG: 25 TABLET, FILM COATED ORAL at 05:06

## 2025-06-03 RX ADMIN — ARFORMOTEROL TARTRATE 15 MCG: 15 SOLUTION RESPIRATORY (INHALATION) at 07:06

## 2025-06-03 RX ADMIN — SENNOSIDES, DOCUSATE SODIUM 1 TABLET: 50; 8.6 TABLET, FILM COATED ORAL at 08:06

## 2025-06-03 RX ADMIN — HYDROCODONE BITARTRATE AND ACETAMINOPHEN 1 TABLET: 10; 325 TABLET ORAL at 06:06

## 2025-06-03 RX ADMIN — DEXAMETHASONE SODIUM PHOSPHATE 10 MG: 10 INJECTION INTRAMUSCULAR; INTRAVENOUS at 08:06

## 2025-06-03 RX ADMIN — FUROSEMIDE 40 MG: 10 INJECTION, SOLUTION INTRAMUSCULAR; INTRAVENOUS at 02:06

## 2025-06-03 RX ADMIN — METOPROLOL TARTRATE 12.5 MG: 25 TABLET, FILM COATED ORAL at 08:06

## 2025-06-03 RX ADMIN — Medication 6 MG: at 08:06

## 2025-06-03 RX ADMIN — MICONAZOLE NITRATE: 20 POWDER TOPICAL at 02:06

## 2025-06-03 RX ADMIN — GABAPENTIN 600 MG: 300 CAPSULE ORAL at 08:06

## 2025-06-03 RX ADMIN — MUPIROCIN 1 G: 20 OINTMENT TOPICAL at 08:06

## 2025-06-03 RX ADMIN — ARFORMOTEROL TARTRATE 15 MCG: 15 SOLUTION RESPIRATORY (INHALATION) at 08:06

## 2025-06-03 RX ADMIN — ESCITALOPRAM OXALATE 20 MG: 10 TABLET ORAL at 08:06

## 2025-06-03 RX ADMIN — SODIUM CHLORIDE 6.25 MG: 9 INJECTION, SOLUTION INTRAVENOUS at 02:06

## 2025-06-03 RX ADMIN — ASPIRIN 81 MG: 81 TABLET ORAL at 08:06

## 2025-06-03 RX ADMIN — CLOPIDOGREL 75 MG: 75 TABLET ORAL at 08:06

## 2025-06-03 RX ADMIN — MICONAZOLE NITRATE: 20 POWDER TOPICAL at 08:06

## 2025-06-03 RX ADMIN — BUDESONIDE INHALATION 0.5 MG: 0.5 SUSPENSION RESPIRATORY (INHALATION) at 08:06

## 2025-06-03 RX ADMIN — ONDANSETRON 4 MG: 2 INJECTION INTRAMUSCULAR; INTRAVENOUS at 02:06

## 2025-06-03 RX ADMIN — HYDROCODONE BITARTRATE AND ACETAMINOPHEN 1 TABLET: 10; 325 TABLET ORAL at 08:06

## 2025-06-03 RX ADMIN — ONDANSETRON 4 MG: 2 INJECTION INTRAMUSCULAR; INTRAVENOUS at 08:06

## 2025-06-03 RX ADMIN — LEVOTHYROXINE SODIUM 150 MCG: 0.1 TABLET ORAL at 05:06

## 2025-06-03 RX ADMIN — ATORVASTATIN CALCIUM 40 MG: 40 TABLET, FILM COATED ORAL at 08:06

## 2025-06-03 NOTE — PT/OT/SLP PROGRESS
Occupational Therapy   Treatment    Name: Aida Gupta  MRN: [id number]  Admitting Diagnosis:  STEMI (ST elevation myocardial infarction)  5 Days Post-Op    Recommendations:     Discharge Recommendations: Moderate Intensity Therapy  Discharge Equipment Recommendations:  none  Barriers to discharge:   (increased physical assistance with ADLs and functional mobility.)    Assessment:     Aida Gupta is a 74 y.o. female with a medical diagnosis of STEMI (ST elevation myocardial infarction).  She presents with general weakness. Patient participated in grooming sitting in chair, chair/bed transfer and bed mobility. Performance deficits affecting function are weakness, impaired endurance, impaired self care skills, impaired functional mobility, gait instability, impaired balance, decreased safety awareness, decreased lower extremity function, decreased upper extremity function.     Rehab Prognosis:  Fair; patient would benefit from acute skilled OT services to address these deficits and reach maximum level of function.       Plan:     Patient to be seen 5 x/week to address the above listed problems via self-care/home management, therapeutic activities, therapeutic exercises  Plan of Care Expires: 06/30/25  Plan of Care Reviewed with: patient    Subjective     Chief Complaint: General weakness  Patient/Family Comments/goals: Improved functional mobility and ADL independence.   Pain/Comfort:  Pain Rating 1: 0/10  Pain Rating Post-Intervention 1: 0/10    Objective:     Communicated with: nurse prior to session.  Patient found up in chair with telemetry, peripheral IV, PureWick upon OT entry to room.    General Precautions: Standard, fall    Orthopedic Precautions:N/A  Braces: N/A  Respiratory Status: 3L O2 via Oximask     Occupational Performance:     Bed Mobility:    Patient completed Sit to Supine with moderate assistance     Functional Mobility/Transfers:  Patient completed Chair <> Bed Transfer using Step  Transfer technique with minimum assistance using rolling walker    Activities of Daily Living:  Grooming: contact guard assistance to brush teeth and wash face sitting in chair.       [facility] 6 Click ADL: 17    Treatment & Education:  Patient encouraged to sit up in chair for 2 hours, but only able to tolerate 23 minutes.     Patient left HOB elevated with all lines intact, call button in reach, and bed alarm on    GOALS:   Multidisciplinary Problems       Occupational Therapy Goals          Problem: Occupational Therapy    Goal Priority Disciplines Outcome Interventions   Occupational Therapy Goal     OT, PT/OT Progressing    Description: Goals to be met by: 06/30/2025     Patient will increase functional independence with ADLs by performing:    Feeding with Minimal Assistance.  UE Dressing with Minimal Assistance.  LE Dressing with Minimal Assistance.  Grooming while standing at sink with Minimal Assistance.  Toileting from bedside commode with Minimal Assistance for hygiene and clothing management.   Rolling to Bilateral with Minimal Assistance.   Supine to sit with Contact Guard Assistance.  Stand pivot transfers with Contact Guard Assistance.  Toilet transfer to bedside commode with Stand-by Assistance.                         Time Tracking:     OT Date of Treatment: 06/03/25  OT Start Time: 1102  OT Stop Time: 1125  OT Total Time (min): 23 min    Billable Minutes:Self Care/Home Management 12  Therapeutic Activity 11    OT/[place]: OT          6/3/2025

## 2025-06-03 NOTE — NURSING
Dr. Rome rounded. Continue current treatment plan as per Dr. Rome. Wound care team to continue to follow up as needed throughout hospital stay.

## 2025-06-03 NOTE — PLAN OF CARE
06/03/25 0724   Patient Assessment/Suction   Level of Consciousness (AVPU) alert   Respiratory Effort Normal;Unlabored   Expansion/Accessory Muscles/Retractions no retractions;no use of accessory muscles   All Lung Fields Breath Sounds diminished   Rhythm/Pattern, Respiratory unlabored;pattern regular;depth regular   Skin Integrity   $ Wound Care Tech Time 15 min   Area Observed Left;Right;Behind ear;Nares   Skin Appearance without discoloration   PRE-TX-O2   Device (Oxygen Therapy) high flow mask   $ Is the patient on Low Flow Oxygen? Yes   Flow (L/min) (Oxygen Therapy) 3   SpO2 98 %   Pulse Oximetry Type Intermittent   $ Pulse Oximetry - Multiple Charge Pulse Oximetry - Multiple   Pulse 62   Resp 16   Aerosol Therapy   $ Aerosol Therapy Charges Aerosol Treatment   Daily Review of Necessity (SVN) completed   Respiratory Treatment Status (SVN) given   Treatment Route (SVN) mask;oxygen   Patient Position HOB elevated   Post Treatment Assessment (SVN) breath sounds unchanged   Signs of Intolerance (SVN) none   Breath Sounds Post-Respiratory Treatment   Post-treatment Heart Rate (beats/min) 74   Post-treatment Resp Rate (breaths/min) 18   Education   $ Education Bronchodilator;15 min

## 2025-06-03 NOTE — ASSESSMENT & PLAN NOTE
Sats are stable  Maybe positioning and size of pts neck causing obstruction, reposition   Trial of steroids and duonebs    Cxr showing possible fluid vs infection. Procal negative, abx stopped  Will try lasix also

## 2025-06-03 NOTE — PT/OT/SLP PROGRESS
"Physical Therapy Treatment    Patient Name:  Aida Gupta   MRN:  [id number]    Recommendations:     Discharge Recommendations: Moderate Intensity Therapy  Discharge Equipment Recommendations: none  Barriers to discharge: Increased Assist required for mobility    Assessment:     Aida Gupta is a 74 y.o. female admitted with a medical diagnosis of STEMI (ST elevation myocardial infarction).  She presents with the following impairments/functional limitations: impaired endurance, weakness, impaired self care skills, impaired functional mobility, gait instability, impaired balance, decreased upper extremity function, decreased lower extremity function, decreased safety awareness, impaired cardiopulmonary response to activity, impaired skin.    Patient agreeable to therapy. Sup>sit: Mod A. Scoot to EOB: CGA. Sat on EOB CGA with BUE support. Noted to be trumulous LLE in sitting. Patient educated and performed 2 x 10 reps of seated BLE therex to include: LAQs, hip flexion, and and ankle pumps/calf raises. Patient agreeable to transfer to chair for OT session. Sit>stand: Min A. Bed>chair: Mod A with RW and Mod verbal cues for sequencing and safety secondary to letting go of walker and attempting to sit prematurely. Stand>sit in chair Min A. Patient left sitting in chair with OT present for start of session.     Rehab Prognosis: Good; patient would benefit from acute skilled PT services to address these deficits and reach maximum level of function.    Recent Surgery: Procedure(s) (LRB):  Left heart cath (Left) 5 Days Post-Op    Plan:     During this hospitalization, patient to be seen daily to address the identified rehab impairments via gait training, therapeutic activities, therapeutic exercises, neuromuscular re-education and progress toward the following goals:    Plan of Care Expires:  06/30/25    Subjective     Chief Complaint: Patient continues with nausea  Patient/Family Comments/goals: "I'm still " "waiting for my meds." ( Per nursing patient already received nausea meds)  Pain/Comfort:  Pain Rating 1: other (see comments) (c/o back pain but did not quantify)  Location 1: back      Objective:     Communicated with nursing prior to session.  Patient found supine with bed alarm, PureWick, oxygen, telemetry upon PT entry to room.     General Precautions: Standard, fall  Orthopedic Precautions: N/A  Braces: N/A  Respiratory Status: Nasal cannula, flow 3.0 L/min     Functional Mobility:  Bed Mobility:     Rolling Right: minimum assistance  Supine to Sit: moderate assistance  Transfers:     Sit to Stand:  minimum assistance with rolling walker  Bed to Chair: moderate assistance with  rolling walker  using  Step Transfer  Balance: sitting: CGA with BUE support, standing: Min A with RW      AM-PAC 6 CLICK MOBILITY  Turning over in bed (including adjusting bedclothes, sheets and blankets)?: 3  Sitting down on and standing up from a chair with arms (e.g., wheelchair, bedside commode, etc.): 3  Moving from lying on back to sitting on the side of the bed?: 2  Moving to and from a bed to a chair (including a wheelchair)?: 2  Need to walk in hospital room?: 2  Climbing 3-5 steps with a railing?: 1  Basic Mobility Total Score: 13       Treatment & Education:  Pt educated on POC, discharge recommendation, need for assist with mobility, use of call bell to seek assistance as needed and fall prevention     Patient left up in chair with all lines intact, call button in reach, and nursing notified.    GOALS:   Multidisciplinary Problems       Physical Therapy Goals          Problem: Physical Therapy    Goal Priority Disciplines Outcome Interventions   Physical Therapy Goal     PT, PT/OT Progressing    Description: Goals to be met by: 25     Patient will increase functional independence with mobility by performin. Supine to sit with Supervision  2. Sit to stand transfer with Supervision  3. Bed to chair transfer with " Supervision using Rolling Walker  4. Gait  x 100 feet with Supervision using Rolling Walker.                              DME Justifications:  No DME recommended requiring DME justifications    Time Tracking:     PT Received On: 06/03/25  PT Start Time: 1052     PT Stop Time: 1106  PT Total Time (min): 14 min     Billable Minutes: Therapeutic Exercise 14    Treatment Type: Treatment  PT/PTA: PT     Number of PTA visits since last PT visit: 0     06/03/2025

## 2025-06-03 NOTE — PLAN OF CARE
Problem: Physical Therapy  Goal: Physical Therapy Goal  Description: Goals to be met by: 25     Patient will increase functional independence with mobility by performin. Supine to sit with Supervision  2. Sit to stand transfer with Supervision  3. Bed to chair transfer with Supervision using Rolling Walker  4. Gait  x 100 feet with Supervision using Rolling Walker.         6/3/2025 1126 by Amita Sánchez, PT  Outcome: Progressing  6/3/2025 1124 by Amita Sánchez, PT  Outcome: Ongoing

## 2025-06-03 NOTE — PLAN OF CARE
[facility]  Discharge Reassessment    Primary Care Provider: Edel Fisher MD    Expected Discharge Date: 6/5/2025    Met with patient at bedside to review discharge recommendation of SNF; patient seemed slightly confused and agreed to SW calling daughter.  SW called patient's daughter Minor and she is agreeable to the plan.    Minor was provided with a list of agencies / facilities in-network with patient's payor plan. Providers that are owned, operated, or affiliated with [facility] are included on the list.     Notified that referrals will be sent to the below listed agencies / facilities from in-network list based on proximity to home / family support:   [facility]  2. [facility]   3. [facility]  4. [facility]  5. [facility]    Patient / family instructed to identify preference. SW to follow up for preferences.    If an additional preferred agency / facility not listed above is identified, additional referral to be sent. If above agencies / facilities unable to accept, will send additional referrals to in-network providers.      LOC[facility] called and PASRR submitted.  PHN authorization request submitted. Ref # [id number] .       Reassessment (most recent)       Discharge Reassessment - 06/03/25 1127          Discharge Reassessment    Assessment Type Discharge Planning Reassessment     Did the patient's condition or plan change since previous assessment? Yes     Discharge Plan discussed with: Patient;Adult children     Communicated OSMAN with patient/caregiver Yes     Discharge Plan A Skilled Nursing Facility     Discharge Plan B Home Health        Post-Acute Status    Post-Acute Authorization Placement     Post-Acute Placement Status Referrals Sent     Patient choice form signed by patient/caregiver List with quality metrics by geographic area provided     Discharge Delays Post-Acute Set-up

## 2025-06-03 NOTE — PROGRESS NOTES
[facility]  Progress Note    Patient Name: Aida uGpta  MRN: [id number]  Patient Class: IP- Inpatient   Admission Date: 5/29/2025  Length of Stay: 5 days  Attending Physician: Edel Jameson MD  Primary Care Provider: Edel Fisher MD        Subjective     Principal Problem:STEMI (ST elevation myocardial infarction)        HPI:  This is a very pleasant 74-year-old lady with comorbid conditions of hypothyroidism, GERD, hyperlipidemia presents to outlying Emergency Department with complaints of intermittent chest pain or loss few days.  Patient reports she felt as though it may has been heartburn and treated herself as such with no improvement in symptoms and due to the persistence she presented to the ED. patient found to have ST elevations, Dr. Bailey was contacted and could STEMI and cath lab were emergently activated.  Patient is now status post heart catheterization with bulky calcifications noted.  There was consideration for transfer to [facility] for complex PCI, however, joint decision made to proceed.  Patient is now status post drug-eluting stent to the LAD.  Admission to ICU status post procedure.  On assessment patient is calm, cooperative and in no acute distress.  She is without chest pain.  She is vitally stable.    Overview/Hospital Course:  Patient with hypothyroidism, GERD, hyperlipidemia presented with chest pain.  Concerns for STEMI, cardiology consulted and patient taken to cath lab emergently.  Status post PCI of the ostial LAD, heavy calcium burden.  Started on dual antiplatelet therapy. PT/OT consulted.  SNF placement pending. Had some shortness of breath, seems to be stridor, possible from large neck and positioning, given breathing treatments and steroids. Abx stopped, as procalcitonin was negative.    Interval History:  Patient seen and examined this morning, reports  shortness of breath.  We will continue steroids and breathing treatments and  monitor and reposition patient.    Review of Systems   Constitutional:  Negative for appetite change.   Respiratory:  Positive for shortness of breath.    Cardiovascular:  Negative for chest pain.     Objective:     Vital Signs (Most Recent):  Temp: 98.4 °F (36.9 °C) (06/03/25 1100)  Pulse: (!) 207 (06/03/25 1100)  Resp: 18 (06/03/25 1100)  BP: (!) 155/95 (06/03/25 1100)  SpO2: 100 % (06/03/25 1100) Vital Signs (24h Range):  Temp:  [98 °F (36.7 °C)-98.5 °F (36.9 °C)] 98.4 °F (36.9 °C)  Pulse:  [] 207  Resp:  [16-18] 18  SpO2:  [96 %-100 %] 100 %  BP: (108-155)/(81-98) 155/95     Weight: 95.5 kg (210 lb 8.6 oz)  Body mass index is 38.51 kg/m².    Intake/Output Summary (Last 24 hours) at 6/3/2025 1327  Last data filed at 6/3/2025 0935  Gross per 24 hour   Intake 420 ml   Output 600 ml   Net -180 ml         Physical Exam  Constitutional:       General: She is in acute distress.   Cardiovascular:      Rate and Rhythm: Normal rate and regular rhythm.   Pulmonary:      Comments: Diminished breath sounds bases  Neurological:      General: No focal deficit present.      Mental Status: She is alert and oriented to person, place, and time.               Significant Labs: All pertinent labs within the past 24 hours have been reviewed.  CBC:   Recent Labs   Lab 06/02/25  0534 06/03/25  0502   WBC 10.84 10.40   HGB 12.5 12.6   HCT 38.3 38.2    338     CMP:   Recent Labs   Lab 06/02/25  0534 06/03/25  0502   * 139   K 4.5 4.4   CL 98 98   CO2 29 34*   * 151*   BUN 15 25*   CREATININE 0.7 0.7   CALCIUM 9.6 9.8   ANIONGAP 8 7*       Significant Imaging: I have reviewed all pertinent imaging results/findings within the past 24 hours.      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   EMILY was placed to the ostial/proximal/mid LAD   lifelong ASA, plavix for at least 1  year, high intensity statin  PT/OT consulted, recommend moderate intensity therapy  ECHO showed ef 50-55    Hypothyroidism  Continue synthroid    Gastroesophageal reflux disease without esophagitis  Continue PPI    Hyperlipidemia  Continue statin    Intertrigo  Nystatin to breast folds and groin  Wound care following    Shortness of breath  Sats are stable  Maybe positioning and size of pts neck causing obstruction, reposition   Trial of steroids and duonebs    Cxr showing possible fluid vs infection. Procal negative, abx stopped  Will try lasix also     VTE Risk Mitigation (From admission, onward)      None            Discharge Planning   OSMAN: 6/5/2025     Code Status: Full Code   Medical Readiness for Discharge Date:   Discharge Plan A: Skilled Nursing Facility   Discharge Delays: (!) Post-Acute Set-up            Please place Justification for DME        Edel Jameson MD, MD  Department of Hospital Medicine   [facility]

## 2025-06-03 NOTE — CARE UPDATE
06/02/25 2031   Patient Assessment/Suction   Level of Consciousness (AVPU) alert   Respiratory Effort Normal;Unlabored   Expansion/Accessory Muscles/Retractions no use of accessory muscles;no retractions;expansion symmetric   All Lung Fields Breath Sounds diminished;equal bilaterally   Rhythm/Pattern, Respiratory pattern regular;unlabored   Cough Frequency no cough   PRE-TX-O2   Device (Oxygen Therapy) high flow mask  (oxymask)   $ Is the patient on Low Flow Oxygen? Yes   SpO2 98 %   Pulse Oximetry Type Intermittent   $ Pulse Oximetry - Multiple Charge Pulse Oximetry - Multiple   Pulse 74   Resp 18   Aerosol Therapy   $ Aerosol Therapy Charges Aerosol Treatment   Daily Review of Necessity (SVN) completed   Respiratory Treatment Status (SVN) given   Treatment Route (SVN) mask;oxygen   Patient Position HOB elevated   Post Treatment Assessment (SVN) breath sounds unchanged   Signs of Intolerance (SVN) none   Breath Sounds Post-Respiratory Treatment   Throughout All Fields Post-Treatment All Fields   Throughout All Fields Post-Treatment no change   Post-treatment Heart Rate (beats/min) 70   Post-treatment Resp Rate (breaths/min) 18

## 2025-06-03 NOTE — SUBJECTIVE & OBJECTIVE
Interval History:  Patient seen and examined this morning, reports  shortness of breath.  We will continue steroids and breathing treatments and monitor and reposition patient.    Review of Systems   Constitutional:  Negative for appetite change.   Respiratory:  Positive for shortness of breath.    Cardiovascular:  Negative for chest pain.     Objective:     Vital Signs (Most Recent):  Temp: 98.4 °F (36.9 °C) (06/03/25 1100)  Pulse: (!) 207 (06/03/25 1100)  Resp: 18 (06/03/25 1100)  BP: (!) 155/95 (06/03/25 1100)  SpO2: 100 % (06/03/25 1100) Vital Signs (24h Range):  Temp:  [98 °F (36.7 °C)-98.5 °F (36.9 °C)] 98.4 °F (36.9 °C)  Pulse:  [] 207  Resp:  [16-18] 18  SpO2:  [96 %-100 %] 100 %  BP: (108-155)/(81-98) 155/95     Weight: 95.5 kg (210 lb 8.6 oz)  Body mass index is 38.51 kg/m².    Intake/Output Summary (Last 24 hours) at 6/3/2025 1327  Last data filed at 6/3/2025 0935  Gross per 24 hour   Intake 420 ml   Output 600 ml   Net -180 ml         Physical Exam  Constitutional:       General: She is in acute distress.   Cardiovascular:      Rate and Rhythm: Normal rate and regular rhythm.   Pulmonary:      Comments: Diminished breath sounds bases  Neurological:      General: No focal deficit present.      Mental Status: She is alert and oriented to person, place, and time.               Significant Labs: All pertinent labs within the past 24 hours have been reviewed.  CBC:   Recent Labs   Lab 06/02/25  0534 06/03/25  0502   WBC 10.84 10.40   HGB 12.5 12.6   HCT 38.3 38.2    338     CMP:   Recent Labs   Lab 06/02/25  0534 06/03/25  0502   * 139   K 4.5 4.4   CL 98 98   CO2 29 34*   * 151*   BUN 15 25*   CREATININE 0.7 0.7   CALCIUM 9.6 9.8   ANIONGAP 8 7*       Significant Imaging: I have reviewed all pertinent imaging results/findings within the past 24 hours.

## 2025-06-03 NOTE — PLAN OF CARE
Problem: Occupational Therapy  Goal: Occupational Therapy Goal  Description: Goals to be met by: 06/30/2025     Patient will increase functional independence with ADLs by performing:    Feeding with Minimal Assistance.  UE Dressing with Minimal Assistance.  LE Dressing with Minimal Assistance.  Grooming while standing at sink with Minimal Assistance.  Toileting from bedside commode with Minimal Assistance for hygiene and clothing management.   Rolling to Bilateral with Minimal Assistance.   Supine to sit with Contact Guard Assistance.  Stand pivot transfers with Contact Guard Assistance.  Toilet transfer to bedside commode with Stand-by Assistance.    Outcome: Progressing

## 2025-06-03 NOTE — CONSULTS
Chief complaint:  No chief complaint on file.      HPI:  Aida Gupta is a 74 y.o. female presenting with MAD of the bilateral groin and bilateral breast folds that were all POA. Pt was seen by wound nurse but the wounds were quite extensive. No other complaints today    PMH:  As per HPI and below:  Past Medical History:   Diagnosis Date    Asthma     chronic bronchitis    Hx of colectomy     Shingles     1 year ago     Thyroid disease        Social History     Socioeconomic History    Marital status:    Tobacco Use    Smoking status: Former     Current packs/day: 0.00     Average packs/day: 3.0 packs/day for 15.0 years (45.0 ttl pk-yrs)     Types: Cigarettes     Start date:      Quit date:      Years since quittin.4    Smokeless tobacco: Never   Substance and Sexual Activity    Alcohol use: No    Drug use: No     Social Drivers of Health     Financial Resource Strain: Low Risk  (2025)    Overall Financial Resource Strain (CARDIA)     Difficulty of Paying Living Expenses: Not very hard   Food Insecurity: No Food Insecurity (2025)    Hunger Vital Sign     Worried About Running Out of Food in the Last Year: Never true     Ran Out of Food in the Last Year: Never true   Recent Concern: Food Insecurity - Food Insecurity Present (2025)    Hunger Vital Sign     Worried About Running Out of Food in the Last Year: Sometimes true     Ran Out of Food in the Last Year: Never true   Transportation Needs: No Transportation Needs (2025)    PRAPARE - Transportation     Lack of Transportation (Medical): No     Lack of Transportation (Non-Medical): No   Physical Activity: Inactive (2025)    Exercise Vital Sign     Days of Exercise per Week: 0 days     Minutes of Exercise per Session: 0 min   Stress: No Stress Concern Present (2025)    [employer] [place] [employer] - Occupational Stress Questionnaire     Feeling of Stress : Only a little   Recent Concern: Stress - Stress  "Concern Present (2025)    [employer] [place] [employer] - Occupational Stress Questionnaire     Feeling of Stress : Rather much   Housing Stability: Low Risk  (2025)    Housing Stability Vital Sign     Unable to Pay for Housing in the Last Year: No     Number of Times Moved in the Last Year: 0     Homeless in the Last Year: No       Past Surgical History:   Procedure Laterality Date    Bilateral tubal ligation      COLECTOMY      COLON SURGERY      Diverticulitis    LEFT HEART CATHETERIZATION Left 2025    Procedure: Left heart cath;  Surgeon: Lia Bailey MD;  Location: [facility] [place] C[facility]/EP LAB;  Service: Cardiology;  Laterality: Left;    TUBAL LIGATION         Family History   Problem Relation Name Age of Onset    Breast cancer Sister      Eczema Neg Hx      Lupus Neg Hx      Psoriasis Neg Hx      Melanoma Neg Hx         Review of patient's allergies indicates:   Allergen Reactions    Contrast media Nausea And Vomiting       Medications Ordered Prior to Encounter[1]    ROS: As per HPI and below:  Pertinent items are noted in HPI.      Physical Exam:     Vitals:    25 0713 25 0724 25 0816 25 1100   BP: (!) 145/98  (!) 145/98 (!) 155/95   Pulse: 67 62 62 (!) 207   Resp: 18 16 18 18   Temp: 98.5 °F (36.9 °C)   98.4 °F (36.9 °C)   TempSrc: Oral   Oral   SpO2: 96% 98%  100%   Weight:       Height:           BP  Min: 72/55  Max: 155/95  Temp  Av °F (36.7 °C)  Min: 97.3 °F (36.3 °C)  Max: 98.6 °F (37 °C)  Pulse  Av.8  Min: 57  Max: 207  Resp  Av.4  Min: 0  Max: 44  SpO2  Av.8 %  Min: 87 %  Max: 100 %  Height  Av' 2" (157.5 cm)  Min: 5' 2" (157.5 cm)  Max: 5' 2" (157.5 cm)  Weight  Av.8 kg (193 lb 8 oz)  Min: 85.3 kg (188 lb)  Max: 95.5 kg (210 lb 8.6 oz)    Body mass index is 38.51 kg/m².          General:             Well developed, well nourished, no apparent distress  HEENT:              NCAT, no JVD, mucous membranes moist, EOM " intact  Cardiovascular:  Regular rate and rhythm, normal S1, normal S2, No murmurs, rubs, or gallops  Respiratory:        Normal breath sounds, no wheezes, no rales, no rhonchi  Abdomen:           Bowel sounds present, non tender, non distended, no masses, no hepatojugular reflux  Extremities:        No clubbing, no cyanosis, no edema  Vascular:            2+ b/l radial.  Peripheral pulses intact.  No carotid bruits.  Neurological:      No focal deficits  Skin:                   No obvious rashes or erythema, MAD of the bilateral groin and bilateral breast folds                        Lab Results   Component Value Date    WBC 10.40 06/03/2025    HGB 12.6 06/03/2025    HCT 38.2 06/03/2025    MCV 92 06/03/2025     06/03/2025     Lab Results   Component Value Date    CHOL 204 (H) 10/25/2024    CHOL 210 (H) 01/19/2024    CHOL 236 (H) 10/24/2023     Lab Results   Component Value Date    HDL 40 10/25/2024    HDL 41 01/19/2024    HDL 46 10/24/2023     Lab Results   Component Value Date    LDLCALC 111.8 10/25/2024    LDLCALC 125.4 01/19/2024    LDLCALC 140.0 10/24/2023     Lab Results   Component Value Date    TRIG 261 (H) 10/25/2024    TRIG 218 (H) 01/19/2024    TRIG 250 (H) 10/24/2023     Lab Results   Component Value Date    CHOLHDL 19.6 (L) 10/25/2024    CHOLHDL 19.5 (L) 01/19/2024    CHOLHDL 19.5 (L) 10/24/2023     CMP  Recent Labs   Lab 06/03/25  0502   *   CALCIUM 9.8      K 4.4   CO2 34*   CL 98   BUN 25*   CREATININE 0.7      Lab Results   Component Value Date    TSH 1.715 05/02/2025             Assessment and Recommendations       Diagnoses:    MAD of the bilateral groin and bilateral breast folds    Plan:  Miconazole to the bilateral groin and breast folds daily    Complexity:    moderate         [1]   No current facility-administered medications on file prior to encounter.     Current Outpatient Medications on File Prior to Encounter   Medication Sig Dispense Refill    albuterol  (PROVENTIL/VENTOLIN HFA) 90 mcg/actuation inhaler Inhale 2 puffs into the lungs every 6 (six) hours as needed for Wheezing. Rescue      amitriptyline (ELAVIL) 50 MG tablet Take 1 tablet (50 mg total) by mouth every evening. 90 tablet 3    cholecalciferol, vitamin D3, (VITAMIN D3) 125 mcg (5,000 unit) Tab Take 1 tablet (5,000 Units total) by mouth once daily. 90 tablet 3    cholestyramine (QUESTRAN) 4 gram packet Take 1 packet (4 g total) by mouth once daily. 90 packet 3    diclofenac sodium (VOLTAREN ARTHRITIS PAIN) 1 % Gel Apply 2 g topically 4 (four) times daily as needed (arthritis). 450 g PRN    ergocalciferol (VITAMIN D2) 50,000 unit Cap Take 1 capsule (50,000 Units total) by mouth twice a week. 24 capsule 3    EScitalopram oxalate (LEXAPRO) 20 MG tablet Take 1 tablet (20 mg total) by mouth every evening. 90 tablet 3    fluticasone propionate (FLONASE) 50 mcg/actuation nasal spray 1 spray (50 mcg total) by Each Nostril route once daily. 16 g PRN    fluticasone-salmeterol diskus inhaler 100-50 mcg Inhale 1 puff into the lungs 2 (two) times daily. Controller 60 each 5    gabapentin (NEURONTIN) 300 MG capsule Take 2 capsules (600 mg total) by mouth 2 (two) times daily. 360 capsule 3    hydrOXYzine HCL (ATARAX) 25 MG tablet TAKE 1 TABLET BY MOUTH THREE TIMES DAILY AS NEEDED FOR ANXIETY 90 tablet prn    levocetirizine (XYZAL) 5 MG tablet Take 1 tablet (5 mg total) by mouth nightly as needed for Allergies (congestion). 30 tablet PRN    levothyroxine (SYNTHROID) 150 MCG tablet Take 1 tablet by mouth once daily 90 tablet 1    meloxicam (MOBIC) 7.5 MG tablet Take 1 tablet by mouth once daily 90 tablet 3    omeprazole (PRILOSEC) 20 MG capsule Take 1 capsule (20 mg total) by mouth once daily. 90 capsule 3    ondansetron (ZOFRAN) 4 MG tablet Take 1 tablet (4 mg total) by mouth every 8 (eight) hours as needed for Nausea. 30 tablet 3    propranoloL (INDERAL LA) 60 MG 24 hr capsule Take 1 capsule (60 mg total) by mouth once  daily. 90 capsule 3    rosuvastatin (CRESTOR) 10 MG tablet Take 1 tablet by mouth once daily 90 tablet 3    triamcinolone acetonide 0.1% (KENALOG) 0.1 % cream Apply topically 2 (two) times daily.

## 2025-06-03 NOTE — PLAN OF CARE
SW called patient's daughter Minor to inform of accepting facilities.  Minor inquired about [facility], and [facility].  SW sent additional referrals and following up.    Authorization pending clinical review.     06/03/25 1436   Post-Acute Status   Post-Acute Authorization Placement   Post-Acute Placement Status Referrals Sent

## 2025-06-04 LAB
ABSOLUTE EOSINOPHIL (SMH): 0 K/UL
ABSOLUTE MONOCYTE (SMH): 0.71 K/UL (ref 0.3–1)
ABSOLUTE NEUTROPHIL COUNT (SMH): 7.6 K/UL (ref 1.8–7.7)
ANION GAP (SMH): 6 MMOL/L (ref 8–16)
BASOPHILS # BLD AUTO: 0.01 K/UL
BASOPHILS NFR BLD AUTO: 0.1 %
BUN SERPL-MCNC: 25 MG/DL (ref 8–23)
CALCIUM SERPL-MCNC: 9.4 MG/DL (ref 8.7–10.5)
CHLORIDE SERPL-SCNC: 99 MMOL/L (ref 95–110)
CO2 SERPL-SCNC: 31 MMOL/L (ref 23–29)
CREAT SERPL-MCNC: 0.6 MG/DL (ref 0.5–1.4)
ERYTHROCYTE [DISTWIDTH] IN BLOOD BY AUTOMATED COUNT: 13.1 % (ref 11.5–14.5)
GFR SERPLBLD CREATININE-BSD FMLA CKD-EPI: >60 ML/MIN/1.73/M2
GLUCOSE SERPL-MCNC: 141 MG/DL (ref 70–110)
HCT VFR BLD AUTO: 38.7 % (ref 37–48.5)
HGB BLD-MCNC: 12.3 GM/DL (ref 12–16)
IMM GRANULOCYTES # BLD AUTO: 0.05 K/UL (ref 0–0.04)
IMM GRANULOCYTES NFR BLD AUTO: 0.5 % (ref 0–0.5)
LYMPHOCYTES # BLD AUTO: 1.01 K/UL (ref 1–4.8)
MAGNESIUM SERPL-MCNC: 2.3 MG/DL (ref 1.6–2.6)
MCH RBC QN AUTO: 29.6 PG (ref 27–31)
MCHC RBC AUTO-ENTMCNC: 31.8 G/DL (ref 32–36)
MCV RBC AUTO: 93 FL (ref 82–98)
NUCLEATED RBC (/100WBC) (SMH): 0 /100 WBC
OHS QRS DURATION: 96 MS
OHS QTC CALCULATION: 487 MS
PLATELET # BLD AUTO: 296 K/UL (ref 150–450)
PMV BLD AUTO: 8.5 FL (ref 9.2–12.9)
POTASSIUM SERPL-SCNC: 4.3 MMOL/L (ref 3.5–5.1)
RBC # BLD AUTO: 4.15 M/UL (ref 4–5.4)
RELATIVE EOSINOPHIL (SMH): 0 % (ref 0–8)
RELATIVE LYMPHOCYTE (SMH): 10.8 % (ref 18–48)
RELATIVE MONOCYTE (SMH): 7.6 % (ref 4–15)
RELATIVE NEUTROPHIL (SMH): 81 % (ref 38–73)
SARS-COV-2 RDRP RESP QL NAA+PROBE: NEGATIVE
SODIUM SERPL-SCNC: 136 MMOL/L (ref 136–145)
WBC # BLD AUTO: 9.33 K/UL (ref 3.9–12.7)

## 2025-06-04 PROCEDURE — 97116 GAIT TRAINING THERAPY: CPT

## 2025-06-04 PROCEDURE — 25000003 PHARM REV CODE 250: Performed by: STUDENT IN AN ORGANIZED HEALTH CARE EDUCATION/TRAINING PROGRAM

## 2025-06-04 PROCEDURE — 11000001 HC ACUTE MED/SURG PRIVATE ROOM

## 2025-06-04 PROCEDURE — 94640 AIRWAY INHALATION TREATMENT: CPT

## 2025-06-04 PROCEDURE — U0002 COVID-19 LAB TEST NON-CDC: HCPCS | Performed by: INTERNAL MEDICINE

## 2025-06-04 PROCEDURE — 63600175 PHARM REV CODE 636 W HCPCS

## 2025-06-04 PROCEDURE — 36415 COLL VENOUS BLD VENIPUNCTURE: CPT | Performed by: STUDENT IN AN ORGANIZED HEALTH CARE EDUCATION/TRAINING PROGRAM

## 2025-06-04 PROCEDURE — 80048 BASIC METABOLIC PNL TOTAL CA: CPT | Performed by: STUDENT IN AN ORGANIZED HEALTH CARE EDUCATION/TRAINING PROGRAM

## 2025-06-04 PROCEDURE — 94761 N-INVAS EAR/PLS OXIMETRY MLT: CPT

## 2025-06-04 PROCEDURE — 25000003 PHARM REV CODE 250

## 2025-06-04 PROCEDURE — 27000221 HC OXYGEN, UP TO 24 HOURS

## 2025-06-04 PROCEDURE — 85025 COMPLETE CBC W/AUTO DIFF WBC: CPT

## 2025-06-04 PROCEDURE — 63600175 PHARM REV CODE 636 W HCPCS: Performed by: INTERNAL MEDICINE

## 2025-06-04 PROCEDURE — 99900035 HC TECH TIME PER 15 MIN (STAT)

## 2025-06-04 PROCEDURE — 25000003 PHARM REV CODE 250: Performed by: INTERNAL MEDICINE

## 2025-06-04 PROCEDURE — 94664 DEMO&/EVAL PT USE INHALER: CPT

## 2025-06-04 PROCEDURE — 83735 ASSAY OF MAGNESIUM: CPT

## 2025-06-04 PROCEDURE — 30200315 PPD INTRADERMAL TEST REV CODE 302: Performed by: INTERNAL MEDICINE

## 2025-06-04 PROCEDURE — 97535 SELF CARE MNGMENT TRAINING: CPT

## 2025-06-04 PROCEDURE — 63600175 PHARM REV CODE 636 W HCPCS: Performed by: STUDENT IN AN ORGANIZED HEALTH CARE EDUCATION/TRAINING PROGRAM

## 2025-06-04 PROCEDURE — 86580 TB INTRADERMAL TEST: CPT | Performed by: INTERNAL MEDICINE

## 2025-06-04 PROCEDURE — 25000242 PHARM REV CODE 250 ALT 637 W/ HCPCS: Performed by: STUDENT IN AN ORGANIZED HEALTH CARE EDUCATION/TRAINING PROGRAM

## 2025-06-04 RX ORDER — AMOXICILLIN 250 MG
1 CAPSULE ORAL 2 TIMES DAILY
Status: DISCONTINUED | OUTPATIENT
Start: 2025-06-04 | End: 2025-06-09 | Stop reason: HOSPADM

## 2025-06-04 RX ORDER — FUROSEMIDE 10 MG/ML
40 INJECTION INTRAMUSCULAR; INTRAVENOUS ONCE
Status: COMPLETED | OUTPATIENT
Start: 2025-06-04 | End: 2025-06-04

## 2025-06-04 RX ADMIN — LEVOTHYROXINE SODIUM 150 MCG: 0.1 TABLET ORAL at 05:06

## 2025-06-04 RX ADMIN — MICONAZOLE NITRATE: 20 POWDER TOPICAL at 09:06

## 2025-06-04 RX ADMIN — ARFORMOTEROL TARTRATE 15 MCG: 15 SOLUTION RESPIRATORY (INHALATION) at 07:06

## 2025-06-04 RX ADMIN — FUROSEMIDE 40 MG: 10 INJECTION, SOLUTION INTRAMUSCULAR; INTRAVENOUS at 02:06

## 2025-06-04 RX ADMIN — BUDESONIDE INHALATION 0.5 MG: 0.5 SUSPENSION RESPIRATORY (INHALATION) at 08:06

## 2025-06-04 RX ADMIN — Medication 6 MG: at 10:06

## 2025-06-04 RX ADMIN — SODIUM CHLORIDE 6.25 MG: 9 INJECTION, SOLUTION INTRAVENOUS at 07:06

## 2025-06-04 RX ADMIN — ARFORMOTEROL TARTRATE 15 MCG: 15 SOLUTION RESPIRATORY (INHALATION) at 08:06

## 2025-06-04 RX ADMIN — HYDROCODONE BITARTRATE AND ACETAMINOPHEN 1 TABLET: 10; 325 TABLET ORAL at 09:06

## 2025-06-04 RX ADMIN — ATORVASTATIN CALCIUM 40 MG: 40 TABLET, FILM COATED ORAL at 09:06

## 2025-06-04 RX ADMIN — ASPIRIN 81 MG: 81 TABLET ORAL at 09:06

## 2025-06-04 RX ADMIN — ESCITALOPRAM OXALATE 20 MG: 10 TABLET ORAL at 09:06

## 2025-06-04 RX ADMIN — DEXAMETHASONE SODIUM PHOSPHATE 10 MG: 10 INJECTION INTRAMUSCULAR; INTRAVENOUS at 09:06

## 2025-06-04 RX ADMIN — SENNOSIDES, DOCUSATE SODIUM 1 TABLET: 50; 8.6 TABLET, FILM COATED ORAL at 09:06

## 2025-06-04 RX ADMIN — BUDESONIDE INHALATION 0.5 MG: 0.5 SUSPENSION RESPIRATORY (INHALATION) at 07:06

## 2025-06-04 RX ADMIN — CLOPIDOGREL 75 MG: 75 TABLET ORAL at 09:06

## 2025-06-04 RX ADMIN — MICONAZOLE NITRATE: 20 POWDER TOPICAL at 02:06

## 2025-06-04 RX ADMIN — METOPROLOL TARTRATE 12.5 MG: 25 TABLET, FILM COATED ORAL at 09:06

## 2025-06-04 RX ADMIN — HYDROXYZINE HYDROCHLORIDE 25 MG: 25 TABLET, FILM COATED ORAL at 09:06

## 2025-06-04 RX ADMIN — PANTOPRAZOLE SODIUM 40 MG: 40 TABLET, DELAYED RELEASE ORAL at 05:06

## 2025-06-04 RX ADMIN — HYDROCODONE BITARTRATE AND ACETAMINOPHEN 1 TABLET: 10; 325 TABLET ORAL at 10:06

## 2025-06-04 RX ADMIN — SODIUM CHLORIDE 6.25 MG: 9 INJECTION, SOLUTION INTRAVENOUS at 02:06

## 2025-06-04 RX ADMIN — TUBERCULIN PURIFIED PROTEIN DERIVATIVE 5 UNITS: 5 INJECTION INTRADERMAL at 02:06

## 2025-06-04 RX ADMIN — ONDANSETRON 4 MG: 2 INJECTION INTRAMUSCULAR; INTRAVENOUS at 09:06

## 2025-06-04 NOTE — SUBJECTIVE & OBJECTIVE
Interval History:  Patient seen and examined this morning, reports nausea.  We will continue steroids and breathing treatments and monitor and reposition patient.    Review of Systems   Constitutional:  Negative for appetite change.   Respiratory:  Positive for shortness of breath.    Cardiovascular:  Negative for chest pain.     Objective:     Vital Signs (Most Recent):  Temp: 98.1 °F (36.7 °C) (06/04/25 1124)  Pulse: (!) 59 (06/04/25 1124)  Resp: 18 (06/04/25 1124)  BP: 122/81 (06/04/25 1124)  SpO2: 100 % (06/04/25 1124) Vital Signs (24h Range):  Temp:  [98 °F (36.7 °C)-98.3 °F (36.8 °C)] 98.1 °F (36.7 °C)  Pulse:  [] 59  Resp:  [17-19] 18  SpO2:  [95 %-100 %] 100 %  BP: (122-144)/(72-86) 122/81     Weight: 95.5 kg (210 lb 8.6 oz)  Body mass index is 38.51 kg/m².    Intake/Output Summary (Last 24 hours) at 6/4/2025 1232  Last data filed at 6/4/2025 1010  Gross per 24 hour   Intake 410 ml   Output 200 ml   Net 210 ml         Physical Exam  Constitutional:       General: She is in acute distress.   Cardiovascular:      Rate and Rhythm: Normal rate and regular rhythm.   Pulmonary:      Comments: Diminished breath sounds bases  Neurological:      General: No focal deficit present.      Mental Status: She is alert and oriented to person, place, and time.               Significant Labs: All pertinent labs within the past 24 hours have been reviewed.  CBC:   Recent Labs   Lab 06/03/25  0502 06/04/25  0606   WBC 10.40 9.33   HGB 12.6 12.3   HCT 38.2 38.7    296     CMP:   Recent Labs   Lab 06/03/25  0502 06/04/25  0606    136   K 4.4 4.3   CL 98 99   CO2 34* 31*   * 141*   BUN 25* 25*   CREATININE 0.7 0.6   CALCIUM 9.8 9.4   ANIONGAP 7* 6*       Significant Imaging: I have reviewed all pertinent imaging results/findings within the past 24 hours.

## 2025-06-04 NOTE — PT/OT/SLP PROGRESS
"Occupational Therapy   Treatment    Name: Aida Gupta  MRN: [id number]  Admitting Diagnosis:  STEMI (ST elevation myocardial infarction)  6 Days Post-Op    Recommendations:     Discharge Recommendations: Moderate Intensity Therapy  Discharge Equipment Recommendations:  to be determined by next level of care  Barriers to discharge:   (increased physical assistance with ADLs and functional transfers)    Assessment:     Aida Gupta is a 74 y.o. female with a medical diagnosis of STEMI (ST elevation myocardial infarction).  She required a lot of motivation to participate with OT.  She reports that she had just gotten back in bed.  She was agreeable to sit EOB to perform oral care. Performance deficits affecting function are weakness, impaired endurance, impaired self care skills, impaired functional mobility, gait instability, impaired balance, decreased lower extremity function, impaired cardiopulmonary response to activity.     Rehab Prognosis:  Good; patient would benefit from acute skilled OT services to address these deficits and reach maximum level of function.       Plan:     Patient to be seen 5 x/week to address the above listed problems via self-care/home management, therapeutic activities, therapeutic exercises  Plan of Care Expires: 06/30/25  Plan of Care Reviewed with: patient    Subjective     Chief Complaint: fatigue  Patient/Family Comments/goals: "I just got back to bed."  Pain/Comfort:  Pain Rating 1: 0/10    Objective:     Communicated with: nurse prior to session.  Patient found HOB elevated with telemetry, peripheral IV, PureWick, bed alarm upon OT entry to room.    General Precautions: Standard, fall    Orthopedic Precautions:N/A  Braces: N/A  Respiratory Status: Nasal cannula, flow 3 L/min     Occupational Performance:     Bed Mobility:    Patient completed Rolling/Turning to Right with moderate assistance  Patient completed Supine to Sit with minimum assistance  Patient completed " Sit to Supine with moderate assistance     Activities of Daily Living:  Grooming: set up assistance for oral care and face washing seated EOB.  She had two episodes of LOB to the Right while sitting EOB unsupported.  Required Min A to maintain unsupported sitting intermittently.       [facility] 6 Click ADL: 16    Treatment & Education:  Pt seen for safe self care activities and fx bed mobility retraining as stated above.  All questions/concerns addressed within scope.  Call staff for BTB/OOB assist. Call bell use explained. Pt acknowledged.  Purpose of OT and POC     Patient left HOB elevated with all lines intact, call button in reach, and bed alarm on    GOALS:   Multidisciplinary Problems       Occupational Therapy Goals          Problem: Occupational Therapy    Goal Priority Disciplines Outcome Interventions   Occupational Therapy Goal     OT, PT/OT Progressing    Description: Goals to be met by: 06/30/2025     Patient will increase functional independence with ADLs by performing:    Feeding with Minimal Assistance.  UE Dressing with Minimal Assistance.  LE Dressing with Minimal Assistance.  Grooming while standing at sink with Minimal Assistance.  Toileting from bedside commode with Minimal Assistance for hygiene and clothing management.   Rolling to Bilateral with Minimal Assistance.   Supine to sit with Contact Guard Assistance.  Stand pivot transfers with Contact Guard Assistance.  Toilet transfer to bedside commode with Stand-by Assistance.                         DME Justifications:  No DME recommended requiring DME justifications    Time Tracking:     OT Date of Treatment: 06/04/25  OT Start Time: 1153  OT Stop Time: 1208  OT Total Time (min): 15 min    Billable Minutes:Self Care/Home Management 15    OT/[place]: OT          6/4/2025

## 2025-06-04 NOTE — CARE UPDATE
06/04/25 0756   Patient Assessment/Suction   Level of Consciousness (AVPU) alert   Respiratory Effort Unlabored   All Lung Fields Breath Sounds diminished   Rhythm/Pattern, Respiratory unlabored;pattern regular;depth regular   Skin Integrity   $ Wound Care Tech Time 15 min   Area Observed Bridge of nose;Cheek   Skin Appearance without discoloration   PRE-TX-O2   Device (Oxygen Therapy) simple face mask   $ Is the patient on Low Flow Oxygen? Yes   Flow (L/min) (Oxygen Therapy) 3   SpO2 99 %   Pulse Oximetry Type Intermittent   $ Pulse Oximetry - Multiple Charge Pulse Oximetry - Multiple   Pulse 61   Resp 18   Aerosol Therapy   $ Aerosol Therapy Charges Aerosol Treatment  (brovana)   Daily Review of Necessity (SVN) completed   Respiratory Treatment Status (SVN) given   Treatment Route (SVN) mask;oxygen   Patient Position HOB elevated   Post Treatment Assessment (SVN) breath sounds unchanged;vital signs unchanged   Signs of Intolerance (SVN) none   Vibratory PEP Therapy   $ Vibratory PEP Charges Aerobika Therapy   $ Vibratory PEP Tech Time Charges 15 min   Daily Review of Necessity (PEP Therapy) completed   Type (PEP Therapy) vibratory/oscillatory   Device (PEP Therapy) flutter   Route (PEP Therapy) mouthpiece   Breaths per Cycle (PEP Therapy) 10   Cycles (PEP Therapy) 1   Settings (PEP Therapy) PEP 4   Patient Position (PEP Therapy) HOB elevated   Post Treatment Assessment (PEP) breath sounds unchanged;vital signs unchanged   Signs of Intolerance (PEP Therapy) none

## 2025-06-04 NOTE — CARE UPDATE
06/03/25 2006   Patient Assessment/Suction   Level of Consciousness (AVPU) alert   Respiratory Effort Normal;Unlabored   Expansion/Accessory Muscles/Retractions no use of accessory muscles   All Lung Fields Breath Sounds diminished   Rhythm/Pattern, Respiratory depth regular;pattern regular   Cough Frequency no cough   PRE-TX-O2   Device (Oxygen Therapy) simple face mask   $ Is the patient on Low Flow Oxygen? Yes   Flow (L/min) (Oxygen Therapy) 3   SpO2 99 %   Pulse Oximetry Type Intermittent   Pulse 60   Resp 18   Aerosol Therapy   $ Aerosol Therapy Charges Aerosol Treatment   Daily Review of Necessity (SVN) completed   Respiratory Treatment Status (SVN) given   Treatment Route (SVN) mask   Patient Position HOB elevated   Post Treatment Assessment (SVN) breath sounds unchanged   Signs of Intolerance (SVN) none   Breath Sounds Post-Respiratory Treatment   Throughout All Fields Post-Treatment All Fields   Throughout All Fields Post-Treatment no change   Post-treatment Heart Rate (beats/min) 60   Post-treatment Resp Rate (breaths/min) 18

## 2025-06-04 NOTE — PROGRESS NOTES
[facility]  Progress Note    Patient Name: Aida Gupta  MRN: [id number]  Patient Class: IP- Inpatient   Admission Date: 5/29/2025  Length of Stay: 6 days  Attending Physician: Edel Jameson MD  Primary Care Provider: Edel Fisher MD        Subjective     Principal Problem:STEMI (ST elevation myocardial infarction)        HPI:  This is a very pleasant 74-year-old lady with comorbid conditions of hypothyroidism, GERD, hyperlipidemia presents to outlying Emergency Department with complaints of intermittent chest pain or loss few days.  Patient reports she felt as though it may has been heartburn and treated herself as such with no improvement in symptoms and due to the persistence she presented to the ED. patient found to have ST elevations, Dr. Bailey was contacted and could STEMI and cath lab were emergently activated.  Patient is now status post heart catheterization with bulky calcifications noted.  There was consideration for transfer to [facility] for complex PCI, however, joint decision made to proceed.  Patient is now status post drug-eluting stent to the LAD.  Admission to ICU status post procedure.  On assessment patient is calm, cooperative and in no acute distress.  She is without chest pain.  She is vitally stable.    Overview/Hospital Course:  Patient with hypothyroidism, GERD, hyperlipidemia presented with chest pain.  Concerns for STEMI, cardiology consulted and patient taken to cath lab emergently.  Status post PCI of the ostial LAD, heavy calcium burden.  Started on dual antiplatelet therapy. PT/OT consulted.  SNF placement pending. Had some shortness of breath, seems to be stridor, possible from large neck and positioning, given breathing treatments and steroids. Abx stopped, as procalcitonin was negative.    Interval History:  Patient seen and examined this morning, reports nausea.  We will continue steroids and breathing treatments and monitor and  reposition patient.    Review of Systems   Constitutional:  Negative for appetite change.   Respiratory:  Positive for shortness of breath.    Cardiovascular:  Negative for chest pain.     Objective:     Vital Signs (Most Recent):  Temp: 98.1 °F (36.7 °C) (06/04/25 1124)  Pulse: (!) 59 (06/04/25 1124)  Resp: 18 (06/04/25 1124)  BP: 122/81 (06/04/25 1124)  SpO2: 100 % (06/04/25 1124) Vital Signs (24h Range):  Temp:  [98 °F (36.7 °C)-98.3 °F (36.8 °C)] 98.1 °F (36.7 °C)  Pulse:  [] 59  Resp:  [17-19] 18  SpO2:  [95 %-100 %] 100 %  BP: (122-144)/(72-86) 122/81     Weight: 95.5 kg (210 lb 8.6 oz)  Body mass index is 38.51 kg/m².    Intake/Output Summary (Last 24 hours) at 6/4/2025 1232  Last data filed at 6/4/2025 1010  Gross per 24 hour   Intake 410 ml   Output 200 ml   Net 210 ml         Physical Exam  Constitutional:       General: She is in acute distress.   Cardiovascular:      Rate and Rhythm: Normal rate and regular rhythm.   Pulmonary:      Comments: Diminished breath sounds bases  Neurological:      General: No focal deficit present.      Mental Status: She is alert and oriented to person, place, and time.               Significant Labs: All pertinent labs within the past 24 hours have been reviewed.  CBC:   Recent Labs   Lab 06/03/25  0502 06/04/25  0606   WBC 10.40 9.33   HGB 12.6 12.3   HCT 38.2 38.7    296     CMP:   Recent Labs   Lab 06/03/25  0502 06/04/25  0606    136   K 4.4 4.3   CL 98 99   CO2 34* 31*   * 141*   BUN 25* 25*   CREATININE 0.7 0.6   CALCIUM 9.8 9.4   ANIONGAP 7* 6*       Significant Imaging: I have reviewed all pertinent imaging results/findings within the past 24 hours.      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   EMILY was placed to the ostial/proximal/mid LAD   lifelong ASA, plavix for at least 1 year, high  intensity statin  PT/OT consulted, recommend moderate intensity therapy  ECHO showed ef 50-55    Hypothyroidism  Continue synthroid    Gastroesophageal reflux disease without esophagitis  Continue PPI    Hyperlipidemia  Continue statin    Intertrigo  Nystatin to breast folds and groin  Wound care following    Shortness of breath  Sats are stable  Maybe positioning and size of pts neck causing obstruction, reposition   Trial of steroids and duonebs    Cxr showing possible fluid vs infection. Procal negative, abx stopped  Will try lasix also     VTE Risk Mitigation (From admission, onward)      None            Discharge Planning   OSMAN: 6/6/2025     Code Status: Full Code   Medical Readiness for Discharge Date:   Discharge Plan A: Skilled Nursing Facility   Discharge Delays: (!) Post-Acute Set-up            Please place Justification for DME        Edel Jameson MD, MD  Department of Hospital Medicine   [facility]

## 2025-06-04 NOTE — PLAN OF CARE
142/PASRR scanned to media.  Auth still pending.     SW called patient's daughter to follow up on facility preference, no answer.     06/04/25 0914   Post-Acute Status   Post-Acute Authorization Placement   Post-Acute Placement Status Pending payor review/awaiting authorization (if required)

## 2025-06-04 NOTE — PHYSICIAN QUERY
Please specify the diagnosis associated with the clinical findings.  New Onset Acute diastolic heart failure (HFpEF)

## 2025-06-04 NOTE — PT/OT/SLP PROGRESS
Physical Therapy Treatment    Patient Name:  Aida Gupta   MRN:  [id number]    Recommendations:     Discharge Recommendations: Moderate Intensity Therapy  Discharge Equipment Recommendations: none  Barriers to discharge:  increase assist with mobility, high fall risk,    Assessment:     Aida Gupta is a 74 y.o. female admitted with a medical diagnosis of STEMI (ST elevation myocardial infarction).  She presents with the following impairments/functional limitations: impaired endurance, weakness, impaired self care skills, impaired functional mobility, gait instability, impaired balance, decreased upper extremity function, decreased lower extremity function, decreased safety awareness, impaired cardiopulmonary response to activity, impaired skin Pt found sleeping. Required additional time to arouse. Flat affect. Pt ambulated 25 ft with RW min to mod A with step by step VC's for  and walker management. Decreased attention to task. Arousal does not seem high enough to remain up in chair. Returned to bed with min A. Nursing aware.    Rehab Prognosis: Fair; patient would benefit from acute skilled PT services to address these deficits and reach maximum level of function.    Recent Surgery: Procedure(s) (LRB):  Left heart cath (Left) 6 Days Post-Op    Plan:     During this hospitalization, patient to be seen daily to address the identified rehab impairments via gait training, therapeutic activities, therapeutic exercises, neuromuscular re-education and progress toward the following goals:    Plan of Care Expires:  06/30/25    Subjective     Chief Complaint: no complaints  Patient/Family Comments/goals: none stated. No family present  Pain/Comfort:  Pain Rating 1: 0/10  Pain Rating Post-Intervention 1: 0/10      Objective:     Communicated with nurse prior to session.  Patient found supine with telemetry, peripheral IV, PureWick, bed alarm upon PT entry to room.     General Precautions: Standard,  fall  Orthopedic Precautions: N/A  Braces: N/A  Respiratory Status: Nasal cannula, flow 3 L/min     Functional Mobility:  Bed Mobility:     Supine to Sit: stand by assistance  Transfers:     Sit to Stand:  contact guard assistance with rolling walker  Gait: min to mod A with RW x 25 ft, constant VC's for steps, mod A for walker management/propulsion, flat affect, decreased arousal, attention to task      AM-PAC 6 CLICK MOBILITY  Turning over in bed (including adjusting bedclothes, sheets and blankets)?: 3  Sitting down on and standing up from a chair with arms (e.g., wheelchair, bedside commode, etc.): 3  Moving from lying on back to sitting on the side of the bed?: 3  Moving to and from a bed to a chair (including a wheelchair)?: 3  Need to walk in hospital room?: 2  Climbing 3-5 steps with a railing?: 1  Basic Mobility Total Score: 15       Treatment & Education:  Bed mobility, transfer and gait training, fall prevention    Patient left HOB elevated with all lines intact, call button in reach, bed alarm on, and nurse notified..    GOALS:   Multidisciplinary Problems       Physical Therapy Goals          Problem: Physical Therapy    Goal Priority Disciplines Outcome Interventions   Physical Therapy Goal     PT, PT/OT Progressing    Description: Goals to be met by: 25     Patient will increase functional independence with mobility by performin. Supine to sit with Supervision  2. Sit to stand transfer with Supervision  3. Bed to chair transfer with Supervision using Rolling Walker  4. Gait  x 100 feet with Supervision using Rolling Walker.                                Time Tracking:     PT Received On: 25  PT Start Time: 1035     PT Stop Time: 1050  PT Total Time (min): 15 min     Billable Minutes: Gait Training 15    Treatment Type: Treatment  PT/PTA: PT     Number of PTA visits since last PT visit: 0     2025

## 2025-06-05 LAB
ABSOLUTE EOSINOPHIL (SMH): 0.02 K/UL
ABSOLUTE MONOCYTE (SMH): 0.74 K/UL (ref 0.3–1)
ABSOLUTE NEUTROPHIL COUNT (SMH): 8.5 K/UL (ref 1.8–7.7)
ANION GAP (SMH): 7 MMOL/L (ref 8–16)
BASOPHILS # BLD AUTO: 0.02 K/UL
BASOPHILS NFR BLD AUTO: 0.2 %
BUN SERPL-MCNC: 27 MG/DL (ref 8–23)
CALCIUM SERPL-MCNC: 9.7 MG/DL (ref 8.7–10.5)
CHLORIDE SERPL-SCNC: 94 MMOL/L (ref 95–110)
CO2 SERPL-SCNC: 35 MMOL/L (ref 23–29)
CREAT SERPL-MCNC: 0.7 MG/DL (ref 0.5–1.4)
ERYTHROCYTE [DISTWIDTH] IN BLOOD BY AUTOMATED COUNT: 12.8 % (ref 11.5–14.5)
GFR SERPLBLD CREATININE-BSD FMLA CKD-EPI: >60 ML/MIN/1.73/M2
GLUCOSE SERPL-MCNC: 138 MG/DL (ref 70–110)
HCT VFR BLD AUTO: 41 % (ref 37–48.5)
HGB BLD-MCNC: 13.5 GM/DL (ref 12–16)
IMM GRANULOCYTES # BLD AUTO: 0.1 K/UL (ref 0–0.04)
IMM GRANULOCYTES NFR BLD AUTO: 0.9 % (ref 0–0.5)
LYMPHOCYTES # BLD AUTO: 1.26 K/UL (ref 1–4.8)
MAGNESIUM SERPL-MCNC: 2.4 MG/DL (ref 1.6–2.6)
MCH RBC QN AUTO: 29.7 PG (ref 27–31)
MCHC RBC AUTO-ENTMCNC: 32.9 G/DL (ref 32–36)
MCV RBC AUTO: 90 FL (ref 82–98)
NUCLEATED RBC (/100WBC) (SMH): 0 /100 WBC
PLATELET # BLD AUTO: 323 K/UL (ref 150–450)
PMV BLD AUTO: 8.9 FL (ref 9.2–12.9)
POTASSIUM SERPL-SCNC: 4.4 MMOL/L (ref 3.5–5.1)
RBC # BLD AUTO: 4.55 M/UL (ref 4–5.4)
RELATIVE EOSINOPHIL (SMH): 0.2 % (ref 0–8)
RELATIVE LYMPHOCYTE (SMH): 11.9 % (ref 18–48)
RELATIVE MONOCYTE (SMH): 7 % (ref 4–15)
RELATIVE NEUTROPHIL (SMH): 79.8 % (ref 38–73)
SODIUM SERPL-SCNC: 136 MMOL/L (ref 136–145)
WBC # BLD AUTO: 10.59 K/UL (ref 3.9–12.7)

## 2025-06-05 PROCEDURE — 99900035 HC TECH TIME PER 15 MIN (STAT)

## 2025-06-05 PROCEDURE — 63600175 PHARM REV CODE 636 W HCPCS: Performed by: INTERNAL MEDICINE

## 2025-06-05 PROCEDURE — 99900031 HC PATIENT EDUCATION (STAT)

## 2025-06-05 PROCEDURE — 25000242 PHARM REV CODE 250 ALT 637 W/ HCPCS: Performed by: STUDENT IN AN ORGANIZED HEALTH CARE EDUCATION/TRAINING PROGRAM

## 2025-06-05 PROCEDURE — 94640 AIRWAY INHALATION TREATMENT: CPT

## 2025-06-05 PROCEDURE — 25000003 PHARM REV CODE 250: Performed by: INTERNAL MEDICINE

## 2025-06-05 PROCEDURE — 94761 N-INVAS EAR/PLS OXIMETRY MLT: CPT

## 2025-06-05 PROCEDURE — 25000003 PHARM REV CODE 250: Performed by: STUDENT IN AN ORGANIZED HEALTH CARE EDUCATION/TRAINING PROGRAM

## 2025-06-05 PROCEDURE — 25000003 PHARM REV CODE 250

## 2025-06-05 PROCEDURE — 27000221 HC OXYGEN, UP TO 24 HOURS

## 2025-06-05 PROCEDURE — 85025 COMPLETE CBC W/AUTO DIFF WBC: CPT

## 2025-06-05 PROCEDURE — 11000001 HC ACUTE MED/SURG PRIVATE ROOM

## 2025-06-05 PROCEDURE — 94664 DEMO&/EVAL PT USE INHALER: CPT

## 2025-06-05 PROCEDURE — 63600175 PHARM REV CODE 636 W HCPCS: Performed by: STUDENT IN AN ORGANIZED HEALTH CARE EDUCATION/TRAINING PROGRAM

## 2025-06-05 PROCEDURE — 63600175 PHARM REV CODE 636 W HCPCS

## 2025-06-05 PROCEDURE — 97530 THERAPEUTIC ACTIVITIES: CPT | Mod: CQ

## 2025-06-05 PROCEDURE — 80048 BASIC METABOLIC PNL TOTAL CA: CPT | Performed by: STUDENT IN AN ORGANIZED HEALTH CARE EDUCATION/TRAINING PROGRAM

## 2025-06-05 PROCEDURE — 83735 ASSAY OF MAGNESIUM: CPT

## 2025-06-05 PROCEDURE — 97116 GAIT TRAINING THERAPY: CPT | Mod: CQ

## 2025-06-05 RX ORDER — POLYETHYLENE GLYCOL 3350 17 G/17G
17 POWDER, FOR SOLUTION ORAL 2 TIMES DAILY
Status: DISCONTINUED | OUTPATIENT
Start: 2025-06-05 | End: 2025-06-09 | Stop reason: HOSPADM

## 2025-06-05 RX ORDER — FUROSEMIDE 10 MG/ML
40 INJECTION INTRAMUSCULAR; INTRAVENOUS ONCE
Status: COMPLETED | OUTPATIENT
Start: 2025-06-05 | End: 2025-06-05

## 2025-06-05 RX ADMIN — GABAPENTIN 600 MG: 300 CAPSULE ORAL at 09:06

## 2025-06-05 RX ADMIN — HYDROXYZINE HYDROCHLORIDE 25 MG: 25 TABLET, FILM COATED ORAL at 08:06

## 2025-06-05 RX ADMIN — PANTOPRAZOLE SODIUM 40 MG: 40 TABLET, DELAYED RELEASE ORAL at 05:06

## 2025-06-05 RX ADMIN — POLYETHYLENE GLYCOL 3350 17 G: 17 POWDER, FOR SOLUTION ORAL at 01:06

## 2025-06-05 RX ADMIN — HYDROCODONE BITARTRATE AND ACETAMINOPHEN 1 TABLET: 10; 325 TABLET ORAL at 08:06

## 2025-06-05 RX ADMIN — SENNOSIDES, DOCUSATE SODIUM 1 TABLET: 50; 8.6 TABLET, FILM COATED ORAL at 09:06

## 2025-06-05 RX ADMIN — POLYETHYLENE GLYCOL 3350 17 G: 17 POWDER, FOR SOLUTION ORAL at 09:06

## 2025-06-05 RX ADMIN — HYDROXYZINE HYDROCHLORIDE 25 MG: 25 TABLET, FILM COATED ORAL at 06:06

## 2025-06-05 RX ADMIN — ATORVASTATIN CALCIUM 40 MG: 40 TABLET, FILM COATED ORAL at 09:06

## 2025-06-05 RX ADMIN — ARFORMOTEROL TARTRATE 15 MCG: 15 SOLUTION RESPIRATORY (INHALATION) at 07:06

## 2025-06-05 RX ADMIN — SODIUM CHLORIDE 6.25 MG: 9 INJECTION, SOLUTION INTRAVENOUS at 06:06

## 2025-06-05 RX ADMIN — CLOPIDOGREL 75 MG: 75 TABLET ORAL at 08:06

## 2025-06-05 RX ADMIN — BUDESONIDE INHALATION 0.5 MG: 0.5 SUSPENSION RESPIRATORY (INHALATION) at 07:06

## 2025-06-05 RX ADMIN — SODIUM CHLORIDE 6.25 MG: 9 INJECTION, SOLUTION INTRAVENOUS at 05:06

## 2025-06-05 RX ADMIN — ASPIRIN 81 MG: 81 TABLET ORAL at 08:06

## 2025-06-05 RX ADMIN — ESCITALOPRAM OXALATE 20 MG: 10 TABLET ORAL at 09:06

## 2025-06-05 RX ADMIN — MICONAZOLE NITRATE: 20 POWDER TOPICAL at 01:06

## 2025-06-05 RX ADMIN — DEXAMETHASONE SODIUM PHOSPHATE 10 MG: 10 INJECTION INTRAMUSCULAR; INTRAVENOUS at 08:06

## 2025-06-05 RX ADMIN — FUROSEMIDE 40 MG: 10 INJECTION, SOLUTION INTRAMUSCULAR; INTRAVENOUS at 01:06

## 2025-06-05 RX ADMIN — SENNOSIDES, DOCUSATE SODIUM 1 TABLET: 50; 8.6 TABLET, FILM COATED ORAL at 08:06

## 2025-06-05 RX ADMIN — Medication 6 MG: at 09:06

## 2025-06-05 RX ADMIN — METOPROLOL TARTRATE 12.5 MG: 25 TABLET, FILM COATED ORAL at 09:06

## 2025-06-05 RX ADMIN — ONDANSETRON 4 MG: 2 INJECTION INTRAMUSCULAR; INTRAVENOUS at 08:06

## 2025-06-05 RX ADMIN — METOPROLOL TARTRATE 12.5 MG: 25 TABLET, FILM COATED ORAL at 08:06

## 2025-06-05 RX ADMIN — MICONAZOLE NITRATE: 20 POWDER TOPICAL at 08:06

## 2025-06-05 RX ADMIN — HYDROCODONE BITARTRATE AND ACETAMINOPHEN 1 TABLET: 10; 325 TABLET ORAL at 07:06

## 2025-06-05 RX ADMIN — BUDESONIDE INHALATION 0.5 MG: 0.5 SUSPENSION RESPIRATORY (INHALATION) at 06:06

## 2025-06-05 RX ADMIN — LEVOTHYROXINE SODIUM 150 MCG: 0.1 TABLET ORAL at 05:06

## 2025-06-05 RX ADMIN — ARFORMOTEROL TARTRATE 15 MCG: 15 SOLUTION RESPIRATORY (INHALATION) at 06:06

## 2025-06-05 NOTE — PT/OT/SLP PROGRESS
Physical Therapy Treatment    Patient Name:  Aida Gupta   MRN:  [id number]    Recommendations:     Discharge Recommendations: Moderate Intensity Therapy  Discharge Equipment Recommendations: none  Barriers to discharge: weakness, fatigue, decreased activity tolerance, impaired endurance    Assessment:     Aida Gupta is a 74 y.o. female admitted with a medical diagnosis of STEMI (ST elevation myocardial infarction).  She presents with the following impairments/functional limitations: weakness, impaired endurance, impaired self care skills, impaired functional mobility, gait instability, impaired balance, decreased lower extremity function, impaired cardiopulmonary response to activity     Pt was found in supine with head of bed elevated and fast asleep. Pt gradually awakened. Pt was agreeable to treatment    Pt fell back asleep during treatment. Pt was awakened again and performed supine to sit transfer with contact guard assist with an increase R lateral lean in seated. Pt performed sit to stand transfer with rolling walker and contact guard assist. Pt ambulated 35 ft with rolling walker and contact guard assist around her room. Pt with a decline in gait speed to secondary to weakness and fatigue as she ambulated back towards her bed.     Pt was returned to bed in supine with head of bed elevated, all lines intact, bed alarm on ad all needs met.     Rehab Prognosis: Fair; patient would benefit from acute skilled PT services to address these deficits and reach maximum level of function.    Recent Surgery: Procedure(s) (LRB):  Left heart cath (Left) 7 Days Post-Op    Plan:     During this hospitalization, patient to be seen daily to address the identified rehab impairments via gait training, therapeutic activities, therapeutic exercises, neuromuscular re-education and progress toward the following goals:    Plan of Care Expires:  06/30/25    Subjective     Chief Complaint: I feel tired  Patient/Family  Comments/goals: to get stronger  Pain/Comfort:  Pain Rating 1: 0/10  Pain Rating Post-Intervention 1: 0/10  Pain Rating Post-Intervention 2: 0/10      Objective:     Communicated with nurse prior to session.  Patient found HOB elevated with telemetry, peripheral IV, PureWick, bed alarm upon PT entry to room.     General Precautions: Standard, fall  Orthopedic Precautions: N/A  Braces: N/A  Respiratory Status: Nasal cannula, flow 2 L/min     Functional Mobility:  Bed Mobility:     Supine to Sit: contact guard assistance  Sit to Supine: minimum assistance  Transfers:     Sit to Stand:  contact guard assistance with rolling walker  Gait: 35 ft x RW CGA, gradual decrease in speed as she approached her bed      AM-PAC 6 CLICK MOBILITY          Treatment & Education:  Pt educated on importance of time OOB, importance of intermittent mobility, safe techniques for transfers/ambulation, discharge recommendations/options, and use of call light for assistance and fall prevention.     Patient left HOB elevated with all lines intact, call button in reach, bed alarm on, and nurse notified..    GOALS:   Multidisciplinary Problems       Physical Therapy Goals          Problem: Physical Therapy    Goal Priority Disciplines Outcome Interventions   Physical Therapy Goal     PT, PT/OT Progressing    Description: Goals to be met by: 25     Patient will increase functional independence with mobility by performin. Supine to sit with Supervision  2. Sit to stand transfer with Supervision  3. Bed to chair transfer with Supervision using Rolling Walker  4. Gait  x 100 feet with Supervision using Rolling Walker.                                  Time Tracking:     PT Received On: 25  PT Start Time: 1037     PT Stop Time: 1100  PT Total Time (min): 23 min     Billable Minutes: Gait Training 14 and Therapeutic Activity 9    Treatment Type: Treatment  PT/PTA: PTA     Number of PTA visits since last PT visit: 2025

## 2025-06-05 NOTE — PLAN OF CARE
06/05/25 1440   Medicare Message   Important Message from Medicare regarding Discharge Appeal Rights Given to patient/caregiver;Explained to patient/caregiver;Signed/date by patient/caregiver   Date IMM was signed 06/05/25   Time IMM was signed 1440     IMM explained to pt. Pt verbalized understanding and signed form.

## 2025-06-05 NOTE — PROGRESS NOTES
[facility] Medicine  Progress Note    Patient Name: Aida Gupta  MRN: [id number]  Patient Class: IP- Inpatient   Admission Date: 5/29/2025  Length of Stay: 7 days  Attending Physician: Edel Jameson MD  Primary Care Provider: Edel Fisher MD        Subjective     Principal Problem:STEMI (ST elevation myocardial infarction)        HPI:  This is a very pleasant 74-year-old lady with comorbid conditions of hypothyroidism, GERD, hyperlipidemia presents to outlying Emergency Department with complaints of intermittent chest pain or loss few days.  Patient reports she felt as though it may has been heartburn and treated herself as such with no improvement in symptoms and due to the persistence she presented to the ED. patient found to have ST elevations, Dr. Bailey was contacted and could STEMI and cath lab were emergently activated.  Patient is now status post heart catheterization with bulky calcifications noted.  There was consideration for transfer to [facility] [place] for complex PCI, however, joint decision made to proceed.  Patient is now status post drug-eluting stent to the LAD.  Admission to ICU status post procedure.  On assessment patient is calm, cooperative and in no acute distress.  She is without chest pain.  She is vitally stable.    Overview/Hospital Course:  Patient with hypothyroidism, GERD, hyperlipidemia presented with chest pain.  Concerns for STEMI, cardiology consulted and patient taken to cath lab emergently.  Status post PCI of the ostial LAD, heavy calcium burden.  Started on dual antiplatelet therapy. PT/OT consulted.  SNF placement pending. Had some shortness of breath, seems to be stridor, possible from large neck and positioning, given breathing treatments and steroids. Abx stopped, as procalcitonin was negative.    Interval History:  Patient seen and examined this morning, reports shortness of breath. We will continue steroids and breathing treatments and  monitor and reposition patient.    Review of Systems   Constitutional:  Negative for appetite change.   Respiratory:  Positive for shortness of breath.    Cardiovascular:  Negative for chest pain.     Objective:     Vital Signs (Most Recent):  Temp: 97.6 °F (36.4 °C) (06/05/25 1107)  Pulse: (!) 58 (06/05/25 1107)  Resp: 19 (06/05/25 1107)  BP: 125/83 (06/05/25 1107)  SpO2: 97 % (06/05/25 1107) Vital Signs (24h Range):  Temp:  [97.4 °F (36.3 °C)-98 °F (36.7 °C)] 97.6 °F (36.4 °C)  Pulse:  [] 58  Resp:  [16-19] 19  SpO2:  [93 %-98 %] 97 %  BP: (125-150)/(81-95) 125/83     Weight: 95.5 kg (210 lb 8.6 oz)  Body mass index is 38.51 kg/m².    Intake/Output Summary (Last 24 hours) at 6/5/2025 1153  Last data filed at 6/5/2025 0613  Gross per 24 hour   Intake 220 ml   Output 900 ml   Net -680 ml         Physical Exam  Constitutional:       General: She is in acute distress.   Cardiovascular:      Rate and Rhythm: Normal rate and regular rhythm.   Pulmonary:      Comments: Diminished breath sounds bases  Neurological:      General: No focal deficit present.      Mental Status: She is alert and oriented to person, place, and time.               Significant Labs: All pertinent labs within the past 24 hours have been reviewed.  CBC:   Recent Labs   Lab 06/04/25  0606 06/05/25  0544   WBC 9.33 10.59   HGB 12.3 13.5   HCT 38.7 41.0    323     CMP:   Recent Labs   Lab 06/04/25  0606 06/05/25  0544    136   K 4.3 4.4   CL 99 94*   CO2 31* 35*   * 138*   BUN 25* 27*   CREATININE 0.6 0.7   CALCIUM 9.4 9.7   ANIONGAP 6* 7*       Significant Imaging: I have reviewed all pertinent imaging results/findings within the past 24 hours.      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   EMILY was placed to the ostial/proximal/mid LAD   lifelong ASA, plavix for at least 1 year,  high intensity statin  PT/OT consulted, recommend moderate intensity therapy  ECHO showed ef 50-55    Hypothyroidism  Continue synthroid    Gastroesophageal reflux disease without esophagitis  Continue PPI    Hyperlipidemia  Continue statin    Intertrigo  Nystatin to breast folds and groin  Wound care following    Shortness of breath  Sats are stable  Maybe positioning and size of pts neck causing obstruction, reposition   Trial of steroids and duonebs    Cxr showing possible fluid vs infection. Procal negative, abx stopped  Will try lasix also     VTE Risk Mitigation (From admission, onward)      None            Discharge Planning   OSMAN: 6/6/2025     Code Status: Full Code   Medical Readiness for Discharge Date:   Discharge Plan A: Skilled Nursing Facility   Discharge Delays: (!) Post-Acute Set-up            Please place Justification for DME        Edel Jameson MD, MD  Department of Hospital Medicine   [facility]

## 2025-06-05 NOTE — NURSING
06/05/25 1435   WOCN Assessment   WOCN Total Time (mins) 30   Visit Date 06/05/25   Visit Time 1230   Consult Type Follow Up   WOCN Speciality Wound   Wound moisture   Intervention assessed;applied;chart review;coordination of care   Teaching on-going        Wound 05/29/25 2005 Puncture Right anterior Groin   Date First Assessed/Time First Assessed: 05/29/25 2005   Primary Wound Type: Puncture  Side: Right  Orientation: anterior  Location: Groin   Wound Image    Dressing Appearance Open to air   Drainage Amount None   Appearance Red   Care Cleansed with:;Antimicrobial agent;Soap and water;Applied:;Skin Barrier   Dressing Applied  ([facility])        Wound 05/29/25 2250 Moisture associated dermatitis Left Breast   Date First Assessed/Time First Assessed: 05/29/25 2250   Present on Original Admission: Yes  Primary Wound Type: Moisture associated dermatitis  Side: Left  Location: Breast   Wound Image    Dressing Appearance Open to air   Drainage Amount None   Appearance Red;Pink   Care Cleansed with:;Antimicrobial agent;Soap and water;Applied:;Skin Barrier   Dressing Applied  ([facility])        Wound 05/29/25 2250 Moisture associated dermatitis Left Groin   Date First Assessed/Time First Assessed: 05/29/25 2250   Present on Original Admission: Yes  Primary Wound Type: Moisture associated dermatitis  Side: Left  Location: Groin   Wound Image    Dressing Appearance Open to air   Drainage Amount None   Appearance Pink   Care Cleansed with:;Antimicrobial agent;Soap and water;Applied:;Skin Barrier   Dressing Applied  ([facility])        Wound 05/29/25 2250 Other (comment) Right Leg   Date First Assessed/Time First Assessed: 05/29/25 2250   Present on Original Admission: Yes  Primary Wound Type: (c) Other (comment)  Side: Right  Location: Leg   Wound Image    Dressing Appearance Open to air   Drainage Amount None   Appearance Red;Pink   Care Cleansed with:;Antimicrobial agent;Soap and water;Applied:;Skin Barrier   Dressing  Applied  ([facility])        Wound 05/30/25 1600 Rash Right Breast   Date First Assessed/Time First Assessed: 05/30/25 1600   Present on Original Admission: Yes  Primary Wound Type: (c) Rash  Side: Right  Location: Breast   Wound Image    Dressing Appearance Open to air   Drainage Amount None   Appearance Pink   Care Cleansed with:;Antimicrobial agent;Soap and water;Applied:;Skin Barrier   Dressing Applied  ([facility])        Wound 05/30/25 1412 Moisture associated dermatitis Buttocks   Date First Assessed/Time First Assessed: 05/30/25 1412   Present on Original Admission: Yes  Primary Wound Type: (c) Moisture associated dermatitis  Location: (c) Buttocks   Wound Image     Dressing Appearance Open to air   Drainage Amount None   Appearance [place]   Periwound Area Intact   Care Cleansed with:;Antimicrobial agent;Soap and water;Applied:;Skin Barrier   Dressing Applied  ([facility])     All skin areas are noted to be improved at this follow up assessment. Changed plan of care to [facility] to all sites due to yeast resolved at this time.

## 2025-06-05 NOTE — PT/OT/SLP PROGRESS
Occupational Therapy      Patient Name:  Aida Gupta   MRN:  [id number]    Patient not seen today secondary to  (working with Physical Therapy). Will follow-up tomorrow.    6/5/2025

## 2025-06-05 NOTE — PLAN OF CARE
Per Astrid with PHN, auth approved [id number] .      06/05/25 0852   Post-Acute Status   Post-Acute Authorization Placement   Post-Acute Placement Status Pending medical clearance/testing

## 2025-06-05 NOTE — PLAN OF CARE
Problem: Physical Therapy  Goal: Physical Therapy Goal  Description: Goals to be met by: 25     Patient will increase functional independence with mobility by performin. Supine to sit with Supervision  2. Sit to stand transfer with Supervision  3. Bed to chair transfer with Supervision using Rolling Walker  4. Gait  x 100 feet with Supervision using Rolling Walker.         Outcome: Progressing

## 2025-06-05 NOTE — PROGRESS NOTES
"Replaced by [facility]  Adult Nutrition   Progress Note (Follow-Up)    SUMMARY     Recommendations  Recommendation/Intervention: 1. Continue Hear Healthy diet as tolerated. 2. Continue senna and mirralax as needed. 3. Hostes to obtain daily menu choices.  Nutrition Goal Status: progressing towards goal  Communication of RD Recs: reviewed with RN    Nutrition Goals:  PO intake will meet >75% of estimated needs by RD follow up.    Nutrition Interventions: Fat modified diet, Cholesterol modified diet, Fiber modified diet, and Sodium modified diet      Nutrition Diagnosis   Nutrition PES Problem: Inadequate protein energy intake  Nutrition PES Etiology: Other (comment) (decreased appetite)  Nutrition PES Signs and Symptoms:  (< 25% EEN / EPN)  Nutrition PES Status: Improving      Dietitian Rounds Brief  Patient tolerates Heart healthy diet. Intake is better Last BM 5/30/25. Continue senna and mirralax. Patient sleeping at visit.Patient sleeping at visit. RD to follow for status change PRN.    Nutrition Related Social Determinants of Health: SDOH: None Identified    Malnutrition Assessment  No evidence of malnutrition at this time.                    Diet order:   Diet Heart Healthy Standard Tray  Oral Nutrition Supplement: Patient declined             Evaluation of Received Nutrient/Fluid Intake  Energy Calories Required: meeting needs  Protein Required: meeting needs  Fluid Required: meeting needs  Tolerance: tolerating     % Intake of Estimated Energy Needs: 75 - 100 %  % Meal Intake: 75 - 100 %      Intake/Output Summary (Last 24 hours) at 6/5/2025 1616  Last data filed at 6/5/2025 0613  Gross per 24 hour   Intake 220 ml   Output 900 ml   Net -680 ml        Anthropometrics  Height: 5' 2" (157.5 cm)  Height (inches): 62 in  Height Method: Measured  Weight: 95.5 kg (210 lb 8.6 oz)  Weight (lb): 210.54 lb  Weight Method: Bed Scale  Ideal Body Weight (IBW), Female: 110 lb  % Ideal Body Weight, Female (lb): 170.91 " %  BMI (Calculated): 38.5  BMI Grade: 35 - 39.9 - obesity - grade II       Estimated/Assessed Needs  Weight Used For Calorie Calculations: 95.5 kg (210 lb 8.6 oz)  Energy Calorie Requirements (kcal): 1773 kcal / day (18 kcal/kg)  Energy Need Method: [place]-St Jeor (MSJ x 1.25.)  Protein Requirements:  gm/day (1.5-2.0 gm/kg/IBW).  Weight Used For Protein Calculations: 50 kg (110 lb 3.7 oz) (IBW)     Estimated Fluid Requirement Method: RDA Method  RDA Method (mL): 1773       Reason for Assessment  Reason For Assessment: RD follow-up  Diagnosis: cardiac disease (NSTEMI)  General Information Comments: Coronary angiogram, PCI of ostial LAD 6/01/25.    Final diagnoses:  None     Past Medical History:   Diagnosis Date    Asthma     chronic bronchitis    Hx of colectomy     Shingles     1 year ago     Thyroid disease         Nutrition/Diet History  Nutrition Intake History: No reported poor intake or appetite prior to admit.  Food Preferences:  obtained.  Spiritual, Cultural Beliefs, Rastafarian Practices, Values that Affect Care: no  Food Allergies: NKFA  Factors Affecting Nutritional Intake: None identified at this time    Nutrition Risk Screen          Wound 05/30/25 1600 Rash Right Breast-Wound Image: Images linked       Wound 05/30/25 1412 Moisture associated dermatitis Buttocks-Wound Image: Images linked       Wound 05/29/25 2250 Moisture associated dermatitis Left Breast-Wound Image: Images linked       Wound 05/29/25 2250 Moisture associated dermatitis Left Groin-Wound Image: Images linked       Wound 05/29/25 2250 Other (comment) Right Leg-Wound Image: Images linked  MST Score: 0  Have you recently lost weight without trying?: No  Weight loss score: 0  Have you been eating poorly because of a decreased appetite?: No  Appetite score: 0       Weight History:  Wt Readings from Last 10 Encounters:   06/01/25 95.5 kg (210 lb 8.6 oz)   05/29/25 86.2 kg (190 lb)   05/02/25 86.7 kg (191 lb 2.2 oz)   10/25/24  80.1 kg (176 lb 9.4 oz)   04/26/24 90 kg (198 lb 6.6 oz)   01/19/24 83.1 kg (183 lb 3.2 oz)   01/04/24 92 kg (202 lb 13.2 oz)   10/26/23 92.3 kg (203 lb 7.8 oz)   08/03/23 94.6 kg (208 lb 8.9 oz)   07/20/23 95.3 kg (210 lb 1.6 oz)        Lab/Procedures/Meds:   Pertinent Labs Reviewed: reviewed  Pertinent Medications Reviewed: reviewed  Medications:Pertinent Medications Reviewed  Scheduled Meds:   budesonide  0.5 mg Nebulization Q12H    And    arformoteroL  15 mcg Nebulization BID    aspirin  81 mg Oral Daily    atorvastatin  40 mg Oral QHS    clopidogreL  75 mg Oral Daily    dexAMETHasone injection  10 mg Intravenous Daily    EScitalopram oxalate  20 mg Oral QHS    levothyroxine  150 mcg Oral Before breakfast    metoprolol tartrate  12.5 mg Oral BID    pantoprazole  40 mg Oral Daily    polyethylene glycol  17 g Oral BID    senna-docusate  1 tablet Oral BID     Continuous Infusions:   nitroGLYCERIN in 5 % dextrose    Continuous PRN   Stopped at 05/29/25 2230     PRN Meds:.  Current Facility-Administered Medications:     acetaminophen, 650 mg, Oral, Q8H PRN    acetaminophen, 650 mg, Oral, Q4H PRN    albuterol-ipratropium, 3 mL, Nebulization, Q4H PRN    aluminum-magnesium hydroxide-simethicone, 30 mL, Oral, QID PRN    dextrose 50%, 12.5 g, Intravenous, PRN    dextrose 50%, 25 g, Intravenous, PRN    gabapentin, 600 mg, Oral, BID PRN    glucagon (human recombinant), 1 mg, Intramuscular, PRN    glucose, 16 g, Oral, PRN    glucose, 24 g, Oral, PRN    HYDROcodone-acetaminophen, 1 tablet, Oral, Q4H PRN    HYDROcodone-acetaminophen, 1 tablet, Oral, Q4H PRN    hydrOXYzine HCL, 25 mg, Oral, TID PRN    magnesium oxide, 800 mg, Oral, PRN    magnesium oxide, 800 mg, Oral, PRN    melatonin, 6 mg, Oral, Nightly PRN    naloxone, 0.02 mg, Intravenous, PRN    nitroGLYCERIN in 5 % dextrose, , , Continuous PRN    ondansetron, 4 mg, Intravenous, Q6H PRN    potassium bicarbonate, 35 mEq, Oral, PRN    potassium bicarbonate, 50 mEq, Oral,  "PRN    potassium bicarbonate, 60 mEq, Oral, PRN    potassium, sodium phosphates, 2 packet, Oral, PRN    potassium, sodium phosphates, 2 packet, Oral, PRN    potassium, sodium phosphates, 2 packet, Oral, PRN    promethazine (PHENERGAN) 6.25 mg in 0.9% NaCl 50 mL IVPB, 6.25 mg, Intravenous, Q6H PRN    simethicone, 2 tablet, Oral, TID PRN    Labs: Pertinent Labs Reviewed  Clinical Chemistry:  Recent Labs   Lab 05/29/25  1844 05/30/25  0339 06/05/25  0544      < > 136   K 4.5   < > 4.4      < > 94*   CO2 19*   < > 35*   *   < > 138*   BUN 17   < > 27*   CREATININE 0.8   < > 0.7   CALCIUM 9.9   < > 9.7   PROT 8.1  --   --    ALBUMIN 3.9  --   --    BILITOT 0.9  --   --    ALKPHOS 70  --   --    AST 27  --   --    ALT 8*  --   --    ANIONGAP 15   < > 7*   MG  --    < > 2.4   LIPASE 13  --   --     < > = values in this interval not displayed.     CBC:   Recent Labs   Lab 06/05/25  0544   WBC 10.59   RBC 4.55   HGB 13.5   HCT 41.0      MCV 90   MCH 29.7   MCHC 32.9     Lipid Panel:  No results for input(s): "CHOL", "HDL", "LDLCALC", "TRIG", "CHOLHDL" in the last 168 hours.  Cardiac Profile:  Recent Labs   Lab 05/29/25  1844   *   CPK 36     Inflammatory Labs:  Recent Labs   Lab 05/29/25  1844   CRP 52.5*     Diabetes:  No results for input(s): "HGBA1C", "POCTGLUCOSE" in the last 168 hours.  Thyroid & Parathyroid:  No results for input(s): "TSH", "FREET4", "R9CCSEN", "P9LXNRZ", "THYROIDAB" in the last 168 hours.    Monitor and Evaluation  Monitor and Evaluation: Food and beverage intake, Diet order     Discharge Planning  Nutrition Discharge Planning: Therapeutic diet (comments)  Therapeutic diet (comments): Heart Healthy diet    Nutrition Risk  Level of Risk/Frequency of Follow-up:  (1 x / week)     Nutrition Follow-Up  RD Follow-up?: Yes            "

## 2025-06-05 NOTE — SUBJECTIVE & OBJECTIVE
Interval History:  Patient seen and examined this morning, reports shortness of breath. We will continue steroids and breathing treatments and monitor and reposition patient.    Review of Systems   Constitutional:  Negative for appetite change.   Respiratory:  Positive for shortness of breath.    Cardiovascular:  Negative for chest pain.     Objective:     Vital Signs (Most Recent):  Temp: 97.6 °F (36.4 °C) (06/05/25 1107)  Pulse: (!) 58 (06/05/25 1107)  Resp: 19 (06/05/25 1107)  BP: 125/83 (06/05/25 1107)  SpO2: 97 % (06/05/25 1107) Vital Signs (24h Range):  Temp:  [97.4 °F (36.3 °C)-98 °F (36.7 °C)] 97.6 °F (36.4 °C)  Pulse:  [] 58  Resp:  [16-19] 19  SpO2:  [93 %-98 %] 97 %  BP: (125-150)/(81-95) 125/83     Weight: 95.5 kg (210 lb 8.6 oz)  Body mass index is 38.51 kg/m².    Intake/Output Summary (Last 24 hours) at 6/5/2025 1153  Last data filed at 6/5/2025 0613  Gross per 24 hour   Intake 220 ml   Output 900 ml   Net -680 ml         Physical Exam  Constitutional:       General: She is in acute distress.   Cardiovascular:      Rate and Rhythm: Normal rate and regular rhythm.   Pulmonary:      Comments: Diminished breath sounds bases  Neurological:      General: No focal deficit present.      Mental Status: She is alert and oriented to person, place, and time.               Significant Labs: All pertinent labs within the past 24 hours have been reviewed.  CBC:   Recent Labs   Lab 06/04/25  0606 06/05/25  0544   WBC 9.33 10.59   HGB 12.3 13.5   HCT 38.7 41.0    323     CMP:   Recent Labs   Lab 06/04/25  0606 06/05/25  0544    136   K 4.3 4.4   CL 99 94*   CO2 31* 35*   * 138*   BUN 25* 27*   CREATININE 0.6 0.7   CALCIUM 9.4 9.7   ANIONGAP 6* 7*       Significant Imaging: I have reviewed all pertinent imaging results/findings within the past 24 hours.

## 2025-06-05 NOTE — CARE UPDATE
06/05/25 0652   Patient Assessment/Suction   Level of Consciousness (AVPU) alert   Respiratory Effort Normal;Unlabored   All Lung Fields Breath Sounds diminished   Skin Integrity   $ Wound Care Tech Time 15 min   Area Observed Nares   Skin Appearance without discoloration   PRE-TX-O2   Device (Oxygen Therapy) nasal cannula   $ Is the patient on Low Flow Oxygen? Yes   Flow (L/min) (Oxygen Therapy) 2   SpO2 97 %   Pulse Oximetry Type Intermittent   $ Pulse Oximetry - Multiple Charge Pulse Oximetry - Multiple   Pulse (!) 55   Resp 16   Aerosol Therapy   $ Aerosol Therapy Charges Aerosol Treatment  (brovana)   Daily Review of Necessity (SVN) completed   Respiratory Treatment Status (SVN) given   Treatment Route (SVN) mask;oxygen   Patient Position HOB elevated   Post Treatment Assessment (SVN) breath sounds unchanged;vital signs unchanged   Signs of Intolerance (SVN) none   Vibratory PEP Therapy   $ Vibratory PEP Charges Aerobika Therapy   $ Vibratory PEP Tech Time Charges 15 min   Daily Review of Necessity (PEP Therapy) completed   Type (PEP Therapy) vibratory/oscillatory   Device (PEP Therapy) flutter   Route (PEP Therapy) mouthpiece   Breaths per Cycle (PEP Therapy) 10   Cycles (PEP Therapy) 1   Settings (PEP Therapy) PEP 3   Patient Position (PEP Therapy) HOB elevated   Post Treatment Assessment (PEP) vital signs unchanged;breath sounds unchanged   Signs of Intolerance (PEP Therapy) none

## 2025-06-05 NOTE — PLAN OF CARE
[facility]  Discharge Reassessment    Primary Care Provider: Edel Fisher MD    Expected Discharge Date: 6/6/2025      Case Management continuing to follow and will assist with discharge planning as needed. Nearing DC, once cleared will go to [facility] for SNF. Pt not clear today d/t reports SOB with no exertion. IV steroids continued. Hoping DC clearance tomorrow.       Reassessment (most recent)       Discharge Reassessment - 06/05/25 1539          Discharge Reassessment    Assessment Type Discharge Planning Reassessment     Did the patient's condition or plan change since previous assessment? No     Discharge Plan discussed with: Patient     Communicated OSMAN with patient/caregiver Yes     Discharge Plan A Skilled Nursing Facility     DME Needed Upon Discharge  none     Transition of Care Barriers None     Why the patient remains in the hospital Requires continued medical care

## 2025-06-06 LAB
ABSOLUTE EOSINOPHIL (SMH): 0.02 K/UL
ABSOLUTE MONOCYTE (SMH): 1.01 K/UL (ref 0.3–1)
ABSOLUTE NEUTROPHIL COUNT (SMH): 10.3 K/UL (ref 1.8–7.7)
ANION GAP (SMH): 9 MMOL/L (ref 8–16)
BASOPHILS # BLD AUTO: 0.04 K/UL
BASOPHILS NFR BLD AUTO: 0.3 %
BUN SERPL-MCNC: 31 MG/DL (ref 8–23)
CALCIUM SERPL-MCNC: 9.7 MG/DL (ref 8.7–10.5)
CHLORIDE SERPL-SCNC: 96 MMOL/L (ref 95–110)
CO2 SERPL-SCNC: 32 MMOL/L (ref 23–29)
CREAT SERPL-MCNC: 0.6 MG/DL (ref 0.5–1.4)
ERYTHROCYTE [DISTWIDTH] IN BLOOD BY AUTOMATED COUNT: 12.9 % (ref 11.5–14.5)
GFR SERPLBLD CREATININE-BSD FMLA CKD-EPI: >60 ML/MIN/1.73/M2
GLUCOSE SERPL-MCNC: 149 MG/DL (ref 70–110)
HCT VFR BLD AUTO: 41.9 % (ref 37–48.5)
HGB BLD-MCNC: 14 GM/DL (ref 12–16)
IMM GRANULOCYTES # BLD AUTO: 0.2 K/UL (ref 0–0.04)
IMM GRANULOCYTES NFR BLD AUTO: 1.6 % (ref 0–0.5)
LYMPHOCYTES # BLD AUTO: 1.09 K/UL (ref 1–4.8)
MAGNESIUM SERPL-MCNC: 2.4 MG/DL (ref 1.6–2.6)
MCH RBC QN AUTO: 29.9 PG (ref 27–31)
MCHC RBC AUTO-ENTMCNC: 33.4 G/DL (ref 32–36)
MCV RBC AUTO: 90 FL (ref 82–98)
NUCLEATED RBC (/100WBC) (SMH): 0 /100 WBC
PLATELET # BLD AUTO: 302 K/UL (ref 150–450)
PMV BLD AUTO: 9.3 FL (ref 9.2–12.9)
POTASSIUM SERPL-SCNC: 4.1 MMOL/L (ref 3.5–5.1)
RBC # BLD AUTO: 4.68 M/UL (ref 4–5.4)
RELATIVE EOSINOPHIL (SMH): 0.2 % (ref 0–8)
RELATIVE LYMPHOCYTE (SMH): 8.6 % (ref 18–48)
RELATIVE MONOCYTE (SMH): 8 % (ref 4–15)
RELATIVE NEUTROPHIL (SMH): 81.3 % (ref 38–73)
SODIUM SERPL-SCNC: 137 MMOL/L (ref 136–145)
TB INDURATION 48 - 72 HR READ: NORMAL
TB SKIN TEST 48 - 72 HR READ: NORMAL
WBC # BLD AUTO: 12.69 K/UL (ref 3.9–12.7)

## 2025-06-06 PROCEDURE — 27000221 HC OXYGEN, UP TO 24 HOURS

## 2025-06-06 PROCEDURE — 63600175 PHARM REV CODE 636 W HCPCS: Performed by: STUDENT IN AN ORGANIZED HEALTH CARE EDUCATION/TRAINING PROGRAM

## 2025-06-06 PROCEDURE — 97530 THERAPEUTIC ACTIVITIES: CPT

## 2025-06-06 PROCEDURE — 94761 N-INVAS EAR/PLS OXIMETRY MLT: CPT

## 2025-06-06 PROCEDURE — 25000003 PHARM REV CODE 250: Performed by: STUDENT IN AN ORGANIZED HEALTH CARE EDUCATION/TRAINING PROGRAM

## 2025-06-06 PROCEDURE — 25000242 PHARM REV CODE 250 ALT 637 W/ HCPCS: Performed by: STUDENT IN AN ORGANIZED HEALTH CARE EDUCATION/TRAINING PROGRAM

## 2025-06-06 PROCEDURE — 63600175 PHARM REV CODE 636 W HCPCS

## 2025-06-06 PROCEDURE — 63600175 PHARM REV CODE 636 W HCPCS: Performed by: INTERNAL MEDICINE

## 2025-06-06 PROCEDURE — 94640 AIRWAY INHALATION TREATMENT: CPT

## 2025-06-06 PROCEDURE — 99900035 HC TECH TIME PER 15 MIN (STAT)

## 2025-06-06 PROCEDURE — 80048 BASIC METABOLIC PNL TOTAL CA: CPT | Performed by: STUDENT IN AN ORGANIZED HEALTH CARE EDUCATION/TRAINING PROGRAM

## 2025-06-06 PROCEDURE — 11000001 HC ACUTE MED/SURG PRIVATE ROOM

## 2025-06-06 PROCEDURE — 99900031 HC PATIENT EDUCATION (STAT)

## 2025-06-06 PROCEDURE — 36415 COLL VENOUS BLD VENIPUNCTURE: CPT

## 2025-06-06 PROCEDURE — 97535 SELF CARE MNGMENT TRAINING: CPT

## 2025-06-06 PROCEDURE — 85025 COMPLETE CBC W/AUTO DIFF WBC: CPT

## 2025-06-06 PROCEDURE — 25000003 PHARM REV CODE 250: Performed by: INTERNAL MEDICINE

## 2025-06-06 PROCEDURE — 94664 DEMO&/EVAL PT USE INHALER: CPT

## 2025-06-06 PROCEDURE — 25000003 PHARM REV CODE 250

## 2025-06-06 PROCEDURE — 83735 ASSAY OF MAGNESIUM: CPT

## 2025-06-06 RX ORDER — FUROSEMIDE 10 MG/ML
40 INJECTION INTRAMUSCULAR; INTRAVENOUS ONCE
Status: COMPLETED | OUTPATIENT
Start: 2025-06-06 | End: 2025-06-06

## 2025-06-06 RX ORDER — PREDNISONE 20 MG/1
40 TABLET ORAL DAILY
Status: DISCONTINUED | OUTPATIENT
Start: 2025-06-06 | End: 2025-06-08

## 2025-06-06 RX ADMIN — ASPIRIN 81 MG: 81 TABLET ORAL at 09:06

## 2025-06-06 RX ADMIN — HYDROCODONE BITARTRATE AND ACETAMINOPHEN 1 TABLET: 10; 325 TABLET ORAL at 10:06

## 2025-06-06 RX ADMIN — ARFORMOTEROL TARTRATE 15 MCG: 15 SOLUTION RESPIRATORY (INHALATION) at 07:06

## 2025-06-06 RX ADMIN — SIMETHICONE 160 MG: 80 TABLET, CHEWABLE ORAL at 08:06

## 2025-06-06 RX ADMIN — BUDESONIDE INHALATION 0.5 MG: 0.5 SUSPENSION RESPIRATORY (INHALATION) at 07:06

## 2025-06-06 RX ADMIN — GABAPENTIN 600 MG: 300 CAPSULE ORAL at 08:06

## 2025-06-06 RX ADMIN — ONDANSETRON 4 MG: 2 INJECTION INTRAMUSCULAR; INTRAVENOUS at 09:06

## 2025-06-06 RX ADMIN — METOPROLOL TARTRATE 12.5 MG: 25 TABLET, FILM COATED ORAL at 08:06

## 2025-06-06 RX ADMIN — Medication 6 MG: at 08:06

## 2025-06-06 RX ADMIN — METOPROLOL TARTRATE 12.5 MG: 25 TABLET, FILM COATED ORAL at 09:06

## 2025-06-06 RX ADMIN — DEXAMETHASONE SODIUM PHOSPHATE 10 MG: 10 INJECTION INTRAMUSCULAR; INTRAVENOUS at 09:06

## 2025-06-06 RX ADMIN — ONDANSETRON 4 MG: 2 INJECTION INTRAMUSCULAR; INTRAVENOUS at 06:06

## 2025-06-06 RX ADMIN — POLYETHYLENE GLYCOL 3350 17 G: 17 POWDER, FOR SOLUTION ORAL at 09:06

## 2025-06-06 RX ADMIN — PREDNISONE 40 MG: 20 TABLET ORAL at 10:06

## 2025-06-06 RX ADMIN — ATORVASTATIN CALCIUM 40 MG: 40 TABLET, FILM COATED ORAL at 08:06

## 2025-06-06 RX ADMIN — ESCITALOPRAM OXALATE 20 MG: 10 TABLET ORAL at 08:06

## 2025-06-06 RX ADMIN — PANTOPRAZOLE SODIUM 40 MG: 40 TABLET, DELAYED RELEASE ORAL at 05:06

## 2025-06-06 RX ADMIN — POLYETHYLENE GLYCOL 3350 17 G: 17 POWDER, FOR SOLUTION ORAL at 08:06

## 2025-06-06 RX ADMIN — FUROSEMIDE 40 MG: 10 INJECTION, SOLUTION INTRAMUSCULAR; INTRAVENOUS at 11:06

## 2025-06-06 RX ADMIN — CLOPIDOGREL 75 MG: 75 TABLET ORAL at 09:06

## 2025-06-06 RX ADMIN — HYDROXYZINE HYDROCHLORIDE 25 MG: 25 TABLET, FILM COATED ORAL at 08:06

## 2025-06-06 RX ADMIN — HYDROCODONE BITARTRATE AND ACETAMINOPHEN 1 TABLET: 5; 325 TABLET ORAL at 06:06

## 2025-06-06 RX ADMIN — LEVOTHYROXINE SODIUM 150 MCG: 0.1 TABLET ORAL at 05:06

## 2025-06-06 RX ADMIN — SENNOSIDES, DOCUSATE SODIUM 1 TABLET: 50; 8.6 TABLET, FILM COATED ORAL at 09:06

## 2025-06-06 RX ADMIN — SENNOSIDES, DOCUSATE SODIUM 1 TABLET: 50; 8.6 TABLET, FILM COATED ORAL at 08:06

## 2025-06-06 NOTE — ASSESSMENT & PLAN NOTE
Sats are stable  Maybe positioning and size of pts neck causing obstruction, reposition   Trial of steroids and duonebs    Cxr showing possible fluid vs infection. Procal negative, abx stopped  Lasix given as well

## 2025-06-06 NOTE — PT/OT/SLP PROGRESS
Physical Therapy      Patient Name:  Aida Gupta   MRN:  [id number]    Patient not seen today secondary to Pain. Will follow-up in PM.

## 2025-06-06 NOTE — PROGRESS NOTES
[facility] Medicine  Progress Note    Patient Name: Aida Gupta  MRN: [id number]  Patient Class: IP- Inpatient   Admission Date: 5/29/2025  Length of Stay: 8 days  Attending Physician: Edel Jameson MD  Primary Care Provider: Edel Fisher MD        Subjective     Principal Problem:STEMI (ST elevation myocardial infarction)        HPI:  This is a very pleasant 74-year-old lady with comorbid conditions of hypothyroidism, GERD, hyperlipidemia presents to outlying Emergency Department with complaints of intermittent chest pain or loss few days.  Patient reports she felt as though it may has been heartburn and treated herself as such with no improvement in symptoms and due to the persistence she presented to the ED. patient found to have ST elevations, Dr. Bailey was contacted and could STEMI and cath lab were emergently activated.  Patient is now status post heart catheterization with bulky calcifications noted.  There was consideration for transfer to [facility] for complex PCI, however, joint decision made to proceed.  Patient is now status post drug-eluting stent to the LAD.  Admission to ICU status post procedure.  On assessment patient is calm, cooperative and in no acute distress.  She is without chest pain.  She is vitally stable.    Overview/Hospital Course:  Patient with hypothyroidism, GERD, hyperlipidemia presented with chest pain.  Concerns for STEMI, cardiology consulted and patient taken to cath lab emergently.  Status post PCI of the ostial LAD, heavy calcium burden.  Started on dual antiplatelet therapy. PT/OT consulted.  SNF placement pending. Had some shortness of breath, possible from large neck and positioning, given breathing treatments and steroids. Abx stopped, as procalcitonin was negative. Case management worked to procure a SNF bed.    Interval History:  Patient seen and examined this morning, reports shortness of breath. We will continue steroids and  breathing treatments and monitor and reposition patient.    Review of Systems   Constitutional:  Negative for appetite change.   Respiratory:  Positive for shortness of breath.    Cardiovascular:  Negative for chest pain.     Objective:     Vital Signs (Most Recent):  Temp: 97.9 °F (36.6 °C) (06/06/25 0719)  Pulse: 68 (06/06/25 0744)  Resp: 18 (06/06/25 1041)  BP: 125/84 (06/06/25 0719)  SpO2: 96 % (06/06/25 0744) Vital Signs (24h Range):  Temp:  [97.4 °F (36.3 °C)-98.2 °F (36.8 °C)] 97.9 °F (36.6 °C)  Pulse:  [50-76] 68  Resp:  [18-19] 18  SpO2:  [94 %-98 %] 96 %  BP: (125-144)/(78-86) 125/84     Weight: 95.5 kg (210 lb 8.6 oz)  Body mass index is 38.51 kg/m².    Intake/Output Summary (Last 24 hours) at 6/6/2025 1041  Last data filed at 6/6/2025 0438  Gross per 24 hour   Intake 120 ml   Output --   Net 120 ml         Physical Exam  Constitutional:       General: She is in acute distress.   Cardiovascular:      Rate and Rhythm: Normal rate and regular rhythm.   Pulmonary:      Comments: Diminished breath sounds bases  Neurological:      General: No focal deficit present.      Mental Status: She is alert and oriented to person, place, and time.               Significant Labs: All pertinent labs within the past 24 hours have been reviewed.  CBC:   Recent Labs   Lab 06/05/25 0544 06/06/25 0446   WBC 10.59 12.69   HGB 13.5 14.0   HCT 41.0 41.9    302     CMP:   Recent Labs   Lab 06/05/25  0544 06/06/25  0446    137   K 4.4 4.1   CL 94* 96   CO2 35* 32*   * 149*   BUN 27* 31*   CREATININE 0.7 0.6   CALCIUM 9.7 9.7   ANIONGAP 7* 9       Significant Imaging: I have reviewed all pertinent imaging results/findings within the past 24 hours.      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   EMILY was placed to the ostial/proximal/mid LAD   lifelong ASA, plavix for  at least 1 year, high intensity statin  PT/OT consulted, recommend moderate intensity therapy  ECHO showed ef 50-55    Hypothyroidism  Continue synthroid    Gastroesophageal reflux disease without esophagitis  Continue PPI    Hyperlipidemia  Continue statin    Intertrigo  Nystatin to breast folds and groin  Wound care following    Shortness of breath  Sats are stable  Maybe positioning and size of pts neck causing obstruction, reposition   Trial of steroids and duonebs    Cxr showing possible fluid vs infection. Procal negative, abx stopped  Lasix given as well    VTE Risk Mitigation (From admission, onward)      None            Discharge Planning   OSMAN: 6/9/2025     Code Status: Full Code   Medical Readiness for Discharge Date:   Discharge Plan A: Skilled Nursing Facility   Discharge Delays: (!) Post-Acute Set-up            Please place Justification for DME        Edel Jameson MD, MD  Department of Hospital Medicine   [facility]

## 2025-06-06 NOTE — PT/OT/SLP PROGRESS
Occupational Therapy   Treatment    Name: Aida Gupta  MRN: [id number]  Admitting Diagnosis:  STEMI (ST elevation myocardial infarction)  8 Days Post-Op    Recommendations:     Discharge Recommendations: Moderate Intensity Therapy  Discharge Equipment Recommendations:  to be determined by next level of care  Barriers to discharge:   (increased physical assistance with ADLs and functional mobility.)    Assessment:     Aida Gupta is a 74 y.o. female with a medical diagnosis of STEMI (ST elevation myocardial infarction).  She presents with general weakness. Patient participated in bed mobility, unsupported sitting EOB, toilet transfer, toilet hygiene and ambulation using rolling walker. Performance deficits affecting function are weakness, impaired endurance, impaired self care skills, impaired functional mobility, gait instability, impaired balance, decreased safety awareness, decreased lower extremity function, decreased upper extremity function.     Rehab Prognosis:  Fair; patient would benefit from acute skilled OT services to address these deficits and reach maximum level of function.       Plan:     Patient to be seen 5 x/week to address the above listed problems via self-care/home management, therapeutic activities, therapeutic exercises  Plan of Care Expires: 06/30/25  Plan of Care Reviewed with: patient    Subjective     Chief Complaint: General weakness  Patient/Family Comments/goals: Improved functional mobility and ADL independence.   Pain/Comfort:  Pain Rating 1: 0/10  Pain Rating Post-Intervention 1: 0/10    Objective:     Communicated with: nurse prior to session.  Patient found HOB elevated with telemetry, peripheral IV, oxygen upon OT entry to room.    General Precautions: Standard, fall    Orthopedic Precautions:N/A  Braces: N/A  Respiratory Status: Nasal cannula, flow 2 L/min     Occupational Performance:     Bed Mobility:    Patient completed Scooting/Bridging with contact guard  assistance  Patient completed Supine to Sit with contact guard assistance  Patient completed Sit to Supine with contact guard assistance   Performed unsupported sitting EOB with contact guard assistance.     Functional Mobility/Transfers:  Patient completed Sit <> Stand Transfer with contact guard assistance  with  rolling walker   Patient completed Toilet Transfer Step Transfer technique with minimum assistance with  hand-held assist and rolling walker  Functional Mobility: ambulated 20 feet in the hospital room and bathroom with minimal assistance using rolling walker.     Activities of Daily Living:  Toileting: maximal assistance to perform toilet hygiene from commode.       [facility] 6 Click ADL:      Treatment & Education:  Patient encouraged to sit up in chair, but declined.     Patient left HOB elevated with all lines intact, call button in reach, and bed alarm on    GOALS:   Multidisciplinary Problems       Occupational Therapy Goals          Problem: Occupational Therapy    Goal Priority Disciplines Outcome Interventions   Occupational Therapy Goal     OT, PT/OT Progressing    Description: Goals to be met by: 06/30/2025     Patient will increase functional independence with ADLs by performing:    Feeding with Minimal Assistance.  UE Dressing with Minimal Assistance.  LE Dressing with Minimal Assistance.  Grooming while standing at sink with Minimal Assistance.  Toileting from bedside commode with Minimal Assistance for hygiene and clothing management.   Rolling to Bilateral with Minimal Assistance.   Supine to sit with Contact Guard Assistance.  Stand pivot transfers with Contact Guard Assistance.  Toilet transfer to bedside commode with Stand-by Assistance.                         Time Tracking:     OT Date of Treatment: 06/06/25  OT Start Time: 1001  OT Stop Time: 1024  OT Total Time (min): 23 min    Billable Minutes:Self Care/Home Management 12  Therapeutic Activity 11    OT/[place]: OT          6/6/2025

## 2025-06-06 NOTE — SUBJECTIVE & OBJECTIVE
Interval History:  Patient seen and examined this morning, reports shortness of breath. We will continue steroids and breathing treatments and monitor and reposition patient.    Review of Systems   Constitutional:  Negative for appetite change.   Respiratory:  Positive for shortness of breath.    Cardiovascular:  Negative for chest pain.     Objective:     Vital Signs (Most Recent):  Temp: 97.9 °F (36.6 °C) (06/06/25 0719)  Pulse: 68 (06/06/25 0744)  Resp: 18 (06/06/25 1041)  BP: 125/84 (06/06/25 0719)  SpO2: 96 % (06/06/25 0744) Vital Signs (24h Range):  Temp:  [97.4 °F (36.3 °C)-98.2 °F (36.8 °C)] 97.9 °F (36.6 °C)  Pulse:  [50-76] 68  Resp:  [18-19] 18  SpO2:  [94 %-98 %] 96 %  BP: (125-144)/(78-86) 125/84     Weight: 95.5 kg (210 lb 8.6 oz)  Body mass index is 38.51 kg/m².    Intake/Output Summary (Last 24 hours) at 6/6/2025 1041  Last data filed at 6/6/2025 0438  Gross per 24 hour   Intake 120 ml   Output --   Net 120 ml         Physical Exam  Constitutional:       General: She is in acute distress.   Cardiovascular:      Rate and Rhythm: Normal rate and regular rhythm.   Pulmonary:      Comments: Diminished breath sounds bases  Neurological:      General: No focal deficit present.      Mental Status: She is alert and oriented to person, place, and time.               Significant Labs: All pertinent labs within the past 24 hours have been reviewed.  CBC:   Recent Labs   Lab 06/05/25 0544 06/06/25 0446   WBC 10.59 12.69   HGB 13.5 14.0   HCT 41.0 41.9    302     CMP:   Recent Labs   Lab 06/05/25  0544 06/06/25 0446    137   K 4.4 4.1   CL 94* 96   CO2 35* 32*   * 149*   BUN 27* 31*   CREATININE 0.7 0.6   CALCIUM 9.7 9.7   ANIONGAP 7* 9       Significant Imaging: I have reviewed all pertinent imaging results/findings within the past 24 hours.

## 2025-06-06 NOTE — CARE UPDATE
06/05/25 1935   Patient Assessment/Suction   Level of Consciousness (AVPU) alert   Respiratory Effort Normal;Unlabored   Expansion/Accessory Muscles/Retractions no use of accessory muscles   All Lung Fields Breath Sounds equal bilaterally;coarse   Rhythm/Pattern, Respiratory unlabored   Cough Frequency frequent   Cough Type fair;loose;nonproductive   Skin Integrity   $ Wound Care Tech Time 15 min   Area Observed Left;Right;Behind ear   Skin Appearance without discoloration   PRE-TX-O2   Device (Oxygen Therapy) nasal cannula   Flow (L/min) (Oxygen Therapy) 2   SpO2 96 %   Pulse Oximetry Type Intermittent   $ Pulse Oximetry - Multiple Charge Pulse Oximetry - Multiple   Pulse 71   Resp 18   Aerosol Therapy   $ Aerosol Therapy Charges Aerosol Treatment  (Pulmicort)   Daily Review of Necessity (SVN) completed   Respiratory Treatment Status (SVN) given   Treatment Route (SVN) mask   Patient Position HOB elevated   Post Treatment Assessment (SVN) breath sounds unchanged   Signs of Intolerance (SVN) none   Breath Sounds Post-Respiratory Treatment   Throughout All Fields Post-Treatment All Fields   Throughout All Fields Post-Treatment no change   Post-treatment Heart Rate (beats/min) 73   Post-treatment Resp Rate (breaths/min) 16   Vibratory PEP Therapy   $ Vibratory PEP Charges Aerobika Therapy   Daily Review of Necessity (PEP Therapy) completed   Type (PEP Therapy) vibratory/oscillatory   Device (PEP Therapy) flutter   Route (PEP Therapy) mouthpiece   Breaths per Cycle (PEP Therapy) 10   Cycles (PEP Therapy) 1   Settings (PEP Therapy) PEP 3   Post Treatment Assessment (PEP) breath sounds unchanged   Signs of Intolerance (PEP Therapy) none   Education   $ Education Bronchodilator;PEP Therapy;Oxygen;15 min   Respiratory Evaluation   $ Care Plan Tech Time 15 min        Quality 130: Documentation Of Current Medications In The Medical Record: Current Medications Documented Detail Level: Detailed

## 2025-06-07 LAB
ABSOLUTE EOSINOPHIL (SMH): 0.01 K/UL
ABSOLUTE MONOCYTE (SMH): 0.99 K/UL (ref 0.3–1)
ABSOLUTE NEUTROPHIL COUNT (SMH): 13.6 K/UL (ref 1.8–7.7)
ANION GAP (SMH): 10 MMOL/L (ref 8–16)
BASOPHILS # BLD AUTO: 0.05 K/UL
BASOPHILS NFR BLD AUTO: 0.3 %
BUN SERPL-MCNC: 32 MG/DL (ref 8–23)
CALCIUM SERPL-MCNC: 9.6 MG/DL (ref 8.7–10.5)
CHLORIDE SERPL-SCNC: 95 MMOL/L (ref 95–110)
CO2 SERPL-SCNC: 31 MMOL/L (ref 23–29)
CREAT SERPL-MCNC: 0.6 MG/DL (ref 0.5–1.4)
ERYTHROCYTE [DISTWIDTH] IN BLOOD BY AUTOMATED COUNT: 12.9 % (ref 11.5–14.5)
GFR SERPLBLD CREATININE-BSD FMLA CKD-EPI: >60 ML/MIN/1.73/M2
GLUCOSE SERPL-MCNC: 153 MG/DL (ref 70–110)
HCT VFR BLD AUTO: 40.2 % (ref 37–48.5)
HGB BLD-MCNC: 13.4 GM/DL (ref 12–16)
IMM GRANULOCYTES # BLD AUTO: 0.21 K/UL (ref 0–0.04)
IMM GRANULOCYTES NFR BLD AUTO: 1.3 % (ref 0–0.5)
LYMPHOCYTES # BLD AUTO: 1.37 K/UL (ref 1–4.8)
MAGNESIUM SERPL-MCNC: 2.4 MG/DL (ref 1.6–2.6)
MCH RBC QN AUTO: 29.8 PG (ref 27–31)
MCHC RBC AUTO-ENTMCNC: 33.3 G/DL (ref 32–36)
MCV RBC AUTO: 89 FL (ref 82–98)
NUCLEATED RBC (/100WBC) (SMH): 0 /100 WBC
PLATELET # BLD AUTO: 324 K/UL (ref 150–450)
PMV BLD AUTO: 8.9 FL (ref 9.2–12.9)
POTASSIUM SERPL-SCNC: 4.1 MMOL/L (ref 3.5–5.1)
RBC # BLD AUTO: 4.5 M/UL (ref 4–5.4)
RELATIVE EOSINOPHIL (SMH): 0.1 % (ref 0–8)
RELATIVE LYMPHOCYTE (SMH): 8.5 % (ref 18–48)
RELATIVE MONOCYTE (SMH): 6.1 % (ref 4–15)
RELATIVE NEUTROPHIL (SMH): 83.7 % (ref 38–73)
SODIUM SERPL-SCNC: 136 MMOL/L (ref 136–145)
WBC # BLD AUTO: 16.19 K/UL (ref 3.9–12.7)

## 2025-06-07 PROCEDURE — 25000003 PHARM REV CODE 250: Performed by: INTERNAL MEDICINE

## 2025-06-07 PROCEDURE — 27000221 HC OXYGEN, UP TO 24 HOURS

## 2025-06-07 PROCEDURE — 99900035 HC TECH TIME PER 15 MIN (STAT)

## 2025-06-07 PROCEDURE — 99900031 HC PATIENT EDUCATION (STAT)

## 2025-06-07 PROCEDURE — 63600175 PHARM REV CODE 636 W HCPCS: Performed by: INTERNAL MEDICINE

## 2025-06-07 PROCEDURE — 63600175 PHARM REV CODE 636 W HCPCS

## 2025-06-07 PROCEDURE — 85025 COMPLETE CBC W/AUTO DIFF WBC: CPT

## 2025-06-07 PROCEDURE — 83735 ASSAY OF MAGNESIUM: CPT

## 2025-06-07 PROCEDURE — 80048 BASIC METABOLIC PNL TOTAL CA: CPT | Performed by: STUDENT IN AN ORGANIZED HEALTH CARE EDUCATION/TRAINING PROGRAM

## 2025-06-07 PROCEDURE — 25000242 PHARM REV CODE 250 ALT 637 W/ HCPCS: Performed by: STUDENT IN AN ORGANIZED HEALTH CARE EDUCATION/TRAINING PROGRAM

## 2025-06-07 PROCEDURE — 94640 AIRWAY INHALATION TREATMENT: CPT

## 2025-06-07 PROCEDURE — 94761 N-INVAS EAR/PLS OXIMETRY MLT: CPT

## 2025-06-07 PROCEDURE — 94664 DEMO&/EVAL PT USE INHALER: CPT

## 2025-06-07 PROCEDURE — 25000003 PHARM REV CODE 250

## 2025-06-07 PROCEDURE — 97530 THERAPEUTIC ACTIVITIES: CPT | Mod: CQ

## 2025-06-07 PROCEDURE — 11000001 HC ACUTE MED/SURG PRIVATE ROOM

## 2025-06-07 PROCEDURE — 25000003 PHARM REV CODE 250: Performed by: STUDENT IN AN ORGANIZED HEALTH CARE EDUCATION/TRAINING PROGRAM

## 2025-06-07 RX ADMIN — SENNOSIDES, DOCUSATE SODIUM 1 TABLET: 50; 8.6 TABLET, FILM COATED ORAL at 10:06

## 2025-06-07 RX ADMIN — CLOPIDOGREL 75 MG: 75 TABLET ORAL at 10:06

## 2025-06-07 RX ADMIN — SENNOSIDES, DOCUSATE SODIUM 1 TABLET: 50; 8.6 TABLET, FILM COATED ORAL at 09:06

## 2025-06-07 RX ADMIN — Medication 6 MG: at 09:06

## 2025-06-07 RX ADMIN — GABAPENTIN 600 MG: 300 CAPSULE ORAL at 09:06

## 2025-06-07 RX ADMIN — ATORVASTATIN CALCIUM 40 MG: 40 TABLET, FILM COATED ORAL at 09:06

## 2025-06-07 RX ADMIN — ARFORMOTEROL TARTRATE 15 MCG: 15 SOLUTION RESPIRATORY (INHALATION) at 07:06

## 2025-06-07 RX ADMIN — HYDROCODONE BITARTRATE AND ACETAMINOPHEN 1 TABLET: 10; 325 TABLET ORAL at 06:06

## 2025-06-07 RX ADMIN — PANTOPRAZOLE SODIUM 40 MG: 40 TABLET, DELAYED RELEASE ORAL at 05:06

## 2025-06-07 RX ADMIN — BUDESONIDE INHALATION 0.5 MG: 0.5 SUSPENSION RESPIRATORY (INHALATION) at 07:06

## 2025-06-07 RX ADMIN — ESCITALOPRAM OXALATE 20 MG: 10 TABLET ORAL at 09:06

## 2025-06-07 RX ADMIN — POLYETHYLENE GLYCOL 3350 17 G: 17 POWDER, FOR SOLUTION ORAL at 09:06

## 2025-06-07 RX ADMIN — METOPROLOL TARTRATE 12.5 MG: 25 TABLET, FILM COATED ORAL at 09:06

## 2025-06-07 RX ADMIN — HYDROXYZINE HYDROCHLORIDE 25 MG: 25 TABLET, FILM COATED ORAL at 06:06

## 2025-06-07 RX ADMIN — METOPROLOL TARTRATE 12.5 MG: 25 TABLET, FILM COATED ORAL at 10:06

## 2025-06-07 RX ADMIN — ONDANSETRON 4 MG: 2 INJECTION INTRAMUSCULAR; INTRAVENOUS at 12:06

## 2025-06-07 RX ADMIN — POLYETHYLENE GLYCOL 3350 17 G: 17 POWDER, FOR SOLUTION ORAL at 10:06

## 2025-06-07 RX ADMIN — LEVOTHYROXINE SODIUM 150 MCG: 0.1 TABLET ORAL at 05:06

## 2025-06-07 RX ADMIN — HYDROCODONE BITARTRATE AND ACETAMINOPHEN 1 TABLET: 10; 325 TABLET ORAL at 12:06

## 2025-06-07 RX ADMIN — PREDNISONE 40 MG: 20 TABLET ORAL at 10:06

## 2025-06-07 RX ADMIN — ASPIRIN 81 MG: 81 TABLET ORAL at 10:06

## 2025-06-07 NOTE — SUBJECTIVE & OBJECTIVE
Interval History:  Patient was seen and examined at bedside this morning.  She reports she is feeling well today, has no complaints at this time.  Pending placement to SNF Monday.  Continue DAPT and prednisone.    Review of Systems   Constitutional:  Negative for appetite change.   Respiratory:  Positive for shortness of breath.    Cardiovascular:  Negative for chest pain.     Objective:     Vital Signs (Most Recent):  Temp: 98.1 °F (36.7 °C) (06/07/25 1631)  Pulse: (!) 56 (06/07/25 1631)  Resp: 16 (06/07/25 1631)  BP: 125/84 (06/07/25 1631)  SpO2: 97 % (06/07/25 1631) Vital Signs (24h Range):  Temp:  [97.6 °F (36.4 °C)-98.4 °F (36.9 °C)] 98.1 °F (36.7 °C)  Pulse:  [53-71] 56  Resp:  [15-18] 16  SpO2:  [92 %-99 %] 97 %  BP: (119-146)/(78-88) 125/84     Weight: 95.5 kg (210 lb 8.6 oz)  Body mass index is 38.51 kg/m².  No intake or output data in the 24 hours ending 06/07/25 1633        Physical Exam  Constitutional:       General: She is in acute distress.   Cardiovascular:      Rate and Rhythm: Normal rate and regular rhythm.   Pulmonary:      Comments: Diminished breath sounds bases  Neurological:      General: No focal deficit present.      Mental Status: She is alert and oriented to person, place, and time.               Significant Labs: All pertinent labs within the past 24 hours have been reviewed.  CBC:   Recent Labs   Lab 06/06/25  0446 06/07/25  0151   WBC 12.69 16.19*   HGB 14.0 13.4   HCT 41.9 40.2    324     CMP:   Recent Labs   Lab 06/06/25  0446 06/07/25  0539    136   K 4.1 4.1   CL 96 95   CO2 32* 31*   * 153*   BUN 31* 32*   CREATININE 0.6 0.6   CALCIUM 9.7 9.6   ANIONGAP 9 10       Significant Imaging: I have reviewed all pertinent imaging results/findings within the past 24 hours.

## 2025-06-07 NOTE — CARE UPDATE
06/07/25 0712   Patient Assessment/Suction   Level of Consciousness (AVPU) responds to voice  (was asleep)   Respiratory Effort Normal;Unlabored   All Lung Fields Breath Sounds diminished;clear   Skin Integrity   $ Wound Care Tech Time 15 min   Area Observed Nares   Skin Appearance without discoloration   PRE-TX-O2   Device (Oxygen Therapy) nasal cannula   $ Is the patient on Low Flow Oxygen? Yes   Flow (L/min) (Oxygen Therapy) 2   SpO2 (!) 94 %   Pulse Oximetry Type Intermittent   $ Pulse Oximetry - Multiple Charge Pulse Oximetry - Multiple   Pulse (!) 55   Resp 16   Aerosol Therapy   $ Aerosol Therapy Charges Aerosol Treatment  (brovana)   Daily Review of Necessity (SVN) completed   Respiratory Treatment Status (SVN) given   Treatment Route (SVN) mask;oxygen   Patient Position HOB elevated   Post Treatment Assessment (SVN) breath sounds unchanged;vital signs unchanged   Signs of Intolerance (SVN) none   Vibratory PEP Therapy   $ Vibratory PEP Charges Aerobika Therapy   $ Vibratory PEP Tech Time Charges 15 min   Daily Review of Necessity (PEP Therapy) completed   Type (PEP Therapy) vibratory/oscillatory   Device (PEP Therapy) flutter   Route (PEP Therapy) mouthpiece   Breaths per Cycle (PEP Therapy) 10   Cycles (PEP Therapy) 1   Settings (PEP Therapy) PEP 4   Patient Position (PEP Therapy) HOB elevated   Post Treatment Assessment (PEP) breath sounds unchanged;vital signs unchanged   Signs of Intolerance (PEP Therapy) none

## 2025-06-07 NOTE — PROGRESS NOTES
[facility] Medicine  Progress Note    Patient Name: Aida Gupta  MRN: [id number]  Patient Class: IP- Inpatient   Admission Date: 5/29/2025  Length of Stay: 9 days  Attending Physician: Johann Crooks DO  Primary Care Provider: Edel Fisher MD        Subjective     Principal Problem:STEMI (ST elevation myocardial infarction)        HPI:  This is a very pleasant 74-year-old lady with comorbid conditions of hypothyroidism, GERD, [facility] of intermittent chest pain or loss few days.  Patient reports she felt as though it may has been heartburn and treated herself as such with no improvement in symptoms and due to the persistence she presented to the ED. patient found to have ST elevations, Dr. Bailey was contacted and could STEMI and cath lab were emergently activated.  Patient is now status post heart catheterization with bulky calcifications noted.  There was consideration for transfer to [facility] for complex PCI, however, joint decision made to proceed.  Patient is now status post drug-eluting stent to the LAD.  Admission to ICU status post procedure.  On assessment patient is calm, cooperative and in no acute distress.  She is without chest pain.  She is vitally stable.    Overview/Hospital Course:  Patient with hypothyroidism, GERD, hyperlipidemia presented with chest pain.  Concerns for STEMI, cardiology consulted and patient taken to cath lab emergently.  Status post PCI of the ostial LAD, heavy calcium burden.  Started on dual antiplatelet therapy. PT/OT consulted.  SNF placement pending. Had some shortness of breath, possible from large neck and positioning, given breathing treatments and steroids. Abx stopped, as procalcitonin was negative. Case management worked to procure a SNF bed.    Interval History:  Patient was seen and examined at bedside this morning.  She reports she is feeling well today, has no  complaints at this time.  Pending placement to SNF Monday.  Continue DAPT and prednisone.    Review of Systems   Constitutional:  Negative for appetite change.   Respiratory:  Positive for shortness of breath.    Cardiovascular:  Negative for chest pain.     Objective:     Vital Signs (Most Recent):  Temp: 98.1 °F (36.7 °C) (06/07/25 1631)  Pulse: (!) 56 (06/07/25 1631)  Resp: 16 (06/07/25 1631)  BP: 125/84 (06/07/25 1631)  SpO2: 97 % (06/07/25 1631) Vital Signs (24h Range):  Temp:  [97.6 °F (36.4 °C)-98.4 °F (36.9 °C)] 98.1 °F (36.7 °C)  Pulse:  [53-71] 56  Resp:  [15-18] 16  SpO2:  [92 %-99 %] 97 %  BP: (119-146)/(78-88) 125/84     Weight: 95.5 kg (210 lb 8.6 oz)  Body mass index is 38.51 kg/m².  No intake or output data in the 24 hours ending 06/07/25 1633        Physical Exam  Constitutional:       General: She is in acute distress.   Cardiovascular:      Rate and Rhythm: Normal rate and regular rhythm.   Pulmonary:      Comments: Diminished breath sounds bases  Neurological:      General: No focal deficit present.      Mental Status: She is alert and oriented to person, place, and time.               Significant Labs: All pertinent labs within the past 24 hours have been reviewed.  CBC:   Recent Labs   Lab 06/06/25  0446 06/07/25  0151   WBC 12.69 16.19*   HGB 14.0 13.4   HCT 41.9 40.2    324     CMP:   Recent Labs   Lab 06/06/25  0446 06/07/25  0539    136   K 4.1 4.1   CL 96 95   CO2 32* 31*   * 153*   BUN 31* 32*   CREATININE 0.6 0.6   CALCIUM 9.7 9.6   ANIONGAP 9 10       Significant Imaging: I have reviewed all pertinent imaging results/findings within the past 24 hours.      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   EMILY was placed to the ostial/proximal/mid LAD   lifelong ASA, plavix for at least 1 year, high intensity statin  PT/OT  consulted, recommend moderate intensity therapy  ECHO showed ef 50-55    Hypothyroidism  Continue synthroid    Gastroesophageal reflux disease without esophagitis  Continue PPI    Hyperlipidemia  Continue statin    Intertrigo  Nystatin to breast folds and groin  Wound care following    Shortness of breath  Sats are stable  Maybe positioning and size of pts neck causing obstruction, reposition   Trial of steroids and duonebs    Cxr showing possible fluid vs infection. Procal negative, abx stopped  Lasix given as well    VTE Risk Mitigation (From admission, onward)      None            Discharge Planning   OSMAN: 6/9/2025     Code Status: Full Code   Medical Readiness for Discharge Date:   Discharge Plan A: Skilled Nursing Facility   Discharge Delays: (!) Post-Acute Set-up            Please place Justification for DME        Johann Crooks DO  Department of Hospital Medicine   [facility]

## 2025-06-07 NOTE — RESPIRATORY THERAPY
06/06/25 1927   Patient Assessment/Suction   Level of Consciousness (AVPU) alert   Respiratory Effort Normal;Unlabored   Expansion/Accessory Muscles/Retractions no retractions;no use of accessory muscles   All Lung Fields Breath Sounds clear;diminished   Rhythm/Pattern, Respiratory pattern regular;unlabored   Cough Frequency infrequent   Cough Type nonproductive   Skin Integrity   $ Wound Care Tech Time 15 min   Area Observed Left;Right;Behind ear;Cheek;Nares   Skin Appearance without discoloration   PRE-TX-O2   Device (Oxygen Therapy) nasal cannula   Flow (L/min) (Oxygen Therapy) 2   SpO2 96 %   Pulse Oximetry Type Intermittent   $ Pulse Oximetry - Multiple Charge Pulse Oximetry - Multiple   Pulse 68   Resp 16   Aerosol Therapy   $ Aerosol Therapy Charges Aerosol Treatment   Daily Review of Necessity (SVN) completed   Respiratory Treatment Status (SVN) given   Treatment Route (SVN) mask;oxygen   Patient Position HOB elevated   Post Treatment Assessment (SVN) breath sounds unchanged   Signs of Intolerance (SVN) none   Vibratory PEP Therapy   $ Vibratory PEP Charges Aerobika Therapy   $ Vibratory PEP Equipment Aerobika Equipment   $ Vibratory PEP Tech Time Charges 15 min   Daily Review of Necessity (PEP Therapy) completed   Type (PEP Therapy) vibratory/oscillatory   Device (PEP Therapy) flutter   Route (PEP Therapy) proper technique demonstrated   Breaths per Cycle (PEP Therapy) 10   Cycles (PEP Therapy) 1   Settings (PEP Therapy) PEP 3   Patient Position (PEP Therapy) HOB elevated   Post Treatment Assessment (PEP) breath sounds unchanged   Signs of Intolerance (PEP Therapy) none   Education   $ Education Bronchodilator;Oxygen;PEP Therapy;15 min   Respiratory Evaluation   $ Care Plan Tech Time 15 min

## 2025-06-07 NOTE — PT/OT/SLP PROGRESS
"Physical Therapy Treatment    Patient Name:  Aida Gupta   MRN:  [id number]    Recommendations:     Discharge Recommendations: Moderate Intensity Therapy  Discharge Equipment Recommendations: none  Barriers to discharge: continued medical management; weakness; decreased mobility from baseline    Assessment:     Aida Gupta is a 74 y.o. female admitted with a medical diagnosis of STEMI (ST elevation myocardial infarction).  She presents with the following impairments/functional limitations: weakness, impaired endurance, impaired functional mobility, decreased safety awareness, decreased lower extremity function, impaired cardiopulmonary response to activity .    Pt declined gait but participated well in standing activity at EOB. Two trials of stepping in place with RW support and CGA. Tolerance of 1min45 sec first trial and 45 sec on second trial.     Pt declined chair transfer, reporting pain in sitting due to "nerve damage from when I had shingles." Returned to supine with Demario and was left in R sidelying with pillow supports.     Rehab Prognosis: Good and Fair; patient would benefit from acute skilled PT services to address these deficits and reach maximum level of function.    Recent Surgery: Procedure(s) (LRB):  Left heart cath (Left) 9 Days Post-Op    Plan:     During this hospitalization, patient to be seen daily to address the identified rehab impairments via gait training, therapeutic activities, therapeutic exercises, neuromuscular re-education and progress toward the following goals:    Plan of Care Expires:  06/30/25    Subjective     Chief Complaint: "legs weak." "Tired"  Patient/Family Comments/goals: none expressed  Pain/Comfort:  Pain Rating 1: 0/10      Objective:     Communicated with nurse prior to session.  Patient found right sidelying with telemetry, peripheral IV, oxygen, bed alarm upon PT entry to room.     General Precautions: Standard, fall  Orthopedic Precautions: N/A  Braces: " N/A  Respiratory Status: Nasal cannula, flow 2 L/min     Functional Mobility:  Bed Mobility:     Supine to Sit: contact guard assistance  Sit to Supine: minimum assistance  Transfers:     Sit to Stand:  contact guard assistance with rolling walker  Gait: stepping in place x 2 trials to fatigue (1min 45 sec; 45 sec) with RW and CGA      AM-PAC 6 CLICK MOBILITY          Treatment & Education:    -Pt education: benefits of participation with therapy, OOB to chair during the day and for all meals as tolerated, staff assist for mobility, fall prevention, call light, review of discharge recommendation  -Seated BLE therex at EOB w/ instruction for form, pace, and benefit: LAQs 2x5, heel/toe raises 1x10    Patient left right sidelying with all lines intact, call button in reach, bed alarm on, and nurse notified..    GOALS:   Multidisciplinary Problems       Physical Therapy Goals          Problem: Physical Therapy    Goal Priority Disciplines Outcome Interventions   Physical Therapy Goal     PT, PT/OT Progressing    Description: Goals to be met by: 25     Patient will increase functional independence with mobility by performin. Supine to sit with Supervision  2. Sit to stand transfer with Supervision  3. Bed to chair transfer with Supervision using Rolling Walker  4. Gait  x 100 feet with Supervision using Rolling Walker.                              Time Tracking:     PT Received On: 25  PT Start Time: 925     PT Stop Time: 940  PT Total Time (min): 15 min     Billable Minutes: Therapeutic Activity 15    Treatment Type: Treatment  PT/PTA: PTA     Number of PTA visits since last PT visit: 2     2025

## 2025-06-08 LAB
ABSOLUTE EOSINOPHIL (SMH): 0.12 K/UL
ABSOLUTE MONOCYTE (SMH): 0.82 K/UL (ref 0.3–1)
ABSOLUTE NEUTROPHIL COUNT (SMH): 12.5 K/UL (ref 1.8–7.7)
ANION GAP (SMH): 9 MMOL/L (ref 8–16)
BASOPHILS # BLD AUTO: 0.05 K/UL
BASOPHILS NFR BLD AUTO: 0.3 %
BUN SERPL-MCNC: 27 MG/DL (ref 8–23)
CALCIUM SERPL-MCNC: 9.1 MG/DL (ref 8.7–10.5)
CHLORIDE SERPL-SCNC: 97 MMOL/L (ref 95–110)
CO2 SERPL-SCNC: 30 MMOL/L (ref 23–29)
CREAT SERPL-MCNC: 0.5 MG/DL (ref 0.5–1.4)
ERYTHROCYTE [DISTWIDTH] IN BLOOD BY AUTOMATED COUNT: 12.9 % (ref 11.5–14.5)
GFR SERPLBLD CREATININE-BSD FMLA CKD-EPI: >60 ML/MIN/1.73/M2
GLUCOSE SERPL-MCNC: 134 MG/DL (ref 70–110)
HCT VFR BLD AUTO: 43.1 % (ref 37–48.5)
HGB BLD-MCNC: 14.4 GM/DL (ref 12–16)
IMM GRANULOCYTES # BLD AUTO: 0.26 K/UL (ref 0–0.04)
IMM GRANULOCYTES NFR BLD AUTO: 1.7 % (ref 0–0.5)
LYMPHOCYTES # BLD AUTO: 1.96 K/UL (ref 1–4.8)
MAGNESIUM SERPL-MCNC: 2.4 MG/DL (ref 1.6–2.6)
MCH RBC QN AUTO: 30 PG (ref 27–31)
MCHC RBC AUTO-ENTMCNC: 33.4 G/DL (ref 32–36)
MCV RBC AUTO: 90 FL (ref 82–98)
NUCLEATED RBC (/100WBC) (SMH): 0 /100 WBC
PLATELET # BLD AUTO: 259 K/UL (ref 150–450)
PMV BLD AUTO: 8.6 FL (ref 9.2–12.9)
POTASSIUM SERPL-SCNC: 4.1 MMOL/L (ref 3.5–5.1)
RBC # BLD AUTO: 4.8 M/UL (ref 4–5.4)
RELATIVE EOSINOPHIL (SMH): 0.8 % (ref 0–8)
RELATIVE LYMPHOCYTE (SMH): 12.5 % (ref 18–48)
RELATIVE MONOCYTE (SMH): 5.2 % (ref 4–15)
RELATIVE NEUTROPHIL (SMH): 79.5 % (ref 38–73)
SODIUM SERPL-SCNC: 136 MMOL/L (ref 136–145)
WBC # BLD AUTO: 15.74 K/UL (ref 3.9–12.7)

## 2025-06-08 PROCEDURE — 80048 BASIC METABOLIC PNL TOTAL CA: CPT | Performed by: STUDENT IN AN ORGANIZED HEALTH CARE EDUCATION/TRAINING PROGRAM

## 2025-06-08 PROCEDURE — 25000003 PHARM REV CODE 250: Performed by: STUDENT IN AN ORGANIZED HEALTH CARE EDUCATION/TRAINING PROGRAM

## 2025-06-08 PROCEDURE — 99900035 HC TECH TIME PER 15 MIN (STAT)

## 2025-06-08 PROCEDURE — 36415 COLL VENOUS BLD VENIPUNCTURE: CPT

## 2025-06-08 PROCEDURE — 11000001 HC ACUTE MED/SURG PRIVATE ROOM

## 2025-06-08 PROCEDURE — 25000003 PHARM REV CODE 250

## 2025-06-08 PROCEDURE — 83735 ASSAY OF MAGNESIUM: CPT

## 2025-06-08 PROCEDURE — 27000221 HC OXYGEN, UP TO 24 HOURS

## 2025-06-08 PROCEDURE — 25000003 PHARM REV CODE 250: Performed by: INTERNAL MEDICINE

## 2025-06-08 PROCEDURE — 99900031 HC PATIENT EDUCATION (STAT)

## 2025-06-08 PROCEDURE — 94664 DEMO&/EVAL PT USE INHALER: CPT

## 2025-06-08 PROCEDURE — 25000242 PHARM REV CODE 250 ALT 637 W/ HCPCS: Performed by: STUDENT IN AN ORGANIZED HEALTH CARE EDUCATION/TRAINING PROGRAM

## 2025-06-08 PROCEDURE — 94761 N-INVAS EAR/PLS OXIMETRY MLT: CPT

## 2025-06-08 PROCEDURE — 63600175 PHARM REV CODE 636 W HCPCS: Performed by: STUDENT IN AN ORGANIZED HEALTH CARE EDUCATION/TRAINING PROGRAM

## 2025-06-08 PROCEDURE — 85025 COMPLETE CBC W/AUTO DIFF WBC: CPT

## 2025-06-08 PROCEDURE — 94640 AIRWAY INHALATION TREATMENT: CPT

## 2025-06-08 RX ORDER — PREDNISONE 20 MG/1
20 TABLET ORAL DAILY
Status: DISCONTINUED | OUTPATIENT
Start: 2025-06-08 | End: 2025-06-09

## 2025-06-08 RX ADMIN — ARFORMOTEROL TARTRATE 15 MCG: 15 SOLUTION RESPIRATORY (INHALATION) at 07:06

## 2025-06-08 RX ADMIN — HYDROCODONE BITARTRATE AND ACETAMINOPHEN 1 TABLET: 10; 325 TABLET ORAL at 01:06

## 2025-06-08 RX ADMIN — LEVOTHYROXINE SODIUM 150 MCG: 0.1 TABLET ORAL at 05:06

## 2025-06-08 RX ADMIN — SENNOSIDES, DOCUSATE SODIUM 1 TABLET: 50; 8.6 TABLET, FILM COATED ORAL at 09:06

## 2025-06-08 RX ADMIN — HYDROXYZINE HYDROCHLORIDE 25 MG: 25 TABLET, FILM COATED ORAL at 09:06

## 2025-06-08 RX ADMIN — PANTOPRAZOLE SODIUM 40 MG: 40 TABLET, DELAYED RELEASE ORAL at 05:06

## 2025-06-08 RX ADMIN — POLYETHYLENE GLYCOL 3350 17 G: 17 POWDER, FOR SOLUTION ORAL at 09:06

## 2025-06-08 RX ADMIN — Medication 6 MG: at 09:06

## 2025-06-08 RX ADMIN — ESCITALOPRAM OXALATE 20 MG: 10 TABLET ORAL at 09:06

## 2025-06-08 RX ADMIN — HYDROXYZINE HYDROCHLORIDE 25 MG: 25 TABLET, FILM COATED ORAL at 02:06

## 2025-06-08 RX ADMIN — PREDNISONE 20 MG: 20 TABLET ORAL at 09:06

## 2025-06-08 RX ADMIN — BUDESONIDE INHALATION 0.5 MG: 0.5 SUSPENSION RESPIRATORY (INHALATION) at 07:06

## 2025-06-08 RX ADMIN — HYDROCODONE BITARTRATE AND ACETAMINOPHEN 1 TABLET: 10; 325 TABLET ORAL at 09:06

## 2025-06-08 RX ADMIN — METOPROLOL TARTRATE 12.5 MG: 25 TABLET, FILM COATED ORAL at 09:06

## 2025-06-08 RX ADMIN — ASPIRIN 81 MG: 81 TABLET ORAL at 09:06

## 2025-06-08 RX ADMIN — CLOPIDOGREL 75 MG: 75 TABLET ORAL at 09:06

## 2025-06-08 RX ADMIN — ATORVASTATIN CALCIUM 40 MG: 40 TABLET, FILM COATED ORAL at 09:06

## 2025-06-08 NOTE — PROGRESS NOTES
[facility] Medicine  Progress Note    Patient Name: Aida Gupta  MRN: [id number]  Patient Class: IP- Inpatient   Admission Date: 5/29/2025  Length of Stay: 10 days  Attending Physician: Johann Crooks DO  Primary Care Provider: Edel Fisher MD        Subjective     Principal Problem:STEMI (ST elevation myocardial infarction)        HPI:  This is a very pleasant 74-year-old lady with comorbid conditions of hypothyroidism, GERD, [facility] of intermittent chest pain or loss few days.  Patient reports she felt as though it may has been heartburn and treated herself as such with no improvement in symptoms and due to the persistence she presented to the ED. patient found to have ST elevations, Dr. Bailey was contacted and could STEMI and cath lab were emergently activated.  Patient is now status post heart catheterization with bulky calcifications noted.  There was consideration for transfer to [facility] for complex PCI, however, joint decision made to proceed.  Patient is now status post drug-eluting stent to the LAD.  Admission to ICU status post procedure.  On assessment patient is calm, cooperative and in no acute distress.  She is without chest pain.  She is vitally stable.    Overview/Hospital Course:  Patient with hypothyroidism, GERD, hyperlipidemia presented with chest pain.  Concerns for STEMI, cardiology consulted and patient taken to cath lab emergently.  Status post PCI of the ostial LAD, heavy calcium burden.  Started on dual antiplatelet therapy. PT/OT consulted.  SNF placement pending. Had some shortness of breath, possible from large neck and positioning, given breathing treatments and steroids. Abx stopped, as procalcitonin was negative. Case management worked to procure a SNF bed.    Interval History:  Patient was seen and examined at bedside this morning.  She reports she is feeling well today, has no  complaints at this time.  Pending placement to SNF Monday.  Continue DAPT and prednisone.    Review of Systems   Constitutional:  Negative for appetite change.   Respiratory:  Positive for shortness of breath.    Cardiovascular:  Negative for chest pain.     Objective:     Vital Signs (Most Recent):  Temp: 98 °F (36.7 °C) (06/08/25 1127)  Pulse: (!) 58 (06/08/25 1127)  Resp: 18 (06/08/25 1314)  BP: 118/79 (06/08/25 1127)  SpO2: 97 % (06/08/25 1127) Vital Signs (24h Range):  Temp:  [96.6 °F (35.9 °C)-98.3 °F (36.8 °C)] 98 °F (36.7 °C)  Pulse:  [48-88] 58  Resp:  [16-20] 18  SpO2:  [79 %-99 %] 97 %  BP: (118-133)/(74-84) 118/79     Weight: 95.5 kg (210 lb 8.6 oz)  Body mass index is 38.51 kg/m².    Intake/Output Summary (Last 24 hours) at 6/8/2025 1355  Last data filed at 6/8/2025 1351  Gross per 24 hour   Intake --   Output 1 ml   Net -1 ml           Physical Exam  Constitutional:       General: She is in acute distress.   Cardiovascular:      Rate and Rhythm: Normal rate and regular rhythm.   Pulmonary:      Comments: Diminished breath sounds bases  Neurological:      General: No focal deficit present.      Mental Status: She is alert and oriented to person, place, and time.               Significant Labs: All pertinent labs within the past 24 hours have been reviewed.  CBC:   Recent Labs   Lab 06/07/25  0151 06/08/25  0525   WBC 16.19* 15.74*   HGB 13.4 14.4   HCT 40.2 43.1    259     CMP:   Recent Labs   Lab 06/07/25  0539 06/08/25  0525    136   K 4.1 4.1   CL 95 97   CO2 31* 30*   * 134*   BUN 32* 27*   CREATININE 0.6 0.5   CALCIUM 9.6 9.1   ANIONGAP 10 9       Significant Imaging: I have reviewed all pertinent imaging results/findings within the past 24 hours.      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   EMILY was placed to the  ostial/proximal/mid LAD   lifelong ASA, plavix for at least 1 year, high intensity statin  PT/OT consulted, recommend moderate intensity therapy  ECHO showed ef 50-55    Hypothyroidism  Continue synthroid    Gastroesophageal reflux disease without esophagitis  Continue PPI    Hyperlipidemia  Continue statin    Intertrigo  Nystatin to breast folds and groin  Wound care following    Shortness of breath  Sats are stable  Maybe positioning and size of pts neck causing obstruction, reposition   Trial of steroids and duonebs    Cxr showing possible fluid vs infection. Procal negative, abx stopped  Lasix given as well    VTE Risk Mitigation (From admission, onward)      None            Discharge Planning   OSMAN: 6/9/2025     Code Status: Full Code   Medical Readiness for Discharge Date:   Discharge Plan A: Skilled Nursing Facility   Discharge Delays: (!) Post-Acute Set-up            Please place Justification for DME        Johann Crooks DO  Department of Hospital Medicine   [facility]

## 2025-06-08 NOTE — PT/OT/SLP PROGRESS
"Physical Therapy      Patient Name:  Aida Gupta   MRN:  [id number]    Patient not seen today secondary to Pt declined participation due to "not feeling good today." Pt notably drowsy and was difficult to rouse from sleep at therapist's entry to room. Nurse informed of status. Will follow-up 6/9/25.    "

## 2025-06-08 NOTE — CARE UPDATE
06/08/25 0749   Patient Assessment/Suction   Level of Consciousness (AVPU) alert   Respiratory Effort Normal;Unlabored   All Lung Fields Breath Sounds diminished   Rhythm/Pattern, Respiratory pattern regular   Skin Integrity   $ Wound Care Tech Time 15 min   Area Observed Nares   Skin Appearance without discoloration   PRE-TX-O2   Device (Oxygen Therapy) nasal cannula   $ Is the patient on Low Flow Oxygen? Yes   Flow (L/min) (Oxygen Therapy) 2   SpO2 99 %   Pulse Oximetry Type Intermittent   $ Pulse Oximetry - Multiple Charge Pulse Oximetry - Multiple   Pulse (!) 48   Resp 20   Aerosol Therapy   $ Aerosol Therapy Charges Aerosol Treatment  (BROVANA)   Daily Review of Necessity (SVN) completed   Respiratory Treatment Status (SVN) given   Treatment Route (SVN) mask;oxygen   Patient Position HOB elevated   Post Treatment Assessment (SVN) breath sounds unchanged;vital signs unchanged   Signs of Intolerance (SVN) none   Vibratory PEP Therapy   $ Vibratory PEP Charges Aerobika Therapy   $ Vibratory PEP Tech Time Charges 15 min   Daily Review of Necessity (PEP Therapy) completed   Type (PEP Therapy) vibratory/oscillatory   Device (PEP Therapy) flutter   Route (PEP Therapy) mouthpiece   Breaths per Cycle (PEP Therapy) 10   Cycles (PEP Therapy) 1   Settings (PEP Therapy) PEP 3   Patient Position (PEP Therapy) HOB elevated   Post Treatment Assessment (PEP) breath sounds unchanged;vital signs unchanged   Signs of Intolerance (PEP Therapy) none

## 2025-06-08 NOTE — SUBJECTIVE & OBJECTIVE
Interval History:  Patient was seen and examined at bedside this morning.  She reports she is feeling well today, has no complaints at this time.  Pending placement to SNF Monday.  Continue DAPT and prednisone.    Review of Systems   Constitutional:  Negative for appetite change.   Respiratory:  Positive for shortness of breath.    Cardiovascular:  Negative for chest pain.     Objective:     Vital Signs (Most Recent):  Temp: 98 °F (36.7 °C) (06/08/25 1127)  Pulse: (!) 58 (06/08/25 1127)  Resp: 18 (06/08/25 1314)  BP: 118/79 (06/08/25 1127)  SpO2: 97 % (06/08/25 1127) Vital Signs (24h Range):  Temp:  [96.6 °F (35.9 °C)-98.3 °F (36.8 °C)] 98 °F (36.7 °C)  Pulse:  [48-88] 58  Resp:  [16-20] 18  SpO2:  [79 %-99 %] 97 %  BP: (118-133)/(74-84) 118/79     Weight: 95.5 kg (210 lb 8.6 oz)  Body mass index is 38.51 kg/m².    Intake/Output Summary (Last 24 hours) at 6/8/2025 1355  Last data filed at 6/8/2025 1351  Gross per 24 hour   Intake --   Output 1 ml   Net -1 ml           Physical Exam  Constitutional:       General: She is in acute distress.   Cardiovascular:      Rate and Rhythm: Normal rate and regular rhythm.   Pulmonary:      Comments: Diminished breath sounds bases  Neurological:      General: No focal deficit present.      Mental Status: She is alert and oriented to person, place, and time.               Significant Labs: All pertinent labs within the past 24 hours have been reviewed.  CBC:   Recent Labs   Lab 06/07/25  0151 06/08/25  0525   WBC 16.19* 15.74*   HGB 13.4 14.4   HCT 40.2 43.1    259     CMP:   Recent Labs   Lab 06/07/25  0539 06/08/25  0525    136   K 4.1 4.1   CL 95 97   CO2 31* 30*   * 134*   BUN 32* 27*   CREATININE 0.6 0.5   CALCIUM 9.6 9.1   ANIONGAP 10 9       Significant Imaging: I have reviewed all pertinent imaging results/findings within the past 24 hours.

## 2025-06-09 VITALS
TEMPERATURE: 98 F | BODY MASS INDEX: 38.75 KG/M2 | WEIGHT: 210.56 LBS | DIASTOLIC BLOOD PRESSURE: 73 MMHG | RESPIRATION RATE: 18 BRPM | HEART RATE: 62 BPM | SYSTOLIC BLOOD PRESSURE: 125 MMHG | HEIGHT: 62 IN | OXYGEN SATURATION: 92 %

## 2025-06-09 LAB
ABSOLUTE EOSINOPHIL (SMH): 0.19 K/UL
ABSOLUTE MONOCYTE (SMH): 0.81 K/UL (ref 0.3–1)
ABSOLUTE NEUTROPHIL COUNT (SMH): 14.7 K/UL (ref 1.8–7.7)
ANION GAP (SMH): 7 MMOL/L (ref 8–16)
BASOPHILS # BLD AUTO: 0.05 K/UL
BASOPHILS NFR BLD AUTO: 0.3 %
BUN SERPL-MCNC: 23 MG/DL (ref 8–23)
CALCIUM SERPL-MCNC: 9.2 MG/DL (ref 8.7–10.5)
CHLORIDE SERPL-SCNC: 95 MMOL/L (ref 95–110)
CO2 SERPL-SCNC: 33 MMOL/L (ref 23–29)
CREAT SERPL-MCNC: 0.7 MG/DL (ref 0.5–1.4)
ERYTHROCYTE [DISTWIDTH] IN BLOOD BY AUTOMATED COUNT: 13 % (ref 11.5–14.5)
GFR SERPLBLD CREATININE-BSD FMLA CKD-EPI: >60 ML/MIN/1.73/M2
GLUCOSE SERPL-MCNC: 155 MG/DL (ref 70–110)
HCT VFR BLD AUTO: 43.1 % (ref 37–48.5)
HGB BLD-MCNC: 14.2 GM/DL (ref 12–16)
IMM GRANULOCYTES # BLD AUTO: 0.4 K/UL (ref 0–0.04)
IMM GRANULOCYTES NFR BLD AUTO: 2.2 % (ref 0–0.5)
LYMPHOCYTES # BLD AUTO: 1.68 K/UL (ref 1–4.8)
MAGNESIUM SERPL-MCNC: 2.4 MG/DL (ref 1.6–2.6)
MCH RBC QN AUTO: 29.9 PG (ref 27–31)
MCHC RBC AUTO-ENTMCNC: 32.9 G/DL (ref 32–36)
MCV RBC AUTO: 91 FL (ref 82–98)
NUCLEATED RBC (/100WBC) (SMH): 0 /100 WBC
PLATELET # BLD AUTO: 291 K/UL (ref 150–450)
PMV BLD AUTO: 8.9 FL (ref 9.2–12.9)
POTASSIUM SERPL-SCNC: 4.6 MMOL/L (ref 3.5–5.1)
RBC # BLD AUTO: 4.75 M/UL (ref 4–5.4)
RELATIVE EOSINOPHIL (SMH): 1.1 % (ref 0–8)
RELATIVE LYMPHOCYTE (SMH): 9.4 % (ref 18–48)
RELATIVE MONOCYTE (SMH): 4.5 % (ref 4–15)
RELATIVE NEUTROPHIL (SMH): 82.5 % (ref 38–73)
SODIUM SERPL-SCNC: 135 MMOL/L (ref 136–145)
WBC # BLD AUTO: 17.85 K/UL (ref 3.9–12.7)

## 2025-06-09 PROCEDURE — 85025 COMPLETE CBC W/AUTO DIFF WBC: CPT

## 2025-06-09 PROCEDURE — 82310 ASSAY OF CALCIUM: CPT | Performed by: STUDENT IN AN ORGANIZED HEALTH CARE EDUCATION/TRAINING PROGRAM

## 2025-06-09 PROCEDURE — 25000003 PHARM REV CODE 250

## 2025-06-09 PROCEDURE — 99900035 HC TECH TIME PER 15 MIN (STAT)

## 2025-06-09 PROCEDURE — 94664 DEMO&/EVAL PT USE INHALER: CPT

## 2025-06-09 PROCEDURE — 97535 SELF CARE MNGMENT TRAINING: CPT

## 2025-06-09 PROCEDURE — 25000003 PHARM REV CODE 250: Performed by: INTERNAL MEDICINE

## 2025-06-09 PROCEDURE — 25000003 PHARM REV CODE 250: Performed by: STUDENT IN AN ORGANIZED HEALTH CARE EDUCATION/TRAINING PROGRAM

## 2025-06-09 PROCEDURE — 63600175 PHARM REV CODE 636 W HCPCS

## 2025-06-09 PROCEDURE — 27000221 HC OXYGEN, UP TO 24 HOURS

## 2025-06-09 PROCEDURE — 94640 AIRWAY INHALATION TREATMENT: CPT

## 2025-06-09 PROCEDURE — 83735 ASSAY OF MAGNESIUM: CPT

## 2025-06-09 PROCEDURE — 99900031 HC PATIENT EDUCATION (STAT)

## 2025-06-09 PROCEDURE — 36415 COLL VENOUS BLD VENIPUNCTURE: CPT | Performed by: STUDENT IN AN ORGANIZED HEALTH CARE EDUCATION/TRAINING PROGRAM

## 2025-06-09 PROCEDURE — 25000242 PHARM REV CODE 250 ALT 637 W/ HCPCS: Performed by: STUDENT IN AN ORGANIZED HEALTH CARE EDUCATION/TRAINING PROGRAM

## 2025-06-09 PROCEDURE — 97530 THERAPEUTIC ACTIVITIES: CPT | Mod: CQ

## 2025-06-09 PROCEDURE — 94761 N-INVAS EAR/PLS OXIMETRY MLT: CPT

## 2025-06-09 RX ORDER — ERGOCALCIFEROL 1.25 MG/1
50000 CAPSULE ORAL
Qty: 4 CAPSULE | Refills: 0 | Status: SHIPPED | OUTPATIENT
Start: 2025-06-09 | End: 2025-07-07

## 2025-06-09 RX ORDER — METOPROLOL TARTRATE 25 MG/1
12.5 TABLET, FILM COATED ORAL 2 TIMES DAILY
Qty: 90 TABLET | Refills: 3 | Status: SHIPPED | OUTPATIENT
Start: 2025-06-09 | End: 2026-06-09

## 2025-06-09 RX ORDER — PANTOPRAZOLE SODIUM 40 MG/1
40 TABLET, DELAYED RELEASE ORAL DAILY
Qty: 90 TABLET | Refills: 3 | Status: SHIPPED | OUTPATIENT
Start: 2025-06-10 | End: 2026-06-10

## 2025-06-09 RX ORDER — CLOPIDOGREL BISULFATE 75 MG/1
75 TABLET ORAL DAILY
Qty: 30 TABLET | Refills: 11 | Status: SHIPPED | OUTPATIENT
Start: 2025-06-10 | End: 2026-06-10

## 2025-06-09 RX ORDER — ASPIRIN 81 MG/1
81 TABLET ORAL DAILY
Qty: 90 TABLET | Refills: 3 | Status: SHIPPED | OUTPATIENT
Start: 2025-06-10 | End: 2026-06-10

## 2025-06-09 RX ORDER — ATORVASTATIN CALCIUM 40 MG/1
40 TABLET, FILM COATED ORAL NIGHTLY
Qty: 90 TABLET | Refills: 3 | Status: SHIPPED | OUTPATIENT
Start: 2025-06-09 | End: 2026-06-09

## 2025-06-09 RX ADMIN — BUDESONIDE INHALATION 0.5 MG: 0.5 SUSPENSION RESPIRATORY (INHALATION) at 06:06

## 2025-06-09 RX ADMIN — ONDANSETRON 4 MG: 2 INJECTION INTRAMUSCULAR; INTRAVENOUS at 03:06

## 2025-06-09 RX ADMIN — METOPROLOL TARTRATE 12.5 MG: 25 TABLET, FILM COATED ORAL at 08:06

## 2025-06-09 RX ADMIN — HYDROCODONE BITARTRATE AND ACETAMINOPHEN 1 TABLET: 10; 325 TABLET ORAL at 01:06

## 2025-06-09 RX ADMIN — ASPIRIN 81 MG: 81 TABLET ORAL at 08:06

## 2025-06-09 RX ADMIN — ARFORMOTEROL TARTRATE 15 MCG: 15 SOLUTION RESPIRATORY (INHALATION) at 06:06

## 2025-06-09 RX ADMIN — PANTOPRAZOLE SODIUM 40 MG: 40 TABLET, DELAYED RELEASE ORAL at 05:06

## 2025-06-09 RX ADMIN — LEVOTHYROXINE SODIUM 150 MCG: 0.1 TABLET ORAL at 05:06

## 2025-06-09 RX ADMIN — CLOPIDOGREL 75 MG: 75 TABLET ORAL at 08:06

## 2025-06-09 RX ADMIN — SENNOSIDES, DOCUSATE SODIUM 1 TABLET: 50; 8.6 TABLET, FILM COATED ORAL at 08:06

## 2025-06-09 RX ADMIN — POLYETHYLENE GLYCOL 3350 17 G: 17 POWDER, FOR SOLUTION ORAL at 08:06

## 2025-06-09 NOTE — DISCHARGE SUMMARY
[facility] Medicine  Discharge Summary      Patient Name: Aida Gupta  MRN: [id number]  JOSE RAFAEL: [id number]  Patient Class: IP- Inpatient  Admission Date: 5/29/2025  Hospital Length of Stay: 11 days  Discharge Date and Time: 06/09/2025 3:01 PM  Attending Physician: Johann Crooks DO   Discharging Provider: Johann Crooks DO  Primary Care Provider: Edel Fisher MD    Primary Care Team: Networked reference to record PCT     HPI:   This is a very pleasant 74-year-old lady with comorbid conditions of hypothyroidism, GERD, hyperlipidemia presents to [facility] Emergency Department with complaints of intermittent chest pain or loss few days.  Patient reports she felt as though it may has been heartburn and treated herself as such with no improvement in symptoms and due to the persistence she presented to the ED. patient found to have ST elevations, Dr. Bailey was contacted and could STEMI and cath lab were emergently activated.  Patient is now status post heart catheterization with bulky calcifications noted.  There was consideration for transfer to [facility] for complex PCI, however, joint decision made to proceed.  Patient is now status post drug-eluting stent to the LAD.  Admission to ICU status post procedure.  On assessment patient is calm, cooperative and in no acute distress.  She is without chest pain.  She is vitally stable.    Procedure(s) (LRB):  Left heart cath (Left)      Hospital Course:   Patient with hypothyroidism, GERD, hyperlipidemia presented with chest pain.  Concerns for STEMI, cardiology consulted and patient taken to cath lab emergently.  Status post PCI of the ostial LAD, heavy calcium burden.  Started on dual antiplatelet therapy. PT/OT consulted.  SNF placement pending. Had some shortness of breath, possible from large neck and positioning, given breathing treatments and steroids. Abx stopped, as procalcitonin was negative. Case management worked to procure a  SNF bed.  Patient has remained hemodynamically stable.  Patient was seen and examined on day of discharge, she is okay for discharge at this time.  She will be discharged to skilled nursing facility.  Patient will follow up outpatient with the primary care physician and with Cardiology.  She will be continued on dual antiplatelet therapy.     Goals of Care Treatment Preferences:  Code Status: Full Code      SDOH Screening:  The patient was screened for utility difficulties, food insecurity, transport difficulties, housing insecurity, and interpersonal safety and there were no concerns identified this admission.     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   EMILY was placed to the ostial/proximal/mid LAD   lifelong ASA, plavix for at least 1 year, high intensity statin  PT/OT consulted, recommend moderate intensity therapy  ECHO showed ef 50-55    Hypothyroidism  Continue synthroid    Gastroesophageal reflux disease without esophagitis  Continue PPI    Hyperlipidemia  Continue statin    Intertrigo  Nystatin to breast folds and groin  Wound care following    Shortness of breath  Sats are stable  Maybe positioning and size of pts neck causing obstruction, reposition   Trial of steroids and duonebs    Cxr showing possible fluid vs infection. Procal negative, abx stopped  Lasix given as well    Final Active Diagnoses:    Diagnosis  Date Noted POA    PRINCIPAL PROBLEM:  STEMI (ST elevation myocardial infarction) [I21.3] 05/29/2025 Yes    Intertrigo [L30.4] 05/29/2025 Yes    Shortness of breath [R06.02] 07/28/2017 Yes    Hyperlipidemia [E78.5] 07/10/2017 Yes    Hypothyroidism [E03.9] 07/10/2017 Yes    Gastroesophageal reflux disease without esophagitis [K21.9] 07/10/2017 Yes      Problems Resolved During this Admission:       Discharged Condition: good    Disposition: Skilled Nursing Facility    Follow Up:   Contact information for follow-up providers       Lia Bailey MD Follow up in 1 week(s).    Specialties: Interventional Cardiology, Cardiovascular Disease  Contact information:  [address] [place] [id number]  [phone]                       Contact information for after-discharge care       Destination       [facility] .    Service: Skilled Nursing  Contact information:  [address] [facility] [address]  [phone]                                 Patient Instructions:      Notify your health care provider if you experience any of the following:  increased confusion or weakness     Notify your health care provider if you experience any of the following:  persistent dizziness, light-headedness, or visual disturbances     Notify your health care provider if you experience any of the following:  worsening rash     Notify your health care provider if you experience any of the following:  severe persistent headache     Notify your health care provider if you experience any of the following:  difficulty breathing or increased cough     Notify your health care provider if you experience any of the following:  redness, tenderness, or signs of infection (pain, swelling, redness, odor or green/yellow discharge around incision site)     Notify your health care provider if you experience any of the following:  severe uncontrolled pain     Notify your health care provider if you experience any of the  following:  persistent nausea and vomiting or diarrhea     Notify your health care provider if you experience any of the following:  temperature >100.4     Cardiac rehab phase II   Standing Status: Future Standing Exp. Date: 05/30/26     Order Specific Question Answer Comments   Department [facility] [place]      Activity as tolerated       Significant Diagnostic Studies: Labs: CMP   Recent Labs   Lab 06/08/25  0525 06/09/25  0305    135*   K 4.1 4.6   CL 97 95   CO2 30* 33*   * 155*   BUN 27* 23   CREATININE 0.5 0.7   CALCIUM 9.1 9.2   ANIONGAP 9 7*    and CBC   Recent Labs   Lab 06/08/25  0525 06/09/25  0305   WBC 15.74* 17.85*   HGB 14.4 14.2   HCT 43.1 43.1    291       Pending Diagnostic Studies:       None             Medications:  Reconciled Home Medications:      Medication List        PAUSE taking these medications      amitriptyline 50 MG tablet  Wait to take this until your doctor or other care provider tells you to start again.  Commonly known as: ELAVIL  Take 1 tablet (50 mg total) by mouth every evening.            START taking these medications      aspirin 81 MG EC tablet  Commonly known as: ECOTRIN  Take 1 tablet (81 mg total) by mouth once daily.  Start taking on: Dannielle 10, 2025     atorvastatin 40 MG tablet  Commonly known as: LIPITOR  Take 1 tablet (40 mg total) by mouth every evening.     clopidogreL 75 mg tablet  Commonly known as: PLAVIX  Take 1 tablet (75 mg total) by mouth once daily.  Start taking on: Dannielle 10, 2025     metoprolol tartrate 25 MG tablet  Commonly known as: LOPRESSOR  Take 0.5 tablets (12.5 mg total) by mouth 2 (two) times daily.     pantoprazole 40 MG tablet  Commonly known as: PROTONIX  Take 1 tablet (40 mg total) by mouth once daily.  Start taking on: Dannielle 10, 2025  Replaces: omeprazole 20 MG capsule            CHANGE how you take these medications      ergocalciferol 50,000 unit Cap  Commonly known as: VITAMIN D2  Take 1 capsule (50,000 Units  total) by mouth every 7 days.  What changed: when to take this            CONTINUE taking these medications      albuterol 90 mcg/actuation inhaler  Commonly known as: PROVENTIL/VENTOLIN HFA  Inhale 2 puffs into the lungs every 6 (six) hours as needed for Wheezing. Rescue     cholecalciferol (vitamin D3) 125 mcg (5,000 unit) Tab  Commonly known as: VITAMIN D3  Take 1 tablet (5,000 Units total) by mouth once daily.     cholestyramine 4 gram packet  Commonly known as: QUESTRAN  Take 1 packet (4 g total) by mouth once daily.     diclofenac sodium 1 % Gel  Commonly known as: VOLTAREN ARTHRITIS PAIN  Apply 2 g topically 4 (four) times daily as needed (arthritis).     EScitalopram oxalate 20 MG tablet  Commonly known as: LEXAPRO  Take 1 tablet (20 mg total) by mouth every evening.     fluticasone propionate 50 mcg/actuation nasal spray  Commonly known as: FLONASE  1 spray (50 mcg total) by Each Nostril route once daily.     fluticasone-salmeterol 100-50 mcg/dose 100-50 mcg/dose diskus inhaler  Commonly known as: ADVAIR  Inhale 1 puff into the lungs 2 (two) times daily. Controller     gabapentin 300 MG capsule  Commonly known as: NEURONTIN  Take 2 capsules (600 mg total) by mouth 2 (two) times daily.     hydrOXYzine HCL 25 MG tablet  Commonly known as: ATARAX  TAKE 1 TABLET BY MOUTH THREE TIMES DAILY AS NEEDED FOR ANXIETY     levocetirizine 5 MG tablet  Commonly known as: XYZAL  Take 1 tablet (5 mg total) by mouth nightly as needed for Allergies (congestion).     levothyroxine 150 MCG tablet  Commonly known as: SYNTHROID  Take 1 tablet by mouth once daily     meloxicam 7.5 MG tablet  Commonly known as: MOBIC  Take 1 tablet by mouth once daily     ondansetron 4 MG tablet  Commonly known as: ZOFRAN  Take 1 tablet (4 mg total) by mouth every 8 (eight) hours as needed for Nausea.     triamcinolone acetonide 0.1% 0.1 % cream  Commonly known as: KENALOG  Apply topically 2 (two) times daily.            STOP taking these  medications      omeprazole 20 MG capsule  Commonly known as: PRILOSEC  Replaced by: pantoprazole 40 MG tablet     propranoloL 60 MG 24 hr capsule  Commonly known as: INDERAL LA     rosuvastatin 10 MG tablet  Commonly known as: CRESTOR              Indwelling Lines/Drains at time of discharge:   Lines/Drains/Airways       Drain  Duration             Female External Urinary Catheter w/ Suction 05/29/25 2300 10 days                    Time spent on the discharge of patient: 35 minutes         Johann Crooks DO  Department of Hospital Medicine  [facility]

## 2025-06-09 NOTE — DISCHARGE INSTRUCTIONS
Formerly [facility], [facility]  Facility Transfer Orders        Admit to: snf    Diagnoses:   Active Hospital Problems    Diagnosis  POA    *STEMI (ST elevation myocardial infarction) [I21.3]  Yes    Intertrigo [L30.4]  Yes    Shortness of breath [R06.02]  Yes    Hyperlipidemia [E78.5]  Yes    Hypothyroidism [E03.9]  Yes    Gastroesophageal reflux disease without esophagitis [K21.9]  Yes      Resolved Hospital Problems   No resolved problems to display.     Allergies:   Review of patient's allergies indicates:   Allergen Reactions    Contrast media Nausea And Vomiting       Code Status: full    Vitals: Routine       Diet: cardiac diet    Activity: Activity as tolerated    Nursing Precautions:     Bed/Surface: Low Air Loss    Consults: PT to evaluate and treat- 7 times a week and OT to evaluate and treat- 7 times a week    Oxygen: room air    Dialysis: Patient is not on dialysis.     Labs:                Pending Diagnostic Studies:       None          Imaging: none    Miscellaneous Care:       IV Access:      Medications: Discontinue all previous medication orders, if any. See new list below.  Current Discharge Medication List        START taking these medications    Details   aspirin (ECOTRIN) 81 MG EC tablet Take 1 tablet (81 mg total) by mouth once daily.  Qty: 90 tablet, Refills: 3      atorvastatin (LIPITOR) 40 MG tablet Take 1 tablet (40 mg total) by mouth every evening.  Qty: 90 tablet, Refills: 3      clopidogreL (PLAVIX) 75 mg tablet Take 1 tablet (75 mg total) by mouth once daily.  Qty: 30 tablet, Refills: 11      metoprolol tartrate (LOPRESSOR) 25 MG tablet Take 0.5 tablets (12.5 mg total) by mouth 2 (two) times daily.  Qty: 90 tablet, Refills: 3    Comments: .      pantoprazole (PROTONIX) 40 MG tablet Take 1 tablet (40 mg total) by mouth once daily.  Qty: 90 tablet, Refills: 3           CONTINUE these medications which have CHANGED    Details   ergocalciferol (VITAMIN D2) 50,000 unit Cap Take 1 capsule (50,000  Units total) by mouth every 7 days.  Qty: 4 capsule, Refills: 0    Associated Diagnoses: Vitamin D deficiency           CONTINUE these medications which have NOT CHANGED    Details   albuterol (PROVENTIL/VENTOLIN HFA) 90 mcg/actuation inhaler Inhale 2 puffs into the lungs every 6 (six) hours as needed for Wheezing. Rescue      amitriptyline (ELAVIL) 50 MG tablet Take 1 tablet (50 mg total) by mouth every evening.  Qty: 90 tablet, Refills: 3    Associated Diagnoses: Post herpetic neuralgia      cholecalciferol, vitamin D3, (VITAMIN D3) 125 mcg (5,000 unit) Tab Take 1 tablet (5,000 Units total) by mouth once daily.  Qty: 90 tablet, Refills: 3    Associated Diagnoses: Vitamin D deficiency      cholestyramine (QUESTRAN) 4 gram packet Take 1 packet (4 g total) by mouth once daily.  Qty: 90 packet, Refills: 3    Associated Diagnoses: Chronic diarrhea      diclofenac sodium (VOLTAREN ARTHRITIS PAIN) 1 % Gel Apply 2 g topically 4 (four) times daily as needed (arthritis).  Qty: 450 g, Refills: PRN    Associated Diagnoses: Hand arthritis      EScitalopram oxalate (LEXAPRO) 20 MG tablet Take 1 tablet (20 mg total) by mouth every evening.  Qty: 90 tablet, Refills: 3    Associated Diagnoses: Depression with anxiety      fluticasone propionate (FLONASE) 50 mcg/actuation nasal spray 1 spray (50 mcg total) by Each Nostril route once daily.  Qty: 16 g, Refills: PRN    Associated Diagnoses: Seasonal allergic rhinitis due to pollen      fluticasone-salmeterol diskus inhaler 100-50 mcg Inhale 1 puff into the lungs 2 (two) times daily. Controller  Qty: 60 each, Refills: 5    Associated Diagnoses: Chronic obstructive pulmonary disease, unspecified COPD type      gabapentin (NEURONTIN) 300 MG capsule Take 2 capsules (600 mg total) by mouth 2 (two) times daily.  Qty: 360 capsule, Refills: 3    Associated Diagnoses: Lumbar radiculitis      hydrOXYzine HCL (ATARAX) 25 MG tablet TAKE 1 TABLET BY MOUTH THREE TIMES DAILY AS NEEDED FOR  ANXIETY  Qty: 90 tablet, Refills: prn    Associated Diagnoses: Depression with anxiety      levocetirizine (XYZAL) 5 MG tablet Take 1 tablet (5 mg total) by mouth nightly as needed for Allergies (congestion).  Qty: 30 tablet, Refills: PRN    Associated Diagnoses: Seasonal allergic rhinitis due to pollen      levothyroxine (SYNTHROID) 150 MCG tablet Take 1 tablet by mouth once daily  Qty: 90 tablet, Refills: 1    Associated Diagnoses: Hypothyroidism, unspecified type      meloxicam (MOBIC) 7.5 MG tablet Take 1 tablet by mouth once daily  Qty: 90 tablet, Refills: 3    Associated Diagnoses: Lumbar radiculitis      ondansetron (ZOFRAN) 4 MG tablet Take 1 tablet (4 mg total) by mouth every 8 (eight) hours as needed for Nausea.  Qty: 30 tablet, Refills: 3    Associated Diagnoses: Nausea      triamcinolone acetonide 0.1% (KENALOG) 0.1 % cream Apply topically 2 (two) times daily.           STOP taking these medications       omeprazole (PRILOSEC) 20 MG capsule Comments:   Reason for Stopping:         propranoloL (INDERAL LA) 60 MG 24 hr capsule Comments:   Reason for Stopping:         rosuvastatin (CRESTOR) 10 MG tablet Comments:   Reason for Stopping:             Follow up:    Contact information for follow-up providers       Lia Bailey MD Follow up in 1 week(s).    Specialties: Interventional Cardiology, Cardiovascular Disease  Contact information:  [address] [facility]  [address] [place] [id number]  [phone]                       Contact information for after-discharge care       Destination       [facility] .    Service: Skilled Nursing  Contact information:  [address] [facility] [id number] [phone]                                     Immunizations Administered as of 6/9/2025       No immunizations on file.            This patient has had both covid vaccinations    Some patients may experience side effects after vaccination.  These may include fever, headache, muscle or joint  aches.  Most symptoms resolve with 24-48 hours and do not require urgent medical evaluation unless they persist for more than 72 hours or symptoms are concerning for an unrelated medical condition.          Maria Teresa Dorantes RN    Patient is to hold the following medication for at least 30 days (contraindicated following STEMI) until follow up with cardiologist:    amitriptyline (ELAVIL) 50 MG tablet Take 1 tablet (50 mg total) by mouth every evening.  Qty: 90 tablet, Refills: 3

## 2025-06-09 NOTE — PT/OT/SLP PROGRESS
Physical Therapy Treatment    Patient Name:  Aida Gupta   MRN:  [id number]    Recommendations:     Discharge Recommendations: Moderate Intensity Therapy  Discharge Equipment Recommendations: none  Barriers to discharge: increased assist with mobility, decreased activity tolerance, balance deficits    Assessment:     Aida Gupta is a 74 y.o. female admitted with a medical diagnosis of STEMI (ST elevation myocardial infarction).  She presents with the following impairments/functional limitations: weakness, impaired endurance, impaired functional mobility, decreased safety awareness, decreased lower extremity function, impaired cardiopulmonary response to activity.    Pt sleeping but easily roused by calling name. Pt agreeable to visit. Pt performed supine to sit transfer with contact guard assist using bed rail to assist. Pt tolerated sitting EOB with close stand by assist. Pt reports needing to have a BM.    Pt reports that she does not think she can ambulate to the bathroom to make it in time. No BSC in room. Pt reports that she could not wait and had bowel accident. Pt returned to bed with min assist.    Pt rolled side to side with min assist while therapist performed hygiene dependently. Pt reports that she thinks she needs to go more and that her stomach hurts. Tech retrieved bedside commode. Supine to sit transfer with contact guard assist. Step transfer from bed to BSC with min assist no AD.    Pt with small amounts of smeared BM but no formed. Pt performed sit to stand transfer with contact guard assist and RW. Pt tolerated standing at RW with contact guard assist while therapist performed hygiene.    Pt declined sitting in chair. Pt performed step transfer back to bed with contact guard assist and RW. Pt returned to supine with min assist. PCT informed of BM and chance that pt may go again.    Rehab Prognosis: Fair; patient would benefit from acute skilled PT services to address these deficits  and reach maximum level of function.    Recent Surgery: Procedure(s) (LRB):  Left heart cath (Left) 11 Days Post-Op    Plan:     During this hospitalization, patient to be seen 5 x/week to address the identified rehab impairments via gait training, therapeutic activities, therapeutic exercises, neuromuscular re-education and progress toward the following goals:    Plan of Care Expires:  06/30/25    Subjective     Chief Complaint: reports stomach pain and needing to have BM  Patient/Family Comments/goals: to get on toilet  Pain/Comfort:  Pain Rating 1: 0/10      Objective:     Communicated with nurse prior to session.  Patient found HOB elevated with telemetry, bed alarm upon PT entry to room.     General Precautions: Standard, fall  Orthopedic Precautions: N/A  Braces: N/A  Respiratory Status: Room air     Functional Mobility:  Bed Mobility:     Rolling Left:  minimum assistance  Rolling Right: minimum assistance  Supine to Sit: contact guard assistance  Sit to Supine: minimum assistance  Transfers:     Sit to Stand:  contact guard assistance with rolling walker  Bed to Chair: contact guard assistance with  rolling walker  using  Step Transfer  Toilet Transfer: minimum assistance with  no AD  using  Step Transfer  Balance: sitting EOB close SBA; standing at RW CGA      AM-PAC 6 CLICK MOBILITY          Treatment & Education:  Pt educated on importance of time OOB, importance of intermittent mobility, safe techniques for transfers/ambulation, discharge recommendations/options, and use of call light for assistance and fall prevention.      Patient left HOB elevated with all lines intact, call button in reach, and bed alarm on..    GOALS:   Multidisciplinary Problems       Physical Therapy Goals          Problem: Physical Therapy    Goal Priority Disciplines Outcome Interventions   Physical Therapy Goal     PT, PT/OT Progressing    Description: Goals to be met by: 6/30/25     Patient will increase functional  independence with mobility by performin. Supine to sit with Supervision  2. Sit to stand transfer with Supervision  3. Bed to chair transfer with Supervision using Rolling Walker  4. Gait  x 100 feet with Supervision using Rolling Walker.                              DME Justifications:  No DME recommended requiring DME justifications    Time Tracking:     PT Received On: 25  PT Start Time: 1059     PT Stop Time: 1137  PT Total Time (min): 38 min     Billable Minutes: Therapeutic Activity 38    Treatment Type: Treatment  PT/PTA: PTA     Number of PTA visits since last PT visit: 3     2025

## 2025-06-09 NOTE — PLAN OF CARE
Patient cleared for discharge from case management standpoint.    Chart and discharge orders reviewed.  Patient discharged to [facility] with no further case management needs.     Attending nurse has been given info to call report.  Ambulance transport requested for 4:30 PM.     06/09/25 1610   Final Note   Assessment Type Final Discharge Note   Anticipated Discharge Disposition SNF   Hospital Resources/Appts/Education Provided Provided patient/caregiver with written discharge plan information;Provided education on problems/symptoms using teachback   Post-Acute Status   Post-Acute Authorization Placement   Post-Acute Placement Status Set-up Complete/Auth obtained   Discharge Delays (!) Ambulance Transport/Facility Transport

## 2025-06-09 NOTE — PLAN OF CARE
MAGAN sent AVS via Epic.  MAGAN verified patient to admit today.  MAGAN updated family.     06/09/25 1314   Post-Acute Status   Post-Acute Authorization Placement   Post-Acute Placement Status Pending post-acute provider review/more information requested

## 2025-06-09 NOTE — PT/OT/SLP PROGRESS
Occupational Therapy   Treatment    Name: Aida Gupta  MRN: [id number]  Admitting Diagnosis:  STEMI (ST elevation myocardial infarction)  11 Days Post-Op    Recommendations:     Discharge Recommendations: Moderate Intensity Therapy  Discharge Equipment Recommendations:  to be determined by next level of care  Barriers to discharge:  Other (Comment) (Increased assistance with ADLs, decreased activity tolerance)    Assessment:     Aida Gupta is a 74 y.o. female with a medical diagnosis of STEMI (ST elevation myocardial infarction).  Performance deficits affecting function are weakness, impaired endurance, impaired self care skills, impaired functional mobility, gait instability, impaired balance, decreased safety awareness, decreased lower extremity function, decreased upper extremity function.     Rehab Prognosis:  Fair; patient would benefit from acute skilled OT services to address these deficits and reach maximum level of function.       Plan:     Patient to be seen 5 x/week to address the above listed problems via self-care/home management, therapeutic activities, therapeutic exercises  Plan of Care Expires: 06/30/25  Plan of Care Reviewed with: patient    Subjective     Chief Complaint: tired and stomach pain   Patient/Family Comments/goals: to get better  Pain/Comfort:  Pain Rating 1: 0/10    Objective:     Communicated with: nurse prior to session.  Patient found supine with telemetry, peripheral IV upon OT entry to room.    General Precautions: Standard, fall    Orthopedic Precautions:N/A  Braces: N/A  Respiratory Status: Room air     Occupational Performance:     Bed Mobility:    Patient completed Rolling/Turning to Left with  stand by assistance  Patient completed Rolling/Turning to Right with stand by assistance  Patient completed Scooting/Bridging with minimum assistance  Patient completed Supine to Sit with contact guard assistance and minimum assistance  Patient completed Sit to Supine  with contact guard assistance and minimum assistance     Functional Mobility/Transfers:  Patient completed Sit <> Stand Transfer with minimum assistance  with  rolling walker   Functional Mobility: Pt took <10 steps in room with CGA and RW then requested brief change    Activities of Daily Living:  Grooming: setup assistance for  hand hygiene setaed EOB   Lower Body Dressing: maximal assistance sock management seated EOB  Toileting: maximal assistance to doff brief in standing with CGA and RW for stability. Max A to don brief seated EOB.       [facility] 6 Click ADL:      Treatment & Education:  -Pt educated on role of OT and POC, use of call light to assist with any needs/transfers, importance of EOB/OOB activities to help negate the negative impact of prolonged hospital stay.       Patient left HOB elevated with all lines intact, call button in reach, and bed alarm on    GOALS:   Multidisciplinary Problems       Occupational Therapy Goals          Problem: Occupational Therapy    Goal Priority Disciplines Outcome Interventions   Occupational Therapy Goal     OT, PT/OT Progressing    Description: Goals to be met by: 06/30/2025     Patient will increase functional independence with ADLs by performing:    Feeding with Minimal Assistance.  UE Dressing with Minimal Assistance.  LE Dressing with Minimal Assistance.  Grooming while standing at sink with Minimal Assistance.  Toileting from bedside commode with Minimal Assistance for hygiene and clothing management.   Rolling to Bilateral with Minimal Assistance.   Supine to sit with Contact Guard Assistance.  Stand pivot transfers with Contact Guard Assistance.  Toilet transfer to bedside commode with Stand-by Assistance.                           Time Tracking:     OT Date of Treatment: 06/09/25  OT Start Time: 1216  OT Stop Time: 1230  OT Total Time (min): 14 min    Billable Minutes:Self Care/Home Management 14    OT/[place]: OT          6/9/2025

## 2025-06-09 NOTE — CARE UPDATE
06/09/25 0645   Patient Assessment/Suction   Level of Consciousness (AVPU) alert   Respiratory Effort Normal;Unlabored   Expansion/Accessory Muscles/Retractions no retractions;no use of accessory muscles   All Lung Fields Breath Sounds Anterior:;Lateral:;clear   Rhythm/Pattern, Respiratory pattern regular;unlabored   Cough Frequency infrequent   Cough Type fair;nonproductive   Skin Integrity   $ Wound Care Tech Time 15 min   Area Observed Left;Right;Behind ear;Cheek;Upper lip;Nares   Skin Appearance without discoloration   PRE-TX-O2   Device (Oxygen Therapy) room air  (weaned from 3L)   $ Is the patient on Low Flow Oxygen? Yes   Flow (L/min) (Oxygen Therapy) 3   SpO2 99 %   Pulse Oximetry Type Intermittent   $ Pulse Oximetry - Multiple Charge Pulse Oximetry - Multiple   Pulse 60   Resp 16   Aerosol Therapy   $ Aerosol Therapy Charges Aerosol Treatment  (PULMICORT)   Daily Review of Necessity (SVN) completed   Respiratory Treatment Status (SVN) given   Treatment Route (SVN) mask;oxygen   Patient Position semi-Mcduffie's   Post Treatment Assessment (SVN) breath sounds improved;increased aeration   Signs of Intolerance (SVN) none   Breath Sounds Post-Respiratory Treatment   Throughout All Fields Post-Treatment All Fields   Throughout All Fields Post-Treatment aeration increased   Post-treatment Heart Rate (beats/min) 62   Post-treatment Resp Rate (breaths/min) 16   Vibratory PEP Therapy   $ Vibratory PEP Charges Aerobika Therapy   $ Vibratory PEP Equipment Aerobika Equipment   $ Vibratory PEP Tech Time Charges 15 min   Daily Review of Necessity (PEP Therapy) completed   Type (PEP Therapy) vibratory/oscillatory   Device (PEP Therapy) flutter   Route (PEP Therapy) mouthpiece   Breaths per Cycle (PEP Therapy) 10   Cycles (PEP Therapy) 1   PEP Pressure (cm H2O) 4   PEP Duration (min) 5   Settings (PEP Therapy) PEP 4   Patient Position (PEP Therapy) semi-Mcduffie's   Post Treatment Assessment (PEP) breath sounds unchanged    Signs of Intolerance (PEP Therapy) none   Education   $ Education Oxygen;Bronchodilator;PEP Therapy;15 min

## 2025-06-10 ENCOUNTER — PATIENT OUTREACH (OUTPATIENT)
Dept: ADMINISTRATIVE | Facility: OTHER | Age: 75
End: 2025-06-10
Payer: MEDICARE

## 2025-06-10 ENCOUNTER — PATIENT MESSAGE (OUTPATIENT)
Dept: ADMINISTRATIVE | Facility: CLINIC | Age: 75
End: 2025-06-10
Payer: MEDICARE

## 2025-06-10 NOTE — PROGRESS NOTES
CHW - Initial Contact    This Community Health Worker completed OR updated the Social Determinant of Health questionnaire with caregiver via telephone today.    Pt identified barriers of most importance are: Spoke with the patient's POA, and she is seeking a nursing home for her mom to receive care to get better [phone]. The patient didn't answer the phone.      Referrals were put through [facility] - no: None  Support and Services:   Other information discussed the patient needs / wants help with: Spoke with the patient's POA, and she is seeking a nursing home for her mom to receive care to get better [phone]. The patient didn't answer the phone.    Follow up required:   Initial Outreach - Due: 6/11/2025

## 2025-07-02 ENCOUNTER — TELEPHONE (OUTPATIENT)
Dept: FAMILY MEDICINE | Facility: CLINIC | Age: 75
End: 2025-07-02
Payer: MEDICARE

## 2025-07-02 NOTE — TELEPHONE ENCOUNTER
Spoke with Minor,   States patient was was referred to [facility]    She states the nurse is refusing to see her. Due to unsafe home environment.   Instructed to call [facility] and ask for a different HH company.   And asking about a hospital bed orders.  She will call back if referral is needed

## 2025-07-02 NOTE — TELEPHONE ENCOUNTER
Copied from CRM #[id number]. Topic: General Inquiry - Patient Advice  >> Jul 2, 2025  1:08 PM Lynn wrote:  Type:  Needs Medical Advice    Who Called: daughter Minor   Symptoms (please be specific): pt needs to speak to dr about getting set up with another [facility] agency,    Would the patient rather a call back or a response via [facility]? Call  Best Call Back Number:   Additional Information: please advise and thank you.

## 2025-07-19 PROBLEM — I21.4 NSTEMI (NON-ST ELEVATED MYOCARDIAL INFARCTION): Status: ACTIVE | Noted: 2025-05-29

## 2025-07-19 PROBLEM — R07.9 CHEST PAIN: Status: ACTIVE | Noted: 2025-07-19

## 2025-07-21 ENCOUNTER — PATIENT OUTREACH (OUTPATIENT)
Dept: ADMINISTRATIVE | Facility: CLINIC | Age: 75
End: 2025-07-21
Payer: MEDICARE

## 2025-07-21 ENCOUNTER — TELEPHONE (OUTPATIENT)
Dept: ADMINISTRATIVE | Facility: CLINIC | Age: 75
End: 2025-07-21
Payer: MEDICARE

## 2025-07-21 ENCOUNTER — TELEPHONE (OUTPATIENT)
Dept: FAMILY MEDICINE | Facility: CLINIC | Age: 75
End: 2025-07-21
Payer: MEDICARE

## 2025-07-21 NOTE — TELEPHONE ENCOUNTER
I spoke with pts daughter via phone. Will see ARELIS Murrieta on  07/24/2025 at 1300. No further questions at this time.

## 2025-07-21 NOTE — TELEPHONE ENCOUNTER
"Phoned patient in response to reply of "2" to post-discharge texting tracker. Ms. Gupta was ordered a hospital bed to be delivered when she was discharged from a facility in [place]. Her daughter Minor explained that it was scheduled to be delivered on a day it was raining and it appears the order was canceled. She is requesting that a new order be placed so that the hospital bed will be delivered. Offered to send message of her request to PCP on her behalf. She accepted this outcome and message sent with patient contact info. Minor verbalized understanding.  "

## 2025-07-21 NOTE — TELEPHONE ENCOUNTER
Pts daughter is requesting new orders for a hospital bed. See message below, I am unable to locate any orders for the pt to fax. Can you please place new orders? Thank you !       ----- Message from Pharmacist Radhika sent at 7/21/2025 12:12 PM CDT -----  Regarding: Hospital Bed Not Delivered  Good afternoon. I'm with the post-discharge team.    Ms. Gupta was ordered a hospital bed to be delivered when she was discharged from a facility in [place]. Her daughter explained that it was scheduled to be delivered on a day it was raining and it appears the order was canceled. She is requesting that a new order be placed so that the hospital bed will be delivered. Ms. Gupta's daughter, Minor, may be reached at [phone].    Please note, this message was sent on the patient's behalf. This mailbox is not routinely monitored. Thanks.     Radhika Rodriguez, TrentD

## 2025-07-21 NOTE — TELEPHONE ENCOUNTER
I spoke with pts daughter via phone she states that a company name [employer] was supposed to deliver the hospital bed on 07/18/2025.  I advised pts daughter to call the company to ensure that the company does not need a new order from PCP. Will call back if needed.

## 2025-07-21 NOTE — PROGRESS NOTES
C3 nurse spoke with Aida Gupta for a TCC post hospital discharge follow up call. The patient does not have a scheduled HOSFU appointment with Edel Fisher MD  within 5-7 days post hospital discharge date 07/19/25. C3 nurse was unable to schedule HOSFU appointment in Psychiatric.    Message sent to PCP staff requesting they contact patient and schedule follow up appointment.

## 2025-07-22 ENCOUNTER — PATIENT OUTREACH (OUTPATIENT)
Dept: ADMINISTRATIVE | Facility: HOSPITAL | Age: 75
End: 2025-07-22
Payer: MEDICARE

## 2025-07-22 NOTE — PROGRESS NOTES
Care Coordination Encounter Details:    Called patient. Spoke to daughter, Minor.     [facility] Portal Status:         [x]  Reviewed MyChart Portal Status offered / enrolled if applicable        Additional Notes:     MyChart Outcomes: encourage use         Updates Requested / Reviewed:        Updated Care Coordination Note, Care Everywhere, , and Immunizations Reconciliation Completed or Queried: [place]         Health Maintenance Screening(s) Due:      Health Maintenance Topics Overdue:    VBHM Score: 2     Osteoporosis Screening  Mammogram    Tetanus Vaccine  Shingles/Zoster Vaccine  RSV Vaccine        Health Maintenance Topic(s) Outreach Outcomes & Actions Taken:    Breast Cancer Screening - Outreach Outcomes & Actions Taken  :      Colorectal Cancer Screening - Outreach Outcomes & Actions Taken  :      Osteoporosis Screening - Outreach Outcomes & Actions Taken  :      Blood Pressure - Outreach Outcomes & Actions Taken  :         Additional Notes:  Informed of HM items due. Note added to 07/24/2025 primary care appointment.           Chronic Disease Management:       Hypertension Measures        BP Readings from Last 1 Encounters:   07/19/25 (!) 140/76           [x]  Reviewed chart for active Hypertension diagnosis     []  Reviewed & documented Home BP Cuff     []  Documented a Remote BP if needed & applicable     []  Scheduled necessary follow up appointments with Primary Care if needed           Provider Team Continuity:     Last PCP Visit Date: 5/2/2025          [x]  Reviewed Primary Care Provider Visits, Annual Wellness Visit, and Future          Appointments to ensure appointments have been scheduled and/or           completed          Social Determinants of Health          [x]  Reviewed, completed, and/or updated the following sections:                  Food Insecurity, Transportation Needs, Financial Resource Strain,                 Tobacco Use          Care Management, Digital Medicine,  and/or Education Referrals    OPCM Risk Score: 81.1         Next Steps - Referral Actions: Digital Medicine Outcomes and Actions Taken: Pt Declined or Not Eligible and declined at this time.        Additional Notes:  CHW declined at this time. No referrals placed during this call.

## 2025-07-28 DIAGNOSIS — M54.16 LUMBAR RADICULITIS: ICD-10-CM

## 2025-07-28 DIAGNOSIS — E55.9 VITAMIN D DEFICIENCY: ICD-10-CM

## 2025-07-28 DIAGNOSIS — I10 HYPERTENSION, UNSPECIFIED TYPE: Primary | ICD-10-CM

## 2025-07-28 RX ORDER — METOPROLOL TARTRATE 25 MG/1
12.5 TABLET, FILM COATED ORAL 2 TIMES DAILY
Qty: 90 TABLET | Refills: 3 | Status: SHIPPED | OUTPATIENT
Start: 2025-07-28 | End: 2026-07-28

## 2025-07-28 RX ORDER — MELOXICAM 7.5 MG/1
7.5 TABLET ORAL DAILY
Qty: 90 TABLET | Refills: 3 | Status: SHIPPED | OUTPATIENT
Start: 2025-07-28

## 2025-07-28 RX ORDER — ACETAMINOPHEN 500 MG
5000 TABLET ORAL DAILY
Qty: 90 TABLET | Refills: 3 | Status: SHIPPED | OUTPATIENT
Start: 2025-07-28

## 2025-07-28 NOTE — TELEPHONE ENCOUNTER
Copied from CRM #[id number]. Topic: Medications - Medication Refill  >> Jul 28, 2025 11:40 AM Alicja wrote:  Type:  RX Refill Request    Who Called: patients daughter at [phone]    Refill or New Rx:refill    RX Name and Strength:  metoprolol tartrate (LOPRESSOR) 25 MG tablet    meloxicam (MOBIC) 7.5 MG tablet    cholecalciferol, vitamin D3, (VITAMIN D3) 125 mcg (5,000 unit) Tab    Preferred Pharmacy with phone number:  [facility] - [place] - [address]  [address] [place] [address]  Phone: [phone] Fax: [phone]      Additional Information: Patients daughter needs this done between today and tomorrow because she is going out of town for a week, please call and advise. Thank you.

## 2025-07-28 NOTE — TELEPHONE ENCOUNTER
No care due was identified.  Health [facility] Embedded Care Due Messages. Reference number: [id number].   7/28/2025 11:54:19 AM CDT

## 2025-08-14 ENCOUNTER — OFFICE VISIT (OUTPATIENT)
Dept: FAMILY MEDICINE | Facility: CLINIC | Age: 75
End: 2025-08-14
Payer: MEDICARE

## 2025-08-14 DIAGNOSIS — M54.16 LUMBAR RADICULITIS: ICD-10-CM

## 2025-08-14 DIAGNOSIS — J30.1 SEASONAL ALLERGIC RHINITIS DUE TO POLLEN: ICD-10-CM

## 2025-08-14 DIAGNOSIS — I10 HYPERTENSION, UNSPECIFIED TYPE: ICD-10-CM

## 2025-08-14 DIAGNOSIS — R73.03 PREDIABETES: ICD-10-CM

## 2025-08-14 DIAGNOSIS — I25.10 CORONARY ARTERY DISEASE, UNSPECIFIED VESSEL OR LESION TYPE, UNSPECIFIED WHETHER ANGINA PRESENT, UNSPECIFIED WHETHER NATIVE OR TRANSPLANTED HEART: ICD-10-CM

## 2025-08-14 DIAGNOSIS — E03.9 HYPOTHYROIDISM, UNSPECIFIED TYPE: ICD-10-CM

## 2025-08-14 DIAGNOSIS — I21.3 ST ELEVATION MYOCARDIAL INFARCTION (STEMI), UNSPECIFIED ARTERY: ICD-10-CM

## 2025-08-14 DIAGNOSIS — J18.9 COMMUNITY ACQUIRED PNEUMONIA, UNSPECIFIED LATERALITY: Primary | ICD-10-CM

## 2025-08-14 DIAGNOSIS — E55.9 VITAMIN D DEFICIENCY: ICD-10-CM

## 2025-08-14 DIAGNOSIS — E66.01 MORBID OBESITY WITH BMI OF 40.0-44.9, ADULT: ICD-10-CM

## 2025-08-14 DIAGNOSIS — F41.8 DEPRESSION WITH ANXIETY: ICD-10-CM

## 2025-08-14 DIAGNOSIS — R11.0 NAUSEA: ICD-10-CM

## 2025-08-14 DIAGNOSIS — J44.9 CHRONIC OBSTRUCTIVE PULMONARY DISEASE, UNSPECIFIED COPD TYPE: ICD-10-CM

## 2025-08-14 DIAGNOSIS — E78.5 HYPERLIPIDEMIA, UNSPECIFIED HYPERLIPIDEMIA TYPE: ICD-10-CM

## 2025-08-14 RX ORDER — LEVOCETIRIZINE DIHYDROCHLORIDE 5 MG/1
5 TABLET, FILM COATED ORAL NIGHTLY PRN
Qty: 30 TABLET | Status: SHIPPED | OUTPATIENT
Start: 2025-08-14 | End: 2026-08-14

## 2025-08-14 RX ORDER — FLUTICASONE PROPIONATE AND SALMETEROL 100; 50 UG/1; UG/1
1 POWDER RESPIRATORY (INHALATION) 2 TIMES DAILY
Qty: 60 EACH | Refills: 5 | Status: SHIPPED | OUTPATIENT
Start: 2025-08-14 | End: 2026-08-14

## 2025-08-14 RX ORDER — ESCITALOPRAM OXALATE 20 MG/1
20 TABLET ORAL NIGHTLY
Qty: 90 TABLET | Refills: 3 | Status: SHIPPED | OUTPATIENT
Start: 2025-08-14

## 2025-08-14 RX ORDER — LEVOTHYROXINE SODIUM 150 UG/1
150 TABLET ORAL
Qty: 90 TABLET | Refills: 1 | Status: SHIPPED | OUTPATIENT
Start: 2025-08-14

## 2025-08-14 RX ORDER — GABAPENTIN 300 MG/1
600 CAPSULE ORAL 2 TIMES DAILY
Qty: 360 CAPSULE | Refills: 3 | Status: SHIPPED | OUTPATIENT
Start: 2025-08-14

## 2025-08-14 RX ORDER — ATORVASTATIN CALCIUM 40 MG/1
40 TABLET, FILM COATED ORAL NIGHTLY
Qty: 90 TABLET | Refills: 3 | Status: SHIPPED | OUTPATIENT
Start: 2025-08-14 | End: 2026-08-14

## 2025-08-14 RX ORDER — ISOSORBIDE MONONITRATE 30 MG/1
30 TABLET, EXTENDED RELEASE ORAL DAILY
Qty: 90 TABLET | Refills: 3 | Status: SHIPPED | OUTPATIENT
Start: 2025-08-14 | End: 2026-08-14

## 2025-08-14 RX ORDER — ONDANSETRON 4 MG/1
4 TABLET, FILM COATED ORAL EVERY 8 HOURS PRN
Qty: 30 TABLET | Refills: 3 | Status: SHIPPED | OUTPATIENT
Start: 2025-08-14

## 2025-08-14 RX ORDER — CLOPIDOGREL BISULFATE 75 MG/1
75 TABLET ORAL DAILY
Qty: 90 TABLET | Refills: 3 | Status: SHIPPED | OUTPATIENT
Start: 2025-08-14 | End: 2026-08-14

## 2025-08-14 RX ORDER — METOPROLOL TARTRATE 25 MG/1
12.5 TABLET, FILM COATED ORAL 2 TIMES DAILY
Qty: 90 TABLET | Refills: 3 | Status: SHIPPED | OUTPATIENT
Start: 2025-08-14 | End: 2026-08-14

## 2025-08-14 RX ORDER — PANTOPRAZOLE SODIUM 40 MG/1
40 TABLET, DELAYED RELEASE ORAL DAILY
Qty: 90 TABLET | Refills: 3 | Status: SHIPPED | OUTPATIENT
Start: 2025-08-14 | End: 2026-08-14

## 2025-08-15 ENCOUNTER — TELEPHONE (OUTPATIENT)
Dept: FAMILY MEDICINE | Facility: CLINIC | Age: 75
End: 2025-08-15
Payer: MEDICARE

## 2025-08-28 ENCOUNTER — PATIENT OUTREACH (OUTPATIENT)
Dept: ADMINISTRATIVE | Facility: HOSPITAL | Age: 75
End: 2025-08-28
Payer: MEDICARE

## 2025-08-28 ENCOUNTER — PATIENT MESSAGE (OUTPATIENT)
Dept: ADMINISTRATIVE | Facility: HOSPITAL | Age: 75
End: 2025-08-28
Payer: MEDICARE

## 2025-09-01 PROBLEM — R07.9 CHEST PAIN: Status: RESOLVED | Noted: 2025-07-19 | Resolved: 2025-09-01

## (undated) DEVICE — SYR 10CC LUER LOCK

## (undated) DEVICE — NDL TUOHY EPIDURAL 20G X 3.5

## (undated) DEVICE — SHEATH INTRO PINNACLE 6F 10CM

## (undated) DEVICE — NDL HYPODERMIC BLUNT 18G 1.5IN

## (undated) DEVICE — CATH LNCHR EB35 6F 110CM

## (undated) DEVICE — SYS LABEL CORRECT MED

## (undated) DEVICE — NDL SAFETY 25G X 1.5 ECLIPSE

## (undated) DEVICE — CATH DXTERITY JR40 100CM 5FR

## (undated) DEVICE — GLOVE SURG ULTRA TOUCH 7.5

## (undated) DEVICE — GUIDE WIRE BMW 014 X190

## (undated) DEVICE — TUBING MINIBORE EXTENSION

## (undated) DEVICE — CATH EAGLE EYE PLATINUM

## (undated) DEVICE — CATH NC EMERGE MR 2.5X12MM

## (undated) DEVICE — CONTRAST VISIPAQUE 150ML

## (undated) DEVICE — GUIDEWIRE INQWIRE SE 3MM JTIP

## (undated) DEVICE — GUIDEWIRE PROWATER .014X180CM

## (undated) DEVICE — CATH EMERGE MR 15 X 2.25

## (undated) DEVICE — KIT MICROINTRODUCE MINI 5X10CM

## (undated) DEVICE — SYR DISP LL 5CC

## (undated) DEVICE — CATH DXTERITY JL40 100CM 5FR

## (undated) DEVICE — SHEATH PINNACLE 6FR 25CM

## (undated) DEVICE — GUIDEWIRE J 3MM .035IN 150